# Patient Record
Sex: FEMALE | Race: WHITE | NOT HISPANIC OR LATINO | Employment: OTHER | URBAN - METROPOLITAN AREA
[De-identification: names, ages, dates, MRNs, and addresses within clinical notes are randomized per-mention and may not be internally consistent; named-entity substitution may affect disease eponyms.]

---

## 2017-02-13 ENCOUNTER — ALLSCRIPTS OFFICE VISIT (OUTPATIENT)
Dept: OTHER | Facility: OTHER | Age: 77
End: 2017-02-13

## 2017-02-13 DIAGNOSIS — R79.89 OTHER SPECIFIED ABNORMAL FINDINGS OF BLOOD CHEMISTRY: ICD-10-CM

## 2017-02-13 DIAGNOSIS — R60.9 EDEMA: ICD-10-CM

## 2017-02-13 DIAGNOSIS — I10 ESSENTIAL (PRIMARY) HYPERTENSION: ICD-10-CM

## 2017-05-15 ENCOUNTER — ALLSCRIPTS OFFICE VISIT (OUTPATIENT)
Dept: OTHER | Facility: OTHER | Age: 77
End: 2017-05-15

## 2017-08-14 ENCOUNTER — ALLSCRIPTS OFFICE VISIT (OUTPATIENT)
Dept: OTHER | Facility: OTHER | Age: 77
End: 2017-08-14

## 2017-08-14 DIAGNOSIS — R51 HEADACHE(784.0): ICD-10-CM

## 2017-09-20 DIAGNOSIS — Z90.5 ACQUIRED ABSENCE OF KIDNEY: ICD-10-CM

## 2017-09-25 ENCOUNTER — ALLSCRIPTS OFFICE VISIT (OUTPATIENT)
Dept: OTHER | Facility: OTHER | Age: 77
End: 2017-09-25

## 2017-09-26 ENCOUNTER — GENERIC CONVERSION - ENCOUNTER (OUTPATIENT)
Dept: OTHER | Facility: OTHER | Age: 77
End: 2017-09-26

## 2017-10-03 ENCOUNTER — GENERIC CONVERSION - ENCOUNTER (OUTPATIENT)
Dept: OTHER | Facility: OTHER | Age: 77
End: 2017-10-03

## 2017-11-21 ENCOUNTER — GENERIC CONVERSION - ENCOUNTER (OUTPATIENT)
Dept: OTHER | Facility: OTHER | Age: 77
End: 2017-11-21

## 2017-11-29 ENCOUNTER — GENERIC CONVERSION - ENCOUNTER (OUTPATIENT)
Dept: OTHER | Facility: OTHER | Age: 77
End: 2017-11-29

## 2017-12-20 ENCOUNTER — ALLSCRIPTS OFFICE VISIT (OUTPATIENT)
Dept: OTHER | Facility: OTHER | Age: 77
End: 2017-12-20

## 2017-12-20 DIAGNOSIS — I51.9 HEART DISEASE: ICD-10-CM

## 2017-12-20 DIAGNOSIS — S09.90XA INJURY OF HEAD: ICD-10-CM

## 2017-12-20 DIAGNOSIS — I10 ESSENTIAL (PRIMARY) HYPERTENSION: ICD-10-CM

## 2017-12-20 DIAGNOSIS — R79.89 OTHER SPECIFIED ABNORMAL FINDINGS OF BLOOD CHEMISTRY: ICD-10-CM

## 2017-12-21 NOTE — PROGRESS NOTES
Assessment   1  Closed head injury, initial encounter (959 01) (S09 90XA)   2  Low serum vitamin B12 (790 6) (R79 89)   3  Diastolic dysfunction (538 5) (I51 9)    Plan   Acute lower UTI    · Ciprofloxacin HCl - 250 MG Oral Tablet   · Pyridium 100 MG Oral Tablet  Benign essential hypertension, Diastolic dysfunction    · (1) TSH WITH FT4 REFLEX; Status:Active; Requested for:65Brn7451;   Closed head injury, initial encounter    · * CT HEAD WO CONTRAST; Status:Need Information - Financial Authorization; Requested XAP:[id number];   Diastolic dysfunction    · (1) COMPREHENSIVE METABOLIC PANEL; Status:Active; Requested for:60Vpu6855;    · EKG/ECG- POC; Status:Complete - Retrospective By Protocol Authorization;   Done:    07JOR4090 10:03AM  Low serum vitamin B12    · (1) CBC/PLT/DIFF; Status:Active; Requested for:98Gqw4091;    · (1) VITAMIN B12; Status:Active; Requested for:38Dmw4607;     Discussion/Summary   Surgical Clearance: She is at a LOW risk from a cardiovascular standpoint at this time without any additional cardiac testing  Reevaluation needed, if she should present with symptoms prior to surgery/procedure  Possible side effects of new medications were reviewed with the patient/guardian today  The treatment plan was reviewed with the patient/guardian  The patient/guardian understands and agrees with the treatment plan      Chief Complaint   SURGICAL CLEARANCE  CATARACT SURGERY TO BE DONE 12/27/2017  EKG DONE IN SEPTEMBER      History of Present Illness   Pre-Op Visit (Brief): The patient is being seen for a preoperative visit  The procedure is a(n) catarct  The indication for surgery is cataract  Surgical Risk Assessment:      Prior Anesthesia: She had prior anesthesia,-- no prior adverse reaction to edidural anesthesia,-- no prior adverse reaction to spinal anesthesia-- and-- no prior adverse reaction to general anesthesia  Pertinent Past Medical History: no pertinent past medical history   Exercise Capacity: able to walk four blocks without symptoms-- and-- able to walk two flights of stairs without symptoms  Symptoms: no symptoms  Pertinent Family History: no pertinent family history  Living Situation: home is secure and supportive  HPI: She fell out of bed 2 nights ago  She struck her head  She states she heard a loud crack  She did not have much in the way of symptoms on the 1st night, but now she has a lump on her head and is very painful to touch  She has not lost consciousness  She has no vision changes  She does have pain behind her right ear and she has some nausea  She has no dizziness and no other complaints  Active Problems   1  Acquired solitary kidney (V45 73) (Z90 5)   2  Acute lower UTI (599 0) (N39 0)   3  Atypical chest pain (786 59) (R07 89)   4  Benign essential hypertension (401 1) (I10)   5  Biceps tendinitis of right shoulder (726 12) (M75 21)   6  Chronic diastolic CHF (congestive heart failure) (428 32,428 0) (I50 32)   7  Constipation due to opioid therapy (564 09,E935 2) (K59 03,T40 2X5A)   8  Diastolic dysfunction (171 5) (I51 9)   9  Dyspnea on exertion (786 09) (R06 09)   10  GERD without esophagitis (530 81) (K21 9)   11  Headache, worsening (784 0) (R51)   12  Lateral epicondylitis of right elbow (726 32) (M77 11)   13  Low serum vitamin B12 (790 6) (R79 89)   14  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)   15  Need for pneumococcal vaccination (V03 82) (Z23)   16  Peripheral edema (782 3) (R60 9)   17  Screening for genitourinary condition (V81 6) (Z13 89)   18   Special screening for other neurological conditions (V80 09) (Z13 89)    Past Medical History    · History of Cough (786 2) (R05)   · History of hypercholesterolemia (V12 29) (Z86 39)   · History of hypertension (V12 59) (Z86 79)   · History of IBS (V12 79) (Z87 19)   · History of rheumatic fever (V12 09) (Z86 79)   · History of Leg swelling (729 81) (M79 89)   · History of Skin lesion (709  9) (L98 9)    Surgical History    · History of Hysterectomy (V88 01)   · History of Kidney Surgery   · History of Tonsillectomy   · History of Treatment Of Ankle Fracture    Family History   Mother    · Family history of hypertension (V17 49) (Z82 49)  Father    · Family history of asthma (V17 5) (Z82 5)  Sister    · Family history of malignant neoplasm (V16 9) (Z80 9)    Social History    · Never a smoker    Current Meds    1  BD Luer-Arpan Syringe 26G X 5/8 3 ML Miscellaneous; USE AS DIRECTED; Therapy: 59ZRL6683 to (Last Rx:03Mar2017)  Requested for: 60NJR5606 Ordered   2  Ciprofloxacin HCl - 250 MG Oral Tablet; TAKE 1 TABLET EVERY 12 HOURS DAILY; Therapy: 37QWI9066 to (Evaluate:28Sep2017)  Requested for: 67PCY7708; Last     Rx:25Sep2017 Ordered   3  Cyanocobalamin 1000 MCG/ML Injection Solution; INJECT 1 ML INTRAMUSCULARLY     ONCE A MONTH;     Therapy: 20LGC2655 to (Last MD:49XCX2234)  Requested for: 13VPM0987 Ordered   4  Esomeprazole Magnesium 40 MG Oral Capsule Delayed Release; TAKE 1 CAPSULE     DAILY AS NEEDED; Therapy: 59QEX7254 to (Evaluate:28Jan2017)  Requested for: 30Xfr2930; Last     Rx:03Sqf3786 Ordered   5  Fluticasone Propionate 50 MCG/ACT Nasal Suspension; USE 2 SPRAYS IN EACH     NOSTRIL ONCE DAILY; Therapy: 04MZM2272 to (Last Rx:14Mar2016)  Requested for: 19BNE6049 Ordered   6  Furosemide 20 MG Oral Tablet; TAKE 1 TABLET DAILY AS DIRECTED; Therapy: 10ITN8111 to (Evaluate:08Old1645)  Requested for: 31REL1525; Last     Rx:26Tcs0530 Ordered   7  HydroCHLOROthiazide 25 MG Oral Tablet; take 1 tablet by mouth once daily; Therapy: 30LEK3650 to (Evaluate:41Nee9680)  Requested for: 56HCP4384; Last     Rx:23Fmw9247 Ordered   8  Losartan Potassium 50 MG Oral Tablet; TAKE 1 TABLET DAILY; Therapy: 97FZX8311 to (Evaluate:77Zwx8815)  Requested for: 19UTY4781; Last     Rx:48Cfw0073 Ordered   9   Polyethylene Glycol 3350 Oral Powder; MIX 1 CAPFUL (17GM) IN 8 OUNCES OF     WATER, JUICE, OR TEA AND DRINK DAILY; Therapy: 53Ssy5659 to (Evaluate:17Mar2018)  Requested for: 08Wbv0537; Last     Rx:09Gbo5808 Ordered   10  Potassium Chloride 10 MEQ TBCR; Therapy: (Recorded:14Mar2016) to Recorded   11  Pyridium 100 MG Oral Tablet; TAKE 1 TABLET 3 TIMES DAILY AFTER MEALS AS      NEEDED; Therapy: 69DII3969 to (Last Lyndal Sicard)  Requested for: 08OPJ1327 Ordered   12  Vitamin D3 1000 UNIT Oral Tablet Chewable; Therapy: (Recorded:14Mar2016) to Recorded    Allergies   1  Amoxicillin TABS   2  Statins    Vitals    Recorded: 44Bkz2587 09:15AM   Heart Rate 59   Respiration 15   Systolic 166   Diastolic 64   Height 4 ft 11 in   Weight 174 lb 8 oz   BMI Calculated 35 24   BSA Calculated 1 74   O2 Saturation 96     Physical Exam        Constitutional      General appearance: No acute distress, well appearing and well nourished  Ears, Nose, Mouth, and Throat      Otoscopic examination: Tympanic membranes translucent with normal light reflex  Canals patent without erythema  Hearing: Normal        Lips, teeth, and gums: Normal, good dentition  Neck      Neck: Supple, symmetric, trachea midline, no masses  Thyroid: Normal, no thyromegaly  Pulmonary      Respiratory effort: No increased work of breathing or signs of respiratory distress  Auscultation of lungs: Clear to auscultation  Cardiovascular      Auscultation of heart: Normal rate and rhythm, normal S1 and S2, no murmurs  Carotid pulses: 2+ bilaterally  Abdominal aorta: Normal        Musculoskeletal      Gait and station: Abnormal  -- tender parietal area right  Neurologic      Cranial nerves: Cranial nerves II-XII intact  Cortical function: Normal mental status  Reflexes: 2+ and symmetric  Sensation: No sensory loss  Coordination: Normal finger to nose and heel to shin         Psychiatric      Judgment and insight: Normal        Orientation to person, place, and time: Normal        Recent and remote memory: Intact  Mood and affect: Normal        Results/Data   EKG/ECG- POC 35Kxb8378 10:03AM Rajesh Rico      Test Name Result Flag Reference   EKG/ECG [id number]          End of Encounter Meds   1  HydroCHLOROthiazide 25 MG Oral Tablet; take 1 tablet by mouth once daily; Therapy: 27HEQ3148 to (Evaluate:2018)  Requested for: 22IDP2742; Last     Rx:15Mob7630 Ordered  2  Losartan Potassium 50 MG Oral Tablet; TAKE 1 TABLET DAILY; Therapy: 44FLP0031 to (Evaluate:2018)  Requested for: 65YGO4379; Last     Rx:39Ddf7900 Ordered  3  Polyethylene Glycol 3350 Oral Powder; MIX 1 CAPFUL (17GM) IN 8 OUNCES OF     WATER, JUICE, OR TEA AND DRINK DAILY; Therapy: 86Ebc0366 to (Evaluate:2018)  Requested for: 51Gyv5419; Last     Rx:42Jjh8793 Ordered  4  Furosemide 20 MG Oral Tablet; TAKE 1 TABLET DAILY AS DIRECTED; Therapy: 58OBR0471 to (Evaluate:2018)  Requested for: 64TMB7448; Last     Rx:74Oxx5676 Ordered  5  Esomeprazole Magnesium 40 MG Oral Capsule Delayed Release (NexIUM); TAKE 1     CAPSULE DAILY AS NEEDED; Therapy: 06LPL3758 to (Evaluate:2017)  Requested for: 70Ajh6909; Last     Rx:72Ltg7771 Ordered  6  BD Luer-Arpan Syringe 26G X 5/8 3 ML Miscellaneous; USE AS DIRECTED; Therapy: 64QWQ5338 to (Last Rx:2017)  Requested for: 44BJN6785 Ordered   7  Cyanocobalamin 1000 MCG/ML Injection Solution; INJECT 1 ML INTRAMUSCULARLY     ONCE A MONTH;     Therapy: 63PBI8898 to (Last J86YHM9632)  Requested for: 58HHS6482 Ordered  8  Fluticasone Propionate 50 MCG/ACT Nasal Suspension; USE 2 SPRAYS IN EACH     NOSTRIL ONCE DAILY; Therapy: 56JJG2973 to (Last Rx:2016)  Requested for: 70UFB3706 Ordered  9  Potassium Chloride 10 MEQ TBCR; Therapy: (Recorded:2016) to Recorded   10  Vitamin D3 1000 UNIT Oral Tablet Chewable;       Therapy: (Recorded:2016) to Recorded    Future Appointments      Date/Time Provider Specialty Site 02/05/2018 09:30 AM Birdena Bamberger, MD [address]    Electronically signed by : Mara Medina MD; Dec 20 2017 10:25AM EST                       (Author)

## 2018-01-12 VITALS
HEART RATE: 60 BPM | OXYGEN SATURATION: 97 % | BODY MASS INDEX: 34.12 KG/M2 | HEIGHT: 59 IN | RESPIRATION RATE: 15 BRPM | DIASTOLIC BLOOD PRESSURE: 72 MMHG | SYSTOLIC BLOOD PRESSURE: 130 MMHG | WEIGHT: 169.25 LBS

## 2018-01-13 VITALS
HEART RATE: 62 BPM | BODY MASS INDEX: 34.68 KG/M2 | HEIGHT: 59 IN | SYSTOLIC BLOOD PRESSURE: 140 MMHG | OXYGEN SATURATION: 97 % | WEIGHT: 172 LBS | DIASTOLIC BLOOD PRESSURE: 82 MMHG | RESPIRATION RATE: 15 BRPM

## 2018-01-14 VITALS
HEIGHT: 59 IN | HEART RATE: 68 BPM | RESPIRATION RATE: 14 BRPM | OXYGEN SATURATION: 95 % | SYSTOLIC BLOOD PRESSURE: 124 MMHG | WEIGHT: 172.25 LBS | BODY MASS INDEX: 34.72 KG/M2 | DIASTOLIC BLOOD PRESSURE: 62 MMHG

## 2018-01-15 VITALS
WEIGHT: 172.38 LBS | OXYGEN SATURATION: 98 % | DIASTOLIC BLOOD PRESSURE: 68 MMHG | BODY MASS INDEX: 34.75 KG/M2 | HEIGHT: 59 IN | RESPIRATION RATE: 15 BRPM | HEART RATE: 65 BPM | SYSTOLIC BLOOD PRESSURE: 122 MMHG

## 2018-01-23 VITALS
DIASTOLIC BLOOD PRESSURE: 64 MMHG | BODY MASS INDEX: 35.18 KG/M2 | RESPIRATION RATE: 15 BRPM | HEART RATE: 59 BPM | SYSTOLIC BLOOD PRESSURE: 128 MMHG | WEIGHT: 174.5 LBS | HEIGHT: 59 IN | OXYGEN SATURATION: 96 %

## 2018-02-12 RX ORDER — FUROSEMIDE 20 MG/1
1 TABLET ORAL DAILY
COMMUNITY
Start: 2017-02-13 | End: 2018-03-16 | Stop reason: SDUPTHER

## 2018-02-12 RX ORDER — FLUTICASONE PROPIONATE 50 MCG
2 SPRAY, SUSPENSION (ML) NASAL DAILY
COMMUNITY
Start: 2016-03-14 | End: 2018-08-06

## 2018-02-12 RX ORDER — CALCIUM CARBONATE 300MG(750)
TABLET,CHEWABLE ORAL
COMMUNITY
End: 2018-05-07

## 2018-02-12 RX ORDER — LOSARTAN POTASSIUM 50 MG/1
TABLET ORAL
Refills: 0 | COMMUNITY
Start: 2018-01-03 | End: 2018-05-07

## 2018-02-12 RX ORDER — ESOMEPRAZOLE MAGNESIUM 40 MG/1
1 CAPSULE, DELAYED RELEASE ORAL DAILY PRN
COMMUNITY
Start: 2016-06-13 | End: 2018-05-07

## 2018-02-12 RX ORDER — HYDROCHLOROTHIAZIDE 25 MG/1
1 TABLET ORAL DAILY
COMMUNITY
Start: 2017-12-18 | End: 2018-08-06

## 2018-02-12 RX ORDER — POTASSIUM CHLORIDE 750 MG/1
TABLET, EXTENDED RELEASE ORAL
COMMUNITY
End: 2018-08-06

## 2018-02-12 RX ORDER — CYANOCOBALAMIN 1000 UG/ML
1 INJECTION INTRAMUSCULAR; SUBCUTANEOUS
COMMUNITY
Start: 2017-03-03 | End: 2018-02-13 | Stop reason: SDUPTHER

## 2018-02-13 ENCOUNTER — OFFICE VISIT (OUTPATIENT)
Dept: FAMILY MEDICINE CLINIC | Facility: CLINIC | Age: 78
End: 2018-02-13
Payer: COMMERCIAL

## 2018-02-13 VITALS
DIASTOLIC BLOOD PRESSURE: 78 MMHG | OXYGEN SATURATION: 97 % | SYSTOLIC BLOOD PRESSURE: 132 MMHG | HEART RATE: 69 BPM | RESPIRATION RATE: 14 BRPM

## 2018-02-13 DIAGNOSIS — I10 BENIGN ESSENTIAL HYPERTENSION: ICD-10-CM

## 2018-02-13 DIAGNOSIS — G62.89 OTHER POLYNEUROPATHY: Primary | ICD-10-CM

## 2018-02-13 DIAGNOSIS — E53.8 LOW SERUM VITAMIN B12: ICD-10-CM

## 2018-02-13 DIAGNOSIS — E53.8 VITAMIN B12 DEFICIENCY: Primary | ICD-10-CM

## 2018-02-13 DIAGNOSIS — J32.9 SINUSITIS, UNSPECIFIED CHRONICITY, UNSPECIFIED LOCATION: ICD-10-CM

## 2018-02-13 DIAGNOSIS — E53.8 B12 DEFICIENCY: ICD-10-CM

## 2018-02-13 DIAGNOSIS — M81.0 OSTEOPOROSIS, UNSPECIFIED OSTEOPOROSIS TYPE, UNSPECIFIED PATHOLOGICAL FRACTURE PRESENCE: ICD-10-CM

## 2018-02-13 DIAGNOSIS — I10 ESSENTIAL HYPERTENSION: ICD-10-CM

## 2018-02-13 PROBLEM — R51.9 HEADACHE, WORSENING: Status: ACTIVE | Noted: 2017-08-14

## 2018-02-13 PROBLEM — R60.9 PERIPHERAL EDEMA: Status: ACTIVE | Noted: 2017-02-13

## 2018-02-13 PROBLEM — M51.16 LUMBAR DISC HERNIATION WITH RADICULOPATHY: Status: ACTIVE | Noted: 2017-09-25

## 2018-02-13 PROBLEM — G62.9 PERIPHERAL NEUROPATHY: Status: ACTIVE | Noted: 2018-02-13

## 2018-02-13 PROBLEM — S09.90XA CLOSED HEAD INJURY: Status: ACTIVE | Noted: 2017-12-20

## 2018-02-13 PROBLEM — N39.0 ACUTE LOWER UTI: Status: ACTIVE | Noted: 2017-09-20

## 2018-02-13 PROBLEM — R60.0 PERIPHERAL EDEMA: Status: ACTIVE | Noted: 2017-02-13

## 2018-02-13 PROBLEM — I50.32 CHRONIC DIASTOLIC CHF (CONGESTIVE HEART FAILURE) (HCC): Status: ACTIVE | Noted: 2017-05-15

## 2018-02-13 PROCEDURE — 99214 OFFICE O/P EST MOD 30 MIN: CPT | Performed by: FAMILY MEDICINE

## 2018-02-13 PROCEDURE — 96372 THER/PROPH/DIAG INJ SC/IM: CPT

## 2018-02-13 RX ORDER — POLYETHYLENE GLYCOL 3350 17 G/17G
POWDER, FOR SOLUTION ORAL DAILY
COMMUNITY
Start: 2017-09-18 | End: 2018-05-07

## 2018-02-13 RX ORDER — PHENAZOPYRIDINE HYDROCHLORIDE 100 MG/1
1 TABLET, FILM COATED ORAL EVERY 8 HOURS
COMMUNITY
Start: 2017-09-25 | End: 2018-05-07

## 2018-02-13 RX ORDER — OFLOXACIN 3 MG/ML
SOLUTION/ DROPS OPHTHALMIC
Refills: 0 | COMMUNITY
Start: 2017-12-11 | End: 2018-05-07

## 2018-02-13 RX ORDER — DULOXETIN HYDROCHLORIDE 30 MG/1
30 CAPSULE, DELAYED RELEASE ORAL DAILY
Qty: 30 CAPSULE | Refills: 5 | Status: SHIPPED | OUTPATIENT
Start: 2018-02-13 | End: 2018-05-07

## 2018-02-13 RX ORDER — CYANOCOBALAMIN 1000 UG/ML
1000 INJECTION INTRAMUSCULAR; SUBCUTANEOUS ONCE
Status: CANCELLED | OUTPATIENT
Start: 2018-02-13

## 2018-02-13 RX ORDER — CYANOCOBALAMIN 1000 UG/ML
1000 INJECTION INTRAMUSCULAR; SUBCUTANEOUS WEEKLY
Qty: 10 ML | Refills: 5 | Status: SHIPPED | OUTPATIENT
Start: 2018-02-13 | End: 2018-08-06

## 2018-02-13 RX ORDER — SULFAMETHOXAZOLE AND TRIMETHOPRIM 800; 160 MG/1; MG/1
1 TABLET ORAL EVERY 12 HOURS SCHEDULED
Qty: 20 TABLET | Refills: 0 | Status: SHIPPED | OUTPATIENT
Start: 2018-02-13 | End: 2018-02-23

## 2018-02-13 RX ORDER — KETOROLAC TROMETHAMINE 4 MG/ML
SOLUTION/ DROPS OPHTHALMIC
Refills: 0 | COMMUNITY
Start: 2017-12-11 | End: 2018-05-07

## 2018-02-13 NOTE — PROGRESS NOTES
Assessment/Plan:    No problem-specific Assessment & Plan notes found for this encounter  Diagnoses and all orders for this visit:    Other polyneuropathy  -     DULoxetine (CYMBALTA) 30 mg delayed release capsule; Take 1 capsule (30 mg total) by mouth daily    B12 deficiency  -     SYRINGE-NEEDLE, DISP, 3 ML (B-D 3CC LUER-BRYAN SYR 26GX5/8") 26G X 5/8" 3 ML MISC; by Does not apply route once a week  -     cyanocobalamin 1,000 mcg/mL; Inject 1 mL (1,000 mcg total) under the skin once a week    Sinusitis, unspecified chronicity, unspecified location  -     sulfamethoxazole-trimethoprim (BACTRIM DS) 800-160 mg per tablet; Take 1 tablet by mouth every 12 (twelve) hours for 10 days    Essential hypertension  -     Lipid panel; Future  -     Comprehensive metabolic panel; Future  -     TSH, 3rd generation with T4 reflex; Future    Osteoporosis, unspecified osteoporosis type, unspecified pathological fracture presence  -     DXA bone density spine hip and pelvis; Future    Benign essential hypertension    Low serum vitamin B12    Other orders  -     Discontinue: SYRINGE-NEEDLE, DISP, 3 ML (B-D 3CC LUER-BRYAN SYR 26GX5/8") 26G X 5/8" 3 ML MISC; by Does not apply route  -     Discontinue: cyanocobalamin 1,000 mcg/mL; Inject 1 mL as directed  -     esomeprazole (NexIUM) 40 MG capsule; Take 1 capsule by mouth daily as needed  -     fluticasone (FLONASE) 50 mcg/act nasal spray; 2 sprays into each nostril daily  -     furosemide (LASIX) 20 mg tablet; Take 1 tablet by mouth daily  -     hydrochlorothiazide (HYDRODIURIL) 25 mg tablet; Take 1 tablet by mouth daily  -     losartan (COZAAR) 50 mg tablet;   -     Cholecalciferol (VITAMIN D3) 1000 units CHEW; Chew  -     potassium chloride (K-DUR,KLOR-CON) 10 mEq tablet; Take by mouth  -     ofloxacin (OCUFLOX) 0 3 % ophthalmic solution; START 3 DAYS PREOP instill 1 drop into right eye four times a day     (REFER TO PRESCRIPTION NOTES)    -     ketorolac (ACULAR) 0 4 % SOLN; instill 1 drop into right eye four times a day 3 DAYS PREOP/POSTO     (REFER TO PRESCRIPTION NOTES)  -     polyethylene glycol (GLYCOLAX) powder; Take by mouth daily  -     phenazopyridine (PYRIDIUM) 100 mg tablet; Take 1 tablet by mouth every 8 (eight) hours          Subjective:      Patient ID: Onelia Christine is a 76 y o  female  She has burning pain in the left lower extremity  It runs from the mid shin to the foot  She has no DM but does have B12 deficiency  She is not taking anything for it  She c/o fatigue, especially post exertionally  She is not taking B12 shots, although they were prescribed  She c/o sinus infection  She has sinus pain and congestion  She has PND and cough  She has no F/C    Her blood pressure is well controlled on her current regimen  She has no chest pain or shortness of breath  She has no new headache or vision changes  Leg Pain          The following portions of the patient's history were reviewed and updated as appropriate:   She  has a past medical history of Hypercholesteremia; Hypertension; IBS (irritable bowel syndrome); Rheumatic fever; and Skin lesion  She  does not have any pertinent problems on file  She  has a past surgical history that includes Hysterectomy; Kidney surgery; Tonsillectomy; and Ankle fracture surgery  Her family history includes Asthma in her father; Cancer in her sister; Hypertension in her mother  She  reports that she has never smoked  She does not have any smokeless tobacco history on file  Her alcohol and drug histories are not on file    Current Outpatient Prescriptions   Medication Sig Dispense Refill    esomeprazole (NexIUM) 40 MG capsule Take 1 capsule by mouth daily as needed      hydrochlorothiazide (HYDRODIURIL) 25 mg tablet Take 1 tablet by mouth daily      Cholecalciferol (VITAMIN D3) 1000 units CHEW Chew      cyanocobalamin 1,000 mcg/mL Inject 1 mL (1,000 mcg total) under the skin once a week 10 mL 5    DULoxetine (CYMBALTA) 30 mg delayed release capsule Take 1 capsule (30 mg total) by mouth daily 30 capsule 5    fluticasone (FLONASE) 50 mcg/act nasal spray 2 sprays into each nostril daily      furosemide (LASIX) 20 mg tablet Take 1 tablet by mouth daily      ketorolac (ACULAR) 0 4 % SOLN instill 1 drop into right eye four times a day 3 DAYS PREOP/POSTO     (REFER TO PRESCRIPTION NOTES)  0    losartan (COZAAR) 50 mg tablet   0    ofloxacin (OCUFLOX) 0 3 % ophthalmic solution START 3 DAYS PREOP instill 1 drop into right eye four times a day     (REFER TO PRESCRIPTION NOTES)  0    phenazopyridine (PYRIDIUM) 100 mg tablet Take 1 tablet by mouth every 8 (eight) hours      polyethylene glycol (GLYCOLAX) powder Take by mouth daily      potassium chloride (K-DUR,KLOR-CON) 10 mEq tablet Take by mouth      sulfamethoxazole-trimethoprim (BACTRIM DS) 800-160 mg per tablet Take 1 tablet by mouth every 12 (twelve) hours for 10 days 20 tablet 0    SYRINGE-NEEDLE, DISP, 3 ML (B-D 3CC LUER-BRYAN SYR 26GX5/8") 26G X 5/8" 3 ML MISC by Does not apply route once a week 4 each 5     No current facility-administered medications for this visit  No current outpatient prescriptions on file prior to visit  No current facility-administered medications on file prior to visit  She is allergic to amoxicillin; ciprofloxacin; and statins       Review of Systems   All other systems reviewed and are negative  Objective:    Vitals:    02/13/18 0908   BP: 132/78   Pulse: 69   Resp: 14   SpO2: 97%        Physical Exam   Constitutional: She is oriented to person, place, and time  Neck: Normal range of motion  Neck supple  Cardiovascular: Normal rate, regular rhythm, normal heart sounds and intact distal pulses  Pulmonary/Chest: Effort normal and breath sounds normal    Abdominal: Soft  Bowel sounds are normal    Musculoskeletal: Normal range of motion  Neurological: She is alert and oriented to person, place, and time   She has normal reflexes  Skin: Skin is warm and dry  Psychiatric: She has a normal mood and affect  Her behavior is normal  Judgment and thought content normal    Nursing note and vitals reviewed

## 2018-02-15 RX ORDER — CYANOCOBALAMIN 1000 UG/ML
1000 INJECTION INTRAMUSCULAR; SUBCUTANEOUS
Status: SHIPPED | OUTPATIENT
Start: 2018-02-15

## 2018-02-19 RX ADMIN — CYANOCOBALAMIN 1000 MCG: 1000 INJECTION INTRAMUSCULAR; SUBCUTANEOUS at 10:08

## 2018-02-26 ENCOUNTER — TELEPHONE (OUTPATIENT)
Dept: FAMILY MEDICINE CLINIC | Facility: CLINIC | Age: 78
End: 2018-02-26

## 2018-03-16 DIAGNOSIS — R60.9 EDEMA, UNSPECIFIED TYPE: Primary | ICD-10-CM

## 2018-03-16 RX ORDER — FUROSEMIDE 20 MG/1
20 TABLET ORAL DAILY
Qty: 30 TABLET | Refills: 5 | Status: SHIPPED | OUTPATIENT
Start: 2018-03-16 | End: 2018-05-21 | Stop reason: SDUPTHER

## 2018-05-07 ENCOUNTER — OFFICE VISIT (OUTPATIENT)
Dept: FAMILY MEDICINE CLINIC | Facility: CLINIC | Age: 78
End: 2018-05-07
Payer: COMMERCIAL

## 2018-05-07 VITALS
SYSTOLIC BLOOD PRESSURE: 130 MMHG | DIASTOLIC BLOOD PRESSURE: 78 MMHG | RESPIRATION RATE: 15 BRPM | BODY MASS INDEX: 36.73 KG/M2 | HEIGHT: 59 IN | HEART RATE: 71 BPM | WEIGHT: 182.2 LBS | OXYGEN SATURATION: 96 % | TEMPERATURE: 98.6 F

## 2018-05-07 DIAGNOSIS — E53.8 B12 DEFICIENCY: ICD-10-CM

## 2018-05-07 DIAGNOSIS — Z13.89 SCREENING FOR NEUROLOGICAL CONDITION: ICD-10-CM

## 2018-05-07 DIAGNOSIS — Z13.89 SCREENING FOR GENITOURINARY CONDITION: ICD-10-CM

## 2018-05-07 DIAGNOSIS — M79.10 MYALGIA: ICD-10-CM

## 2018-05-07 DIAGNOSIS — I50.32 CHRONIC DIASTOLIC CHF (CONGESTIVE HEART FAILURE) (HCC): ICD-10-CM

## 2018-05-07 DIAGNOSIS — R07.89 ATYPICAL CHEST PAIN: ICD-10-CM

## 2018-05-07 DIAGNOSIS — R06.00 DYSPNEA ON EXERTION: ICD-10-CM

## 2018-05-07 DIAGNOSIS — R10.11 RUQ PAIN: ICD-10-CM

## 2018-05-07 DIAGNOSIS — I10 ESSENTIAL HYPERTENSION: Primary | ICD-10-CM

## 2018-05-07 DIAGNOSIS — N39.0 ACUTE LOWER UTI: ICD-10-CM

## 2018-05-07 DIAGNOSIS — I51.89 DIASTOLIC DYSFUNCTION: ICD-10-CM

## 2018-05-07 PROCEDURE — 3725F SCREEN DEPRESSION PERFORMED: CPT | Performed by: FAMILY MEDICINE

## 2018-05-07 PROCEDURE — 1101F PT FALLS ASSESS-DOCD LE1/YR: CPT | Performed by: FAMILY MEDICINE

## 2018-05-07 PROCEDURE — 99214 OFFICE O/P EST MOD 30 MIN: CPT | Performed by: FAMILY MEDICINE

## 2018-05-07 PROCEDURE — 93000 ELECTROCARDIOGRAM COMPLETE: CPT | Performed by: FAMILY MEDICINE

## 2018-05-07 NOTE — PROGRESS NOTES
Assessment/Plan:    No problem-specific Assessment & Plan notes found for this encounter  Diagnoses and all orders for this visit:    Essential hypertension  -     Lipid panel; Future  -     Comprehensive metabolic panel; Future  -     TSH, 3rd generation; Future  -     CBC and differential; Future    Chronic diastolic CHF (congestive heart failure) (HCC)  -     Comprehensive metabolic panel; Future  -     TSH, 3rd generation; Future  -     CBC and differential; Future  -     NT-BNP PRO; Future  -     NM myocardial perfusion spect (rx stress and/or rest); Future    Acute lower UTI  -     UA (URINE) with reflex to Microscopic  -     Comprehensive metabolic panel; Future  -     TSH, 3rd generation; Future  -     CBC and differential; Future    B12 deficiency  -     Comprehensive metabolic panel; Future  -     TSH, 3rd generation; Future  -     CBC and differential; Future    Diastolic dysfunction  -     Comprehensive metabolic panel; Future  -     TSH, 3rd generation; Future  -     CBC and differential; Future  -     NT-BNP PRO; Future    Dyspnea on exertion  -     Comprehensive metabolic panel; Future  -     TSH, 3rd generation; Future  -     CBC and differential; Future    Myalgia  -     Sedimentation rate, automated; Future  -     Comprehensive metabolic panel; Future  -     TSH, 3rd generation; Future  -     CBC and differential; Future    RUQ pain  -     US right upper quadrant; Future  -     TSH, 3rd generation; Future  -     CBC and differential; Future    Atypical chest pain  -     NM myocardial perfusion spect (rx stress and/or rest); Future    Other orders  -     lansoprazole (PREVACID SOLUTAB) 15 mg disintegrating tablet; Take 15 mg by mouth daily  -     Hm Mammography          Subjective:      Patient ID: Nohemi Knight is a 76 y o  female  She woke today with nausea  She denies vomiting or diarrhea  She has no abdominal pain  She has no F/C    She is concerned about her kidneys (solitary kidney)  She had some intermittent chest pain radiating down the left arm  She has not had stress testing  She has no nausea with this chest pain, however  The chest pain occurred at night (in bad)  The pain lasted about a minute or so and resolved spontaneously  She denies palpitations or diaphoresis  She has right-sided low back pain that runs up into the thoracic area  She has no urinary c/o  She has various c/o pain  She has pain in both shoulders and her right biceps  She has vague abdominal pain  She has diastolic dysfunction  She is taking 20 mg of lasix  She is not taking losartan, but the reason why is not clear  The following portions of the patient's history were reviewed and updated as appropriate:   She  has a past medical history of Hypercholesteremia; Hypertension; IBS (irritable bowel syndrome); Rheumatic fever; and Skin lesion  She   Patient Active Problem List    Diagnosis Date Noted    Peripheral neuropathy 02/13/2018    B12 deficiency 02/13/2018    Sinusitis 02/13/2018    Osteoporosis 02/13/2018    Closed head injury 12/20/2017    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    Acute lower UTI 09/20/2017    Headache, worsening 08/14/2017    Chronic diastolic CHF (congestive heart failure) ([facility] [address] ) 05/15/2017    Low serum vitamin B12 02/13/2017    Peripheral edema 02/13/2017    Atypical chest pain 08/01/2016    Biceps tendinitis of right shoulder 75/74/2756    Diastolic dysfunction 32/73/7929    Lateral epicondylitis of right elbow 04/11/2016    Essential hypertension 03/14/2016    Dyspnea on exertion 03/14/2016    GERD without esophagitis 03/14/2016     She  has a past surgical history that includes Hysterectomy; Kidney surgery; Tonsillectomy; and Ankle fracture surgery  Her family history includes Asthma in her father; Cancer in her sister; Hypertension in her mother  She  reports that she has never smoked   She has never used smokeless tobacco  She reports that she does not drink alcohol or use drugs    Current Outpatient Prescriptions   Medication Sig Dispense Refill    cyanocobalamin 1,000 mcg/mL Inject 1 mL (1,000 mcg total) under the skin once a week 10 mL 5    fluticasone (FLONASE) 50 mcg/act nasal spray 2 sprays into each nostril daily      furosemide (LASIX) 20 mg tablet Take 1 tablet (20 mg total) by mouth daily 30 tablet 5    hydrochlorothiazide (HYDRODIURIL) 25 mg tablet Take 1 tablet by mouth daily      lansoprazole (PREVACID SOLUTAB) 15 mg disintegrating tablet Take 15 mg by mouth daily      potassium chloride (K-DUR,KLOR-CON) 10 mEq tablet Take by mouth       Current Facility-Administered Medications   Medication Dose Route Frequency Provider Last Rate Last Dose    cyanocobalamin injection 1,000 mcg  1,000 mcg Intramuscular Q30 Days Ever MD Bernard   1,000 mcg at 02/19/18 1008     Current Outpatient Prescriptions on File Prior to Visit   Medication Sig    cyanocobalamin 1,000 mcg/mL Inject 1 mL (1,000 mcg total) under the skin once a week    fluticasone (FLONASE) 50 mcg/act nasal spray 2 sprays into each nostril daily    furosemide (LASIX) 20 mg tablet Take 1 tablet (20 mg total) by mouth daily    hydrochlorothiazide (HYDRODIURIL) 25 mg tablet Take 1 tablet by mouth daily    [DISCONTINUED] polyethylene glycol (GLYCOLAX) powder Take by mouth daily    [DISCONTINUED] SYRINGE-NEEDLE, DISP, 3 ML (B-D 3CC LUER-BRYAN SYR 26GX5/8") 26G X 5/8" 3 ML MISC by Does not apply route once a week    potassium chloride (K-DUR,KLOR-CON) 10 mEq tablet Take by mouth    [DISCONTINUED] Cholecalciferol (VITAMIN D3) 1000 units CHEW Chew    [DISCONTINUED] DULoxetine (CYMBALTA) 30 mg delayed release capsule Take 1 capsule (30 mg total) by mouth daily    [DISCONTINUED] esomeprazole (NexIUM) 40 MG capsule Take 1 capsule by mouth daily as needed    [DISCONTINUED] ketorolac (ACULAR) 0 4 % SOLN instill 1 drop into right eye four times a day 3 DAYS PREOP/POSTO     (REFER TO PRESCRIPTION NOTES)   [DISCONTINUED] losartan (COZAAR) 50 mg tablet     [DISCONTINUED] ofloxacin (OCUFLOX) 0 3 % ophthalmic solution START 3 DAYS PREOP instill 1 drop into right eye four times a day     (REFER TO PRESCRIPTION NOTES)   [DISCONTINUED] phenazopyridine (PYRIDIUM) 100 mg tablet Take 1 tablet by mouth every 8 (eight) hours     Current Facility-Administered Medications on File Prior to Visit   Medication    cyanocobalamin injection 1,000 mcg     She is allergic to amoxicillin; ciprofloxacin; and statins       Review of Systems   Respiratory: Positive for shortness of breath  Cardiovascular: Positive for chest pain and leg swelling  Gastrointestinal: Positive for abdominal pain and nausea  Genitourinary: Positive for dysuria  Musculoskeletal: Positive for arthralgias and myalgias  Objective:      /78 (BP Location: Right arm, Patient Position: Sitting, Cuff Size: Large)   Pulse 71   Temp 98 6 °F (37 °C)   Resp 15   Ht 4' 11" (1 499 m)   Wt 82 6 kg (182 lb 3 2 oz)   SpO2 96%   BMI 36 80 kg/m²          Physical Exam   Constitutional: She is oriented to person, place, and time  She appears well-developed  Neck: Normal range of motion  Neck supple  Cardiovascular: Normal rate, regular rhythm, normal heart sounds and intact distal pulses  Pulmonary/Chest: Effort normal and breath sounds normal    Abdominal: Soft  Bowel sounds are normal    Musculoskeletal: Normal range of motion  Neurological: She is alert and oriented to person, place, and time  She has normal reflexes  Skin: Skin is warm and dry  Psychiatric: She has a normal mood and affect  Her behavior is normal  Judgment normal    Nursing note and vitals reviewed

## 2018-05-07 NOTE — PATIENT INSTRUCTIONS
To evaluate your chest pain, I would like to do a stress test       I would like to ultrasound your gallbladder to see if that is making you sick  I have some lab work to look at diabetes and a form of arthritis for your joint and muscle pain  We will check your thyroid out as well  Increase your Lasix (Furosamide) to 40 mg (2 tablets at a time)

## 2018-05-08 ENCOUNTER — TELEPHONE (OUTPATIENT)
Dept: FAMILY MEDICINE CLINIC | Facility: CLINIC | Age: 78
End: 2018-05-08

## 2018-05-21 DIAGNOSIS — R60.9 EDEMA, UNSPECIFIED TYPE: ICD-10-CM

## 2018-05-21 RX ORDER — FUROSEMIDE 20 MG/1
40 TABLET ORAL DAILY
Qty: 30 TABLET | Refills: 5 | Status: SHIPPED | OUTPATIENT
Start: 2018-05-21 | End: 2018-08-06

## 2018-05-30 ENCOUNTER — TELEPHONE (OUTPATIENT)
Dept: FAMILY MEDICINE CLINIC | Facility: CLINIC | Age: 78
End: 2018-05-30

## 2018-05-30 DIAGNOSIS — R52 PAIN: Primary | ICD-10-CM

## 2018-05-31 RX ORDER — TRAMADOL HYDROCHLORIDE 50 MG/1
50 TABLET ORAL EVERY 6 HOURS PRN
Qty: 30 TABLET | Refills: 0 | Status: SHIPPED | OUTPATIENT
Start: 2018-05-31 | End: 2018-08-06

## 2018-06-27 DIAGNOSIS — R60.9 EDEMA, UNSPECIFIED TYPE: ICD-10-CM

## 2018-07-05 ENCOUNTER — TELEPHONE (OUTPATIENT)
Dept: FAMILY MEDICINE CLINIC | Facility: CLINIC | Age: 78
End: 2018-07-05

## 2018-08-01 RX ORDER — NITROFURANTOIN 25; 75 MG/1; MG/1
CAPSULE ORAL
Refills: 0 | COMMUNITY
Start: 2018-07-09 | End: 2018-08-06

## 2018-08-01 RX ORDER — METHYLPREDNISOLONE 4 MG/1
TABLET ORAL
Refills: 0 | COMMUNITY
Start: 2018-07-09 | End: 2018-08-06

## 2018-08-06 ENCOUNTER — OFFICE VISIT (OUTPATIENT)
Dept: FAMILY MEDICINE CLINIC | Facility: CLINIC | Age: 78
End: 2018-08-06
Payer: COMMERCIAL

## 2018-08-06 VITALS
WEIGHT: 168.2 LBS | SYSTOLIC BLOOD PRESSURE: 118 MMHG | HEIGHT: 59 IN | HEART RATE: 74 BPM | BODY MASS INDEX: 33.91 KG/M2 | TEMPERATURE: 98.8 F | DIASTOLIC BLOOD PRESSURE: 62 MMHG | OXYGEN SATURATION: 98 % | RESPIRATION RATE: 15 BRPM

## 2018-08-06 DIAGNOSIS — I10 ESSENTIAL HYPERTENSION: Primary | ICD-10-CM

## 2018-08-06 DIAGNOSIS — I50.32 CHRONIC DIASTOLIC CHF (CONGESTIVE HEART FAILURE) (HCC): ICD-10-CM

## 2018-08-06 PROCEDURE — 99213 OFFICE O/P EST LOW 20 MIN: CPT | Performed by: FAMILY MEDICINE

## 2018-08-06 PROCEDURE — 3008F BODY MASS INDEX DOCD: CPT | Performed by: FAMILY MEDICINE

## 2018-08-06 RX ORDER — CITALOPRAM 20 MG/1
20 TABLET ORAL DAILY
COMMUNITY
End: 2018-08-16 | Stop reason: SDUPTHER

## 2018-08-07 ENCOUNTER — TELEPHONE (OUTPATIENT)
Dept: FAMILY MEDICINE CLINIC | Facility: CLINIC | Age: 78
End: 2018-08-07

## 2018-08-08 ENCOUNTER — TELEPHONE (OUTPATIENT)
Dept: FAMILY MEDICINE CLINIC | Facility: CLINIC | Age: 78
End: 2018-08-08

## 2018-08-08 NOTE — TELEPHONE ENCOUNTER
Patient said she can not swallow the potassium pills, is asking for liquid potassium to be called into the pharmacy

## 2018-08-10 ENCOUNTER — TELEPHONE (OUTPATIENT)
Dept: FAMILY MEDICINE CLINIC | Facility: CLINIC | Age: 78
End: 2018-08-10

## 2018-08-10 DIAGNOSIS — I50.32 CHRONIC DIASTOLIC CHF (CONGESTIVE HEART FAILURE) (HCC): Primary | ICD-10-CM

## 2018-08-16 DIAGNOSIS — F32.A DEPRESSION, UNSPECIFIED DEPRESSION TYPE: Primary | ICD-10-CM

## 2018-08-16 RX ORDER — CITALOPRAM 20 MG/1
20 TABLET ORAL DAILY
Qty: 90 TABLET | Refills: 3 | Status: SHIPPED | OUTPATIENT
Start: 2018-08-16 | End: 2018-08-17 | Stop reason: SDUPTHER

## 2018-08-17 ENCOUNTER — TELEPHONE (OUTPATIENT)
Dept: FAMILY MEDICINE CLINIC | Facility: CLINIC | Age: 78
End: 2018-08-17

## 2018-08-17 DIAGNOSIS — F32.A DEPRESSION, UNSPECIFIED DEPRESSION TYPE: ICD-10-CM

## 2018-08-17 RX ORDER — CITALOPRAM 20 MG/1
20 TABLET ORAL DAILY
Qty: 90 TABLET | Refills: 0 | Status: SHIPPED | OUTPATIENT
Start: 2018-08-17 | End: 2018-12-24 | Stop reason: SDUPTHER

## 2018-09-04 ENCOUNTER — OFFICE VISIT (OUTPATIENT)
Dept: FAMILY MEDICINE CLINIC | Facility: CLINIC | Age: 78
End: 2018-09-04
Payer: COMMERCIAL

## 2018-09-04 VITALS
DIASTOLIC BLOOD PRESSURE: 78 MMHG | HEIGHT: 59 IN | BODY MASS INDEX: 34.51 KG/M2 | WEIGHT: 171.2 LBS | HEART RATE: 68 BPM | OXYGEN SATURATION: 95 % | SYSTOLIC BLOOD PRESSURE: 126 MMHG | RESPIRATION RATE: 16 BRPM

## 2018-09-04 DIAGNOSIS — Z23 NEED FOR INFLUENZA VACCINATION: Primary | ICD-10-CM

## 2018-09-04 DIAGNOSIS — I51.89 DIASTOLIC DYSFUNCTION: ICD-10-CM

## 2018-09-04 DIAGNOSIS — I50.32 CHRONIC DIASTOLIC CHF (CONGESTIVE HEART FAILURE) (HCC): ICD-10-CM

## 2018-09-04 DIAGNOSIS — K22.9 ESOPHAGUS DISORDER: ICD-10-CM

## 2018-09-04 DIAGNOSIS — I10 ESSENTIAL HYPERTENSION: ICD-10-CM

## 2018-09-04 PROCEDURE — 99214 OFFICE O/P EST MOD 30 MIN: CPT | Performed by: FAMILY MEDICINE

## 2018-09-04 RX ORDER — POTASSIUM CHLORIDE 1.5 G/1.77G
20 POWDER, FOR SOLUTION ORAL 2 TIMES DAILY
Qty: 100 PACKET | Refills: 5 | Status: SHIPPED | OUTPATIENT
Start: 2018-09-04 | End: 2018-10-09 | Stop reason: SDUPTHER

## 2018-09-04 RX ORDER — POTASSIUM CHLORIDE 20 MEQ/1
20 TABLET, EXTENDED RELEASE ORAL DAILY
COMMUNITY
End: 2018-09-04

## 2018-09-04 RX ORDER — FUROSEMIDE 40 MG/1
40 TABLET ORAL DAILY
COMMUNITY
End: 2018-10-09 | Stop reason: SDUPTHER

## 2018-09-04 RX ORDER — LANSOPRAZOLE 15 MG/1
15 CAPSULE, DELAYED RELEASE ORAL DAILY
COMMUNITY
End: 2021-12-23 | Stop reason: ALTCHOICE

## 2018-09-04 NOTE — PROGRESS NOTES
Assessment/Plan:    No problem-specific Assessment & Plan notes found for this encounter  Diagnoses and all orders for this visit:    Need for influenza vaccination  -     influenza vaccine, [id number], high-dose, PF 0 5 mL, for patients 65 yr+ (FLUZONE HIGH-DOSE)    Chronic diastolic CHF (congestive heart failure) ([facility] [address] )  -     Echo complete with contrast if indicated; Future  -     potassium chloride (KLOR-CON) 20 mEq packet; Take 20 mEq by mouth 2 (two) times a day  -     Ambulatory Referral to [address] [place] [address] [facility]; Future    Essential hypertension  -     potassium chloride (KLOR-CON) 20 mEq packet; Take 20 mEq by mouth 2 (two) times a day    Diastolic dysfunction  -     Echo complete with contrast if indicated; Future  -     potassium chloride (KLOR-CON) 20 mEq packet; Take 20 mEq by mouth 2 (two) times a day  -     Comprehensive metabolic panel; Future  -     Ambulatory Referral to Home Health; Future    Esophagus disorder  -     Ambulatory referral to Gastroenterology; Future    Other orders  -     lansoprazole (PREVACID) 15 mg capsule; Take 15 mg by mouth daily  -     Discontinue: potassium chloride (K-DUR,KLOR-CON) 20 mEq tablet; Take 20 mEq by mouth daily  -     furosemide (LASIX) 20 mg tablet; Take 20 mg by mouth daily          Subjective:      Patient ID: Mattie Babinski is a [de-identified] y o  female  She has diastolic dysfunction  She has increased swelling of both lower extremities for several weeks  She has some orthopnea and HINES  She is up 3 pounds since her last visit  She had a TTE ordered, but it was not performed  She has no cough or wheezing  She had one episode of non-exertional CP  It was left-sided and involved the left arm as well  She had no other associated symptoms  It lasted about 5 minutes and resolved on its own  She states her anxiety is well controlled on Celexa  She has no side effects          The following portions of the patient's history were reviewed and updated as appropriate:   She  has a past medical history of Hypercholesteremia; Hypertension; IBS (irritable bowel syndrome); Rheumatic fever; and Skin lesion  She   Patient Active Problem List    Diagnosis Date Noted    Screening for genitourinary condition 05/07/2018    Screening for neurological condition 05/07/2018    Peripheral neuropathy 02/13/2018    B12 deficiency 02/13/2018    Sinusitis 02/13/2018    Osteoporosis 02/13/2018    Closed head injury 12/20/2017    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    Acute lower UTI 09/20/2017    Headache, worsening 08/14/2017    Chronic diastolic CHF (congestive heart failure) ([facility] [address] ) 05/15/2017    Low serum vitamin B12 02/13/2017    Peripheral edema 02/13/2017    Atypical chest pain 08/01/2016    Biceps tendinitis of right shoulder 63/69/4603    Diastolic dysfunction 07/77/7247    Lateral epicondylitis of right elbow 04/11/2016    Essential hypertension 03/14/2016    Dyspnea on exertion 03/14/2016    GERD without esophagitis 03/14/2016     She  has a past surgical history that includes Hysterectomy; Kidney surgery; Tonsillectomy; and Ankle fracture surgery  Her family history includes Asthma in her father; Cancer in her sister; Hypertension in her mother  She  reports that she has never smoked  She has never used smokeless tobacco  She reports that she does not drink alcohol or use drugs    Current Outpatient Prescriptions   Medication Sig Dispense Refill    citalopram (CeleXA) 20 mg tablet Take 1 tablet (20 mg total) by mouth daily 90 tablet 0    furosemide (LASIX) 20 mg tablet Take 20 mg by mouth daily      lansoprazole (PREVACID) 15 mg capsule Take 15 mg by mouth daily      potassium chloride (KLOR-CON) 20 mEq packet Take 20 mEq by mouth 2 (two) times a day 100 packet 5     Current Facility-Administered Medications   Medication Dose Route Frequency Provider Last Rate Last Dose    cyanocobalamin injection 1,000 mcg  1,000 mcg Intramuscular Q30 Days Baljeet Sherman MD   1,000 mcg at 02/19/18 1008     Current Outpatient Prescriptions on File Prior to Visit   Medication Sig    citalopram (CeleXA) 20 mg tablet Take 1 tablet (20 mg total) by mouth daily     Current Facility-Administered Medications on File Prior to Visit   Medication    cyanocobalamin injection 1,000 mcg     She is allergic to amoxicillin; ciprofloxacin; erythromycin; and statins       Review of Systems   Respiratory: Positive for shortness of breath  Cardiovascular: Positive for leg swelling  All other systems reviewed and are negative  Objective:      /78 (BP Location: Right arm, Patient Position: Sitting, Cuff Size: Large)   Pulse 68   Resp 16   Ht 4' 11" (1 499 m)   Wt 77 7 kg (171 lb 3 2 oz)   SpO2 95%   BMI 34 58 kg/m²          Physical Exam   Constitutional: She is oriented to person, place, and time  She appears well-developed and well-nourished  Neck: Normal range of motion  Neck supple  Cardiovascular: Normal rate, regular rhythm, normal heart sounds and intact distal pulses  Pulmonary/Chest: Effort normal and breath sounds normal    Abdominal: Soft  Bowel sounds are normal    Musculoskeletal: Normal range of motion  Neurological: She is alert and oriented to person, place, and time  She has normal reflexes  Skin: Skin is warm and dry  Psychiatric: She has a normal mood and affect  Her behavior is normal  Judgment and thought content normal    Nursing note and vitals reviewed

## 2018-10-09 ENCOUNTER — OFFICE VISIT (OUTPATIENT)
Dept: FAMILY MEDICINE CLINIC | Facility: CLINIC | Age: 78
End: 2018-10-09
Payer: COMMERCIAL

## 2018-10-09 VITALS
SYSTOLIC BLOOD PRESSURE: 108 MMHG | RESPIRATION RATE: 16 BRPM | DIASTOLIC BLOOD PRESSURE: 68 MMHG | HEIGHT: 59 IN | OXYGEN SATURATION: 96 % | WEIGHT: 172 LBS | BODY MASS INDEX: 34.68 KG/M2 | HEART RATE: 77 BPM

## 2018-10-09 DIAGNOSIS — M25.512 CHRONIC LEFT SHOULDER PAIN: ICD-10-CM

## 2018-10-09 DIAGNOSIS — I51.89 DIASTOLIC DYSFUNCTION: ICD-10-CM

## 2018-10-09 DIAGNOSIS — I10 ESSENTIAL HYPERTENSION: ICD-10-CM

## 2018-10-09 DIAGNOSIS — I50.32 CHRONIC DIASTOLIC CHF (CONGESTIVE HEART FAILURE) (HCC): ICD-10-CM

## 2018-10-09 DIAGNOSIS — Z23 ENCOUNTER FOR IMMUNIZATION: Primary | ICD-10-CM

## 2018-10-09 DIAGNOSIS — G89.29 CHRONIC LEFT SHOULDER PAIN: ICD-10-CM

## 2018-10-09 DIAGNOSIS — M54.32 SCIATICA OF LEFT SIDE: ICD-10-CM

## 2018-10-09 PROCEDURE — 90670 PCV13 VACCINE IM: CPT

## 2018-10-09 PROCEDURE — G0008 ADMIN INFLUENZA VIRUS VAC: HCPCS

## 2018-10-09 PROCEDURE — 4040F PNEUMOC VAC/ADMIN/RCVD: CPT

## 2018-10-09 PROCEDURE — 1160F RVW MEDS BY RX/DR IN RCRD: CPT | Performed by: FAMILY MEDICINE

## 2018-10-09 PROCEDURE — 1160F RVW MEDS BY RX/DR IN RCRD: CPT

## 2018-10-09 PROCEDURE — 90662 IIV NO PRSV INCREASED AG IM: CPT

## 2018-10-09 PROCEDURE — G0009 ADMIN PNEUMOCOCCAL VACCINE: HCPCS

## 2018-10-09 PROCEDURE — 99214 OFFICE O/P EST MOD 30 MIN: CPT | Performed by: FAMILY MEDICINE

## 2018-10-09 RX ORDER — FUROSEMIDE 40 MG/1
40 TABLET ORAL DAILY
Qty: 60 TABLET | Refills: 5 | Status: SHIPPED | OUTPATIENT
Start: 2018-10-09 | End: 2019-10-22 | Stop reason: SDUPTHER

## 2018-10-09 RX ORDER — POTASSIUM CHLORIDE 1.5 G/1.77G
20 POWDER, FOR SOLUTION ORAL 2 TIMES DAILY
Qty: 60 TABLET | Refills: 5 | Status: SHIPPED | OUTPATIENT
Start: 2018-10-09 | End: 2018-11-19

## 2018-10-09 NOTE — PROGRESS NOTES
Assessment/Plan:    No problem-specific Assessment & Plan notes found for this encounter  Diagnoses and all orders for this visit:    Encounter for immunization  -     influenza vaccine, [id number], high-dose, PF 0 5 mL, for patients 65 yr+ (FLUZONE HIGH-DOSE)    Chronic diastolic CHF (congestive heart failure) (HCC)  -     furosemide (LASIX) 40 mg tablet; Take 1 tablet (40 mg total) by mouth daily  -     potassium chloride (KLOR-CON) 20 mEq packet; Take 20 mEq by mouth 2 (two) times a day  -     Elastic Bandages & Supports (MEDICAL COMPRESSION STOCKINGS) MISC; by Does not apply route daily    Essential hypertension  -     potassium chloride (KLOR-CON) 20 mEq packet; Take 20 mEq by mouth 2 (two) times a day    Diastolic dysfunction  -     potassium chloride (KLOR-CON) 20 mEq packet; Take 20 mEq by mouth 2 (two) times a day    Sciatica of left side  -     MRI lumbar spine w wo contrast; Future    Chronic left shoulder pain  -     Ambulatory referral to Physical Therapy; Future          Subjective:      Patient ID: Cintia Fuchs is a [de-identified] y o  female  She has HINES  She can walk about 1/2 a block before she gets winded  She has orthopnea and a pain in the center of her back at night  These symptoms are not exertional   She has no CP  She has no diaphoresis, palpitations, or nausea  She c/o sciatica  She has pain in the low back that radiates down the left leg  She has left shoulder pain and decreased ROM  She has difficulty reaching behind her and with abduction of the left arm  Her BP is at goal on her current regimen  The following portions of the patient's history were reviewed and updated as appropriate:   She  has a past medical history of Hypercholesteremia; Hypertension; IBS (irritable bowel syndrome); Rheumatic fever; and Skin lesion    She   Patient Active Problem List    Diagnosis Date Noted    Chronic left shoulder pain 10/09/2018    Screening for genitourinary condition 05/07/2018  Screening for neurological condition 05/07/2018    Peripheral neuropathy 02/13/2018    B12 deficiency 02/13/2018    Sinusitis 02/13/2018    Osteoporosis 02/13/2018    Closed head injury 12/20/2017    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    Acute lower UTI 09/20/2017    Headache, worsening 08/14/2017    Chronic diastolic CHF (congestive heart failure) ([facility] [address] ) 05/15/2017    Low serum vitamin B12 02/13/2017    Peripheral edema 02/13/2017    Atypical chest pain 08/01/2016    Biceps tendinitis of right shoulder 40/79/1415    Diastolic dysfunction 18/68/4288    Lateral epicondylitis of right elbow 04/11/2016    Essential hypertension 03/14/2016    Dyspnea on exertion 03/14/2016    GERD without esophagitis 03/14/2016     She  has a past surgical history that includes Hysterectomy; Kidney surgery; Tonsillectomy; and Ankle fracture surgery  Her family history includes Asthma in her father; Cancer in her sister; Hypertension in her mother  She  reports that she has never smoked  She has never used smokeless tobacco  She reports that she does not drink alcohol or use drugs    Current Outpatient Prescriptions   Medication Sig Dispense Refill    citalopram (CeleXA) 20 mg tablet Take 1 tablet (20 mg total) by mouth daily 90 tablet 0    furosemide (LASIX) 40 mg tablet Take 1 tablet (40 mg total) by mouth daily 60 tablet 5    lansoprazole (PREVACID) 15 mg capsule Take 15 mg by mouth daily      potassium chloride (KLOR-CON) 20 mEq packet Take 20 mEq by mouth 2 (two) times a day 60 tablet 5    Elastic Bandages & Supports (MEDICAL COMPRESSION STOCKINGS) MISC by Does not apply route daily 2 each 0     Current Facility-Administered Medications   Medication Dose Route Frequency Provider Last Rate Last Dose    cyanocobalamin injection 1,000 mcg  1,000 mcg Intramuscular Q30 Days Bonita Bonner MD   1,000 mcg at 02/19/18 1008     Current Outpatient Prescriptions on File Prior to Visit   Medication Sig    citalopram (CeleXA) 20 mg tablet Take 1 tablet (20 mg total) by mouth daily    lansoprazole (PREVACID) 15 mg capsule Take 15 mg by mouth daily    [DISCONTINUED] furosemide (LASIX) 40 mg tablet Take 40 mg by mouth daily      [DISCONTINUED] potassium chloride (KLOR-CON) 20 mEq packet Take 20 mEq by mouth 2 (two) times a day     Current Facility-Administered Medications on File Prior to Visit   Medication    cyanocobalamin injection 1,000 mcg     She is allergic to amoxicillin; ciprofloxacin; erythromycin; and statins       Review of Systems   All other systems reviewed and are negative  Objective:      /68 (BP Location: Right arm, Patient Position: Sitting, Cuff Size: Large)   Pulse 77   Resp 16   Ht 4' 11" (1 499 m)   Wt 78 kg (172 lb)   SpO2 96%   BMI 34 74 kg/m²          Physical Exam   Constitutional: She is oriented to person, place, and time  She appears well-developed and well-nourished  Neck: Normal range of motion  Neck supple  Cardiovascular: Normal rate, regular rhythm, normal heart sounds and intact distal pulses  Pulmonary/Chest: Effort normal and breath sounds normal    Abdominal: Soft  Bowel sounds are normal    Musculoskeletal: Normal range of motion  Neurological: She is alert and oriented to person, place, and time  She has normal reflexes  Skin: Skin is warm and dry  Psychiatric: She has a normal mood and affect  Her behavior is normal  Judgment and thought content normal    Nursing note and vitals reviewed

## 2018-11-19 ENCOUNTER — TELEPHONE (OUTPATIENT)
Dept: FAMILY MEDICINE CLINIC | Facility: CLINIC | Age: 78
End: 2018-11-19

## 2018-11-19 DIAGNOSIS — I50.32 CHRONIC DIASTOLIC CHF (CONGESTIVE HEART FAILURE) (HCC): ICD-10-CM

## 2018-11-19 DIAGNOSIS — I10 ESSENTIAL HYPERTENSION: ICD-10-CM

## 2018-11-19 DIAGNOSIS — I51.89 DIASTOLIC DYSFUNCTION: ICD-10-CM

## 2018-11-19 RX ORDER — POTASSIUM CHLORIDE 20 MEQ/1
20 TABLET, EXTENDED RELEASE ORAL 2 TIMES DAILY
Qty: 60 TABLET | Refills: 5 | Status: SHIPPED | OUTPATIENT
Start: 2018-11-19 | End: 2019-06-14 | Stop reason: SDUPTHER

## 2018-12-24 DIAGNOSIS — F32.A DEPRESSION, UNSPECIFIED DEPRESSION TYPE: ICD-10-CM

## 2018-12-24 RX ORDER — CITALOPRAM 20 MG/1
20 TABLET ORAL DAILY
Qty: 90 TABLET | Refills: 3 | Status: SHIPPED | OUTPATIENT
Start: 2018-12-24 | End: 2019-09-03 | Stop reason: ALTCHOICE

## 2019-01-15 ENCOUNTER — OFFICE VISIT (OUTPATIENT)
Dept: FAMILY MEDICINE CLINIC | Facility: CLINIC | Age: 79
End: 2019-01-15
Payer: COMMERCIAL

## 2019-01-15 VITALS
HEIGHT: 59 IN | SYSTOLIC BLOOD PRESSURE: 130 MMHG | HEART RATE: 50 BPM | OXYGEN SATURATION: 95 % | DIASTOLIC BLOOD PRESSURE: 70 MMHG | RESPIRATION RATE: 15 BRPM | BODY MASS INDEX: 35.32 KG/M2 | WEIGHT: 175.2 LBS

## 2019-01-15 DIAGNOSIS — I50.32 CHRONIC DIASTOLIC CHF (CONGESTIVE HEART FAILURE) (HCC): Primary | ICD-10-CM

## 2019-01-15 DIAGNOSIS — N39.0 ACUTE LOWER UTI: ICD-10-CM

## 2019-01-15 DIAGNOSIS — R07.89 ATYPICAL CHEST PAIN: ICD-10-CM

## 2019-01-15 DIAGNOSIS — N30.00 ACUTE CYSTITIS WITHOUT HEMATURIA: ICD-10-CM

## 2019-01-15 PROCEDURE — 99214 OFFICE O/P EST MOD 30 MIN: CPT | Performed by: FAMILY MEDICINE

## 2019-01-15 PROCEDURE — 1160F RVW MEDS BY RX/DR IN RCRD: CPT | Performed by: FAMILY MEDICINE

## 2019-01-15 PROCEDURE — 93000 ELECTROCARDIOGRAM COMPLETE: CPT | Performed by: FAMILY MEDICINE

## 2019-01-15 RX ORDER — SULFAMETHOXAZOLE AND TRIMETHOPRIM 800; 160 MG/1; MG/1
1 TABLET ORAL EVERY 12 HOURS SCHEDULED
Qty: 14 TABLET | Refills: 0 | Status: SHIPPED | OUTPATIENT
Start: 2019-01-15 | End: 2019-01-22

## 2019-01-15 NOTE — PROGRESS NOTES
Assessment/Plan:    No problem-specific Assessment & Plan notes found for this encounter  Diagnoses and all orders for this visit:    Chronic diastolic CHF (congestive heart failure) ([facility])    Acute lower UTI  -     sulfamethoxazole-trimethoprim (BACTRIM DS) 800-160 mg per tablet; Take 1 tablet by mouth every 12 (twelve) hours for 7 days    Acute cystitis without hematuria    Atypical chest pain  -     NM myocardial perfusion spect (rx stress and/or rest); Future          Subjective:      Patient ID: Elona Aschoff is a [de-identified] y o  female  She has burning with urination  She has urinary frequency as well  She is on a diuretic due to a history of diastolic dysfunction and peripheral edema  She is having some visual hallucinations  She has no auditory hallucinations  She sees someone sitting next to her and she saw something run under the table  She has some SOB  When this happens, she gets a sharp pain in the middle of her back and down her left arm  The following portions of the patient's history were reviewed and updated as appropriate:   She  has a past medical history of Hypercholesteremia; Hypertension; IBS (irritable bowel syndrome); Rheumatic fever; and Skin lesion    She   Patient Active Problem List    Diagnosis Date Noted    Acute cystitis without hematuria 01/15/2019    Chronic left shoulder pain 10/09/2018    Screening for genitourinary condition 05/07/2018    Screening for neurological condition 05/07/2018    Peripheral neuropathy 02/13/2018    B12 deficiency 02/13/2018    Sinusitis 02/13/2018    Osteoporosis 02/13/2018    Closed head injury 12/20/2017    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    Acute lower UTI 09/20/2017    Headache, worsening 08/14/2017    [facility] 05/15/2017    Low serum vitamin B12 02/13/2017    Peripheral edema 02/13/2017    Atypical chest pain 08/01/2016    Biceps tendinitis of right shoulder 58/08/3192    Diastolic dysfunction 75/08/0231    Lateral epicondylitis of right elbow 04/11/2016    Essential hypertension 03/14/2016    Dyspnea on exertion 03/14/2016    GERD without esophagitis 03/14/2016     She  has a past surgical history that includes Hysterectomy; Kidney surgery; Tonsillectomy; and Ankle fracture surgery  Her family history includes Asthma in her father; Cancer in her sister; Hypertension in her mother  She  reports that she has never smoked  She has never used smokeless tobacco  She reports that she does not drink alcohol or use drugs    Current Outpatient Prescriptions   Medication Sig Dispense Refill    citalopram (CeleXA) 20 mg tablet Take 1 tablet (20 mg total) by mouth daily 90 tablet 3    Elastic Bandages & Supports (MEDICAL COMPRESSION STOCKINGS) MISC by Does not apply route daily 2 each 0    furosemide (LASIX) 40 mg tablet Take 1 tablet (40 mg total) by mouth daily 60 tablet 5    lansoprazole (PREVACID) 15 mg capsule Take 15 mg by mouth daily      potassium chloride (K-DUR,KLOR-CON) 20 mEq tablet Take 1 tablet (20 mEq total) by mouth 2 (two) times a day 60 tablet 5    sulfamethoxazole-trimethoprim (BACTRIM DS) 800-160 mg per tablet Take 1 tablet by mouth every 12 (twelve) hours for 7 days 14 tablet 0     Current Facility-Administered Medications   Medication Dose Route Frequency Provider Last Rate Last Dose    cyanocobalamin injection 1,000 mcg  1,000 mcg Intramuscular Q30 Days Aurelia Garcia MD   1,000 mcg at 02/19/18 1008     Current Outpatient Prescriptions on File Prior to Visit   Medication Sig    citalopram (CeleXA) 20 mg tablet Take 1 tablet (20 mg total) by mouth daily    Elastic Bandages & Supports (MEDICAL COMPRESSION STOCKINGS) MISC by Does not apply route daily    furosemide (LASIX) 40 mg tablet Take 1 tablet (40 mg total) by mouth daily    lansoprazole (PREVACID) 15 mg capsule Take 15 mg by mouth daily    potassium chloride (LAURA-SARA,KLOR-CON) 20 mEq tablet Take 1 tablet (20 mEq total) by mouth 2 (two) times a day     Current Facility-Administered Medications on File Prior to Visit   Medication    cyanocobalamin injection 1,000 mcg     She is allergic to amoxicillin; ciprofloxacin; erythromycin; and statins       Review of Systems   All other systems reviewed and are negative  Objective:      /70 (BP Location: Right arm, Patient Position: Sitting, Cuff Size: Large)   Pulse (!) 50   Resp 15   Ht 4' 11" (1 499 m)   Wt 79 5 kg (175 lb 3 2 oz)   SpO2 95%   BMI 35 39 kg/m²          Physical Exam   Constitutional: She is oriented to person, place, and time  She appears well-developed and well-nourished  Neck: Normal range of motion  Neck supple  Cardiovascular: Normal rate, regular rhythm, normal heart sounds and intact distal pulses  Pulmonary/Chest: Effort normal and breath sounds normal    Abdominal: Soft  Bowel sounds are normal    Musculoskeletal: Normal range of motion  Neurological: She is alert and oriented to person, place, and time  She has normal reflexes  Skin: Skin is warm and dry  Psychiatric: She has a normal mood and affect  Her behavior is normal  Judgment and thought content normal    Nursing note and vitals reviewed

## 2019-01-18 ENCOUNTER — TELEPHONE (OUTPATIENT)
Dept: FAMILY MEDICINE CLINIC | Facility: CLINIC | Age: 79
End: 2019-01-18

## 2019-01-21 ENCOUNTER — TELEPHONE (OUTPATIENT)
Dept: FAMILY MEDICINE CLINIC | Facility: CLINIC | Age: 79
End: 2019-01-21

## 2019-01-24 ENCOUNTER — TELEPHONE (OUTPATIENT)
Dept: FAMILY MEDICINE CLINIC | Facility: CLINIC | Age: 79
End: 2019-01-24

## 2019-04-02 ENCOUNTER — TELEPHONE (OUTPATIENT)
Dept: FAMILY MEDICINE CLINIC | Facility: CLINIC | Age: 79
End: 2019-04-02

## 2019-04-02 DIAGNOSIS — R05.9 COUGH: Primary | ICD-10-CM

## 2019-04-02 RX ORDER — BENZONATATE 100 MG/1
100 CAPSULE ORAL 3 TIMES DAILY PRN
Qty: 20 CAPSULE | Refills: 0 | Status: SHIPPED | OUTPATIENT
Start: 2019-04-02 | End: 2019-04-30

## 2019-04-30 ENCOUNTER — OFFICE VISIT (OUTPATIENT)
Dept: FAMILY MEDICINE CLINIC | Facility: CLINIC | Age: 79
End: 2019-04-30
Payer: COMMERCIAL

## 2019-04-30 VITALS
SYSTOLIC BLOOD PRESSURE: 124 MMHG | DIASTOLIC BLOOD PRESSURE: 66 MMHG | WEIGHT: 180 LBS | BODY MASS INDEX: 36.29 KG/M2 | OXYGEN SATURATION: 96 % | HEART RATE: 91 BPM | HEIGHT: 59 IN | RESPIRATION RATE: 15 BRPM

## 2019-04-30 DIAGNOSIS — G44.52 NEW DAILY PERSISTENT HEADACHE: ICD-10-CM

## 2019-04-30 DIAGNOSIS — R42 VERTIGO: ICD-10-CM

## 2019-04-30 DIAGNOSIS — R23.2 HOT FLASHES: ICD-10-CM

## 2019-04-30 DIAGNOSIS — I50.32 CHRONIC DIASTOLIC CHF (CONGESTIVE HEART FAILURE) (HCC): ICD-10-CM

## 2019-04-30 DIAGNOSIS — R94.39 ABNORMAL STRESS TEST: Primary | ICD-10-CM

## 2019-04-30 LAB
ALBUMIN SERPL BCP-MCNC: 3.2 G/DL (ref 3.5–5)
ALP SERPL-CCNC: 80 U/L (ref 46–116)
ALT SERPL W P-5'-P-CCNC: 13 U/L (ref 12–78)
ANION GAP SERPL CALCULATED.3IONS-SCNC: 4 MMOL/L (ref 4–13)
AST SERPL W P-5'-P-CCNC: 11 U/L (ref 5–45)
BASOPHILS # BLD AUTO: 0.06 THOUSANDS/ΜL (ref 0–0.1)
BASOPHILS NFR BLD AUTO: 1 % (ref 0–1)
BILIRUB SERPL-MCNC: 0.36 MG/DL (ref 0.2–1)
BUN SERPL-MCNC: 25 MG/DL (ref 5–25)
CALCIUM SERPL-MCNC: 8.7 MG/DL (ref 8.3–10.1)
CHLORIDE SERPL-SCNC: 109 MMOL/L (ref 100–108)
CO2 SERPL-SCNC: 28 MMOL/L (ref 21–32)
CREAT SERPL-MCNC: 0.94 MG/DL (ref 0.6–1.3)
EOSINOPHIL # BLD AUTO: 0.13 THOUSAND/ΜL (ref 0–0.61)
EOSINOPHIL NFR BLD AUTO: 1 % (ref 0–6)
ERYTHROCYTE [DISTWIDTH] IN BLOOD BY AUTOMATED COUNT: 13.9 % (ref 11.6–15.1)
ERYTHROCYTE [SEDIMENTATION RATE] IN BLOOD: 21 MM/HOUR (ref 0–20)
GFR SERPL CREATININE-BSD FRML MDRD: 57 ML/MIN/1.73SQ M
GLUCOSE SERPL-MCNC: 81 MG/DL (ref 65–140)
HCT VFR BLD AUTO: 41.1 % (ref 34.8–46.1)
HGB BLD-MCNC: 13 G/DL (ref 11.5–15.4)
IMM GRANULOCYTES # BLD AUTO: 0.07 THOUSAND/UL (ref 0–0.2)
IMM GRANULOCYTES NFR BLD AUTO: 1 % (ref 0–2)
LYMPHOCYTES # BLD AUTO: 1.56 THOUSANDS/ΜL (ref 0.6–4.47)
LYMPHOCYTES NFR BLD AUTO: 17 % (ref 14–44)
MCH RBC QN AUTO: 30.7 PG (ref 26.8–34.3)
MCHC RBC AUTO-ENTMCNC: 31.6 G/DL (ref 31.4–37.4)
MCV RBC AUTO: 97 FL (ref 82–98)
MONOCYTES # BLD AUTO: 0.74 THOUSAND/ΜL (ref 0.17–1.22)
MONOCYTES NFR BLD AUTO: 8 % (ref 4–12)
NEUTROPHILS # BLD AUTO: 6.69 THOUSANDS/ΜL (ref 1.85–7.62)
NEUTS SEG NFR BLD AUTO: 72 % (ref 43–75)
NRBC BLD AUTO-RTO: 0 /100 WBCS
PLATELET # BLD AUTO: 298 THOUSANDS/UL (ref 149–390)
PMV BLD AUTO: 12.4 FL (ref 8.9–12.7)
POTASSIUM SERPL-SCNC: 4.7 MMOL/L (ref 3.5–5.3)
PROT SERPL-MCNC: 6.5 G/DL (ref 6.4–8.2)
RBC # BLD AUTO: 4.23 MILLION/UL (ref 3.81–5.12)
SODIUM SERPL-SCNC: 141 MMOL/L (ref 136–145)
TSH SERPL DL<=0.05 MIU/L-ACNC: 1.79 UIU/ML (ref 0.36–3.74)
WBC # BLD AUTO: 9.25 THOUSAND/UL (ref 4.31–10.16)

## 2019-04-30 PROCEDURE — 99214 OFFICE O/P EST MOD 30 MIN: CPT | Performed by: FAMILY MEDICINE

## 2019-04-30 PROCEDURE — 36415 COLL VENOUS BLD VENIPUNCTURE: CPT | Performed by: FAMILY MEDICINE

## 2019-04-30 PROCEDURE — 85652 RBC SED RATE AUTOMATED: CPT | Performed by: FAMILY MEDICINE

## 2019-04-30 PROCEDURE — 1125F AMNT PAIN NOTED PAIN PRSNT: CPT | Performed by: FAMILY MEDICINE

## 2019-04-30 PROCEDURE — 1160F RVW MEDS BY RX/DR IN RCRD: CPT | Performed by: FAMILY MEDICINE

## 2019-04-30 PROCEDURE — 84443 ASSAY THYROID STIM HORMONE: CPT | Performed by: FAMILY MEDICINE

## 2019-04-30 PROCEDURE — 80053 COMPREHEN METABOLIC PANEL: CPT | Performed by: FAMILY MEDICINE

## 2019-04-30 PROCEDURE — 1170F FXNL STATUS ASSESSED: CPT | Performed by: FAMILY MEDICINE

## 2019-04-30 PROCEDURE — 85025 COMPLETE CBC W/AUTO DIFF WBC: CPT | Performed by: FAMILY MEDICINE

## 2019-04-30 RX ORDER — FUROSEMIDE 40 MG/1
40 TABLET ORAL DAILY
Qty: 60 TABLET | Refills: 5 | Status: CANCELLED | OUTPATIENT
Start: 2019-04-30

## 2019-04-30 RX ORDER — FUROSEMIDE 40 MG/1
60 TABLET ORAL DAILY
Qty: 90 TABLET | Refills: 3 | Status: SHIPPED | OUTPATIENT
Start: 2019-04-30 | End: 2020-01-07 | Stop reason: ALTCHOICE

## 2019-06-14 DIAGNOSIS — I10 ESSENTIAL HYPERTENSION: ICD-10-CM

## 2019-06-14 RX ORDER — POTASSIUM CHLORIDE 20 MEQ/1
TABLET, EXTENDED RELEASE ORAL
Qty: 60 TABLET | Refills: 5 | Status: SHIPPED | OUTPATIENT
Start: 2019-06-14 | End: 2020-01-27

## 2019-08-30 ENCOUNTER — TELEPHONE (OUTPATIENT)
Dept: FAMILY MEDICINE CLINIC | Facility: CLINIC | Age: 79
End: 2019-08-30

## 2019-09-03 ENCOUNTER — OFFICE VISIT (OUTPATIENT)
Dept: FAMILY MEDICINE CLINIC | Facility: CLINIC | Age: 79
End: 2019-09-03
Payer: COMMERCIAL

## 2019-09-03 VITALS
HEART RATE: 50 BPM | WEIGHT: 177.8 LBS | TEMPERATURE: 97.1 F | DIASTOLIC BLOOD PRESSURE: 82 MMHG | OXYGEN SATURATION: 98 % | SYSTOLIC BLOOD PRESSURE: 118 MMHG | BODY MASS INDEX: 35.84 KG/M2 | HEIGHT: 59 IN

## 2019-09-03 DIAGNOSIS — N30.00 ACUTE CYSTITIS WITHOUT HEMATURIA: ICD-10-CM

## 2019-09-03 DIAGNOSIS — R30.0 BURNING WITH URINATION: ICD-10-CM

## 2019-09-03 DIAGNOSIS — E55.9 VITAMIN D DEFICIENCY: ICD-10-CM

## 2019-09-03 DIAGNOSIS — N18.30 CKD (CHRONIC KIDNEY DISEASE), STAGE III (HCC): Primary | ICD-10-CM

## 2019-09-03 DIAGNOSIS — I50.32 CHRONIC DIASTOLIC CHF (CONGESTIVE HEART FAILURE) (HCC): ICD-10-CM

## 2019-09-03 DIAGNOSIS — Z90.5 SINGLE KIDNEY: ICD-10-CM

## 2019-09-03 PROBLEM — F41.9 ANXIETY: Status: ACTIVE | Noted: 2019-08-20

## 2019-09-03 PROBLEM — J32.9 SINUSITIS: Status: RESOLVED | Noted: 2018-02-13 | Resolved: 2019-09-03

## 2019-09-03 PROBLEM — R00.1 JUNCTIONAL BRADYCARDIA: Status: ACTIVE | Noted: 2019-07-17

## 2019-09-03 PROBLEM — R26.2 AMBULATORY DYSFUNCTION: Status: ACTIVE | Noted: 2019-07-26

## 2019-09-03 PROBLEM — I48.91 A-FIB (HCC): Status: ACTIVE | Noted: 2019-07-23

## 2019-09-03 LAB
BILIRUB UR QL STRIP: NEGATIVE
CLARITY UR: CLEAR
COLOR UR: YELLOW
GLUCOSE UR STRIP-MCNC: NEGATIVE MG/DL
HGB UR QL STRIP.AUTO: NEGATIVE
KETONES UR STRIP-MCNC: NEGATIVE MG/DL
LEUKOCYTE ESTERASE UR QL STRIP: NEGATIVE
NITRITE UR QL STRIP: NEGATIVE
PH UR STRIP.AUTO: 6 [PH]
PROT UR STRIP-MCNC: NEGATIVE MG/DL
SP GR UR STRIP.AUTO: 1.02 (ref 1–1.03)
UROBILINOGEN UR QL STRIP.AUTO: 1 E.U./DL

## 2019-09-03 PROCEDURE — 99214 OFFICE O/P EST MOD 30 MIN: CPT | Performed by: NURSE PRACTITIONER

## 2019-09-03 PROCEDURE — 81003 URINALYSIS AUTO W/O SCOPE: CPT | Performed by: NURSE PRACTITIONER

## 2019-09-03 RX ORDER — NITROFURANTOIN 25; 75 MG/1; MG/1
100 CAPSULE ORAL 2 TIMES DAILY
Qty: 10 CAPSULE | Refills: 0 | Status: SHIPPED | OUTPATIENT
Start: 2019-09-03 | End: 2019-09-08

## 2019-09-03 RX ORDER — ASPIRIN 81 MG/1
81 TABLET, CHEWABLE ORAL DAILY
COMMUNITY
Start: 2019-08-05 | End: 2019-10-22 | Stop reason: SDUPTHER

## 2019-09-03 RX ORDER — FLUTICASONE PROPIONATE 50 MCG
1 SPRAY, SUSPENSION (ML) NASAL DAILY
COMMUNITY
Start: 2019-08-05 | End: 2020-08-04

## 2019-09-03 NOTE — PROGRESS NOTES
Assessment/Plan:     Diagnoses and all orders for this visit:    CKD (chronic kidney disease), stage III ([place] [address] )  -     Ambulatory referral to Nephrology; Future    Single kidney  -     Ambulatory referral to Nephrology; Future    Chronic diastolic CHF (congestive heart failure) ([place] [address] )  -     Ambulatory referral to Nephrology; Future    Burning with urination  -     UA w Reflex to Microscopic w Reflex to Culture - Clinic Collect    Acute cystitis without hematuria  -     nitrofurantoin (MACROBID) 100 mg capsule; Take 1 capsule (100 mg total) by mouth 2 (two) times a day for 5 days    Vitamin D deficiency  -     Cholecalciferol 1000 units tablet; Take 1 tablet (1,000 Units total) by mouth daily    Other orders  -     fluticasone (FLONASE) 50 mcg/act nasal spray; 1 spray into each nostril daily  -     aspirin 81 mg chewable tablet; Chew 81 mg daily        Subjective:      Patient ID: Jeevan Sorensen is a 78 y o  female  Patient presents to [address] [place] [address] as follow up  Allergies, medical history and current medications reviewed with patient  Patient was admitted to [facility] in July 2019 for LUPE and UTI and underwent rehabilitation  Patient reports that during hospitalization, her Celexa was D/C'ed; patient states she is unsure why it was discontinued, but states she does not wish to restart the medication at this time  Patient is followed by Cardiology and is currently wearing 14-day Holter Monitor, with about 7 days remaining  Patient states she did not take Lasix this morning due to today's appointment, but states she will take medication when she gets home  Today, patient C/O nausea, ongoing for about 2 days  Patient also reports symptoms fever, intermittent burning with urination and pain with urination  Patient has a single kidney  Patient Care Team:  Claudia Frausto MD as PCP - General  Griselda Spore, MD (Cardiology)    Review of Systems   Constitutional: Positive for fever  HENT: Positive for congestion  Gastrointestinal: Positive for nausea  Genitourinary: Positive for dysuria  All other systems reviewed and are negative  Objective:    /82 (BP Location: Left arm, Patient Position: Sitting, Cuff Size: Large)   Pulse (!) 50   Temp (!) 97 1 °F (36 2 °C) (Temporal)   Ht 4' 11" (1 499 m)   Wt 80 6 kg (177 lb 12 8 oz)   SpO2 98%   BMI 35 91 kg/m²      Physical Exam   Constitutional: She is oriented to person, place, and time  She appears well-developed and well-nourished  No distress  HENT:   Head: Normocephalic and atraumatic  Right Ear: Tympanic membrane, external ear and ear canal normal    Left Ear: Tympanic membrane, external ear and ear canal normal    Nose: Mucosal edema present  Mouth/Throat: Uvula is midline, oropharynx is clear and moist and mucous membranes are normal    Nasal turbinates red, moist and without exudate  Eyes: Conjunctivae and lids are normal    Neck: Normal range of motion  No tracheal deviation present  Cardiovascular: Normal rate, regular rhythm, S1 normal and S2 normal  Exam reveals distant heart sounds  Pulmonary/Chest: Effort normal  No respiratory distress  She has rales  Abdominal: Normal appearance  She exhibits no distension  There is no guarding  Musculoskeletal: Normal range of motion  Neurological: She is alert and oriented to person, place, and time  Skin: Skin is warm, dry and intact  Psychiatric: She has a normal mood and affect  Her speech is normal    Nursing note and vitals reviewed

## 2019-09-03 NOTE — PATIENT INSTRUCTIONS
Low-Sodium Diet   WHAT YOU NEED TO KNOW:   What is a low-sodium diet? A low-sodium diet limits foods that are high in sodium (salt)  You will need to follow a low-sodium diet if you have high blood pressure, kidney disease, or heart failure  You may also need to follow this diet if you have a condition that is causing your body to retain (hold) extra fluid  You may need to limit the amount of sodium you eat to 1,500 mg  Ask your healthcare provider how much sodium you can have each day  How can I use food labels to choose foods that are low in sodium? Read food labels to find the amount of sodium they contain  The amount of sodium is listed in milligrams (mg)  The % Daily Value (DV) column tells you how much of your daily needs are met by 1 serving of the food for each nutrient listed  Choose foods that have less than 5% of the DV of sodium  These foods are considered low in sodium  Foods that have 20% or more of the DV of sodium are considered high in sodium  Some food labels may also list any of the following terms that tell you about the sodium content in the food:  · Sodium-free:  Less than 5 mg in each serving    · Very low sodium:  35 mg of sodium or less in each serving    · Low sodium:  140 mg of sodium or less in each serving    · Reduced sodium: At least 25% less sodium in each serving than the regular type    · Light in sodium:  50% less sodium in each serving    · Unsalted or no added salt:  No extra salt is added during processing (the food may still contain sodium)  Which foods should I avoid? Salty foods are high in sodium   You should avoid the following:  · Processed foods:      ¨ Mixes for cornbread, biscuits, cake, and pudding     ¨ Instant foods, such as potatoes, cereals, noodles, and rice     ¨ Packaged foods, such as bread stuffing, rice and pasta mixes, snack dip mixes, and macaroni and cheese     ¨ Canned foods, such as canned vegetables, soups, broths, sauces, and vegetable or tomato juice    ¨ Snack foods, such as salted chips, popcorn, pretzels, pork rinds, salted crackers, and salted nuts    ¨ Frozen foods, such as dinners, entrees, vegetables with sauces, and breaded meats    ¨ Sauerkraut, pickled vegetables, and other foods prepared in brine    · Meats and cheeses:      ¨ Smoked or cured meat, such as corned beef, woodruff, ham, hot dogs, and sausage    ¨ Canned meats or spreads, such as potted meats, sardines, anchovies, and imitation seafood    ¨ Deli or lunch meats, such as bologna, ham, turkey, and roast beef    ¨ Processed cheese, such as American cheese and cheese spreads    · Condiments, sauces, and seasonings:      ¨ Salt (¼ teaspoon of salt contains 575 mg of sodium)    ¨ Seasonings made with salt, such as garlic salt, celery salt, onion salt, and seasoned salt    ¨ Regular soy sauce, barbecue sauce, teriyaki sauce, steak sauce, Worcestershire sauce, and most flavored vinegars    ¨ Canned gravy and mixes     ¨ Regular condiments, such as mustard, ketchup, and salad dressings    ¨ Pickles and olives    ¨ Meat tenderizers and monosodium glutamate (MSG)  Which foods can I include? Read the food label to find the amount of sodium in each serving  · Bread and cereal:  Try to choose breads with less than 80 mg of sodium per serving  ¨ Bread, roll, yimi, tortilla, or unsalted crackers  ¨ Ready-to-eat cereals with less than 5% DV of sodium (examples include shredded wheat and puffed rice)    ¨ Pasta    · Vegetables and fruits:      ¨ Unsalted fresh, frozen, or canned vegetables    ¨ Fresh, frozen, or canned fruits    ¨ Fruit juice    · Dairy:  One serving has about 150 mg of sodium  ¨ Milk, all types    ¨ Yogurt    ¨ Hard cheese, such as cheddar, Swiss, [employer], or mozzarella    · Meat and other protein foods:  Some raw meats may have added sodium       ¨ Plain meats, fish, and poultry     ¨ Egg    · Other foods:      ¨ Homemade pudding    ¨ Unsalted nuts, popcorn, or pretzels    ¨ Unsalted butter or margarine  What are some ways that I can decrease sodium? · Add spices and herbs to foods instead of salt during cooking  Use salt-free seasonings to add flavor to foods  Examples include onion powder, garlic powder, basil, pollock powder, paprika, and parsley  Try lemon or lime juice or vinegar to give foods a tart flavor  Use hot peppers, pepper, or cayenne pepper to add a spicy flavor to foods  · Do not keep a salt shaker at your kitchen table  This may help keep you from adding salt to food at the table  It may take time to get used to enjoying the natural flavor of food instead of adding salt  Talk to your healthcare provider before you use salt substitutes  Some salt substitutes have a high amount of potassium and need to be avoided if you have kidney disease  · Choose low-sodium foods at restaurants  Meals from restaurants are often high in sodium  Some restaurants have nutrition information on the menu that tells you the amount of sodium in their foods  If possible, ask for your food to be prepared with less, or no salt  · Shop for unsalted or low-sodium foods and snacks at the grocery store  Examples include unsalted or low-sodium broths, soups, and canned vegetables  Choose fresh or frozen vegetables instead  Choose unsalted nuts or seeds or fresh fruits or vegetables as snacks  Read food labels and choose salt-free, very low-sodium, or low-sodium foods  CARE AGREEMENT:   You have the right to help plan your care  Discuss treatment options with your caregivers to decide what care you want to receive  You always have the right to refuse treatment  The above information is an  only  It is not intended as medical advice for individual conditions or treatments  Talk to your doctor, nurse or pharmacist before following any medical regimen to see if it is safe and effective for you    © 2017 [facility] Information is for End User's use only and may not be sold, redistributed or otherwise used for commercial purposes  All illustrations and images included in CareNotes® are the copyrighted property of [employer] M [employer]  or [employer] Elkins

## 2019-09-05 ENCOUNTER — TELEPHONE (OUTPATIENT)
Dept: FAMILY MEDICINE CLINIC | Facility: CLINIC | Age: 79
End: 2019-09-05

## 2019-09-05 DIAGNOSIS — R11.0 NAUSEA: Primary | ICD-10-CM

## 2019-09-05 RX ORDER — ONDANSETRON 4 MG/1
4 TABLET, FILM COATED ORAL EVERY 8 HOURS PRN
Qty: 20 TABLET | Refills: 0 | Status: SHIPPED | OUTPATIENT
Start: 2019-09-05 | End: 2020-09-01 | Stop reason: ALTCHOICE

## 2019-09-05 NOTE — TELEPHONE ENCOUNTER
States that antibiotic that she was prescribed is causing her to feel nauseous after taking, asking if there is something else she could try

## 2019-09-06 ENCOUNTER — TELEPHONE (OUTPATIENT)
Dept: FAMILY MEDICINE CLINIC | Facility: CLINIC | Age: 79
End: 2019-09-06

## 2019-09-06 DIAGNOSIS — L03.90 CELLULITIS, UNSPECIFIED CELLULITIS SITE: Primary | ICD-10-CM

## 2019-09-06 RX ORDER — SULFAMETHOXAZOLE AND TRIMETHOPRIM 800; 160 MG/1; MG/1
1 TABLET ORAL EVERY 12 HOURS SCHEDULED
Qty: 14 TABLET | Refills: 0 | Status: SHIPPED | OUTPATIENT
Start: 2019-09-06 | End: 2019-09-13

## 2019-09-10 ENCOUNTER — TELEPHONE (OUTPATIENT)
Dept: FAMILY MEDICINE CLINIC | Facility: CLINIC | Age: 79
End: 2019-09-10

## 2019-10-22 ENCOUNTER — OFFICE VISIT (OUTPATIENT)
Dept: FAMILY MEDICINE CLINIC | Facility: CLINIC | Age: 79
End: 2019-10-22
Payer: COMMERCIAL

## 2019-10-22 VITALS
HEIGHT: 59 IN | HEART RATE: 68 BPM | BODY MASS INDEX: 35.96 KG/M2 | OXYGEN SATURATION: 97 % | RESPIRATION RATE: 18 BRPM | WEIGHT: 178.4 LBS | SYSTOLIC BLOOD PRESSURE: 110 MMHG | DIASTOLIC BLOOD PRESSURE: 80 MMHG | TEMPERATURE: 97.7 F

## 2019-10-22 DIAGNOSIS — R22.43 LOCALIZED SWELLING OF BOTH LOWER LEGS: ICD-10-CM

## 2019-10-22 DIAGNOSIS — H53.8 BLURRY VISION: ICD-10-CM

## 2019-10-22 DIAGNOSIS — R06.02 SHORTNESS OF BREATH: ICD-10-CM

## 2019-10-22 DIAGNOSIS — H43.392 VITREOUS FLOATERS OF LEFT EYE: ICD-10-CM

## 2019-10-22 DIAGNOSIS — J30.2 SEASONAL ALLERGIC RHINITIS, UNSPECIFIED TRIGGER: ICD-10-CM

## 2019-10-22 DIAGNOSIS — M79.89 SWELLING OF BOTH LOWER EXTREMITIES: ICD-10-CM

## 2019-10-22 DIAGNOSIS — Z00.00 MEDICARE ANNUAL WELLNESS VISIT, SUBSEQUENT: Primary | ICD-10-CM

## 2019-10-22 DIAGNOSIS — Z83.518 FAMILY HISTORY OF CATARACTS: ICD-10-CM

## 2019-10-22 DIAGNOSIS — Z23 NEED FOR IMMUNIZATION AGAINST INFLUENZA: Primary | ICD-10-CM

## 2019-10-22 DIAGNOSIS — I50.32 CHRONIC DIASTOLIC CHF (CONGESTIVE HEART FAILURE) (HCC): ICD-10-CM

## 2019-10-22 DIAGNOSIS — K64.0 GRADE I HEMORRHOIDS: ICD-10-CM

## 2019-10-22 DIAGNOSIS — E78.2 MIXED HYPERLIPIDEMIA: ICD-10-CM

## 2019-10-22 DIAGNOSIS — R42 DIZZINESS: ICD-10-CM

## 2019-10-22 DIAGNOSIS — R09.89 RESPIRATORY CRACKLES OF BOTH LUNGS: ICD-10-CM

## 2019-10-22 PROCEDURE — 99215 OFFICE O/P EST HI 40 MIN: CPT | Performed by: NURSE PRACTITIONER

## 2019-10-22 PROCEDURE — G0008 ADMIN INFLUENZA VIRUS VAC: HCPCS

## 2019-10-22 PROCEDURE — G0439 PPPS, SUBSEQ VISIT: HCPCS | Performed by: NURSE PRACTITIONER

## 2019-10-22 PROCEDURE — 1125F AMNT PAIN NOTED PAIN PRSNT: CPT | Performed by: NURSE PRACTITIONER

## 2019-10-22 PROCEDURE — 90662 IIV NO PRSV INCREASED AG IM: CPT

## 2019-10-22 PROCEDURE — 1170F FXNL STATUS ASSESSED: CPT | Performed by: NURSE PRACTITIONER

## 2019-10-22 RX ORDER — ASPIRIN 81 MG/1
81 TABLET, CHEWABLE ORAL DAILY
Qty: 30 TABLET | Refills: 11 | Status: SHIPPED | OUTPATIENT
Start: 2019-10-22 | End: 2020-09-01 | Stop reason: ALTCHOICE

## 2019-10-22 NOTE — PATIENT INSTRUCTIONS
Wellness Visit for Adults   WHAT YOU NEED TO KNOW:   What is a wellness visit? A wellness visit is when you see your healthcare provider to get screened for health problems  You can also get advice on how to stay healthy  Write down your questions so you remember to ask them  Ask your healthcare provider how often you should have a wellness visit  What happens at a wellness visit? Your healthcare provider will ask about your health, and your family history of health problems  This includes high blood pressure, heart disease, and cancer  He or she will ask if you have symptoms that concern you, if you smoke, and about your mood  You may also be asked about your intake of medicines, supplements, food, and alcohol  Any of the following may be done:  · Your weight  will be checked  Your height may also be checked so your body mass index (BMI) can be calculated  Your BMI shows if you are at a healthy weight  · Your blood pressure  and heart rate will be checked  Your temperature may also be checked  · Blood and urine tests  may be done  Blood tests may be done to check your cholesterol levels  Abnormal cholesterol levels increase your risk for heart disease and stroke  You may also need a blood or urine test to check for diabetes if you are at increased risk  Urine tests may be done to look for signs of an infection or kidney disease  · A physical exam  includes checking your heartbeat and lungs with a stethoscope  Your healthcare provider may also check your skin to look for sun damage  · Screening tests  may be recommended  A screening test is done to check for diseases that may not cause symptoms  The screening tests you may need depend on your age, gender, family history, and lifestyle habits  For example, colorectal screening may be recommended if you are 48years old or older  What screening tests do I need if I am a woman? · A Pap smear  is used to screen for cervical cancer   Pap smears are usually done every 3 to 5 years depending on your age  You may need them more often if you have had abnormal Pap smear test results in the past  Ask your healthcare provider how often you should have a Pap smear  · A mammogram  is an x-ray of your breasts to screen for breast cancer  Experts recommend mammograms every 2 years starting at age 48 years  You may need a mammogram at age 52 years or younger if you have an increased risk for breast cancer  Talk to your healthcare provider about when you should start having mammograms and how often you need them  What vaccines might I need? · Get an influenza vaccine  every year  The influenza vaccine protects you from the flu  Several types of viruses cause the flu  The viruses change over time, so new vaccines are made each year  · Get a tetanus-diphtheria (Td) booster vaccine  every 10 years  This vaccine protects you against tetanus and diphtheria  Tetanus is a severe infection that may cause painful muscle spasms and lockjaw  Diphtheria is a severe bacterial infection that causes a thick covering in the back of your mouth and throat  · Get a human papillomavirus (HPV) vaccine  if you are female and aged 23 to 32 or male 23 to 24 and never received it  This vaccine protects you from HPV infection  HPV is the most common infection spread by sexual contact  HPV may also cause vaginal, penile, and anal cancers  · Get a pneumococcal vaccine  if you are aged 72 years or older  The pneumococcal vaccine is an injection given to protect you from pneumococcal disease  Pneumococcal disease is an infection caused by pneumococcal bacteria  The infection may cause pneumonia, meningitis, or an ear infection  · Get a shingles vaccine  if you are aged 61 or older, even if you have had shingles before  The shingles vaccine is an injection to protect you from the varicella-zoster virus  This is the same virus that causes chickenpox   Shingles is a painful rash that develops in people who had chickenpox or have been exposed to the virus  How can I eat healthy? My Plate is a model for planning healthy meals  It shows the types and amounts of foods that should go on your plate  Fruits and vegetables make up about half of your plate, and grains and protein make up the other half  A serving of dairy is included on the side of your plate  The amount of calories and serving sizes you need depends on your age, gender, weight, and height  Examples of healthy foods are listed below:  · Eat a variety of vegetables  such as dark green, red, and orange vegetables  You can also include canned vegetables low in sodium (salt) and frozen vegetables without added butter or sauces  · Eat a variety of fresh fruits , canned fruit in 100% juice, frozen fruit, and dried fruit  · Include whole grains  At least half of the grains you eat should be whole grains  Examples include whole-wheat bread, wheat pasta, brown rice, and whole-grain cereals such as oatmeal     · Eat a variety of protein foods such as seafood (fish and shellfish), lean meat, and poultry without skin (turkey and chicken)  Examples of lean meats include pork leg, shoulder, or tenderloin, and beef round, sirloin, tenderloin, and extra lean ground beef  Other protein foods include eggs and egg substitutes, beans, peas, soy products, nuts, and seeds  · Choose low-fat dairy products such as skim or 1% milk or low-fat yogurt, cheese, and cottage cheese  · Limit unhealthy fats  such as butter, hard margarine, and shortening  How much exercise do I need? Exercise at least 30 minutes per day on most days of the week  Some examples of exercise include walking, biking, dancing, and swimming  You can also fit in more physical activity by taking the stairs instead of the elevator or parking farther away from stores  Include muscle strengthening activities 2 days each week  Regular exercise provides many health benefits  It helps you manage your weight, and decreases your risk for type 2 diabetes, heart disease, stroke, and high blood pressure  Exercise can also help improve your mood  Ask your healthcare provider about the best exercise plan for you  What are some general health and safety guidelines I should follow? · Do not smoke  Nicotine and other chemicals in cigarettes and cigars can cause lung damage  Ask your healthcare provider for information if you currently smoke and need help to quit  E-cigarettes or smokeless tobacco still contain nicotine  Talk to your healthcare provider before you use these products  · Limit alcohol  A drink of alcohol is 12 ounces of beer, 5 ounces of wine, or 1½ ounces of liquor  · Lose weight, if needed  Being overweight increases your risk of certain health conditions  These include heart disease, high blood pressure, type 2 diabetes, and certain types of cancer  · Protect your skin  Do not sunbathe or use tanning beds  Use sunscreen with a SPF 15 or higher  Apply sunscreen at least 15 minutes before you go outside  Reapply sunscreen every 2 hours  Wear protective clothing, hats, and sunglasses when you are outside  · Drive safely  Always wear your seatbelt  Make sure everyone in your car wears a seatbelt  A seatbelt can save your life if you are in an accident  Do not use your cell phone when you are driving  This could distract you and cause an accident  Pull over if you need to make a call or send a text message  · Practice safe sex  Use latex condoms if are sexually active and have more than one partner  Your healthcare provider may recommend screening tests for sexually transmitted infections (STIs)  · Wear helmets, lifejackets, and protective gear  Always wear a helmet when you ride a bike or motorcycle, go skiing, or play sports that could cause a head injury  Wear protective equipment when you play sports   Wear a lifejacket when you are on a boat or doing water sports  CARE AGREEMENT:   You have the right to help plan your care  Learn about your health condition and how it may be treated  Discuss treatment options with your caregivers to decide what care you want to receive  You always have the right to refuse treatment  The above information is an  only  It is not intended as medical advice for individual conditions or treatments  Talk to your doctor, nurse or pharmacist before following any medical regimen to see if it is safe and effective for you  © 2017 [address] Information is for End User's use only and may not be sold, redistributed or otherwise used for commercial purposes  All illustrations and images included in CareNotes® are the copyrighted property of [employer] M [employer]  or [employer] Elkins

## 2019-10-22 NOTE — PROGRESS NOTES
Assessment/Plan:     Diagnoses and all orders for this visit:    Medicare annual wellness visit, subsequent    Blurry vision  -     Ambulatory referral to Ophthalmology; Future    Vitreous floaters of left eye  -     Ambulatory referral to Ophthalmology; Future    Family history of cataracts  -     Ambulatory referral to Ophthalmology; Future    Shortness of breath  -     XR chest pa & lateral; Future  -     NT-BNP PRO; Future  -     Comprehensive metabolic panel; Future  -     CBC and differential; Future    Respiratory crackles of both lungs  -     XR chest pa & lateral; Future  -     NT-BNP PRO; Future    Localized swelling of both lower legs  -     NT-BNP PRO; Future  -     Comprehensive metabolic panel; Future  -     CBC and differential; Future    Chronic diastolic CHF (congestive heart failure) (HCC)  -     aspirin 81 mg chewable tablet; Chew 1 tablet (81 mg total) daily    Grade I hemorrhoids  Comments:  Trial Desitin and F/U with GI  Orders:  -     zinc oxide (DESITIN) 40 % PSTE; Apply topically daily    Seasonal allergic rhinitis, unspecified trigger  Comments:  Continue with Flonase, as prescribed    Dizziness  Comments:  Check labs    Swelling of both lower extremities  Comments:  Check labs and F/U with Cardiology    Mixed hyperlipidemia  -     Lipid panel; Future        Subjective:      Patient ID: Luiza Coelho is a 78 y o  female  Patient presents to [address] [place] [address] for routine checkup with multiple complaints  Allergies, medical history and current medications reviewed with patient; patient reports taking all medications as prescribed without issues  Patient C/O right-sided leg pain with bilateral leg/ankle swelling, ongoing for several months  Patient states she uses OTC Aleve, which does provide temporary relief; patient states she has never used any muscle relaxers   Patient states she has used Compression Stockings in the past, but states "it just moved the swelling up her leg " Patient states swelling improves with elevated of lower extremities  Patient states she has her initial appointment with Nephrology in December 2019 and follows with Cardiology Dr Moises Zuñiga, who she last saw about 3 months ago  Patient also C/O blurred vision that is worst in the right eye, ongoing for about 3 weeks ago  Patient has a history of cataract removal, which she had done through [facility]  Patient states her last eye exam was "quite a while ago "     Patient also C/O sinus congestion, ongoing for "a long time " Patient reports using Flonase, which is somewhat effective  Patient also C/O dizziness when getting in/out of bed, ongoing for about 2 weeks  Patient denies any dizziness other than when getting in/out of bed  Patient describes it as a "spinning feeling "     Patient also C/O rectal pressure, ongoing for about 1 week  Patient states the pressure is throbbing  Patient continues to follow with Gastroenterology, with her next appointment in November 2019  Patient also C/O intermittent chest pain with shortness of breath, ongoing for a few months  Patient states she becomes "exhausted" with any type of exertion  Patient had Holter Monitor completed in July 2019, which revealed symptomatic arrhythmia  Patient Care Team:  Gisela El MD as PCP - General  Jacoby Yu MD (Cardiology)  Jaron Sampson MD (Nephrology)  Nghia Schmidt MD (Gastroenterology)    Review of Systems   HENT: Positive for congestion  Eyes: Positive for visual disturbance  Respiratory: Positive for shortness of breath  Cardiovascular: Positive for chest pain and leg swelling  Gastrointestinal: Positive for rectal pain  Musculoskeletal: Positive for arthralgias  Neurological: Positive for dizziness       Objective:    /80 (BP Location: Right arm, Patient Position: Sitting, Cuff Size: Large)   Pulse 68   Temp 97 7 °F (36 5 °C) (Temporal)   Resp 18   Ht 4' 11" (1 499 m)   Wt 80 9 kg (178 lb 6 4 oz)   SpO2 97%   BMI 36 03 kg/m²      Physical Exam   Constitutional: She is oriented to person, place, and time  She appears well-developed and well-nourished  No distress  HENT:   Head: Normocephalic and atraumatic  Right Ear: Tympanic membrane, external ear and ear canal normal    Left Ear: Tympanic membrane, external ear and ear canal normal    Nose: Nose normal  No mucosal edema  Mouth/Throat: Uvula is midline, oropharynx is clear and moist and mucous membranes are normal    Nasal turbinates pink, moist and without exudate  Eyes: Conjunctivae and lids are normal    Neck: Normal range of motion  No tracheal deviation present  Cardiovascular: Normal rate, S1 normal, S2 normal and normal heart sounds  An irregular rhythm present  No murmur heard  Pulmonary/Chest: Effort normal  No respiratory distress  She has no decreased breath sounds  She has no wheezes  She has no rhonchi  She has rales in the right lower field and the left lower field  Abdominal: Normal appearance  She exhibits no distension  There is no guarding  Musculoskeletal: Normal range of motion  Right ankle: She exhibits swelling  Left ankle: She exhibits swelling  Right foot: There is swelling  Left foot: There is swelling  Neurological: She is alert and oriented to person, place, and time  Skin: Skin is warm, dry and intact  Psychiatric: She has a normal mood and affect  Her speech is normal    Nursing note and vitals reviewed

## 2019-10-22 NOTE — PROGRESS NOTES
Assessment and Plan:     Problem List Items Addressed This Visit     None      Visit Diagnoses     Medicare annual wellness visit, subsequent    -  Primary    Mixed hyperlipidemia        Relevant Orders    Lipid panel           Preventive health issues were discussed with patient, and age appropriate screening tests were ordered as noted in patient's After Visit Summary  Personalized health advice and appropriate referrals for health education or preventive services given if needed, as noted in patient's After Visit Summary       History of Present Illness:     Patient presents for Medicare Annual Wellness visit    Patient Care Team:  Brandon Petty MD as PCP - Chad Stenr MD (Cardiology)     Problem List:     Patient Active Problem List   Diagnosis    Atypical chest pain    Essential hypertension    Biceps tendinitis of right shoulder    Chronic diastolic CHF (congestive heart failure) [facility])    Closed head injury    Diastolic dysfunction    Dyspnea on exertion    GERD without esophagitis    Headache, worsening    Lateral epicondylitis of right elbow    Low serum vitamin B12    Lumbar disc herniation with radiculopathy    Peripheral edema    Peripheral neuropathy    B12 deficiency    Osteoporosis    Chronic left shoulder pain    BMI 36 0-36 9,adult    A-fib [facility] [address] [facility]    Ambulatory dysfunction    Anxiety    Junctional bradycardia    Stage 3 chronic kidney disease ([facility] [address] )    Single kidney    Vitamin D deficiency      Past Medical and Surgical History:     Past Medical History:   Diagnosis Date    Hypercholesteremia     Hypertension     IBS (irritable bowel syndrome)     Rheumatic fever     Skin lesion      Past Surgical History:   Procedure Laterality Date    ANKLE FRACTURE SURGERY      LAST ASSESSED [id number]    HYSTERECTOMY      KIDNEY SURGERY      TONSILLECTOMY        Family History:     Family History   Problem Relation Age of Onset    Hypertension Mother     Asthma Father     Cancer Sister       Social History:     Social History     Socioeconomic History    Marital status:       Spouse name: None    Number of children: None    Years of education: None    Highest education level: None   Occupational History    None   Social Needs    Financial resource strain: None    Food insecurity:     Worry: None     Inability: None    Transportation needs:     Medical: None     Non-medical: None   Tobacco Use    Smoking status: Never Smoker    Smokeless tobacco: Never Used   Substance and Sexual Activity    Alcohol use: No    Drug use: No    Sexual activity: None   Lifestyle    Physical activity:     Days per week: None     Minutes per session: None    Stress: None   Relationships    Social connections:     Talks on phone: None     Gets together: None     Attends [employer] service: None     Active member of club or organization: None     Attends meetings of clubs or organizations: None     Relationship status: None    Intimate partner violence:     Fear of current or ex partner: None     Emotionally abused: None     Physically abused: None     Forced sexual activity: None   Other Topics Concern    None   Social History Narrative    None       Medications and Allergies:     Current Outpatient Medications   Medication Sig Dispense Refill    aspirin 81 mg chewable tablet Chew 81 mg daily      Cholecalciferol 1000 units tablet Take 1 tablet (1,000 Units total) by mouth daily 30 tablet 5    Elastic Bandages & Supports ([address]) MISC by Does not apply route daily 2 each 0    fluticasone (FLONASE) 50 mcg/act nasal spray 1 spray into each nostril daily      furosemide (LASIX) 40 mg tablet Take 1 tablet (40 mg total) by mouth daily 60 tablet 5    furosemide (LASIX) 40 mg tablet Take 1 5 tablets (60 mg total) by mouth daily 90 tablet 3    lansoprazole (PREVACID) 15 mg capsule Take 15 mg by mouth daily      ondansetron (ZOFRAN) 4 mg tablet Take 1 tablet (4 mg total) by mouth every 8 (eight) hours as needed for nausea or vomiting 20 tablet 0    potassium chloride (K-DUR,KLOR-CON) 20 mEq tablet take 1 tablet by mouth twice a day 60 tablet 5     Current Facility-Administered Medications   Medication Dose Route Frequency Provider Last Rate Last Dose    cyanocobalamin injection 1,000 mcg  1,000 mcg Intramuscular Q30 Days Mireya Guy MD   1,000 mcg at 02/19/18 1008     Allergies   Allergen Reactions    Amoxicillin     Ciprofloxacin     Erythromycin     Statins       Immunizations:     Immunization History   Administered Date(s) Administered    Influenza, high dose seasonal 0 5 mL 10/09/2018    Pneumococcal Conjugate 13-Valent 10/09/2018    Pneumococcal Polysaccharide PPV23 11/14/2016      Health Maintenance:         Topic Date Due    CRC Screening: Colonoscopy  05/28/2022         Topic Date Due    HEPATITIS B VACCINES (1 of 3 - Risk 3-dose series) 06/19/1959    DTaP,Tdap,and Td Vaccines (1 - Tdap) 06/19/1959    INFLUENZA VACCINE  07/01/2019      Medicare Health Risk Assessment:     /80 (BP Location: Right arm, Patient Position: Sitting, Cuff Size: Large)   Pulse 68   Temp 97 7 °F (36 5 °C) (Temporal)   Resp 18   Ht 4' 11" (1 499 m)   Wt 80 9 kg (178 lb 6 4 oz)   SpO2 97%   BMI 36 03 kg/m²      April Tejada is here for her Subsequent Wellness visit  Last Medicare Wellness visit information reviewed, patient interviewed, no change since last AWV  Health Risk Assessment:   Patient rates overall health as fair  Patient feels that their physical health rating is slightly worse  Eyesight was rated as slightly worse  Hearing was rated as slightly worse  Patient feels that their emotional and mental health rating is slightly better  Pain experienced in the last 7 days has been a lot  Patient's pain rating has been 9/10  Patient states that she has experienced no weight loss or gain in last 6 months       Depression Screening:   PHQ-2 Score: 0      Fall Risk Screening: In the past year, patient has experienced: no history of falling in past year      Urinary Incontinence Screening:   Patient has leaked urine accidently in the last six months  Home Safety:  Patient has trouble with stairs inside or outside of their home  Patient has working smoke alarms and has working carbon monoxide detector  Home safety hazards include: none  Nutrition:   Current diet is Regular, No Added Salt and Frequent junk food  Medications:   Patient is not currently taking any over-the-counter supplements  Patient is able to manage medications  Activities of Daily Living (ADLs)/Instrumental Activities of Daily Living (IADLs):   Walk and transfer into and out of bed and chair?: Yes  Dress and groom yourself?: Yes    Bathe or shower yourself?: Yes    Feed yourself?  Yes  Do your laundry/housekeeping?: No  Manage your money, pay your bills and track your expenses?: Yes  Make your own meals?: Yes    Do your own shopping?: No    Previous Hospitalizations:   Any hospitalizations or ED visits within the last 12 months?: Yes    How many hospitalizations have you had in the last year?: 1-2    Advance Care Planning:   Living will: No    Durable POA for healthcare: No    Advanced directive: No    Five wishes given: Yes      PREVENTIVE SCREENINGS      Cardiovascular Screening:    General: Screening Current    Due for: Lipid Panel      Diabetes Screening:     General: Screening Current      Colorectal Cancer Screening:     General: Screening Current      Breast Cancer Screening:     General: Screening Current      Cervical Cancer Screening:    General: Screening Not Indicated      Osteoporosis Screening:    General: History Osteoporosis and Screening Current      Abdominal Aortic Aneurysm (AAA) Screening:        General: Screening Not Indicated      Lung Cancer Screening:     General: Screening Not Indicated      Hepatitis C Screening:    General: Screening Not Indicated      [facility], CRNP

## 2019-11-05 ENCOUNTER — TELEPHONE (OUTPATIENT)
Dept: FAMILY MEDICINE CLINIC | Facility: CLINIC | Age: 79
End: 2019-11-05

## 2019-11-05 DIAGNOSIS — N18.30 CKD (CHRONIC KIDNEY DISEASE) STAGE 3, GFR 30-59 ML/MIN (HCC): ICD-10-CM

## 2019-11-05 DIAGNOSIS — E78.1 HYPERTRIGLYCERIDEMIA: Primary | ICD-10-CM

## 2019-11-05 RX ORDER — OMEGA-3-ACID ETHYL ESTERS 1 G/1
1 CAPSULE, LIQUID FILLED ORAL 2 TIMES DAILY
Qty: 60 CAPSULE | Refills: 5 | Status: SHIPPED | OUTPATIENT
Start: 2019-11-05 | End: 2020-01-07 | Stop reason: SDUPTHER

## 2019-11-05 NOTE — TELEPHONE ENCOUNTER
GFR decreased @ 40 7 mL/min  BUN slightly increased @ 27 mg/dL  Creatinine slightly increased @ 1 2 mg/dL  Plan: This appears to be slightly below the patient's baseline  Recheck in 3 months  Triglycerides high @ 310 mg/dL  Total cholesteral elevated @ 247 mg/dL  LDL elevated @ 138 mg/dL  Plan:  Patient has known statin intolerance  We will start Lovaza and OTC fiber supplement, and recheck labs in 6 months

## 2019-11-19 ENCOUNTER — OFFICE VISIT (OUTPATIENT)
Dept: FAMILY MEDICINE CLINIC | Facility: CLINIC | Age: 79
End: 2019-11-19
Payer: COMMERCIAL

## 2019-11-19 VITALS
HEIGHT: 59 IN | RESPIRATION RATE: 18 BRPM | HEART RATE: 56 BPM | WEIGHT: 177.2 LBS | TEMPERATURE: 98.2 F | DIASTOLIC BLOOD PRESSURE: 68 MMHG | SYSTOLIC BLOOD PRESSURE: 112 MMHG | BODY MASS INDEX: 35.72 KG/M2 | OXYGEN SATURATION: 94 %

## 2019-11-19 DIAGNOSIS — G89.29 CHRONIC PAIN OF BOTH SHOULDERS: ICD-10-CM

## 2019-11-19 DIAGNOSIS — M25.511 CHRONIC PAIN OF BOTH SHOULDERS: ICD-10-CM

## 2019-11-19 DIAGNOSIS — I95.1 ORTHOSTATIC HYPOTENSION: ICD-10-CM

## 2019-11-19 DIAGNOSIS — N18.30 STAGE 3 CHRONIC KIDNEY DISEASE (HCC): ICD-10-CM

## 2019-11-19 DIAGNOSIS — E78.1 HYPERTRIGLYCERIDEMIA: ICD-10-CM

## 2019-11-19 DIAGNOSIS — Z09 FOLLOW-UP EXAMINATION: Primary | ICD-10-CM

## 2019-11-19 DIAGNOSIS — R25.2 NOCTURNAL MUSCLE CRAMPS: ICD-10-CM

## 2019-11-19 DIAGNOSIS — M25.512 CHRONIC PAIN OF BOTH SHOULDERS: ICD-10-CM

## 2019-11-19 PROCEDURE — 1160F RVW MEDS BY RX/DR IN RCRD: CPT | Performed by: NURSE PRACTITIONER

## 2019-11-19 PROCEDURE — 99214 OFFICE O/P EST MOD 30 MIN: CPT | Performed by: NURSE PRACTITIONER

## 2019-11-19 PROCEDURE — 3725F SCREEN DEPRESSION PERFORMED: CPT | Performed by: NURSE PRACTITIONER

## 2019-11-19 NOTE — PROGRESS NOTES
Assessment/Plan:     Diagnoses and all orders for this visit:    Follow-up examination    Orthostatic hypotension  Comments:  Discussed changing positions slowly and encouraged patient to discuss symptoms with Cardiologist    Nocturnal muscle cramps  Comments:  Patient refuses Magnesium supplement at this time    Hypertriglyceridemia  Comments:  Continue current    Stage 3 chronic kidney disease ([facility] [address] )  Comments: Followed by Nephrology    Chronic pain of both shoulders  Comments: Instructed patient to notify office when ready for referral        Subjective:      Patient ID: Vijay Granados is a 78 y o  female  Patient presents to [address] [place] [address] as follow up  Allergies, medical history and current medications reviewed with patient; patient reports taking all medications as prescribed without issues  Patient continues to C/O bilateral ankle swelling and dizziness when laying down or getting up  Patient had initial appointment with Nephrology Dr Elda Farah on 11/5/19; patient had renal US completed yesterday  Patient was instructed to follow up with Cardiology; patient is scheduled with Cardiology Dr Suki Means on 12/10/19  Patient reports she would prefer to be seen by Ophthalmology Dr Reese Villalta of [facility] for routine eye exam, as she had previously followed with him in the past for cataracts  Patient states that since starting Lovaza, she has noticed increased burping and leg cramping; patient states cramping primarily occurs in the feet  Patient states cramping is worst at bedtime, and states "it wakes her up "     Patient also C/O continued bilateral shoulder pain  Patient reports she had done Physical Therapy, which seemed to make her symptoms worse  Patient refuses Orthopedic referral at this time       Patient Care Team:  Joan Cedeño MD as PCP - General  Prashant Turner MD (Cardiology)  Esthela Hager MD (Nephrology)  Anjel Briseno MD (Gastroenterology)  Raeann Vaughan CRNP (Obstetrics and Gynecology)    Review of Systems   Eyes: Positive for visual disturbance  Cardiovascular: Positive for leg swelling  Musculoskeletal: Positive for arthralgias  Neurological: Positive for dizziness  Objective:    /68 (BP Location: Left arm, Patient Position: Sitting, Cuff Size: Large)   Pulse 56   Temp 98 2 °F (36 8 °C) (Temporal)   Resp 18   Ht 4' 11" (1 499 m)   Wt 80 4 kg (177 lb 3 2 oz)   SpO2 94%   BMI 35 79 kg/m²      Physical Exam   Constitutional: She is oriented to person, place, and time  She appears well-developed and well-nourished  No distress  HENT:   Head: Normocephalic and atraumatic  Eyes: Conjunctivae and lids are normal    Neck: Normal range of motion  No tracheal deviation present  Cardiovascular: Normal rate, S1 normal, S2 normal and normal heart sounds  An irregular rhythm present  No murmur heard  Pulmonary/Chest: Effort normal and breath sounds normal  No respiratory distress  Abdominal: Normal appearance  She exhibits no distension  There is no guarding  Musculoskeletal: Normal range of motion  Neurological: She is alert and oriented to person, place, and time  Skin: Skin is warm, dry and intact  Psychiatric: She has a normal mood and affect  Her speech is normal    Nursing note and vitals reviewed

## 2020-01-07 ENCOUNTER — OFFICE VISIT (OUTPATIENT)
Dept: FAMILY MEDICINE CLINIC | Facility: CLINIC | Age: 80
End: 2020-01-07
Payer: COMMERCIAL

## 2020-01-07 VITALS
HEART RATE: 35 BPM | SYSTOLIC BLOOD PRESSURE: 104 MMHG | DIASTOLIC BLOOD PRESSURE: 60 MMHG | WEIGHT: 175.6 LBS | BODY MASS INDEX: 35.4 KG/M2 | TEMPERATURE: 97.3 F | HEIGHT: 59 IN | OXYGEN SATURATION: 97 %

## 2020-01-07 DIAGNOSIS — E78.1 HYPERTRIGLYCERIDEMIA: ICD-10-CM

## 2020-01-07 DIAGNOSIS — T88.7XXA SIDE EFFECT OF MEDICATION: ICD-10-CM

## 2020-01-07 DIAGNOSIS — I50.32 CHRONIC DIASTOLIC CHF (CONGESTIVE HEART FAILURE) (HCC): ICD-10-CM

## 2020-01-07 DIAGNOSIS — L03.116 CELLULITIS OF LEFT LOWER EXTREMITY: Primary | ICD-10-CM

## 2020-01-07 DIAGNOSIS — R14.0 FLATULENCE/GAS PAIN/BELCHING: ICD-10-CM

## 2020-01-07 PROBLEM — Z90.5 H/O LEFT NEPHRECTOMY: Status: ACTIVE | Noted: 2019-12-10

## 2020-01-07 PROBLEM — I25.10 CAD (CORONARY ARTERY DISEASE): Status: ACTIVE | Noted: 2019-12-17

## 2020-01-07 PROCEDURE — 1160F RVW MEDS BY RX/DR IN RCRD: CPT | Performed by: NURSE PRACTITIONER

## 2020-01-07 PROCEDURE — 3725F SCREEN DEPRESSION PERFORMED: CPT | Performed by: NURSE PRACTITIONER

## 2020-01-07 PROCEDURE — 99214 OFFICE O/P EST MOD 30 MIN: CPT | Performed by: NURSE PRACTITIONER

## 2020-01-07 RX ORDER — OMEGA-3-ACID ETHYL ESTERS 1 G/1
1 CAPSULE, LIQUID FILLED ORAL 2 TIMES DAILY
Qty: 60 CAPSULE | Refills: 5 | Status: SHIPPED | OUTPATIENT
Start: 2020-01-07 | End: 2020-08-17

## 2020-01-07 RX ORDER — EZETIMIBE 10 MG/1
10 TABLET ORAL DAILY
COMMUNITY
End: 2022-03-11 | Stop reason: SDUPTHER

## 2020-01-07 RX ORDER — SIMETHICONE 125 MG
125 CAPSULE ORAL
Qty: 90 EACH | Refills: 0 | Status: SHIPPED | OUTPATIENT
Start: 2020-01-07 | End: 2020-02-10

## 2020-01-07 RX ORDER — DOXYCYCLINE HYCLATE 100 MG/1
100 CAPSULE ORAL EVERY 12 HOURS SCHEDULED
Qty: 14 CAPSULE | Refills: 0 | Status: SHIPPED | OUTPATIENT
Start: 2020-01-07 | End: 2020-01-14

## 2020-01-07 RX ORDER — FUROSEMIDE 40 MG/1
TABLET ORAL
COMMUNITY
End: 2021-12-23 | Stop reason: DRUGHIGH

## 2020-01-07 RX ORDER — METOPROLOL SUCCINATE 25 MG/1
12.5 TABLET, EXTENDED RELEASE ORAL DAILY
COMMUNITY
End: 2021-12-23 | Stop reason: ALTCHOICE

## 2020-01-07 RX ORDER — NITROGLYCERIN 0.4 MG/1
0.4 TABLET SUBLINGUAL
COMMUNITY
End: 2022-06-20

## 2020-01-07 RX ORDER — FUROSEMIDE 40 MG/1
TABLET ORAL
COMMUNITY
End: 2020-01-07 | Stop reason: ALTCHOICE

## 2020-01-07 NOTE — PATIENT INSTRUCTIONS
Cellulitis, Ambulatory Care   GENERAL INFORMATION:   Cellulitis  is a skin infection caused by bacteria  Common symptoms include the following:   · Fever    · A red, warm, swollen area on your skin    · Pain when the area is touched    · Bumps or blisters (abscess) that may drain pus    · Bumpy, raised skin that feels like an orange peel  Seek immediate care for the following symptoms:   · An increase in pain, redness, warmth, and size    · Red streaks coming from the infected area    · A thin, gray-brown discharge coming from your infected skin area    · A crackling under your skin when you touch it    · Purple dots or bumps on your skin, or bleeding under your skin    · New swelling and pain in your legs    · Sudden trouble breathing or chest pain  Treatment for cellulitis  may include medicines to treat the bacterial infection or decrease pain  The infection may need to be cleaned out  Damaged, dead, or infected tissue may need to be cut away to help your wound heal   Manage your symptoms:   · Elevate your wound above the level of your heart  as often as you can  This will help decrease swelling and pain  Prop your wound on pillows or blankets to keep it elevated comfortably  · Clean your wound as directed  You may need to wash the wound with soap and water  Look for signs of infection  · Wear pressure stockings as directed  The stockings are tight and put pressure on your legs  This improves blood flow and decreases swelling  Prevent cellulitis:   · Wash your hands often  Use soap and water  Wash your hands after you use the bathroom, change a child's diapers, or sneeze  Wash your hands before you prepare or eat food  Use lotion to prevent dry, cracked skin  · Do not share personal items, such as towels, clothing, and razors  · Clean exercise equipment  with germ-killing  before and after you use it    Follow up with your healthcare provider as directed:  Write down your questions so you remember to ask them during your visits  CARE AGREEMENT:   You have the right to help plan your care  Learn about your health condition and how it may be treated  Discuss treatment options with your caregivers to decide what care you want to receive  You always have the right to refuse treatment  The above information is an  only  It is not intended as medical advice for individual conditions or treatments  Talk to your doctor, nurse or pharmacist before following any medical regimen to see if it is safe and effective for you  © 2014 [address] is for End User's use only and may not be sold, redistributed or otherwise used for commercial purposes  All illustrations and images included in CareNotes® are the copyrighted property of [employer]  or [employer]  Wellness Visit for Adults   WHAT YOU NEED TO KNOW:   What is a wellness visit? A wellness visit is when you see your healthcare provider to get screened for health problems  You can also get advice on how to stay healthy  Write down your questions so you remember to ask them  Ask your healthcare provider how often you should have a wellness visit  What happens at a wellness visit? Your healthcare provider will ask about your health, and your family history of health problems  This includes high blood pressure, heart disease, and cancer  He or she will ask if you have symptoms that concern you, if you smoke, and about your mood  You may also be asked about your intake of medicines, supplements, food, and alcohol  Any of the following may be done:  · Your weight  will be checked  Your height may also be checked so your body mass index (BMI) can be calculated  Your BMI shows if you are at a healthy weight  · Your blood pressure  and heart rate will be checked  Your temperature may also be checked  · Blood and urine tests  may be done   Blood tests may be done to check your cholesterol levels  Abnormal cholesterol levels increase your risk for heart disease and stroke  You may also need a blood or urine test to check for diabetes if you are at increased risk  Urine tests may be done to look for signs of an infection or kidney disease  · A physical exam  includes checking your heartbeat and lungs with a stethoscope  Your healthcare provider may also check your skin to look for sun damage  · Screening tests  may be recommended  A screening test is done to check for diseases that may not cause symptoms  The screening tests you may need depend on your age, gender, family history, and lifestyle habits  For example, colorectal screening may be recommended if you are 48years old or older  What screening tests do I need if I am a woman? · A Pap smear  is used to screen for cervical cancer  Pap smears are usually done every 3 to 5 years depending on your age  You may need them more often if you have had abnormal Pap smear test results in the past  Ask your healthcare provider how often you should have a Pap smear  · A mammogram  is an x-ray of your breasts to screen for breast cancer  Experts recommend mammograms every 2 years starting at age 48 years  You may need a mammogram at age 52 years or younger if you have an increased risk for breast cancer  Talk to your healthcare provider about when you should start having mammograms and how often you need them  What vaccines might I need? · Get an influenza vaccine  every year  The influenza vaccine protects you from the flu  Several types of viruses cause the flu  The viruses change over time, so new vaccines are made each year  · Get a tetanus-diphtheria (Td) booster vaccine  every 10 years  This vaccine protects you against tetanus and diphtheria  Tetanus is a severe infection that may cause painful muscle spasms and lockjaw   Diphtheria is a severe bacterial infection that causes a thick covering in the back of your mouth and throat  · Get a human papillomavirus (HPV) vaccine  if you are female and aged 23 to 32 or male 23 to 24 and never received it  This vaccine protects you from HPV infection  HPV is the most common infection spread by sexual contact  HPV may also cause vaginal, penile, and anal cancers  · Get a pneumococcal vaccine  if you are aged 72 years or older  The pneumococcal vaccine is an injection given to protect you from pneumococcal disease  Pneumococcal disease is an infection caused by pneumococcal bacteria  The infection may cause pneumonia, meningitis, or an ear infection  · Get a shingles vaccine  if you are aged 61 or older, even if you have had shingles before  The shingles vaccine is an injection to protect you from the varicella-zoster virus  This is the same virus that causes chickenpox  Shingles is a painful rash that develops in people who had chickenpox or have been exposed to the virus  How can I eat healthy? My Plate is a model for planning healthy meals  It shows the types and amounts of foods that should go on your plate  Fruits and vegetables make up about half of your plate, and grains and protein make up the other half  A serving of dairy is included on the side of your plate  The amount of calories and serving sizes you need depends on your age, gender, weight, and height  Examples of healthy foods are listed below:  · Eat a variety of vegetables  such as dark green, red, and orange vegetables  You can also include canned vegetables low in sodium (salt) and frozen vegetables without added butter or sauces  · Eat a variety of fresh fruits , canned fruit in 100% juice, frozen fruit, and dried fruit  · Include whole grains  At least half of the grains you eat should be whole grains   Examples include whole-wheat bread, wheat pasta, brown rice, and whole-grain cereals such as oatmeal     · Eat a variety of protein foods such as seafood (fish and shellfish), lean meat, and poultry without skin (turkey and chicken)  Examples of lean meats include pork leg, shoulder, or tenderloin, and beef round, sirloin, tenderloin, and extra lean ground beef  Other protein foods include eggs and egg substitutes, beans, peas, soy products, nuts, and seeds  · Choose low-fat dairy products such as skim or 1% milk or low-fat yogurt, cheese, and cottage cheese  · Limit unhealthy fats  such as butter, hard margarine, and shortening  How much exercise do I need? Exercise at least 30 minutes per day on most days of the week  Some examples of exercise include walking, biking, dancing, and swimming  You can also fit in more physical activity by taking the stairs instead of the elevator or parking farther away from stores  Include muscle strengthening activities 2 days each week  Regular exercise provides many health benefits  It helps you manage your weight, and decreases your risk for type 2 diabetes, heart disease, stroke, and high blood pressure  Exercise can also help improve your mood  Ask your healthcare provider about the best exercise plan for you  What are some general health and safety guidelines I should follow? · Do not smoke  Nicotine and other chemicals in cigarettes and cigars can cause lung damage  Ask your healthcare provider for information if you currently smoke and need help to quit  E-cigarettes or smokeless tobacco still contain nicotine  Talk to your healthcare provider before you use these products  · Limit alcohol  A drink of alcohol is 12 ounces of beer, 5 ounces of wine, or 1½ ounces of liquor  · Lose weight, if needed  Being overweight increases your risk of certain health conditions  These include heart disease, high blood pressure, type 2 diabetes, and certain types of cancer  · Protect your skin  Do not sunbathe or use tanning beds  Use sunscreen with a SPF 15 or higher  Apply sunscreen at least 15 minutes before you go outside   Reapply sunscreen every 2 hours  Wear protective clothing, hats, and sunglasses when you are outside  · Drive safely  Always wear your seatbelt  Make sure everyone in your car wears a seatbelt  A seatbelt can save your life if you are in an accident  Do not use your cell phone when you are driving  This could distract you and cause an accident  Pull over if you need to make a call or send a text message  · Practice safe sex  Use latex condoms if are sexually active and have more than one partner  Your healthcare provider may recommend screening tests for sexually transmitted infections (STIs)  · Wear helmets, lifejackets, and protective gear  Always wear a helmet when you ride a bike or motorcycle, go skiing, or play sports that could cause a head injury  Wear protective equipment when you play sports  Wear a lifejacket when you are on a boat or doing water sports  CARE AGREEMENT:   You have the right to help plan your care  Learn about your health condition and how it may be treated  Discuss treatment options with your caregivers to decide what care you want to receive  You always have the right to refuse treatment  The above information is an  only  It is not intended as medical advice for individual conditions or treatments  Talk to your doctor, nurse or pharmacist before following any medical regimen to see if it is safe and effective for you  © 2017 [address] Information is for End User's use only and may not be sold, redistributed or otherwise used for commercial purposes  All illustrations and images included in CareNotes® are the copyrighted property of [employer]  or Wolfgang Elkins

## 2020-01-07 NOTE — PROGRESS NOTES
Assessment/Plan:     Diagnoses and all orders for this visit:    Cellulitis of left lower extremity  -     doxycycline hyclate (VIBRAMYCIN) 100 mg capsule; Take 1 capsule (100 mg total) by mouth every 12 (twelve) hours for 7 days    Flatulence/gas pain/belching  Comments:  Trial Simethicone  Orders:  -     simethicone (MYLICON,GAS-X) 022 MG CAPS; Take 1 capsule (125 mg total) by mouth 3 (three) times daily after meals    Side effect of medication  Comments:  Stop taking Zofran    Hypertriglyceridemia  -     omega-3-acid ethyl esters (LOVAZA) 1 g capsule; Take 1 capsule (1 g total) by mouth 2 (two) times a day    Chronic diastolic CHF (congestive heart failure) ([facility] [address] )  Comments: Followed by Cardiology    Other orders  -     Discontinue: furosemide (LASIX) 40 mg tablet; Take by mouth Take 2 tablets (80 mg) every AM and 1 tablet (40 mg) every PM  -     furosemide (LASIX) 40 mg tablet; Take by mouth Take by mouth Take 2 tablets (80 mg) every AM and 1 tablet (40 mg) every PM  -     nitroglycerin (NITROSTAT) 0 4 mg SL tablet; Place 0 4 mg under the tongue every 5 (five) minutes as needed  -     metoprolol succinate (TOPROL-XL) 25 mg 24 hr tablet; Take 12 5 mg by mouth daily  -     ezetimibe (ZETIA) 10 mg tablet; Take 10 mg by mouth daily        Subjective:      Patient ID: Christa Davidson is a 78 y o  female  Patient presents to [address] [place] [address] with complaints of cellulitis  Allergies, medical history and current medications reviewed with patient; patient reports taking all medications as prescribed without issues  Patient had seen Cardiology Dr Agnieszka Lozano on 1/2/20, who had recommended follow up with PCP regarding some redness of the left lower extremity  Patient states the area was initially tender to the touch, which has since somewhat improved  Patient also C/O belching that is associated with bile reflux, as well as nausea and "burning in her stomach," ongoing for about 2 days   Patient states she had taken Zofran, which seemed to worsen symptoms and "made her feel funny like she didn't know what to do with herself " Patient C/O dizziness, "like her head feels heavy," that started after taking Zofran this morning  Patient is taking Prevacid as prescribed by Gastroenterology Dr Brianna Ruiz  Patient Care Team:  Cj Apple MD as PCP - General Randall Wesley MD (Cardiology)  Maximilian Macario MD (Nephrology)  Nohemi Rm MD (Gastroenterology)  Ebb Ormond, CRNP (Obstetrics and Gynecology)    Review of Systems   Cardiovascular: Positive for leg swelling  Skin: Positive for rash  Neurological: Positive for dizziness  All other systems reviewed and are negative  Objective:    /60 (BP Location: Right arm, Patient Position: Sitting, Cuff Size: Large)   Pulse (!) 35   Temp (!) 97 3 °F (36 3 °C) (Temporal)   Ht 4' 11" (1 499 m)   Wt 79 7 kg (175 lb 9 6 oz)   SpO2 97%   BMI 35 47 kg/m²      Physical Exam   Constitutional: She is oriented to person, place, and time  She appears well-developed and well-nourished  No distress  HENT:   Head: Normocephalic and atraumatic  Eyes: Conjunctivae and lids are normal    Neck: Normal range of motion  No tracheal deviation present  Cardiovascular: Normal rate, regular rhythm, S1 normal and S2 normal  Exam reveals distant heart sounds  Pulmonary/Chest: Effort normal and breath sounds normal  No respiratory distress  Abdominal: Normal appearance and bowel sounds are normal  She exhibits no distension  There is no guarding  Musculoskeletal: Normal range of motion  Left ankle: She exhibits swelling (+1 non-pitting)  Neurological: She is alert and oriented to person, place, and time  Skin: Skin is warm, dry and intact  There is erythema (left lower extremity; warm to touch)  Psychiatric: She has a normal mood and affect  Her speech is normal    Nursing note and vitals reviewed  BMI Counseling: Body mass index is 35 47 kg/m²   The BMI is above normal  Nutrition recommendations include encouraging healthy choices of fruits and vegetables, consuming healthier snacks, reducing intake of saturated and trans fat and reducing intake of cholesterol  Exercise recommendations include exercising 3-5 times per week  The above recommendations were included in patient instructions

## 2020-01-16 DIAGNOSIS — L03.116 CELLULITIS OF LEFT LOWER EXTREMITY: ICD-10-CM

## 2020-01-17 RX ORDER — DOXYCYCLINE HYCLATE 100 MG/1
CAPSULE ORAL
Qty: 14 CAPSULE | Refills: 0 | OUTPATIENT
Start: 2020-01-17

## 2020-01-27 DIAGNOSIS — I10 ESSENTIAL HYPERTENSION: ICD-10-CM

## 2020-01-27 RX ORDER — POTASSIUM CHLORIDE 20 MEQ/1
TABLET, EXTENDED RELEASE ORAL
Qty: 60 TABLET | Refills: 0 | Status: SHIPPED | OUTPATIENT
Start: 2020-01-27 | End: 2020-03-02

## 2020-02-10 DIAGNOSIS — R14.0 FLATULENCE/GAS PAIN/BELCHING: ICD-10-CM

## 2020-02-10 RX ORDER — SIMETHICONE 125 MG
CAPSULE ORAL
Qty: 90 CAPSULE | Refills: 2 | Status: SHIPPED | OUTPATIENT
Start: 2020-02-10 | End: 2020-02-20 | Stop reason: ALTCHOICE

## 2020-02-20 ENCOUNTER — OFFICE VISIT (OUTPATIENT)
Dept: FAMILY MEDICINE CLINIC | Facility: CLINIC | Age: 80
End: 2020-02-20
Payer: COMMERCIAL

## 2020-02-20 VITALS
SYSTOLIC BLOOD PRESSURE: 120 MMHG | WEIGHT: 173.4 LBS | OXYGEN SATURATION: 95 % | TEMPERATURE: 98.6 F | BODY MASS INDEX: 34.96 KG/M2 | DIASTOLIC BLOOD PRESSURE: 80 MMHG | HEART RATE: 98 BPM | HEIGHT: 59 IN

## 2020-02-20 DIAGNOSIS — L60.3 BRITTLE NAILS: ICD-10-CM

## 2020-02-20 DIAGNOSIS — G89.29 CHRONIC BILATERAL LOW BACK PAIN WITH RIGHT-SIDED SCIATICA: ICD-10-CM

## 2020-02-20 DIAGNOSIS — M54.41 CHRONIC BILATERAL LOW BACK PAIN WITH RIGHT-SIDED SCIATICA: ICD-10-CM

## 2020-02-20 DIAGNOSIS — K03.89: ICD-10-CM

## 2020-02-20 DIAGNOSIS — R25.2 MUSCLE CRAMPING: ICD-10-CM

## 2020-02-20 DIAGNOSIS — R53.83 OTHER FATIGUE: ICD-10-CM

## 2020-02-20 DIAGNOSIS — L65.9 HAIR THINNING: ICD-10-CM

## 2020-02-20 DIAGNOSIS — L03.116 CELLULITIS OF LEFT LOWER EXTREMITY: ICD-10-CM

## 2020-02-20 DIAGNOSIS — E55.9 VITAMIN D DEFICIENCY: ICD-10-CM

## 2020-02-20 DIAGNOSIS — N18.30 STAGE 3 CHRONIC KIDNEY DISEASE (HCC): Primary | ICD-10-CM

## 2020-02-20 PROCEDURE — 3079F DIAST BP 80-89 MM HG: CPT | Performed by: NURSE PRACTITIONER

## 2020-02-20 PROCEDURE — 1036F TOBACCO NON-USER: CPT | Performed by: NURSE PRACTITIONER

## 2020-02-20 PROCEDURE — 99215 OFFICE O/P EST HI 40 MIN: CPT | Performed by: NURSE PRACTITIONER

## 2020-02-20 PROCEDURE — 1160F RVW MEDS BY RX/DR IN RCRD: CPT | Performed by: NURSE PRACTITIONER

## 2020-02-20 PROCEDURE — 3008F BODY MASS INDEX DOCD: CPT | Performed by: NURSE PRACTITIONER

## 2020-02-20 PROCEDURE — 3074F SYST BP LT 130 MM HG: CPT | Performed by: NURSE PRACTITIONER

## 2020-02-20 PROCEDURE — 4040F PNEUMOC VAC/ADMIN/RCVD: CPT | Performed by: NURSE PRACTITIONER

## 2020-02-20 RX ORDER — DOXYCYCLINE HYCLATE 100 MG
100 TABLET ORAL 2 TIMES DAILY
Qty: 20 TABLET | Refills: 0 | Status: SHIPPED | OUTPATIENT
Start: 2020-02-20 | End: 2020-03-01

## 2020-02-20 NOTE — PROGRESS NOTES
Assessment/Plan:     Diagnoses and all orders for this visit:    Stage 3 chronic kidney disease ([facility] [address] )  Comments:  Repeat CMP in 3 months  Orders:  -     Comprehensive metabolic panel; Future    Cellulitis of left lower extremity  -     doxycycline hyclate (VIBRA-TABS) 100 mg tablet; Take 1 tablet (100 mg total) by mouth 2 (two) times a day for 10 days    Brittle nails  -     Iron Panel (Includes Ferritin, Iron Sat%, Iron, and TIBC); Future  -     TSH, 3rd generation; Future  -     T3, free; Future  -     T4, free; Future    Hair thinning  -     Iron Panel (Includes Ferritin, Iron Sat%, Iron, and TIBC); Future  -     TSH, 3rd generation; Future  -     T3, free; Future  -     T4, free; Future    Other fatigue  -     Iron Panel (Includes Ferritin, Iron Sat%, Iron, and TIBC); Future  -     TSH, 3rd generation; Future  -     T3, free; Future  -     T4, free; Future    Chronic bilateral low back pain with right-sided sciatica  Comments:  Trial topical cream  Orders:  -     Lidocaine-Menthol 4-1 % CREA; Apply topically as needed (pain)    Brittle teeth  -     Ambulatory referral to Dentistry; Future    Vitamin D deficiency  -     Vitamin D 1,25 dihydroxy; Future    Muscle cramping  -     Magnesium; Future        Subjective:      Patient ID: Kathleen Patel is a 78 y o  female  Patient presents to [address] [place] [address] as follow up with multiple complaints  Allergies, medical history and current medications reviewed with patient; patient reports taking all medications as prescribed without issues  Patient c/o peeling and brittle toe and finger nails, with thinning hair  Patient also c/o ongoing fatigue       Patient also c/o brittle teeth; patient states she "chews on the inside of her mouth and her teeth break " Patient reports she has not seen a dentist in "almost 50 years " Patient is currently prescribed Vitamin D 1,000 units daily, which the patient "thinks she's still taking "     Patient also c/o redness, swelling and "shiny skin" to the left lower extremity  Patient reports she has been taking Lasix as prescribed by Cardiology Dr Elsa Pereira, who she last saw on 1/2/20  Patient was previously prescribed course of Doxycycline, which she reports was effective  Patient also c/o tremors to the bilateral hands; patient reports she occasionally has difficulty holding items and frequently drops things as a result  Patient reports episodes several times per day, and states shaking is limited to her hands  Patient reports she occasionally wakes up in the morning with her hands "clenched" as well  Patient also c/o low back pain with sciatica that impairs her ability to walk  Patient reports pain is exacerbated by foul weather  Patient reports using OTC Tylenol, which is not very effective  Patient reports using OTC Icy Hot patches, which are effective but she has difficulty with the patches keeping the patches adhered  Patient Care Team:  Linette Jain MD as PCP - General  Virginia Sanchez MD (Cardiology)  Aide Saldana MD (Nephrology)  Eliane Chong MD (Gastroenterology)  BETH Hernandez (Obstetrics and Gynecology)    Review of Systems   Constitutional: Positive for fatigue  HENT: Positive for dental problem  Cardiovascular: Positive for leg swelling  Musculoskeletal: Positive for back pain  Skin: Positive for color change and rash  Neurological: Positive for tremors  All other systems reviewed and are negative  Objective:    /80 (BP Location: Left arm, Patient Position: Sitting, Cuff Size: Large)   Pulse 98   Temp 98 6 °F (37 °C) (Temporal)   Ht 4' 11" (1 499 m)   Wt 78 7 kg (173 lb 6 4 oz)   SpO2 95%   BMI 35 02 kg/m²      Physical Exam   Constitutional: She is oriented to person, place, and time  She appears well-developed and well-nourished  No distress  HENT:   Head: Normocephalic and atraumatic     Right Ear: Tympanic membrane, external ear and ear canal normal    Left Ear: Tympanic membrane, external ear and ear canal normal    Nose: Nose normal  No mucosal edema  Mouth/Throat: Uvula is midline, oropharynx is clear and moist and mucous membranes are normal    Nasal turbinates pink, moist and without exudate  Eyes: Conjunctivae and lids are normal    Neck: Normal range of motion  No tracheal deviation present  Cardiovascular: Normal rate, regular rhythm, S1 normal and S2 normal  Exam reveals distant heart sounds  Pulmonary/Chest: Effort normal  No respiratory distress  She has decreased breath sounds  She has no wheezes  She has no rhonchi  She has no rales  Abdominal: Soft  Bowel sounds are normal  She exhibits no distension and no mass  There is no hepatosplenomegaly  There is no tenderness  There is no guarding  Musculoskeletal: Normal range of motion  Lumbar back: She exhibits spasm  Neurological: She is alert and oriented to person, place, and time  Skin: Skin is warm, dry and intact  Psychiatric: She has a normal mood and affect  Her speech is normal    Nursing note and vitals reviewed

## 2020-03-02 ENCOUNTER — TELEPHONE (OUTPATIENT)
Dept: FAMILY MEDICINE CLINIC | Facility: CLINIC | Age: 80
End: 2020-03-02

## 2020-03-02 DIAGNOSIS — K03.89: Primary | ICD-10-CM

## 2020-03-02 DIAGNOSIS — I10 ESSENTIAL HYPERTENSION: ICD-10-CM

## 2020-03-02 DIAGNOSIS — G62.89 OTHER POLYNEUROPATHY: ICD-10-CM

## 2020-03-02 DIAGNOSIS — M51.16 LUMBAR DISC HERNIATION WITH RADICULOPATHY: ICD-10-CM

## 2020-03-02 DIAGNOSIS — G89.29 CHRONIC BILATERAL LOW BACK PAIN WITH RIGHT-SIDED SCIATICA: ICD-10-CM

## 2020-03-02 DIAGNOSIS — M54.41 CHRONIC BILATERAL LOW BACK PAIN WITH RIGHT-SIDED SCIATICA: ICD-10-CM

## 2020-03-02 RX ORDER — POTASSIUM CHLORIDE 20 MEQ/1
TABLET, EXTENDED RELEASE ORAL
Qty: 60 TABLET | Refills: 0 | Status: SHIPPED | OUTPATIENT
Start: 2020-03-02 | End: 2020-04-17

## 2020-03-18 DIAGNOSIS — M51.16 LUMBAR DISC HERNIATION WITH RADICULOPATHY: Primary | ICD-10-CM

## 2020-03-18 RX ORDER — GABAPENTIN 100 MG/1
100 CAPSULE ORAL 3 TIMES DAILY
Qty: 90 CAPSULE | Refills: 3 | Status: SHIPPED | OUTPATIENT
Start: 2020-03-18 | End: 2020-09-01 | Stop reason: ALTCHOICE

## 2020-03-18 RX ORDER — TIZANIDINE HYDROCHLORIDE 4 MG/1
4 CAPSULE, GELATIN COATED ORAL 3 TIMES DAILY
Qty: 90 CAPSULE | Refills: 5 | Status: SHIPPED | OUTPATIENT
Start: 2020-03-18 | End: 2020-09-01 | Stop reason: ALTCHOICE

## 2020-03-18 NOTE — PROGRESS NOTES
1   Do not fill Lyrica  It is expensive and can worsen heart failure  I sent in gabapentin instead  2   Robaxin is not covered by your insurance  I sent in Zanaflex instead

## 2020-03-19 DIAGNOSIS — E55.9 VITAMIN D DEFICIENCY: ICD-10-CM

## 2020-03-19 DIAGNOSIS — R14.0 FLATULENCE/GAS PAIN/BELCHING: ICD-10-CM

## 2020-03-19 RX ORDER — SIMETHICONE 125 MG
CAPSULE ORAL
Qty: 180 CAPSULE | Refills: 0 | Status: SHIPPED | OUTPATIENT
Start: 2020-03-19 | End: 2020-09-01 | Stop reason: ALTCHOICE

## 2020-03-19 RX ORDER — MELATONIN
Qty: 90 TABLET | Refills: 1 | Status: SHIPPED | OUTPATIENT
Start: 2020-03-19 | End: 2021-07-19 | Stop reason: SDUPTHER

## 2020-03-21 ENCOUNTER — NURSE TRIAGE (OUTPATIENT)
Dept: OTHER | Facility: OTHER | Age: 80
End: 2020-03-21

## 2020-03-21 NOTE — TELEPHONE ENCOUNTER
Regarding: Anxiety  ----- Message from Satya Blandon sent at 3/21/2020 11:23 AM EDT -----  "I'm experiencing anxiety and would like my anti-anxiety medication "

## 2020-03-21 NOTE — TELEPHONE ENCOUNTER
Patient is requesting a refill of medication for her anxiety, she could not remember the name of the medication she used to take  She was advised to call the office on Monday for an appointment

## 2020-03-21 NOTE — TELEPHONE ENCOUNTER
Reason for Disposition   [1] Symptoms of anxiety or panic AND [2] has not been evaluated for this by physician    Answer Assessment - Initial Assessment Questions  1  CONCERN: "What happened that made you call today?"      Needs a refill of anti-anxiety medication  2  ANXIETY SYMPTOM SCREENING: "Can you describe how you have been feeling?"  (e g , tense, restless, panicky, anxious, keyed up, trouble sleeping, trouble concentrating)      Only symptoms is that she doesn't enjoy eating  Only eating fruit  3  ONSET: "How long have you been feeling this way?"      Began yesterday  4  RECURRENT: "Have you felt this way before?"  If yes: "What happened that time?" "What helped these feelings go away in the past?"       She has experienced this before  5  RISK OF HARM - SUICIDAL IDEATION:  "Do you ever have thoughts of hurting or killing yourself?"  (e g , yes, no, no but preoccupation with thoughts about death)    - INTENT:  "Do you have thoughts of hurting or killing yourself right NOW?" (e g , yes, no, N/A)    - PLAN: "Do you have a specific plan for how you would do this?" (e g , gun, knife, overdose, no plan, N/A)      Denied  6  RISK OF HARM - HOMICIDAL IDEATION:  "Do you ever have thoughts of hurting or killing someone else?"  (e g , yes, no, no but preoccupation with thoughts about death)    - INTENT:  "Do you have thoughts of hurting or killing someone right NOW?" (e g , yes, no, N/A)    - PLAN: "Do you have a specific plan for how you would do this?" (e g , gun, knife, no plan, N/A)       Denied  7  FUNCTIONAL IMPAIRMENT: "How have things been going for you overall in your life? Have you had any more difficulties than usual doing your normal daily activities?"  (e g , better, same, worse; self-care, school, work, interactions)      Not able to eat  Lower back pain     8  SUPPORT: "Who is with you now?" "Who do you live with?" "Do you have family or friends nearby who you can talk to?"       Denies any support with her  9  THERAPIST: "Do you have a counselor or therapist? Name?"      Denied  10  STRESSORS: "Has there been any new stress or recent changes in your life?"        Was hospitalized last week for back surgery  Cousin  recently  11  CAFFEINE ABUSE: "Do you drink caffeinated beverages, and how much each day?" (e g , coffee, tea, viry)        One cup of coffee in the morning     13  OTHER SYMPTOMS: "Do you have any other physical symptoms right now?" (e g , chest pain, palpitations, difficulty breathing, fever)        Denies chest pain, palpitations, difficulty breathing, fever    Protocols used: ANXIETY AND PANIC ATTACK-ADULT-

## 2020-03-23 NOTE — TELEPHONE ENCOUNTER
Please see message below from [facility]    Patient is requesting a refill of medication for her anxiety, she could not remember the name of the medication she used to take  She was advised to call the office on Monday for an appointment

## 2020-03-27 ENCOUNTER — TELEMEDICINE (OUTPATIENT)
Dept: FAMILY MEDICINE CLINIC | Facility: CLINIC | Age: 80
End: 2020-03-27
Payer: COMMERCIAL

## 2020-03-27 DIAGNOSIS — Z09 ENCOUNTER FOR EXAMINATION FOLLOWING TREATMENT AT HOSPITAL: ICD-10-CM

## 2020-03-27 DIAGNOSIS — R53.83 OTHER FATIGUE: Primary | ICD-10-CM

## 2020-03-27 DIAGNOSIS — I50.32 CHRONIC DIASTOLIC CHF (CONGESTIVE HEART FAILURE) (HCC): ICD-10-CM

## 2020-03-27 DIAGNOSIS — N18.30 STAGE 3 CHRONIC KIDNEY DISEASE (HCC): ICD-10-CM

## 2020-03-27 DIAGNOSIS — D72.828 OTHER ELEVATED WHITE BLOOD CELL (WBC) COUNT: ICD-10-CM

## 2020-03-27 PROCEDURE — 99443 PR PHYS/QHP TELEPHONE EVALUATION 21-30 MIN: CPT | Performed by: NURSE PRACTITIONER

## 2020-03-27 NOTE — PROGRESS NOTES
Virtual Regular Visit    Problem List Items Addressed This Visit        Cardiovascular and Mediastinum    Chronic diastolic CHF (congestive heart failure) (HCC)    Relevant Orders    XR chest pa & lateral    NT-BNP PRO       Genitourinary    Stage 3 chronic kidney disease (HCC)    Relevant Orders    Comprehensive metabolic panel      Other Visit Diagnoses     Other fatigue    -  Primary    Check labs and CXR; call office if symptoms worsen    Relevant Orders    XR chest pa & lateral    CBC and differential    Comprehensive metabolic panel    NT-BNP PRO    Other elevated white blood cell (WBC) count        Relevant Orders    CBC and differential    Encounter for examination following treatment at hospital        Instructed patient to D/C Lyrica          Reason for visit is fatigue  Patient states she woke up this morning, ate breakfast and suddently "felt exhausted " Patient states she took Prevacid, ASA, Metoprolol, Zetia, Potassium and Lasix this morning  Patient states when she takes deep breaths "it sounds like her stomach is growling with pain in the pit of the stomach " Patient denies any dark/tarry stools, cough, fever, nausea or vomiting  Patient reports difficulty taking a deep breath; patient reports improvement of leg swelling  Patient was also recently admitted to [facility] for back pain, where she was discharged on Robaxin, Sinemet and Lyrica  Patient states she is concerned regarding multiple new prescriptions due to known CHF  Encounter provider BETH Hammer    Provider located at [address] [place] [address]    Recent Visits  Date Type Provider Dept   03/27/20 Telemedicine BETH Alan  [facility]   Showing recent visits within past 7 days and meeting all other requirements     Future Appointments  No visits were found meeting these conditions     Showing future appointments within next 150 days and meeting all other requirements      After connecting through telephone, the patient was identified by name and date of birth  Kizzy Kahn was informed that this is a telemedicine visit and that the visit is being conducted through telephone which may not be secure and therefore, might not be HIPAA-compliant  My office door was closed  No one else was in the room  She acknowledged consent and understanding of privacy and security of the video platform  The patient has agreed to participate and understands they can discontinue the visit at any time  Subjective  Kizzy Kahn is a 78 y o  female      Past Medical History:   Diagnosis Date    Hypercholesteremia     Hypertension     IBS (irritable bowel syndrome)     Rheumatic fever     Skin lesion      Past Surgical History:   Procedure Laterality Date    ANKLE FRACTURE SURGERY      LAST ASSESSED [id number]    HYSTERECTOMY      KIDNEY SURGERY      TONSILLECTOMY       Current Outpatient Medications   Medication Sig Dispense Refill    carbidopa-levodopa (SINEMET)  mg per tablet Take 1 tablet by mouth 3 (three) times a day      methocarbamol (ROBAXIN) 500 mg tablet Take 500 mg by mouth 4 (four) times a day      aspirin 81 mg chewable tablet Chew 1 tablet (81 mg total) daily 30 tablet 11    cholecalciferol (VITAMIN D3) 1,000 units tablet take 1 tablet by mouth once daily 90 tablet 1    Elastic Bandages & Supports (MEDICAL COMPRESSION STOCKINGS) MISC by Does not apply route daily 2 each 0    ezetimibe (ZETIA) 10 mg tablet Take 10 mg by mouth daily      fluticasone (FLONASE) 50 mcg/act nasal spray 1 spray into each nostril daily      furosemide (LASIX) 40 mg tablet Take by mouth Take by mouth Take 2 tablets (80 mg) every AM and 1 tablet (40 mg) every PM      gabapentin (NEURONTIN) 100 mg capsule Take 1 capsule (100 mg total) by mouth 3 (three) times a day 90 capsule 3    lansoprazole (PREVACID) 15 mg capsule Take 15 mg by mouth daily      Lidocaine-Menthol 4-1 % CREA Apply topically as needed (pain) 60 g 5    metoprolol succinate (TOPROL-XL) 25 mg 24 hr tablet Take 12 5 mg by mouth daily      nitroglycerin (NITROSTAT) 0 4 mg SL tablet Place 0 4 mg under the tongue every 5 (five) minutes as needed      omega-3-acid ethyl esters (LOVAZA) 1 g capsule Take 1 capsule (1 g total) by mouth 2 (two) times a day 60 capsule 5    ondansetron (ZOFRAN) 4 mg tablet Take 1 tablet (4 mg total) by mouth every 8 (eight) hours as needed for nausea or vomiting 20 tablet 0    potassium chloride (K-DUR,KLOR-CON) 20 mEq tablet take 1 tablet by mouth twice a day 60 tablet 0    simethicone (MYLICON,GAS-X) 378 MG CAPS TAKE 1 CAPSULE BY MOUTH 3 TIMES A DAY AFTER MEALS 180 capsule 0    TiZANidine (ZANAFLEX) 4 MG capsule Take 1 capsule (4 mg total) by mouth 3 (three) times a day 90 capsule 5    zinc oxide (DESITIN) 40 % PSTE Apply topically daily 57 g 1     Current Facility-Administered Medications   Medication Dose Route Frequency Provider Last Rate Last Dose    cyanocobalamin injection 1,000 mcg  1,000 mcg Intramuscular Q30 Days Amauri Ely MD   1,000 mcg at 02/19/18 1008     Allergies   Allergen Reactions    Amoxicillin Hives    Ciprofloxacin Hives    Erythromycin Hives    Statins      Other reaction(s): Other (see comments)     Review of Systems   Constitutional: Positive for fatigue  Negative for fever  Respiratory: Positive for chest tightness  Negative for cough  Gastrointestinal: Negative for blood in stool, nausea and vomiting  All other systems reviewed and are negative  I spent 30 minutes with the patient during this visit

## 2020-03-30 PROBLEM — M54.10 BACK PAIN WITH RIGHT-SIDED RADICULOPATHY: Status: ACTIVE | Noted: 2020-03-11

## 2020-03-30 RX ORDER — METHOCARBAMOL 500 MG/1
500 TABLET, FILM COATED ORAL 4 TIMES DAILY
COMMUNITY
End: 2020-09-01 | Stop reason: ALTCHOICE

## 2020-04-07 ENCOUNTER — TELEPHONE (OUTPATIENT)
Dept: FAMILY MEDICINE CLINIC | Facility: CLINIC | Age: 80
End: 2020-04-07

## 2020-04-07 DIAGNOSIS — I50.32 CHRONIC DIASTOLIC CHF (CONGESTIVE HEART FAILURE) (HCC): Primary | ICD-10-CM

## 2020-04-07 DIAGNOSIS — N18.30 CKD (CHRONIC KIDNEY DISEASE) STAGE 3, GFR 30-59 ML/MIN (HCC): ICD-10-CM

## 2020-04-09 ENCOUNTER — TELEMEDICINE (OUTPATIENT)
Dept: FAMILY MEDICINE CLINIC | Facility: CLINIC | Age: 80
End: 2020-04-09
Payer: COMMERCIAL

## 2020-04-09 DIAGNOSIS — I50.32 CHRONIC DIASTOLIC CHF (CONGESTIVE HEART FAILURE) (HCC): ICD-10-CM

## 2020-04-09 DIAGNOSIS — I10 ESSENTIAL HYPERTENSION: Primary | ICD-10-CM

## 2020-04-09 DIAGNOSIS — R00.1 JUNCTIONAL BRADYCARDIA: ICD-10-CM

## 2020-04-09 DIAGNOSIS — I48.91 ATRIAL FIBRILLATION, UNSPECIFIED TYPE (HCC): ICD-10-CM

## 2020-04-09 PROCEDURE — G2012 BRIEF CHECK IN BY MD/QHP: HCPCS | Performed by: FAMILY MEDICINE

## 2020-04-10 ENCOUNTER — TELEPHONE (OUTPATIENT)
Dept: FAMILY MEDICINE CLINIC | Facility: CLINIC | Age: 80
End: 2020-04-10

## 2020-04-12 ENCOUNTER — TELEPHONE (OUTPATIENT)
Dept: OTHER | Facility: OTHER | Age: 80
End: 2020-04-12

## 2020-04-14 ENCOUNTER — TELEPHONE (OUTPATIENT)
Dept: FAMILY MEDICINE CLINIC | Facility: CLINIC | Age: 80
End: 2020-04-14

## 2020-04-14 DIAGNOSIS — N30.00 ACUTE CYSTITIS WITHOUT HEMATURIA: Primary | ICD-10-CM

## 2020-04-14 RX ORDER — SULFAMETHOXAZOLE AND TRIMETHOPRIM 800; 160 MG/1; MG/1
1 TABLET ORAL EVERY 12 HOURS SCHEDULED
Qty: 14 TABLET | Refills: 0 | Status: SHIPPED | OUTPATIENT
Start: 2020-04-14 | End: 2020-04-21

## 2020-04-16 ENCOUNTER — TELEPHONE (OUTPATIENT)
Dept: FAMILY MEDICINE CLINIC | Facility: CLINIC | Age: 80
End: 2020-04-16

## 2020-04-16 DIAGNOSIS — I10 ESSENTIAL HYPERTENSION: ICD-10-CM

## 2020-04-17 RX ORDER — POTASSIUM CHLORIDE 20 MEQ/1
TABLET, EXTENDED RELEASE ORAL
Qty: 60 TABLET | Refills: 5 | Status: SHIPPED | OUTPATIENT
Start: 2020-04-17 | End: 2020-10-26

## 2020-04-20 ENCOUNTER — TELEPHONE (OUTPATIENT)
Dept: FAMILY MEDICINE CLINIC | Facility: CLINIC | Age: 80
End: 2020-04-20

## 2020-05-26 ENCOUNTER — OFFICE VISIT (OUTPATIENT)
Dept: FAMILY MEDICINE CLINIC | Facility: CLINIC | Age: 80
End: 2020-05-26
Payer: COMMERCIAL

## 2020-05-26 DIAGNOSIS — G89.29 CHRONIC PAIN OF LEFT ANKLE: ICD-10-CM

## 2020-05-26 DIAGNOSIS — M25.572 CHRONIC PAIN OF LEFT ANKLE: ICD-10-CM

## 2020-05-26 DIAGNOSIS — M54.41 CHRONIC BILATERAL LOW BACK PAIN WITH RIGHT-SIDED SCIATICA: ICD-10-CM

## 2020-05-26 DIAGNOSIS — R05.9 COUGH: ICD-10-CM

## 2020-05-26 DIAGNOSIS — N18.30 STAGE 3 CHRONIC KIDNEY DISEASE (HCC): ICD-10-CM

## 2020-05-26 DIAGNOSIS — I50.32 CHRONIC DIASTOLIC CHF (CONGESTIVE HEART FAILURE) (HCC): Primary | ICD-10-CM

## 2020-05-26 DIAGNOSIS — G89.29 CHRONIC BILATERAL LOW BACK PAIN WITH RIGHT-SIDED SCIATICA: ICD-10-CM

## 2020-05-26 PROBLEM — Z86.79 H/O: RHEUMATIC FEVER: Status: ACTIVE | Noted: 2018-07-12

## 2020-05-26 PROBLEM — M54.30 SCIATICA: Status: ACTIVE | Noted: 2018-07-12

## 2020-05-26 PROCEDURE — 99442 PR PHYS/QHP TELEPHONE EVALUATION 11-20 MIN: CPT | Performed by: NURSE PRACTITIONER

## 2020-05-26 RX ORDER — BENZONATATE 100 MG/1
100 CAPSULE ORAL 3 TIMES DAILY PRN
Qty: 20 CAPSULE | Refills: 0 | Status: SHIPPED | OUTPATIENT
Start: 2020-05-26 | End: 2021-07-19

## 2020-06-01 ENCOUNTER — TELEPHONE (OUTPATIENT)
Dept: FAMILY MEDICINE CLINIC | Facility: CLINIC | Age: 80
End: 2020-06-01

## 2020-06-01 DIAGNOSIS — I50.32 CHRONIC DIASTOLIC CHF (CONGESTIVE HEART FAILURE) (HCC): Primary | ICD-10-CM

## 2020-06-01 RX ORDER — METOLAZONE 2.5 MG/1
TABLET ORAL
Qty: 3 TABLET | Refills: 5 | Status: SHIPPED | OUTPATIENT
Start: 2020-06-01 | End: 2020-09-01 | Stop reason: ALTCHOICE

## 2020-07-10 ENCOUNTER — TELEPHONE (OUTPATIENT)
Dept: FAMILY MEDICINE CLINIC | Facility: CLINIC | Age: 80
End: 2020-07-10

## 2020-07-10 DIAGNOSIS — R21 RASH: Primary | ICD-10-CM

## 2020-07-10 RX ORDER — NYSTATIN 100000 [USP'U]/G
POWDER TOPICAL 4 TIMES DAILY
Qty: 60 G | Refills: 0 | Status: SHIPPED | OUTPATIENT
Start: 2020-07-10 | End: 2020-09-01 | Stop reason: ALTCHOICE

## 2020-08-17 DIAGNOSIS — E78.1 HYPERTRIGLYCERIDEMIA: ICD-10-CM

## 2020-08-17 RX ORDER — OMEGA-3-ACID ETHYL ESTERS 1 G/1
CAPSULE, LIQUID FILLED ORAL
Qty: 60 CAPSULE | Refills: 5 | Status: SHIPPED | OUTPATIENT
Start: 2020-08-17 | End: 2021-12-23 | Stop reason: ALTCHOICE

## 2020-09-01 ENCOUNTER — OFFICE VISIT (OUTPATIENT)
Dept: FAMILY MEDICINE CLINIC | Facility: CLINIC | Age: 80
End: 2020-09-01
Payer: COMMERCIAL

## 2020-09-01 VITALS
OXYGEN SATURATION: 95 % | DIASTOLIC BLOOD PRESSURE: 60 MMHG | BODY MASS INDEX: 35.48 KG/M2 | HEIGHT: 59 IN | SYSTOLIC BLOOD PRESSURE: 100 MMHG | TEMPERATURE: 98.9 F | WEIGHT: 176 LBS | HEART RATE: 51 BPM

## 2020-09-01 DIAGNOSIS — R47.89 WORD FINDING DIFFICULTY: ICD-10-CM

## 2020-09-01 DIAGNOSIS — E55.9 VITAMIN D DEFICIENCY: ICD-10-CM

## 2020-09-01 DIAGNOSIS — M54.6 ACUTE BILATERAL THORACIC BACK PAIN: ICD-10-CM

## 2020-09-01 DIAGNOSIS — H93.11 TINNITUS, RIGHT: ICD-10-CM

## 2020-09-01 DIAGNOSIS — R25.1 OCCASIONAL TREMORS: ICD-10-CM

## 2020-09-01 DIAGNOSIS — R07.81 RIB PAIN: ICD-10-CM

## 2020-09-01 DIAGNOSIS — M54.2 NECK PAIN: Primary | ICD-10-CM

## 2020-09-01 DIAGNOSIS — F32.A CHRONIC DEPRESSION: ICD-10-CM

## 2020-09-01 DIAGNOSIS — Z13.29 SCREENING FOR THYROID DISORDER: ICD-10-CM

## 2020-09-01 DIAGNOSIS — R68.89 FORGETFULNESS: ICD-10-CM

## 2020-09-01 DIAGNOSIS — H91.91 DECREASED HEARING OF RIGHT EAR: ICD-10-CM

## 2020-09-01 DIAGNOSIS — Z78.0 POSTMENOPAUSAL: ICD-10-CM

## 2020-09-01 DIAGNOSIS — R25.2 MUSCLE CRAMPING: ICD-10-CM

## 2020-09-01 DIAGNOSIS — E53.8 B12 DEFICIENCY: ICD-10-CM

## 2020-09-01 PROCEDURE — 1160F RVW MEDS BY RX/DR IN RCRD: CPT | Performed by: NURSE PRACTITIONER

## 2020-09-01 PROCEDURE — 3078F DIAST BP <80 MM HG: CPT | Performed by: NURSE PRACTITIONER

## 2020-09-01 PROCEDURE — 99215 OFFICE O/P EST HI 40 MIN: CPT | Performed by: NURSE PRACTITIONER

## 2020-09-01 PROCEDURE — 1036F TOBACCO NON-USER: CPT | Performed by: NURSE PRACTITIONER

## 2020-09-01 RX ORDER — POLYETHYLENE GLYCOL 3350 17 G/17G
17 POWDER ORAL DAILY
COMMUNITY
Start: 2020-04-21 | End: 2022-04-12 | Stop reason: ALTCHOICE

## 2020-09-01 RX ORDER — CITALOPRAM 20 MG/1
20 TABLET ORAL DAILY
Qty: 30 TABLET | Refills: 5 | Status: SHIPPED | OUTPATIENT
Start: 2020-09-01 | End: 2021-07-19

## 2020-09-01 RX ORDER — ACETAMINOPHEN,DIPHENHYDRAMINE HCL 500; 25 MG/1; MG/1
1 TABLET, FILM COATED ORAL
COMMUNITY
Start: 2020-04-07 | End: 2020-09-01 | Stop reason: ALTCHOICE

## 2020-09-01 RX ORDER — PREGABALIN 25 MG/1
25 CAPSULE ORAL
COMMUNITY
Start: 2020-03-17 | End: 2020-09-01 | Stop reason: ALTCHOICE

## 2020-09-01 RX ORDER — APIXABAN 5 MG/1
5 TABLET, FILM COATED ORAL 2 TIMES DAILY
COMMUNITY
Start: 2020-07-02 | End: 2021-09-28 | Stop reason: SDUPTHER

## 2020-09-01 RX ORDER — ACETAMINOPHEN 500 MG
500 TABLET ORAL EVERY 6 HOURS PRN
COMMUNITY
Start: 2020-05-12

## 2020-09-01 RX ORDER — LOPERAMIDE HYDROCHLORIDE 2 MG/1
2 TABLET ORAL
COMMUNITY
Start: 2020-05-13 | End: 2021-12-23 | Stop reason: ALTCHOICE

## 2020-09-01 NOTE — PROGRESS NOTES
Assessment/Plan:     Diagnoses and all orders for this visit:    Neck pain  -     XR spine cervical complete 4 or 5 vw non injury; Future    Acute bilateral thoracic back pain  -     XR spine thoracic 3 vw; Future    Rib pain  -     XR ribs bilateral 3 vw; Future    Decreased hearing of right ear  -     Ambulatory Referral to Otolaryngology; Future    Tinnitus, right  -     Ambulatory Referral to Otolaryngology; Future    Occasional tremors  -     Ambulatory referral to Neurology; Future    Word finding difficulty  -     Ambulatory referral to Neurology; Future    Forgetfulness  -     Ambulatory referral to Neurology; Future    Chronic depression  Comments:  Restart Celexa  Orders:  -     citalopram (CeleXA) 20 mg tablet; Take 1 tablet (20 mg total) by mouth daily    Muscle cramping  -     Comprehensive metabolic panel; Future  -     Magnesium; Future    B12 deficiency  -     Vitamin B12; Future    Vitamin D deficiency  -     Vitamin D 1,25 dihydroxy; Future    Postmenopausal  -     Vitamin D 1,25 dihydroxy; Future    Screening for thyroid disorder  -     TSH, 3rd generation; Future    Other orders  -     Eliquis 5 MG; Take 5 mg by mouth 2 (two) times a day  -     acetaminophen (TYLENOL) 500 mg tablet; Take 500 mg by mouth  -     Discontinue: diphenhydrAMINE-acetaminophen (TYLENOL PM)  MG TABS; Take 1 tablet by mouth  -     loperamide (IMODIUM A-D) 2 MG tablet; Take 2 mg by mouth  -     polyethylene glycol (GLYCOLAX) 17 GM/SCOOP; Take 17 g by mouth  -     Discontinue: pregabalin (LYRICA) 25 mg capsule; Take 25 mg by mouth        Subjective:      Patient ID: Matt Gale is a 78 y o  female  Patient [facility] to [facility] with multiple complaints  Allergies, medical history and current medications reviewed with patient; patient reports taking all medications as prescribed without issues  Patient c/o neck pain, ongoing for about 1 month   Patient states she was at a wedding about 1 month ago, but denies any increased activity  Patient states she notices a "clicking" when she turns her head, and reports stiffness in the morning  Patient states symptoms are worse to the right neck  Patient follows with Pain Management Dr Shantanu Sanabria, who she last saw 8/18/20, for lumbar steroid injections; patient states next appointment is scheduled for 10/13/20  Patient also c/o pain to the mid back/lower chest area, ongoing for several days  Patient states pain is not affected by deep breathing  Patient states she does notice improvement of pain after removing her bra  Patient states she has not discussed the above symptoms with Dr Shantanu Sanabria  Patient also reports ear congestion, ongoing for about 1 month  Patient states she seems to notice improvement with repositioning her head  Patient reports associated ear pain and hearing loss  Patient also reports increased forgetfulness and difficulty speaking, worsening over the last year  Patient states she sometimes stutters and "can't get her words out " Patient also reports occasional hand tremors  Patient continues to experience leg cramping, that seems to be worst at nighttime  Patient reports taking Potassium as prescribed  Patient also reports "crying all the time," ongoing for several months  Patient reports today would have been her husbands birthday, and she continues to struggle with his loss  Patient was on Celexa several years ago, which she tolerated well, but states it was discontinued during prior hospitalization  Patient states she is not currently interested in counseling  Patient Care Team:  Charlette Garcia MD as PCP - General  Manolo Talbot MD (Cardiology)  Julianna Crespo MD (Nephrology)  Qi Arthur MD (Gastroenterology)  Shantell Vogt (Obstetrics and Gynecology)    Review of Systems   HENT: Positive for hearing loss  Musculoskeletal: Positive for back pain, neck pain and neck stiffness  Neurological: Positive for tremors  Psychiatric/Behavioral: Positive for decreased concentration and dysphoric mood  All other systems reviewed and are negative  Objective:    /60   Pulse (!) 51   Temp 98 9 °F (37 2 °C)   Ht 4' 11" (1 499 m)   Wt 79 8 kg (176 lb)   SpO2 95%   BMI 35 55 kg/m²      Physical Exam  Vitals signs and nursing note reviewed  Constitutional:       General: She is not in acute distress  Appearance: Normal appearance  She is well-developed  HENT:      Head: Normocephalic and atraumatic  Right Ear: Ear canal and external ear normal  No drainage  No middle ear effusion  Tympanic membrane is injected  Left Ear: Ear canal and external ear normal  No drainage  No middle ear effusion  Tympanic membrane is injected  Eyes:      General: Lids are normal       Conjunctiva/sclera: Conjunctivae normal    Neck:      Musculoskeletal: Normal range of motion  Trachea: No tracheal deviation  Cardiovascular:      Rate and Rhythm: Normal rate  Rhythm regularly irregular  Heart sounds: S1 normal and S2 normal  Murmur present  Pulmonary:      Effort: Pulmonary effort is normal  No respiratory distress  Breath sounds: Normal breath sounds  Abdominal:      General: There is no distension  Tenderness: There is no guarding  Musculoskeletal: Normal range of motion  Cervical back: She exhibits tenderness (upon palpation)  Thoracic back: She exhibits tenderness (upon palpation)  Skin:     General: Skin is warm and dry  Neurological:      Mental Status: She is alert and oriented to person, place, and time     Psychiatric:         Speech: Speech normal

## 2020-09-17 ENCOUNTER — TELEPHONE (OUTPATIENT)
Dept: FAMILY MEDICINE CLINIC | Facility: CLINIC | Age: 80
End: 2020-09-17

## 2020-09-17 NOTE — TELEPHONE ENCOUNTER
Her xrays showed degenerative changes, and I would recommend following up with Pain Management for further evaluation, since she already follows with Dr Valene Primrose

## 2020-10-07 ENCOUNTER — TELEMEDICINE (OUTPATIENT)
Dept: FAMILY MEDICINE CLINIC | Facility: CLINIC | Age: 80
End: 2020-10-07
Payer: COMMERCIAL

## 2020-10-07 DIAGNOSIS — Z20.828 EXPOSURE TO SARS-ASSOCIATED CORONAVIRUS: ICD-10-CM

## 2020-10-07 DIAGNOSIS — R11.0 NAUSEA: ICD-10-CM

## 2020-10-07 DIAGNOSIS — Z20.820 EXPOSURE TO VARICELLA ZOSTER VIRUS (VZV): Primary | ICD-10-CM

## 2020-10-07 PROCEDURE — 99443 PR PHYS/QHP TELEPHONE EVALUATION 21-30 MIN: CPT | Performed by: NURSE PRACTITIONER

## 2020-10-08 ENCOUNTER — TELEPHONE (OUTPATIENT)
Dept: FAMILY MEDICINE CLINIC | Facility: CLINIC | Age: 80
End: 2020-10-08

## 2020-10-08 DIAGNOSIS — R11.0 NAUSEA: Primary | ICD-10-CM

## 2020-10-08 RX ORDER — ONDANSETRON 4 MG/1
4 TABLET, FILM COATED ORAL EVERY 8 HOURS PRN
Qty: 20 TABLET | Refills: 0 | Status: SHIPPED | OUTPATIENT
Start: 2020-10-08 | End: 2021-10-21 | Stop reason: ALTCHOICE

## 2020-10-09 ENCOUNTER — TELEMEDICINE (OUTPATIENT)
Dept: FAMILY MEDICINE CLINIC | Facility: CLINIC | Age: 80
End: 2020-10-09
Payer: COMMERCIAL

## 2020-10-09 DIAGNOSIS — N18.32 STAGE 3B CHRONIC KIDNEY DISEASE (HCC): ICD-10-CM

## 2020-10-09 DIAGNOSIS — Z09 HOSPITAL DISCHARGE FOLLOW-UP: Primary | ICD-10-CM

## 2020-10-09 DIAGNOSIS — R11.0 NAUSEA: ICD-10-CM

## 2020-10-09 PROCEDURE — 99442 PR PHYS/QHP TELEPHONE EVALUATION 11-20 MIN: CPT | Performed by: NURSE PRACTITIONER

## 2020-10-12 LAB — EXT SARS-COV-2: NOT DETECTED

## 2020-10-23 ENCOUNTER — TELEMEDICINE (OUTPATIENT)
Dept: FAMILY MEDICINE CLINIC | Facility: CLINIC | Age: 80
End: 2020-10-23
Payer: COMMERCIAL

## 2020-10-23 DIAGNOSIS — N28.1 RENAL CYST: ICD-10-CM

## 2020-10-23 DIAGNOSIS — Z71.89 CARE PLAN DISCUSSED WITH PATIENT: Primary | ICD-10-CM

## 2020-10-23 DIAGNOSIS — R41.3 IMPAIRED MEMORY: ICD-10-CM

## 2020-10-23 DIAGNOSIS — N18.32 STAGE 3B CHRONIC KIDNEY DISEASE (HCC): ICD-10-CM

## 2020-10-23 DIAGNOSIS — Z79.899 ENCOUNTER FOR MEDICATION REVIEW: ICD-10-CM

## 2020-10-23 DIAGNOSIS — G44.89 OTHER HEADACHE SYNDROME: ICD-10-CM

## 2020-10-23 DIAGNOSIS — Z90.5 SINGLE KIDNEY: ICD-10-CM

## 2020-10-23 PROCEDURE — 99442 PR PHYS/QHP TELEPHONE EVALUATION 11-20 MIN: CPT | Performed by: NURSE PRACTITIONER

## 2020-10-26 DIAGNOSIS — I10 ESSENTIAL HYPERTENSION: ICD-10-CM

## 2020-10-26 RX ORDER — POTASSIUM CHLORIDE 20 MEQ/1
TABLET, EXTENDED RELEASE ORAL
Qty: 60 TABLET | Refills: 5 | Status: SHIPPED | OUTPATIENT
Start: 2020-10-26 | End: 2021-04-26

## 2020-11-25 ENCOUNTER — TELEMEDICINE (OUTPATIENT)
Dept: FAMILY MEDICINE CLINIC | Facility: CLINIC | Age: 80
End: 2020-11-25
Payer: COMMERCIAL

## 2020-11-25 DIAGNOSIS — Z71.89 MEDICATION CARE PLAN DISCUSSED WITH PATIENT: Primary | ICD-10-CM

## 2020-11-25 DIAGNOSIS — I50.32 CHRONIC DIASTOLIC CHF (CONGESTIVE HEART FAILURE) (HCC): ICD-10-CM

## 2020-11-25 DIAGNOSIS — N18.32 STAGE 3B CHRONIC KIDNEY DISEASE (HCC): ICD-10-CM

## 2020-11-25 PROCEDURE — 99442 PR PHYS/QHP TELEPHONE EVALUATION 11-20 MIN: CPT | Performed by: NURSE PRACTITIONER

## 2020-12-01 ENCOUNTER — OFFICE VISIT (OUTPATIENT)
Dept: FAMILY MEDICINE CLINIC | Facility: CLINIC | Age: 80
End: 2020-12-01
Payer: COMMERCIAL

## 2020-12-01 VITALS
HEART RATE: 64 BPM | OXYGEN SATURATION: 95 % | DIASTOLIC BLOOD PRESSURE: 60 MMHG | BODY MASS INDEX: 35.88 KG/M2 | SYSTOLIC BLOOD PRESSURE: 110 MMHG | WEIGHT: 178 LBS | HEIGHT: 59 IN | TEMPERATURE: 98.3 F

## 2020-12-01 DIAGNOSIS — A41.9 SEPSIS, DUE TO UNSPECIFIED ORGANISM, UNSPECIFIED WHETHER ACUTE ORGAN DYSFUNCTION PRESENT (HCC): ICD-10-CM

## 2020-12-01 DIAGNOSIS — E66.01 OBESITY, MORBID (HCC): ICD-10-CM

## 2020-12-01 DIAGNOSIS — K59.00 CONSTIPATION, UNSPECIFIED CONSTIPATION TYPE: ICD-10-CM

## 2020-12-01 DIAGNOSIS — N17.9 AKI (ACUTE KIDNEY INJURY) (HCC): ICD-10-CM

## 2020-12-01 DIAGNOSIS — M46.87 OTHER SPECIFIED INFLAMMATORY SPONDYLOPATHIES, LUMBOSACRAL REGION (HCC): ICD-10-CM

## 2020-12-01 DIAGNOSIS — R10.11 RUQ PAIN: Primary | ICD-10-CM

## 2020-12-01 DIAGNOSIS — I50.32 CHRONIC DIASTOLIC CHF (CONGESTIVE HEART FAILURE) (HCC): ICD-10-CM

## 2020-12-01 DIAGNOSIS — G20 PARKINSON'S DISEASE (HCC): ICD-10-CM

## 2020-12-01 DIAGNOSIS — R42 VERTIGO: ICD-10-CM

## 2020-12-01 DIAGNOSIS — I48.91 ATRIAL FIBRILLATION, UNSPECIFIED TYPE (HCC): ICD-10-CM

## 2020-12-01 PROBLEM — G20.A1 PARKINSON'S DISEASE: Status: ACTIVE | Noted: 2020-12-01

## 2020-12-01 PROCEDURE — 3074F SYST BP LT 130 MM HG: CPT | Performed by: FAMILY MEDICINE

## 2020-12-01 PROCEDURE — 3725F SCREEN DEPRESSION PERFORMED: CPT | Performed by: FAMILY MEDICINE

## 2020-12-01 PROCEDURE — 3288F FALL RISK ASSESSMENT DOCD: CPT | Performed by: FAMILY MEDICINE

## 2020-12-01 PROCEDURE — 99214 OFFICE O/P EST MOD 30 MIN: CPT | Performed by: FAMILY MEDICINE

## 2020-12-01 PROCEDURE — 3078F DIAST BP <80 MM HG: CPT | Performed by: FAMILY MEDICINE

## 2020-12-01 PROCEDURE — 1101F PT FALLS ASSESS-DOCD LE1/YR: CPT | Performed by: FAMILY MEDICINE

## 2020-12-01 PROCEDURE — 1036F TOBACCO NON-USER: CPT | Performed by: FAMILY MEDICINE

## 2020-12-01 RX ORDER — MEMANTINE HYDROCHLORIDE 5 MG/1
5 TABLET ORAL 2 TIMES DAILY
COMMUNITY
Start: 2020-11-17 | End: 2020-12-01

## 2020-12-01 RX ORDER — TRIAMCINOLONE ACETONIDE 40 MG/ML
30 INJECTION, SUSPENSION INTRA-ARTICULAR; INTRAMUSCULAR
COMMUNITY
Start: 2020-11-30 | End: 2020-12-01

## 2020-12-01 RX ORDER — BUPIVACAINE HYDROCHLORIDE 5 MG/ML
15 INJECTION, SOLUTION EPIDURAL; INTRACAUDAL
COMMUNITY
Start: 2020-11-30 | End: 2020-12-01

## 2020-12-07 ENCOUNTER — TELEPHONE (OUTPATIENT)
Dept: FAMILY MEDICINE CLINIC | Facility: CLINIC | Age: 80
End: 2020-12-07

## 2020-12-10 ENCOUNTER — TELEPHONE (OUTPATIENT)
Dept: FAMILY MEDICINE CLINIC | Facility: CLINIC | Age: 80
End: 2020-12-10

## 2020-12-14 DIAGNOSIS — R10.11 RUQ PAIN: Primary | ICD-10-CM

## 2020-12-21 ENCOUNTER — TELEPHONE (OUTPATIENT)
Dept: FAMILY MEDICINE CLINIC | Facility: CLINIC | Age: 80
End: 2020-12-21

## 2020-12-21 DIAGNOSIS — R10.11 RUQ PAIN: Primary | ICD-10-CM

## 2021-03-09 ENCOUNTER — OFFICE VISIT (OUTPATIENT)
Dept: FAMILY MEDICINE CLINIC | Facility: CLINIC | Age: 81
End: 2021-03-09
Payer: COMMERCIAL

## 2021-03-09 VITALS
HEART RATE: 63 BPM | BODY MASS INDEX: 36.29 KG/M2 | DIASTOLIC BLOOD PRESSURE: 58 MMHG | OXYGEN SATURATION: 95 % | WEIGHT: 180 LBS | SYSTOLIC BLOOD PRESSURE: 110 MMHG | HEIGHT: 59 IN | TEMPERATURE: 98.9 F

## 2021-03-09 DIAGNOSIS — I48.91 ATRIAL FIBRILLATION, UNSPECIFIED TYPE (HCC): ICD-10-CM

## 2021-03-09 DIAGNOSIS — G20 PARKINSON'S DISEASE (HCC): ICD-10-CM

## 2021-03-09 DIAGNOSIS — I50.32 CHRONIC DIASTOLIC CHF (CONGESTIVE HEART FAILURE) (HCC): Primary | ICD-10-CM

## 2021-03-09 DIAGNOSIS — R00.1 JUNCTIONAL BRADYCARDIA: ICD-10-CM

## 2021-03-09 DIAGNOSIS — E66.01 OBESITY, MORBID (HCC): ICD-10-CM

## 2021-03-09 DIAGNOSIS — N18.31 STAGE 3A CHRONIC KIDNEY DISEASE (HCC): ICD-10-CM

## 2021-03-09 DIAGNOSIS — M46.87 OTHER SPECIFIED INFLAMMATORY SPONDYLOPATHIES, LUMBOSACRAL REGION (HCC): ICD-10-CM

## 2021-03-09 PROCEDURE — G0439 PPPS, SUBSEQ VISIT: HCPCS | Performed by: FAMILY MEDICINE

## 2021-03-09 PROCEDURE — 99214 OFFICE O/P EST MOD 30 MIN: CPT | Performed by: FAMILY MEDICINE

## 2021-03-09 PROCEDURE — 3288F FALL RISK ASSESSMENT DOCD: CPT | Performed by: FAMILY MEDICINE

## 2021-03-09 PROCEDURE — 1036F TOBACCO NON-USER: CPT | Performed by: FAMILY MEDICINE

## 2021-03-09 PROCEDURE — 1170F FXNL STATUS ASSESSED: CPT | Performed by: FAMILY MEDICINE

## 2021-03-09 PROCEDURE — 1125F AMNT PAIN NOTED PAIN PRSNT: CPT | Performed by: FAMILY MEDICINE

## 2021-03-09 RX ORDER — ISOSORBIDE MONONITRATE 30 MG/1
30 TABLET, EXTENDED RELEASE ORAL DAILY
COMMUNITY
Start: 2021-02-02 | End: 2022-04-29

## 2021-03-09 RX ORDER — MEMANTINE HYDROCHLORIDE 5 MG/1
5 TABLET ORAL 2 TIMES DAILY
COMMUNITY
End: 2021-04-26 | Stop reason: SDUPTHER

## 2021-03-09 RX ORDER — GABAPENTIN 100 MG/1
CAPSULE ORAL
Qty: 90 CAPSULE | Refills: 3 | Status: SHIPPED | OUTPATIENT
Start: 2021-03-09 | End: 2021-07-19 | Stop reason: SDUPTHER

## 2021-03-09 NOTE — PROGRESS NOTES
Assessment and Plan:     Problem List Items Addressed This Visit     None           Preventive health issues were discussed with patient, and age appropriate screening tests were ordered as noted in patient's After Visit Summary  Personalized health advice and appropriate referrals for health education or preventive services given if needed, as noted in patient's After Visit Summary  History of Present Illness:     Patient presents for Welcome to Medicare visit       Patient Care Team:  Galileo Florence MD as PCP - Adrian Monson MD (Cardiology)  Nathan Carrasquillo MD (Nephrology)  Jason Reynolds MD (Gastroenterology)  Dorota Mahmood (Obstetrics and Gynecology)  Tara Churchill MD (Pain Medicine)     Review of Systems:     Review of Systems   Problem List:     Patient Active Problem List   Diagnosis    Atypical chest pain    Essential hypertension    Biceps tendinitis of right shoulder    Chronic diastolic CHF (congestive heart failure) ([facility])    Closed head injury    Diastolic dysfunction    Dyspnea on exertion    GERD without esophagitis    Headache, worsening    Lateral epicondylitis of right elbow    Low serum vitamin B12    Lumbar disc herniation with radiculopathy    Peripheral edema    Peripheral neuropathy    B12 deficiency    Osteoporosis    Chronic left shoulder pain    BMI 36 0-36 9,adult    A-fib ([address] )    Ambulatory dysfunction    Anxiety    Junctional bradycardia    Stage 3 chronic kidney disease    Single kidney    Vitamin D deficiency    CAD (coronary artery disease)    H/O left nephrectomy    Back pain with right-sided radiculopathy    H/O: rheumatic fever    Osteoarthritis    Peripheral venous insufficiency    Sciatica    Renal cyst    Parkinson's disease ([address]    LUPE (acute kidney injury) ([address]    Other specified inflammatory spondylopathies, lumbosacral region ([address]    Sepsis ([address]    Obesity, morbid ([address] )    Constipation      Past Medical and Surgical History:     Past Medical History:   Diagnosis Date    Hypercholesteremia     Hypertension     IBS (irritable bowel syndrome)     Rheumatic fever     Skin lesion      Past Surgical History:   Procedure Laterality Date    ANKLE FRACTURE SURGERY      LAST ASSESSED [id number]    HYSTERECTOMY      KIDNEY SURGERY      TONSILLECTOMY        Family History:     Family History   Problem Relation Age of Onset    Hypertension Mother     Asthma Father     Cancer Sister       Social History:        Social History     Socioeconomic History    Marital status:       Spouse name: Not on file    Number of children: Not on file    Years of education: Not on file    Highest education level: Not on file   Occupational History    Not on file   Social Needs    Financial resource strain: Not on file    Food insecurity     Worry: Not on file     Inability: Not on file    Transportation needs     Medical: Not on file     Non-medical: Not on file   Tobacco Use    Smoking status: Never Smoker    Smokeless tobacco: Never Used   Substance and Sexual Activity    Alcohol use: No    Drug use: No    Sexual activity: Not on file   Lifestyle    Physical activity     Days per week: Not on file     Minutes per session: Not on file    Stress: Not on file   Relationships    Social connections     Talks on phone: Not on file     Gets together: Not on file     Attends Hinduism service: Not on file     Active member of club or organization: Not on file     Attends meetings of clubs or organizations: Not on file     Relationship status: Not on file    Intimate partner violence     Fear of current or ex partner: Not on file     Emotionally abused: Not on file     Physically abused: Not on file     Forced sexual activity: Not on file   Other Topics Concern    Not on file   Social History Narrative    Not on file      Medications and Allergies:     Current Outpatient Medications   Medication Sig Dispense Refill    isosorbide mononitrate (IMDUR) 30 mg 24 hr tablet Take 30 mg by mouth      acetaminophen (TYLENOL) 500 mg tablet Take 500 mg by mouth      benzonatate (TESSALON PERLES) 100 mg capsule Take 1 capsule (100 mg total) by mouth 3 (three) times a day as needed for cough 20 capsule 0    cholecalciferol (VITAMIN D3) 1,000 units tablet take 1 tablet by mouth once daily 90 tablet 1    citalopram (CeleXA) 20 mg tablet Take 1 tablet (20 mg total) by mouth daily 30 tablet 5    Eliquis 5 MG Take 5 mg by mouth 2 (two) times a day      ezetimibe (ZETIA) 10 mg tablet Take 10 mg by mouth daily      furosemide (LASIX) 40 mg tablet Take by mouth Take by mouth Take 2 tablets (80 mg) every AM and 1 tablet (40 mg) every PM      lansoprazole (PREVACID) 15 mg capsule Take 15 mg by mouth daily      loperamide (IMODIUM A-D) 2 MG tablet Take 2 mg by mouth      memantine (NAMENDA) 5 mg tablet Take 5 mg by mouth 2 (two) times a day      metoprolol succinate (TOPROL-XL) 25 mg 24 hr tablet Take 12 5 mg by mouth daily      nitroglycerin (NITROSTAT) 0 4 mg SL tablet Place 0 4 mg under the tongue every 5 (five) minutes as needed      omega-3-acid ethyl esters (LOVAZA) 1 g capsule TAKE 1 CAPSULE BY MOUTH TWICE A DAY 60 capsule 5    ondansetron (ZOFRAN) 4 mg tablet Take 1 tablet (4 mg total) by mouth every 8 (eight) hours as needed for nausea or vomiting 20 tablet 0    polyethylene glycol (GLYCOLAX) 17 GM/SCOOP Take 17 g by mouth      potassium chloride (K-DUR,KLOR-CON) 20 mEq tablet take 1 tablet by mouth twice a day 60 tablet 5     Current Facility-Administered Medications   Medication Dose Route Frequency Provider Last Rate Last Admin    cyanocobalamin injection 1,000 mcg  1,000 mcg Intramuscular Q30 Days Leroy Raya MD   1,000 mcg at 02/19/18 1008     Allergies   Allergen Reactions    Amoxicillin Hives    Ciprofloxacin Hives    Erythromycin Hives    Statins      Other reaction(s):  Other (see comments)    Amoxicillin-Pot Clavulanate      Other reaction(s): pt does not remember    Clindamycin      Other reaction(s): Other (See Comments), pt does not remember  PT DOES NOT RECALL REACTION    Other reaction(s): Other (See Comments)  PT DOES NOT RECALL REACTION      Other reaction(s): Other (See Comments), pt does not remember  PT DOES NOT RECALL REACTION      Immunizations:     Immunization History   Administered Date(s) Administered    Influenza, high dose seasonal 0 7 mL 10/09/2018, 10/22/2019    Pneumococcal Conjugate 13-Valent 10/09/2018    Pneumococcal Polysaccharide PPV23 11/14/2016      Health Maintenance:         Topic Date Due    Colonoscopy Surveillance  05/28/2022         Topic Date Due    COVID-19 Vaccine (1 of 2) 06/19/1956    DTaP,Tdap,and Td Vaccines (1 - Tdap) 06/19/1959    Influenza Vaccine (1) 09/01/2020      Medicare Screening Tests and Risk Assessments:     Chet Mcgrath is here for her Subsequent Wellness visit  Health Risk Assessment:   Patient rates overall health as fair  Patient feels that their physical health rating is slightly worse  Eyesight was rated as slightly worse  Hearing was rated as slightly worse  Patient feels that their emotional and mental health rating is slightly worse  Pain experienced in the last 7 days has been a lot  Patient's pain rating has been 9/10  Patient states that she has experienced no weight loss or gain in last 6 months  Fall Risk Screening: In the past year, patient has experienced: no history of falling in past year      Urinary Incontinence Screening:   Patient has leaked urine accidently in the last six months  Home Safety:  Patient has trouble with stairs inside or outside of their home  Patient has working smoke alarms and has working carbon monoxide detector  Home safety hazards include: none  Nutrition:   Current diet is Regular, No Added Salt and Frequent junk food  Medications:   Patient is not currently taking any over-the-counter supplements  Patient is able to manage medications  Activities of Daily Living (ADLs)/Instrumental Activities of Daily Living (IADLs):   Walk and transfer into and out of bed and chair?: Yes  Dress and groom yourself?: Yes    Bathe or shower yourself?: Yes    Feed yourself? Yes  Do your laundry/housekeeping?: No  Manage your money, pay your bills and track your expenses?: Yes  Make your own meals?: Yes    Do your own shopping?: No    Advance Care Planning:   Living will: No    Five wishes given: Yes      Cognitive Screening:   Provider or family/friend/caregiver concerned regarding cognition?: Yes    PREVENTIVE SCREENINGS      Cardiovascular Screening:    General: Screening Not Indicated and History Lipid Disorder      Diabetes Screening:     General: Screening Current      Cervical Cancer Screening:    General: Screening Not Indicated      Osteoporosis Screening:    General: Screening Not Indicated and History Osteoporosis      Lung Cancer Screening:     General: Screening Not Indicated    Screening, Brief Intervention, and Referral to Treatment (SBIRT)    Screening  Typical number of drinks in a day: 0    No exam data present     Physical Exam:     There were no vitals taken for this visit      Physical Exam     Luna Mora MD

## 2021-03-09 NOTE — PATIENT INSTRUCTIONS
Medicare Preventive Visit Patient Instructions  Thank you for completing your Welcome to Medicare Visit or Medicare Annual Wellness Visit today  Your next wellness visit will be due in one year (3/10/2022)  The screening/preventive services that you may require over the next 5-10 years are detailed below  Some tests may not apply to you based off risk factors and/or age  Screening tests ordered at today's visit but not completed yet may show as past due  Also, please note that scanned in results may not display below  Preventive Screenings:  Service Recommendations Previous Testing/Comments   Colorectal Cancer Screening  * Colonoscopy    * Fecal Occult Blood Test (FOBT)/Fecal Immunochemical Test (FIT)  * Fecal DNA/Cologuard Test  * Flexible Sigmoidoscopy Age: 54-65 years old   Colonoscopy: every 10 years (may be performed more frequently if at higher risk)  OR  FOBT/FIT: every 1 year  OR  Cologuard: every 3 years  OR  Sigmoidoscopy: every 5 years  Screening may be recommended earlier than age 48 if at higher risk for colorectal cancer  Also, an individualized decision between you and your healthcare provider will decide whether screening between the ages of 74-80 would be appropriate  Colonoscopy: 05/28/2019  FOBT/FIT: Not on file  Cologuard: Not on file  Sigmoidoscopy: Not on file          Breast Cancer Screening Age: 36 years old  Frequency: every 1-2 years  Not required if history of left and right mastectomy Mammogram: 04/03/2018        Cervical Cancer Screening Between the ages of 21-29, pap smear recommended once every 3 years  Between the ages of 33-67, can perform pap smear with HPV co-testing every 5 years     Recommendations may differ for women with a history of total hysterectomy, cervical cancer, or abnormal pap smears in past  Pap Smear: Not on file    Screening Not Indicated   Hepatitis C Screening Once for adults born between [facility]  More frequently in patients at high risk for Hepatitis C Hep C Antibody: Not on file        Diabetes Screening 1-2 times per year if you're at risk for diabetes or have pre-diabetes Fasting glucose: No results in last 5 years   A1C: 7 1 %    Screening Current   Cholesterol Screening Once every 5 years if you don't have a lipid disorder  May order more often based on risk factors  Lipid panel: 01/06/2020    Screening Not Indicated  History Lipid Disorder     Other Preventive Screenings Covered by Medicare:  1  Abdominal Aortic Aneurysm (AAA) Screening: covered once if your at risk  You're considered to be at risk if you have a family history of AAA  2  Lung Cancer Screening: covers low dose CT scan once per year if you meet all of the following conditions: (1) Age 50-69; (2) No signs or symptoms of lung cancer; (3) Current smoker or have quit smoking within the last 15 years; (4) You have a tobacco smoking history of at least 30 pack years (packs per day multiplied by number of years you smoked); (5) You get a written order from a healthcare provider  3  Glaucoma Screening: covered annually if you're considered high risk: (1) You have diabetes OR (2) Family history of glaucoma OR (3)  aged 48 and older OR (3)  American aged 72 and older  3  Osteoporosis Screening: covered every 2 years if you meet one of the following conditions: (1) You're estrogen deficient and at risk for osteoporosis based off medical history and other findings; (2) Have a vertebral abnormality; (3) On glucocorticoid therapy for more than 3 months; (4) Have primary hyperparathyroidism; (5) On osteoporosis medications and need to assess response to drug therapy  · Last bone density test (DXA Scan): 04/27/2018  5  HIV Screening: covered annually if you're between the age of 12-76  Also covered annually if you are younger than 13 and older than 72 with risk factors for HIV infection   For pregnant patients, it is covered up to 3 times per pregnancy  Immunizations:  Immunization Recommendations   Influenza Vaccine Annual influenza vaccination during flu season is recommended for all persons aged >= 6 months who do not have contraindications   Pneumococcal Vaccine (Prevnar and Pneumovax)  * Prevnar = PCV13  * Pneumovax = PPSV23   Adults 25-60 years old: 1-3 doses may be recommended based on certain risk factors  Adults 72 years old: Prevnar (PCV13) vaccine recommended followed by Pneumovax (PPSV23) vaccine  If already received PPSV23 since turning 65, then PCV13 recommended at least one year after PPSV23 dose  Hepatitis B Vaccine 3 dose series if at intermediate or high risk (ex: diabetes, end stage renal disease, liver disease)   Tetanus (Td) Vaccine - COST NOT COVERED BY MEDICARE PART B Following completion of primary series, a booster dose should be given every 10 years to maintain immunity against tetanus  Td may also be given as tetanus wound prophylaxis  Tdap Vaccine - COST NOT COVERED BY MEDICARE PART B Recommended at least once for all adults  For pregnant patients, recommended with each pregnancy  Shingles Vaccine (Shingrix) - COST NOT COVERED BY MEDICARE PART B  2 shot series recommended in those aged 48 and above     Health Maintenance Due:      Topic Date Due    Colonoscopy Surveillance  05/28/2022     Immunizations Due:      Topic Date Due    COVID-19 Vaccine (1 of 2) 06/19/1956    DTaP,Tdap,and Td Vaccines (1 - Tdap) 06/19/1959    Influenza Vaccine (1) 09/01/2020     Advance Directives   What are advance directives? Advance directives are legal documents that state your wishes and plans for medical care  These plans are made ahead of time in case you lose your ability to make decisions for yourself  Advance directives can apply to any medical decision, such as the treatments you want, and if you want to donate organs  What are the types of advance directives?   There are many types of advance directives, and each state has rules about how to use them  You may choose a combination of any of the following:  · Living will: This is a written record of the treatment you want  You can also choose which treatments you do not want, which to limit, and which to stop at a certain time  This includes surgery, medicine, IV fluid, and tube feedings  · Durable power of  for healthcare [facility]): This is a written record that states who you want to make healthcare choices for you when you are unable to make them for yourself  This person, called a proxy, is usually a family member or a friend  You may choose more than 1 proxy  · Do not resuscitate (DNR) order:  A DNR order is used in case your heart stops beating or you stop breathing  It is a request not to have certain forms of treatment, such as CPR  A DNR order may be included in other types of advance directives  · Medical directive: This covers the care that you want if you are in a coma, near death, or unable to make decisions for yourself  You can list the treatments you want for each condition  Treatment may include pain medicine, surgery, blood transfusions, dialysis, IV or tube feedings, and a ventilator (breathing machine)  · Values history: This document has questions about your views, beliefs, and how you feel and think about life  This information can help others choose the care that you would choose  Why are advance directives important? An advance directive helps you control your care  Although spoken wishes may be used, it is better to have your wishes written down  Spoken wishes can be misunderstood, or not followed  Treatments may be given even if you do not want them  An advance directive may make it easier for your family to make difficult choices about your care  Urinary Incontinence   Urinary incontinence (UI)  is when you lose control of your bladder  UI develops because your bladder cannot store or empty urine properly   The 3 most common types of UI are stress incontinence, urge incontinence, or both  Medicines:   · May be given to help strengthen your bladder control  Report any side effects of medication to your healthcare provider  Do pelvic muscle exercises often:  Your pelvic muscles help you stop urinating  Squeeze these muscles tight for 5 seconds, then relax for 5 seconds  Gradually work up to squeezing for 10 seconds  Do 3 sets of 15 repetitions a day, or as directed  This will help strengthen your pelvic muscles and improve bladder control  Train your bladder:  Go to the bathroom at set times, such as every 2 hours, even if you do not feel the urge to go  You can also try to hold your urine when you feel the urge to go  For example, hold your urine for 5 minutes when you feel the urge to go  As that becomes easier, hold your urine for 10 minutes  Self-care:   · Keep a UI record  Write down how often you leak urine and how much you leak  Make a note of what you were doing when you leaked urine  · Drink liquids as directed  You may need to limit the amount of liquid you drink to help control your urine leakage  Do not drink any liquid right before you go to bed  Limit or do not have drinks that contain caffeine or alcohol  · Prevent constipation  Eat a variety of high-fiber foods  Good examples are high-fiber cereals, beans, vegetables, and whole-grain breads  Walking is the best way to trigger your intestines to have a bowel movement  · Exercise regularly and maintain a healthy weight  Weight loss and exercise will decrease pressure on your bladder and help you control your leakage  · Use a catheter as directed  to help empty your bladder  A catheter is a tiny, plastic tube that is put into your bladder to drain your urine  · Go to behavior therapy as directed  Behavior therapy may be used to help you learn to control your urge to urinate      Weight Management   Why it is important to manage your weight:  Being overweight increases your risk of health conditions such as heart disease, high blood pressure, type 2 diabetes, and certain types of cancer  It can also increase your risk for osteoarthritis, sleep apnea, and other respiratory problems  Aim for a slow, steady weight loss  Even a small amount of weight loss can lower your risk of health problems  How to lose weight safely:  A safe and healthy way to lose weight is to eat fewer calories and get regular exercise  You can lose up about 1 pound a week by decreasing the number of calories you eat by 500 calories each day  Healthy meal plan for weight management:  A healthy meal plan includes a variety of foods, contains fewer calories, and helps you stay healthy  A healthy meal plan includes the following:  · Eat whole-grain foods more often  A healthy meal plan should contain fiber  Fiber is the part of grains, fruits, and vegetables that is not broken down by your body  Whole-grain foods are healthy and provide extra fiber in your diet  Some examples of whole-grain foods are whole-wheat breads and pastas, oatmeal, brown rice, and bulgur  · Eat a variety of vegetables every day  Include dark, leafy greens such as spinach, kale, remi greens, and mustard greens  Eat yellow and orange vegetables such as carrots, sweet potatoes, and winter squash  · Eat a variety of fruits every day  Choose fresh or canned fruit (canned in its own juice or light syrup) instead of juice  Fruit juice has very little or no fiber  · Eat low-fat dairy foods  Drink fat-free (skim) milk or 1% milk  Eat fat-free yogurt and low-fat cottage cheese  Try low-fat cheeses such as mozzarella and other reduced-fat cheeses  · Choose meat and other protein foods that are low in fat  Choose beans or other legumes such as split peas or lentils  Choose fish, skinless poultry (chicken or turkey), or lean cuts of red meat (beef or pork)  Before you cook meat or poultry, cut off any visible fat  · Use less fat and oil  Try baking foods instead of frying them  Add less fat, such as margarine, sour cream, regular salad dressing and mayonnaise to foods  Eat fewer high-fat foods  Some examples of high-fat foods include french fries, doughnuts, ice cream, and cakes  · Eat fewer sweets  Limit foods and drinks that are high in sugar  This includes candy, cookies, regular soda, and sweetened drinks  Exercise:  Exercise at least 30 minutes per day on most days of the week  Some examples of exercise include walking, biking, dancing, and swimming  You can also fit in more physical activity by taking the stairs instead of the elevator or parking farther away from stores  Ask your healthcare provider about the best exercise plan for you  © Copyright [employer] 2018 Information is for End User's use only and may not be sold, redistributed or otherwise used for commercial purposes   All illustrations and images included in CareNotes® are the copyrighted property of [employer]  or [address] [place], [place] [address]pe

## 2021-03-09 NOTE — PROGRESS NOTES
Assessment/Plan:    No problem-specific Assessment & Plan notes found for this encounter  Diagnoses and all orders for this visit:    Chronic diastolic CHF (congestive heart failure) (HCC)  -     gabapentin (NEURONTIN) 100 mg capsule; Take 1 cap nightly for 1 week, then increase to twice daily for 1 week, then increase to three times daily  Atrial fibrillation, unspecified type (HCC)  -     gabapentin (NEURONTIN) 100 mg capsule; Take 1 cap nightly for 1 week, then increase to twice daily for 1 week, then increase to three times daily  Junctional bradycardia  -     gabapentin (NEURONTIN) 100 mg capsule; Take 1 cap nightly for 1 week, then increase to twice daily for 1 week, then increase to three times daily  Other specified inflammatory spondylopathies, lumbosacral region ([facility] [address] )  -     gabapentin (NEURONTIN) 100 mg capsule; Take 1 cap nightly for 1 week, then increase to twice daily for 1 week, then increase to three times daily  Parkinson's disease ([facility] [address] )  -     gabapentin (NEURONTIN) 100 mg capsule; Take 1 cap nightly for 1 week, then increase to twice daily for 1 week, then increase to three times daily  Obesity, morbid (HCC)  -     gabapentin (NEURONTIN) 100 mg capsule; Take 1 cap nightly for 1 week, then increase to twice daily for 1 week, then increase to three times daily  Stage 3a chronic kidney disease  -     gabapentin (NEURONTIN) 100 mg capsule; Take 1 cap nightly for 1 week, then increase to twice daily for 1 week, then increase to three times daily  Other orders  -     memantine (NAMENDA) 5 mg tablet; Take 5 mg by mouth 2 (two) times a day  -     isosorbide mononitrate (IMDUR) 30 mg 24 hr tablet; Take 30 mg by mouth          Subjective:      Patient ID: Unique Light is a 78 y o  female  Her BP is well controlled on her current regimen  She has no CP or SOB  She has a stress test scheduled for this Thursday  Her lipids are at goal on her current regimen    She has normal LFTs and no myalgias or muscle weakness  She has right-sided radicular pain  She has no numbness or weakness  She is seeing pain management  She has PAF and is taking Eliquis  She has no bleeding episodes  She has no palpitations, syncope, or near syncope  Diarrhea         The following portions of the patient's history were reviewed and updated as appropriate:   She  has a past medical history of Hypercholesteremia, Hypertension, IBS (irritable bowel syndrome), Rheumatic fever, and Skin lesion    She   Patient Active Problem List    Diagnosis Date Noted    [facility] 12/01/2020    LUPE (acute kidney injury) ([facility] [address] ) 12/01/2020    Other specified inflammatory spondylopathies, lumbosacral region ([facility] ) 12/01/2020    Sepsis ([facility] [address] ) 12/01/2020    Obesity, morbid ([facility] ) 12/01/2020    Constipation 12/01/2020    Renal cyst 10/23/2020    Back pain with right-sided radiculopathy 03/11/2020    CAD (coronary artery disease) 12/17/2019    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    Single kidney 09/03/2019    Vitamin D deficiency 09/03/2019    Anxiety 08/20/2019    Ambulatory dysfunction 07/26/2019    [facility]fib [facility] 07/23/2019    Stage 3a chronic kidney disease 07/23/2019    Junctional bradycardia 07/17/2019    BMI 36 0-36 9,adult 04/30/2019    Chronic left shoulder pain 10/09/2018    H/O: rheumatic fever 07/12/2018    Sciatica 07/12/2018    Peripheral neuropathy 02/13/2018    B12 deficiency 02/13/2018    Osteoporosis 02/13/2018    Closed head injury 12/20/2017    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    Headache, worsening 08/14/2017    Chronic diastolic CHF (congestive heart failure) ([facility] [address] ) 05/15/2017    Low serum vitamin B12 02/13/2017    Peripheral edema 02/13/2017    Atypical chest pain 08/01/2016    Biceps tendinitis of right shoulder 11/47/9645    Diastolic dysfunction 72/36/0127    Lateral epicondylitis of right elbow 04/11/2016    Essential hypertension 03/14/2016    Dyspnea on exertion 03/14/2016    GERD without esophagitis 03/14/2016    Osteoarthritis 09/17/2012    Peripheral venous insufficiency 09/17/2012     She  has a past surgical history that includes Hysterectomy; Kidney surgery; Tonsillectomy; and Ankle fracture surgery  Her family history includes Asthma in her father; Cancer in her sister; Hypertension in her mother  She  reports that she has never smoked  She has never used smokeless tobacco  She reports that she does not drink alcohol or use drugs  Current Outpatient Medications   Medication Sig Dispense Refill    isosorbide mononitrate (IMDUR) 30 mg 24 hr tablet Take 30 mg by mouth      acetaminophen (TYLENOL) 500 mg tablet Take 500 mg by mouth      benzonatate (TESSALON PERLES) 100 mg capsule Take 1 capsule (100 mg total) by mouth 3 (three) times a day as needed for cough 20 capsule 0    cholecalciferol (VITAMIN D3) 1,000 units tablet take 1 tablet by mouth once daily 90 tablet 1    citalopram (CeleXA) 20 mg tablet Take 1 tablet (20 mg total) by mouth daily 30 tablet 5    Eliquis 5 MG Take 5 mg by mouth 2 (two) times a day      ezetimibe (ZETIA) 10 mg tablet Take 10 mg by mouth daily      furosemide (LASIX) 40 mg tablet Take by mouth Take by mouth Take 2 tablets (80 mg) every AM and 1 tablet (40 mg) every PM      gabapentin (NEURONTIN) 100 mg capsule Take 1 cap nightly for 1 week, then increase to twice daily for 1 week, then increase to three times daily   90 capsule 3    lansoprazole (PREVACID) 15 mg capsule Take 15 mg by mouth daily      loperamide (IMODIUM A-D) 2 MG tablet Take 2 mg by mouth      memantine (NAMENDA) 5 mg tablet Take 5 mg by mouth 2 (two) times a day      metoprolol succinate (TOPROL-XL) 25 mg 24 hr tablet Take 12 5 mg by mouth daily      nitroglycerin (NITROSTAT) 0 4 mg SL tablet Place 0 4 mg under the tongue every 5 (five) minutes as needed      omega-3-acid ethyl esters (LOVAZA) 1 g capsule TAKE 1 CAPSULE BY MOUTH TWICE A DAY 60 capsule 5    ondansetron (ZOFRAN) 4 mg tablet Take 1 tablet (4 mg total) by mouth every 8 (eight) hours as needed for nausea or vomiting 20 tablet 0    polyethylene glycol (GLYCOLAX) 17 GM/SCOOP Take 17 g by mouth      potassium chloride (K-DUR,KLOR-CON) 20 mEq tablet take 1 tablet by mouth twice a day 60 tablet 5     Current Facility-Administered Medications   Medication Dose Route Frequency Provider Last Rate Last Admin    cyanocobalamin injection 1,000 mcg  1,000 mcg Intramuscular Q30 Days Reddy Araujo MD   1,000 mcg at 02/19/18 1008     Current Outpatient Medications on File Prior to Visit   Medication Sig    isosorbide mononitrate (IMDUR) 30 mg 24 hr tablet Take 30 mg by mouth    acetaminophen (TYLENOL) 500 mg tablet Take 500 mg by mouth    benzonatate (TESSALON PERLES) 100 mg capsule Take 1 capsule (100 mg total) by mouth 3 (three) times a day as needed for cough    cholecalciferol (VITAMIN D3) 1,000 units tablet take 1 tablet by mouth once daily    citalopram (CeleXA) 20 mg tablet Take 1 tablet (20 mg total) by mouth daily    Eliquis 5 MG Take 5 mg by mouth 2 (two) times a day    ezetimibe (ZETIA) 10 mg tablet Take 10 mg by mouth daily    furosemide (LASIX) 40 mg tablet Take by mouth Take by mouth Take 2 tablets (80 mg) every AM and 1 tablet (40 mg) every PM    lansoprazole (PREVACID) 15 mg capsule Take 15 mg by mouth daily    loperamide (IMODIUM A-D) 2 MG tablet Take 2 mg by mouth    memantine (NAMENDA) 5 mg tablet Take 5 mg by mouth 2 (two) times a day    metoprolol succinate (TOPROL-XL) 25 mg 24 hr tablet Take 12 5 mg by mouth daily    nitroglycerin (NITROSTAT) 0 4 mg SL tablet Place 0 4 mg under the tongue every 5 (five) minutes as needed    omega-3-acid ethyl esters (LOVAZA) 1 g capsule TAKE 1 CAPSULE BY MOUTH TWICE A DAY    ondansetron (ZOFRAN) 4 mg tablet Take 1 tablet (4 mg total) by mouth every 8 (eight) hours as needed for nausea or vomiting    polyethylene glycol (GLYCOLAX) 17 GM/SCOOP Take 17 g by mouth    potassium chloride (K-DUR,KLOR-CON) 20 mEq tablet take 1 tablet by mouth twice a day     Current Facility-Administered Medications on File Prior to Visit   Medication    cyanocobalamin injection 1,000 mcg     She is allergic to amoxicillin; ciprofloxacin; erythromycin; statins; amoxicillin-pot clavulanate; and clindamycin       Review of Systems   Gastrointestinal: Positive for diarrhea  All other systems reviewed and are negative  Objective:      /58   Pulse 63   Temp 98 9 °F (37 2 °C)   Ht 4' 11" (1 499 m)   Wt 81 6 kg (180 lb)   SpO2 95%   BMI 36 36 kg/m²          Physical Exam  Vitals signs and nursing note reviewed  Constitutional:       Appearance: Normal appearance  She is obese  Neck:      Musculoskeletal: Normal range of motion and neck supple  Cardiovascular:      Rate and Rhythm: Normal rate and regular rhythm  Pulses: Normal pulses  Heart sounds: Normal heart sounds  Pulmonary:      Effort: Pulmonary effort is normal       Breath sounds: Normal breath sounds  Abdominal:      General: Abdomen is flat  Bowel sounds are normal       Palpations: Abdomen is soft  Musculoskeletal: Normal range of motion  Skin:     General: Skin is warm and dry  Capillary Refill: Capillary refill takes less than 2 seconds  Neurological:      General: No focal deficit present  Mental Status: She is alert and oriented to person, place, and time  Mental status is at baseline  Psychiatric:         Mood and Affect: Mood normal          Behavior: Behavior normal          Thought Content:  Thought content normal          Judgment: Judgment normal

## 2021-03-29 ENCOUNTER — TELEPHONE (OUTPATIENT)
Dept: FAMILY MEDICINE CLINIC | Facility: CLINIC | Age: 81
End: 2021-03-29

## 2021-04-11 ENCOUNTER — NURSE TRIAGE (OUTPATIENT)
Dept: OTHER | Facility: OTHER | Age: 81
End: 2021-04-11

## 2021-04-11 NOTE — TELEPHONE ENCOUNTER
Regardin79 Y/O UTI NAUSEA FINISHED ANTIBIOTIC ONE KIDNEY  ----- Message from Carlos Eduardo Salazar sent at 2021  1:10 PM EDT -----  "I have a urinary infection I took my antibiotics they finish yesterday  I feel sick to my stomach nauseous and I only have one kidney  "

## 2021-04-11 NOTE — TELEPHONE ENCOUNTER
Patient unable to facilitate a virtual visit  She will call office in the morning if not feeling better  Reason for Disposition   Unexplained nausea    Answer Assessment - Initial Assessment Questions  1  NAUSEA SEVERITY: "How bad is the nausea?" (e g , mild, moderate, severe; dehydration, weight loss)  Moderate  Did eat cereal this morning  2  ONSET: "When did the nausea begin?"      Couple days ago but worse today  3  VOMITING: "Any vomiting?" If so, ask: "How many times today?"      None    4  RECURRENT SYMPTOM: "Have you had nausea before?" If so, ask: "When was the last time?" "What happened that time?"      No     5  CAUSE: "What do you think is causing the nausea?"      Unknown  Has completed her course of kelfex for a uti  Still having some burning  Not able to check temp because does not have thermometer with her  One episode of soft stool      Protocols used: NAUSEA-ADULT-AH

## 2021-04-26 DIAGNOSIS — I10 ESSENTIAL HYPERTENSION: ICD-10-CM

## 2021-04-26 DIAGNOSIS — F03.90 DEMENTIA WITHOUT BEHAVIORAL DISTURBANCE, UNSPECIFIED DEMENTIA TYPE (HCC): Primary | ICD-10-CM

## 2021-04-26 RX ORDER — MEMANTINE HYDROCHLORIDE 5 MG/1
5 TABLET ORAL 2 TIMES DAILY
Qty: 60 TABLET | Refills: 3 | Status: SHIPPED | OUTPATIENT
Start: 2021-04-26 | End: 2021-09-08

## 2021-04-26 RX ORDER — POTASSIUM CHLORIDE 20 MEQ/1
TABLET, EXTENDED RELEASE ORAL
Qty: 60 TABLET | Refills: 5 | Status: SHIPPED | OUTPATIENT
Start: 2021-04-26 | End: 2021-12-23 | Stop reason: ALTCHOICE

## 2021-06-04 RX ORDER — DOXYCYCLINE HYCLATE 100 MG/1
100 CAPSULE ORAL EVERY 12 HOURS
COMMUNITY
Start: 2021-04-19 | End: 2021-12-23 | Stop reason: ALTCHOICE

## 2021-06-04 RX ORDER — CEPHALEXIN 500 MG/1
CAPSULE ORAL
COMMUNITY
Start: 2021-04-01 | End: 2021-07-19

## 2021-06-04 RX ORDER — FLUTICASONE PROPIONATE 50 MCG
2 SPRAY, SUSPENSION (ML) NASAL DAILY PRN
COMMUNITY
End: 2022-06-20

## 2021-06-04 RX ORDER — PREGABALIN 25 MG/1
25 CAPSULE ORAL 2 TIMES DAILY
COMMUNITY
End: 2021-12-23 | Stop reason: ALTCHOICE

## 2021-06-04 RX ORDER — NYSTATIN 100000 [USP'U]/G
POWDER TOPICAL 2 TIMES DAILY
COMMUNITY
End: 2021-12-23 | Stop reason: ALTCHOICE

## 2021-06-04 RX ORDER — ASPIRIN 81 MG/1
81 TABLET, CHEWABLE ORAL DAILY
COMMUNITY
End: 2021-12-23 | Stop reason: ALTCHOICE

## 2021-06-08 ENCOUNTER — OFFICE VISIT (OUTPATIENT)
Dept: FAMILY MEDICINE CLINIC | Facility: CLINIC | Age: 81
End: 2021-06-08
Payer: COMMERCIAL

## 2021-06-08 VITALS
SYSTOLIC BLOOD PRESSURE: 98 MMHG | HEIGHT: 59 IN | DIASTOLIC BLOOD PRESSURE: 54 MMHG | BODY MASS INDEX: 36.08 KG/M2 | WEIGHT: 179 LBS | OXYGEN SATURATION: 98 % | HEART RATE: 58 BPM

## 2021-06-08 DIAGNOSIS — I25.10 CORONARY ARTERY DISEASE INVOLVING NATIVE HEART, UNSPECIFIED VESSEL OR LESION TYPE, UNSPECIFIED WHETHER ANGINA PRESENT: Primary | ICD-10-CM

## 2021-06-08 PROCEDURE — 99214 OFFICE O/P EST MOD 30 MIN: CPT | Performed by: FAMILY MEDICINE

## 2021-06-08 PROCEDURE — 3725F SCREEN DEPRESSION PERFORMED: CPT | Performed by: FAMILY MEDICINE

## 2021-06-08 PROCEDURE — 1160F RVW MEDS BY RX/DR IN RCRD: CPT | Performed by: FAMILY MEDICINE

## 2021-06-08 PROCEDURE — 1036F TOBACCO NON-USER: CPT | Performed by: FAMILY MEDICINE

## 2021-06-08 NOTE — PROGRESS NOTES
Assessment/Plan:    No problem-specific Assessment & Plan notes found for this encounter  Diagnoses and all orders for this visit:    Coronary artery disease involving native heart, unspecified vessel or lesion type, unspecified whether angina present    Other orders  -     pregabalin (LYRICA) 25 mg capsule; Take 25 mg by mouth 2 (two) times a day  -     nystatin (MYCOSTATIN) powder; Apply topically 2 (two) times a day  -     fluticasone (FLONASE) 50 mcg/act nasal spray; 2 sprays into each nostril daily  -     doxycycline hyclate (VIBRAMYCIN) 100 mg capsule; Take 100 mg by mouth every 12 (twelve) hours  -     cephalexin (KEFLEX) 500 mg capsule; take 1 capsule by mouth three times a day for 10 days  -     aspirin 81 mg chewable tablet; Chew 81 mg daily          Subjective:      Patient ID: Misti Martinez is a 78 y o  female  She has a known cardiac history  She has jaw pain, left shoulder pain, substernal chest pain and intermittent SOB  She is having symptoms in the office  I am concerned about a cardiac origin for her symptoms  I offered to call 911, but she wanted to have her son take her to the ER  The following portions of the patient's history were reviewed and updated as appropriate:   She  has a past medical history of Hypercholesteremia, Hypertension, IBS (irritable bowel syndrome), Rheumatic fever, and Skin lesion    She   Patient Active Problem List    Diagnosis Date Noted    Parkinson's disease ([facility] ) 12/01/2020    [facility]) [facility] 12/01/2020    Other specified inflammatory spondylopathies, lumbosacral region ([facility] [address] ) 12/01/2020    Sepsis ([facility] [address] ) 12/01/2020    Obesity, morbid ([facility] ) 12/01/2020    Constipation 12/01/2020    Renal cyst 10/23/2020    Back pain with right-sided radiculopathy 03/11/2020    CAD (coronary artery disease) 12/17/2019    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    Single kidney 09/03/2019    Vitamin D deficiency 09/03/2019    Anxiety 08/20/2019    Ambulatory dysfunction 07/26/2019    A-fib ([facility] [address] ) 07/23/2019    Stage 3a chronic kidney disease ([facility] [address] ) 07/23/2019    Junctional bradycardia 07/17/2019    BMI 36 0-36 9,adult 04/30/2019    Chronic left shoulder pain 10/09/2018    H/O: rheumatic fever 07/12/2018    Sciatica 07/12/2018    Peripheral neuropathy 02/13/2018    B12 deficiency 02/13/2018    Osteoporosis 02/13/2018    Closed head injury 12/20/2017    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    Headache, worsening 08/14/2017    Chronic diastolic CHF (congestive heart failure) ([facility] [address] ) 05/15/2017    Low serum vitamin B12 02/13/2017    Peripheral edema 02/13/2017    Other chest pain 08/01/2016    Biceps tendinitis of right shoulder 15/80/0217    Diastolic dysfunction 74/66/5092    Lateral epicondylitis of right elbow 04/11/2016    Essential hypertension 03/14/2016    Dyspnea on exertion 03/14/2016    GERD without esophagitis 03/14/2016    Osteoarthritis 09/17/2012    Peripheral venous insufficiency 09/17/2012     She  has a past surgical history that includes Hysterectomy; Kidney surgery; Tonsillectomy; and Ankle fracture surgery  Her family history includes Asthma in her father; Cancer in her sister; Hypertension in her mother  She  reports that she has never smoked  She has never used smokeless tobacco  She reports that she does not drink alcohol or use drugs    Current Outpatient Medications   Medication Sig Dispense Refill    acetaminophen (TYLENOL) 500 mg tablet Take 500 mg by mouth      aspirin 81 mg chewable tablet Chew 81 mg daily      cholecalciferol (VITAMIN D3) 1,000 units tablet take 1 tablet by mouth once daily 90 tablet 1    citalopram (CeleXA) 20 mg tablet Take 1 tablet (20 mg total) by mouth daily 30 tablet 5    Eliquis 5 MG Take 5 mg by mouth 2 (two) times a day      ezetimibe (ZETIA) 10 mg tablet Take 10 mg by mouth daily      fluticasone (FLONASE) 50 mcg/act nasal spray 2 sprays into each nostril daily  furosemide (LASIX) 40 mg tablet Take by mouth Take by mouth Take 2 tablets (80 mg) every AM and 1 tablet (40 mg) every PM      gabapentin (NEURONTIN) 100 mg capsule Take 1 cap nightly for 1 week, then increase to twice daily for 1 week, then increase to three times daily   90 capsule 3    isosorbide mononitrate (IMDUR) 30 mg 24 hr tablet Take 30 mg by mouth      lansoprazole (PREVACID) 15 mg capsule Take 15 mg by mouth daily      loperamide (IMODIUM A-D) 2 MG tablet Take 2 mg by mouth      memantine (NAMENDA) 5 mg tablet Take 1 tablet (5 mg total) by mouth 2 (two) times a day 60 tablet 3    metoprolol succinate (TOPROL-XL) 25 mg 24 hr tablet Take 12 5 mg by mouth daily      nitroglycerin (NITROSTAT) 0 4 mg SL tablet Place 0 4 mg under the tongue every 5 (five) minutes as needed      nystatin (MYCOSTATIN) powder Apply topically 2 (two) times a day      omega-3-acid ethyl esters (LOVAZA) 1 g capsule TAKE 1 CAPSULE BY MOUTH TWICE A DAY 60 capsule 5    ondansetron (ZOFRAN) 4 mg tablet Take 1 tablet (4 mg total) by mouth every 8 (eight) hours as needed for nausea or vomiting 20 tablet 0    polyethylene glycol (GLYCOLAX) 17 GM/SCOOP Take 17 g by mouth      potassium chloride (K-DUR,KLOR-CON) 20 mEq tablet take 1 tablet by mouth twice a day 60 tablet 5    pregabalin (LYRICA) 25 mg capsule Take 25 mg by mouth 2 (two) times a day      benzonatate (TESSALON PERLES) 100 mg capsule Take 1 capsule (100 mg total) by mouth 3 (three) times a day as needed for cough (Patient not taking: Reported on 6/8/2021) 20 capsule 0    cephalexin (KEFLEX) 500 mg capsule take 1 capsule by mouth three times a day for 10 days      doxycycline hyclate (VIBRAMYCIN) 100 mg capsule Take 100 mg by mouth every 12 (twelve) hours       Current Facility-Administered Medications   Medication Dose Route Frequency Provider Last Rate Last Admin    cyanocobalamin injection 1,000 mcg  1,000 mcg Intramuscular Q30 Days Lucien Braun MD 1,000 mcg at 02/19/18 1008     Current Outpatient Medications on File Prior to Visit   Medication Sig    acetaminophen (TYLENOL) 500 mg tablet Take 500 mg by mouth    aspirin 81 mg chewable tablet Chew 81 mg daily    cholecalciferol (VITAMIN D3) 1,000 units tablet take 1 tablet by mouth once daily    citalopram (CeleXA) 20 mg tablet Take 1 tablet (20 mg total) by mouth daily    Eliquis 5 MG Take 5 mg by mouth 2 (two) times a day    ezetimibe (ZETIA) 10 mg tablet Take 10 mg by mouth daily    fluticasone (FLONASE) 50 mcg/act nasal spray 2 sprays into each nostril daily    furosemide (LASIX) 40 mg tablet Take by mouth Take by mouth Take 2 tablets (80 mg) every AM and 1 tablet (40 mg) every PM    gabapentin (NEURONTIN) 100 mg capsule Take 1 cap nightly for 1 week, then increase to twice daily for 1 week, then increase to three times daily      isosorbide mononitrate (IMDUR) 30 mg 24 hr tablet Take 30 mg by mouth    lansoprazole (PREVACID) 15 mg capsule Take 15 mg by mouth daily    loperamide (IMODIUM A-D) 2 MG tablet Take 2 mg by mouth    memantine (NAMENDA) 5 mg tablet Take 1 tablet (5 mg total) by mouth 2 (two) times a day    metoprolol succinate (TOPROL-XL) 25 mg 24 hr tablet Take 12 5 mg by mouth daily    nitroglycerin (NITROSTAT) 0 4 mg SL tablet Place 0 4 mg under the tongue every 5 (five) minutes as needed    nystatin (MYCOSTATIN) powder Apply topically 2 (two) times a day    omega-3-acid ethyl esters (LOVAZA) 1 g capsule TAKE 1 CAPSULE BY MOUTH TWICE A DAY    ondansetron (ZOFRAN) 4 mg tablet Take 1 tablet (4 mg total) by mouth every 8 (eight) hours as needed for nausea or vomiting    polyethylene glycol (GLYCOLAX) 17 GM/SCOOP Take 17 g by mouth    potassium chloride (K-DUR,KLOR-CON) 20 mEq tablet take 1 tablet by mouth twice a day    pregabalin (LYRICA) 25 mg capsule Take 25 mg by mouth 2 (two) times a day    benzonatate (TESSALON PERLES) 100 mg capsule Take 1 capsule (100 mg total) by mouth 3 (three) times a day as needed for cough (Patient not taking: Reported on 6/8/2021)    cephalexin (KEFLEX) 500 mg capsule take 1 capsule by mouth three times a day for 10 days    doxycycline hyclate (VIBRAMYCIN) 100 mg capsule Take 100 mg by mouth every 12 (twelve) hours     Current Facility-Administered Medications on File Prior to Visit   Medication    cyanocobalamin injection 1,000 mcg     She is allergic to amoxicillin; ciprofloxacin; erythromycin; statins; amoxicillin-pot clavulanate; and clindamycin       Review of Systems   Respiratory: Positive for shortness of breath  Cardiovascular: Positive for chest pain  All other systems reviewed and are negative  Objective:      BP 98/54   Pulse 58   Ht 4' 11" (1 499 m)   Wt 81 2 kg (179 lb)   SpO2 98%   BMI 36 15 kg/m²          Physical Exam  Vitals signs and nursing note reviewed  Constitutional:       Appearance: She is well-developed  She is obese  HENT:      Head: Normocephalic and atraumatic  Cardiovascular:      Heart sounds: Normal heart sounds  Pulmonary:      Effort: Pulmonary effort is normal       Breath sounds: Normal breath sounds  Abdominal:      General: Bowel sounds are normal       Palpations: Abdomen is soft  Neurological:      Mental Status: She is alert

## 2021-06-08 NOTE — PROGRESS NOTES
BMI Counseling: Body mass index is 36 15 kg/m²  The BMI is above normal  Nutrition recommendations include reducing portion sizes, decreasing overall calorie intake, 3-5 servings of fruits/vegetables daily, reducing fast food intake, consuming healthier snacks, decreasing soda and/or juice intake, moderation in carbohydrate intake, increasing intake of lean protein, reducing intake of saturated fat and trans fat and reducing intake of cholesterol  Exercise recommendations include moderate aerobic physical activity for 150 minutes/week, vigorous aerobic physical activity for 75 minutes/week, exercising 3-5 times per week, joining a gym and strength training exercises

## 2021-07-19 ENCOUNTER — OFFICE VISIT (OUTPATIENT)
Dept: FAMILY MEDICINE CLINIC | Facility: CLINIC | Age: 81
End: 2021-07-19
Payer: COMMERCIAL

## 2021-07-19 VITALS
DIASTOLIC BLOOD PRESSURE: 60 MMHG | WEIGHT: 168 LBS | TEMPERATURE: 98.8 F | HEIGHT: 59 IN | OXYGEN SATURATION: 94 % | SYSTOLIC BLOOD PRESSURE: 110 MMHG | BODY MASS INDEX: 33.87 KG/M2 | HEART RATE: 64 BPM

## 2021-07-19 DIAGNOSIS — Z90.5 SINGLE KIDNEY: Chronic | ICD-10-CM

## 2021-07-19 DIAGNOSIS — E55.9 VITAMIN D DEFICIENCY: ICD-10-CM

## 2021-07-19 DIAGNOSIS — N18.31 STAGE 3A CHRONIC KIDNEY DISEASE (HCC): ICD-10-CM

## 2021-07-19 DIAGNOSIS — E66.01 OBESITY, MORBID (HCC): ICD-10-CM

## 2021-07-19 DIAGNOSIS — G20 PARKINSON'S DISEASE (HCC): ICD-10-CM

## 2021-07-19 DIAGNOSIS — I10 ESSENTIAL HYPERTENSION: Primary | ICD-10-CM

## 2021-07-19 DIAGNOSIS — I50.32 CHRONIC DIASTOLIC CHF (CONGESTIVE HEART FAILURE) (HCC): ICD-10-CM

## 2021-07-19 DIAGNOSIS — I48.91 ATRIAL FIBRILLATION, UNSPECIFIED TYPE (HCC): ICD-10-CM

## 2021-07-19 DIAGNOSIS — R00.1 JUNCTIONAL BRADYCARDIA: ICD-10-CM

## 2021-07-19 DIAGNOSIS — M46.87 OTHER SPECIFIED INFLAMMATORY SPONDYLOPATHIES, LUMBOSACRAL REGION (HCC): ICD-10-CM

## 2021-07-19 DIAGNOSIS — I25.10 CORONARY ARTERY DISEASE INVOLVING NATIVE HEART, UNSPECIFIED VESSEL OR LESION TYPE, UNSPECIFIED WHETHER ANGINA PRESENT: ICD-10-CM

## 2021-07-19 PROBLEM — A41.9 SEPSIS (HCC): Status: RESOLVED | Noted: 2020-12-01 | Resolved: 2021-07-19

## 2021-07-19 PROCEDURE — 99496 TRANSJ CARE MGMT HIGH F2F 7D: CPT | Performed by: FAMILY MEDICINE

## 2021-07-19 RX ORDER — GABAPENTIN 100 MG/1
CAPSULE ORAL
Qty: 90 CAPSULE | Refills: 3 | Status: SHIPPED | OUTPATIENT
Start: 2021-07-19 | End: 2021-09-13 | Stop reason: SDUPTHER

## 2021-07-19 RX ORDER — B-COMPLEX WITH VITAMIN C
1 TABLET ORAL
COMMUNITY
End: 2021-07-19 | Stop reason: SDUPTHER

## 2021-07-19 RX ORDER — MELATONIN
1000 DAILY
Qty: 90 TABLET | Refills: 1 | Status: SHIPPED | OUTPATIENT
Start: 2021-07-19 | End: 2022-02-02 | Stop reason: SDUPTHER

## 2021-07-19 RX ORDER — CLOPIDOGREL BISULFATE 75 MG/1
75 TABLET ORAL DAILY
COMMUNITY
Start: 2021-06-25 | End: 2022-03-11 | Stop reason: SDUPTHER

## 2021-07-19 RX ORDER — CHLORAL HYDRATE 500 MG
1 CAPSULE ORAL 2 TIMES DAILY
COMMUNITY
End: 2021-12-23 | Stop reason: ALTCHOICE

## 2021-07-19 RX ORDER — B-COMPLEX WITH VITAMIN C
1 TABLET ORAL 2 TIMES DAILY WITH MEALS
Qty: 60 TABLET | Refills: 5 | Status: SHIPPED | OUTPATIENT
Start: 2021-07-19 | End: 2022-02-02 | Stop reason: SDUPTHER

## 2021-07-19 RX ORDER — LOSARTAN POTASSIUM 25 MG/1
25 TABLET ORAL DAILY
COMMUNITY
Start: 2021-06-26 | End: 2022-04-12 | Stop reason: ALTCHOICE

## 2021-07-19 NOTE — ASSESSMENT & PLAN NOTE
Wt Readings from Last 3 Encounters:   07/19/21 76 2 kg (168 lb)   06/08/21 81 2 kg (179 lb)   03/09/21 81 6 kg (180 lb)       Stable  Continue current

## 2021-07-19 NOTE — PROGRESS NOTES
Assessment/Plan:     Chronic diastolic CHF (congestive heart failure) ([facility])  Wt Readings from Last 3 Encounters:   07/19/21 76 2 kg (168 lb)   06/08/21 81 2 kg (179 lb)   03/09/21 81 6 kg (180 lb)       Stable  Continue current  A-fib (HCC)  Stable  Continue current  CAD (coronary artery disease)  Stable  Continue current  Stage 3a chronic kidney disease [facility])  Lab Results   Component Value Date    EGFR 57 04/30/2019    EGFR 43 6 05/09/2016    CREATININE 0 94 04/30/2019    CREATININE 1 20 05/09/2016   Stable  Continue current  Diagnoses and all orders for this visit:    Essential hypertension    Chronic diastolic CHF (congestive heart failure) ([facility])    Atrial fibrillation, unspecified type ([facility] )    Coronary artery disease involving native heart, unspecified vessel or lesion type, unspecified whether angina present    Stage 3a chronic kidney disease ([facility] )    Single kidney    Other orders  -     clopidogrel (PLAVIX) 75 mg tablet; Take 75 mg by mouth daily  -     calcium carbonate-vitamin D (OSCAL-D) 500 mg-200 units per tablet; Take 1 tablet by mouth  -     losartan (COZAAR) 25 mg tablet; Take 25 mg by mouth daily  -     Omega-3 Fatty Acids (fish oil) 1,000 mg; Take 1 g by mouth 2 (two) times a day         Subjective:     Patient ID: Lacy Price is a 78 y o  female  She had an NSTEMI on June 17th  She had cardiac catheterization and is s/p stent to the left circumflex  She is on ASA and Plavix  She is also on Elquis for A-fib  She is on a beta-blocker and Zetia  She is allergic to statin medications  It is unclear if she has a cardiology f/u  Her blood pressure is well controlled in the office today  She has a history of solitary kidney  She is on an angiotensin receptor blocker  There is questionable diagnosis of type 2 diabetes mellitus  Her blood sugars have always been at goal here in the office  She was diuresed for development of a left pleural effusion    Her weight is stable and she appears euvolemic in the office today  Review of Systems   All other systems reviewed and are negative  Objective:     Physical Exam  Vitals and nursing note reviewed  Constitutional:       Appearance: Normal appearance  She is obese  HENT:      Head: Normocephalic and atraumatic  Cardiovascular:      Rate and Rhythm: Normal rate and regular rhythm  Pulses: Normal pulses  Heart sounds: Normal heart sounds  Pulmonary:      Effort: Pulmonary effort is normal       Breath sounds: Normal breath sounds  Abdominal:      General: Abdomen is flat  Bowel sounds are normal       Palpations: Abdomen is soft  Musculoskeletal:         General: Normal range of motion  Cervical back: Normal range of motion and neck supple  Skin:     General: Skin is warm and dry  Capillary Refill: Capillary refill takes less than 2 seconds  Neurological:      General: No focal deficit present  Mental Status: She is alert and oriented to person, place, and time  Mental status is at baseline  Psychiatric:         Mood and Affect: Mood normal          Behavior: Behavior normal          Thought Content: Thought content normal          Judgment: Judgment normal            Vitals:    07/19/21 1206   BP: 110/60   BP Location: Left arm   Patient Position: Sitting   Cuff Size: Large   Pulse: 64   Temp: 98 8 °F (37 1 °C)   SpO2: 94%   Weight: 76 2 kg (168 lb)   Height: 4' 11" (1 499 m)       Transitional Care Management Review:  Montserrat Galvan is a 78 y o  female here for TCM follow up       During the TCM phone call patient stated:    TCM Call (since 6/18/2021)     None      TCM Call (since 6/18/2021)     None          Aaron Roy MD

## 2021-07-19 NOTE — ASSESSMENT & PLAN NOTE
Lab Results   Component Value Date    EGFR 57 04/30/2019    EGFR 43 6 05/09/2016    CREATININE 0 94 04/30/2019    CREATININE 1 20 05/09/2016   Stable  Continue current

## 2021-07-20 NOTE — CONSULTS
Chief Complaint  SURGICAL CLEARANCE  CATARACT SURGERY TO BE DONE 12/27/2017  EKG DONE IN SEPTEMBER      History of Present Illness  Pre-Op Visit (Brief): The patient is being seen for a preoperative visit  The procedure is a(n) catarct  The indication for surgery is cataract  Surgical Risk Assessment:   Prior Anesthesia: She had prior anesthesia, no prior adverse reaction to edidural anesthesia, no prior adverse reaction to spinal anesthesia and no prior adverse reaction to general anesthesia  Pertinent Past Medical History: no pertinent past medical history  Exercise Capacity: able to walk four blocks without symptoms and able to walk two flights of stairs without symptoms  Symptoms: no symptoms  Pertinent Family History: no pertinent family history  Living Situation: home is secure and supportive  HPI: She fell out of bed 2 nights ago  She struck her head  She states she heard a loud crack  She did not have much in the way of symptoms on the 1st night, but now she has a lump on her head and is very painful to touch  She has not lost consciousness  She has no vision changes  She does have pain behind her right ear and she has some nausea  She has no dizziness and no other complaints  Active Problems    1  Acquired solitary kidney (V45 73) (Z90 5)   2  Acute lower UTI (599 0) (N39 0)   3  Atypical chest pain (786 59) (R07 89)   4  Benign essential hypertension (401 1) (I10)   5  Biceps tendinitis of right shoulder (726 12) (M75 21)   6  Chronic diastolic CHF (congestive heart failure) (428 32,428 0) (I50 32)   7  Constipation due to opioid therapy (564 09,E935 2) (K59 03,T40 2X5A)   8  Diastolic dysfunction (350 1) (I51 9)   9  Dyspnea on exertion (786 09) (R06 09)   10  GERD without esophagitis (530 81) (K21 9)   11  Headache, worsening (784 0) (R51)   12  Lateral epicondylitis of right elbow (726 32) (M77 11)   13  Low serum vitamin B12 (790 6) (R79 89)   14   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)   15  Need for pneumococcal vaccination (V03 82) (Z23)   16  Peripheral edema (782 3) (R60 9)   17  Screening for genitourinary condition (V81 6) (Z13 89)   18  Special screening for other neurological conditions (V80 09) (Z13 89)    Past Medical History    · History of Cough (786 2) (R05)   · History of hypercholesterolemia (V12 29) (Z86 39)   · History of hypertension (V12 59) (Z86 79)   · History of IBS (V12 79) (Z87 19)   · History of rheumatic fever (V12 09) (Z86 79)   · History of Leg swelling (729 81) (M79 89)   · History of Skin lesion (709 9) (L98 9)    Surgical History    · History of Hysterectomy (V88 01)   · History of Kidney Surgery   · History of Tonsillectomy   · History of Treatment Of Ankle Fracture    Family History    · Family history of hypertension (V17 49) (Z82 49)    · Family history of asthma (V17 5) (Z82 5)    · Family history of malignant neoplasm (V16 9) (Z80 9)    Social History    · Never a smoker    Current Meds   1  BD Luer-Arpan Syringe 26G X 5/8" 3 ML Miscellaneous; USE AS DIRECTED; Therapy: 22CKJ8498 to (Last Rx:03Mar2017)  Requested for: 37ZBH9579 Ordered   2  Ciprofloxacin HCl - 250 MG Oral Tablet; TAKE 1 TABLET EVERY 12 HOURS DAILY; Therapy: 55ZYG7310 to (Evaluate:28Sep2017)  Requested for: 12MOK2640; Last   Rx:25Sep2017 Ordered   3  Cyanocobalamin 1000 MCG/ML Injection Solution; INJECT 1 ML INTRAMUSCULARLY   ONCE A MONTH;   Therapy: 75IPQ3663 to (Last ND:83FJT6201)  Requested for: 69VOJ7558 Ordered   4  Esomeprazole Magnesium 40 MG Oral Capsule Delayed Release; TAKE 1 CAPSULE   DAILY AS NEEDED; Therapy: 44OZY0224 to (Evaluate:28Jan2017)  Requested for: 18Chi7233; Last   Rx:22Bmw1371 Ordered   5  Fluticasone Propionate 50 MCG/ACT Nasal Suspension; USE 2 SPRAYS IN EACH   NOSTRIL ONCE DAILY; Therapy: 42DOB9083 to (Last Rx:14Mar2016)  Requested for: 68FCL7244 Ordered   6  Furosemide 20 MG Oral Tablet; TAKE 1 TABLET DAILY AS DIRECTED;    Therapy: 73ACT9280 to DISPLAY PLAN FREE TEXT (Evaluate:43Psq0906)  Requested for: 07UCF2096; Last   Rx:74Fnt4236 Ordered   7  HydroCHLOROthiazide 25 MG Oral Tablet; take 1 tablet by mouth once daily; Therapy: 75VCI4657 to (Evaluate:39Kkj4737)  Requested for: 29OGT7324; Last   Rx:74Shg2228 Ordered   8  Losartan Potassium 50 MG Oral Tablet; TAKE 1 TABLET DAILY; Therapy: 38PTF4370 to (Evaluate:38Aam5641)  Requested for: 20RMU5631; Last   Rx:11Oqv8908 Ordered   9  Polyethylene Glycol 3350 Oral Powder; MIX 1 CAPFUL (17GM) IN 8 OUNCES OF WATER,   JUICE, OR TEA AND DRINK DAILY; Therapy: 94Vnx0604 to (Evaluate:17Mar2018)  Requested for: 48Rny8769; Last   Rx:27Lyt0108 Ordered   10  Potassium Chloride 10 MEQ TBCR; Therapy: (Recorded:14Mar2016) to Recorded   11  Pyridium 100 MG Oral Tablet; TAKE 1 TABLET 3 TIMES DAILY AFTER MEALS AS    NEEDED; Therapy: 31LLT4793 to (Last [place])  Requested for: 93CBT3738 Ordered   12  Vitamin D3 1000 UNIT Oral Tablet Chewable; Therapy: (Recorded:14Mar2016) to Recorded    Allergies    1  Amoxicillin TABS   2  Statins    Vitals  Signs    Heart Rate: 59  Respiration: 15  Systolic: 537  Diastolic: 64  Height: 4 ft 11 in  Weight: 174 lb 8 oz  BMI Calculated: 35 24  BSA Calculated: 1 74  O2 Saturation: 96    Physical Exam    Constitutional   General appearance: No acute distress, well appearing and well nourished  Ears, Nose, Mouth, and Throat   Otoscopic examination: Tympanic membranes translucent with normal light reflex  Canals patent without erythema  Hearing: Normal     Lips, teeth, and gums: Normal, good dentition  Neck   Neck: Supple, symmetric, trachea midline, no masses  Thyroid: Normal, no thyromegaly  Pulmonary   Respiratory effort: No increased work of breathing or signs of respiratory distress  Auscultation of lungs: Clear to auscultation  Cardiovascular   Auscultation of heart: Normal rate and rhythm, normal S1 and S2, no murmurs  Carotid pulses: 2+ bilaterally      Abdominal aorta: DISPLAY PLAN FREE TEXT Normal     Musculoskeletal   Gait and station: Abnormal   tender parietal area right  Neurologic   Cranial nerves: Cranial nerves II-XII intact  Cortical function: Normal mental status  Reflexes: 2+ and symmetric  Sensation: No sensory loss  Coordination: Normal finger to nose and heel to shin  Psychiatric   Judgment and insight: Normal     Orientation to person, place, and time: Normal     Recent and remote memory: Intact  Mood and affect: Normal        Results/Data  EKG/ECG- POC 70Jvt2512 10:03AM Ramón Deem     Test Name Result Flag Reference   EKG/ECG [id number]         Assessment    1  Closed head injury, initial encounter (959 01) (S09 90XA)   2  Low serum vitamin B12 (790 6) (R79 89)   3  Diastolic dysfunction (566 3) (I51 9)    Plan  Acute lower UTI    · Ciprofloxacin HCl - 250 MG Oral Tablet   · Pyridium 100 MG Oral Tablet  Benign essential hypertension, Diastolic dysfunction    · (1) TSH WITH FT4 REFLEX; Status:Active; Requested for:72Gkw1840;   Closed head injury, initial encounter    · * CT HEAD WO CONTRAST; Status:Need Information - Financial Authorization; Requested NXS:[id number];   Diastolic dysfunction    · (1) COMPREHENSIVE METABOLIC PANEL; Status:Active; Requested for:59Vdy0303;    · EKG/ECG- POC; Status:Complete - Retrospective By Protocol Authorization;   Done:  81RVO1104 10:03AM  Low serum vitamin B12    · (1) CBC/PLT/DIFF; Status:Active; Requested for:94Pku5213;    · (1) VITAMIN B12; Status:Active; Requested for:97Uwg8724;     Discussion/Summary  Surgical Clearance: She is at a LOW risk from a cardiovascular standpoint at this time without any additional cardiac testing  Reevaluation needed, if she should present with symptoms prior to surgery/procedure  Possible side effects of new medications were reviewed with the patient/guardian today  The treatment plan was reviewed with the patient/guardian   The patient/guardian understands and agrees with the treatment plan DISPLAY PLAN FREE TEXT DISPLAY PLAN FREE TEXT DISPLAY PLAN FREE TEXT DISPLAY PLAN FREE TEXT

## 2021-07-22 ENCOUNTER — PATIENT OUTREACH (OUTPATIENT)
Dept: FAMILY MEDICINE CLINIC | Facility: CLINIC | Age: 81
End: 2021-07-22

## 2021-07-22 NOTE — PROGRESS NOTES
OP SW received referral from Dr Irene Crooks requesting to assist patient in completing Patient Assistance Application for Eliquis  OP SW called patient  Patient reports she lives in a high rise apartment with her son  Patient's son provides assistance  Patient also has the waiver program through [facility] 3x a week (M- 3hrs; W-1hr;Fri-1hr)  Patient uses a walker  Patient has [employer]  Patient's son assists with errands  Patient's monthly income is $1999 from pension and $90 from [facility]  Patient states her Eliquis is $40 and she wants to complete the Patient Assistance Application to cover costs  OP SW started Eliquis PAP and faxed to the [facility] for Dr Irene Crooks to complete the medical portion  OP SW to then mail the application to patient for her to sign and send pension/ssi statements  OP SW to follow

## 2021-07-30 ENCOUNTER — PATIENT OUTREACH (OUTPATIENT)
Dept: FAMILY MEDICINE CLINIC | Facility: CLINIC | Age: 81
End: 2021-07-30

## 2021-08-03 ENCOUNTER — PATIENT OUTREACH (OUTPATIENT)
Dept: FAMILY MEDICINE CLINIC | Facility: CLINIC | Age: 81
End: 2021-08-03

## 2021-08-03 ENCOUNTER — TELEPHONE (OUTPATIENT)
Dept: FAMILY MEDICINE CLINIC | Facility: CLINIC | Age: 81
End: 2021-08-03

## 2021-08-03 NOTE — TELEPHONE ENCOUNTER
Pt feels since shes taking 2 gabapentin instead of 1 her mental status isnt as good, she cant get words out, and also feels her hands are shaking more & shes dropping things, she took 2 today, but wants to know if she can drop back to the 1 daily

## 2021-08-03 NOTE — PROGRESS NOTES
OP SW mailed out Eliquis PAP and instructions for patient to complete her portion  OP SW highlighted for patient what needs to be completed  OP SW to follow

## 2021-08-16 ENCOUNTER — PATIENT OUTREACH (OUTPATIENT)
Dept: FAMILY MEDICINE CLINIC | Facility: CLINIC | Age: 81
End: 2021-08-16

## 2021-08-16 NOTE — PROGRESS NOTES
OP SW called patient to follow up on Eliquis PAP  Patient is collecting her pension and SSI statements  Patient is also calling the pharmacy to gather her out of pocket medication costs  OP SW to follow

## 2021-08-30 ENCOUNTER — PATIENT OUTREACH (OUTPATIENT)
Dept: FAMILY MEDICINE CLINIC | Facility: CLINIC | Age: 81
End: 2021-08-30

## 2021-08-30 NOTE — PROGRESS NOTES
OP SW called patient to follow up on Eliquis PAP       Patient continues to collect her pension and SSI statements  Patient is also calling the pharmacy to gather her out of pocket medication costs  Patient states she will contact OP SW for further assistance      OP RAHAT to follow

## 2021-09-08 ENCOUNTER — PATIENT OUTREACH (OUTPATIENT)
Dept: FAMILY MEDICINE CLINIC | Facility: CLINIC | Age: 81
End: 2021-09-08

## 2021-09-08 DIAGNOSIS — F03.90 DEMENTIA WITHOUT BEHAVIORAL DISTURBANCE, UNSPECIFIED DEMENTIA TYPE (HCC): ICD-10-CM

## 2021-09-08 RX ORDER — MEMANTINE HYDROCHLORIDE 5 MG/1
TABLET ORAL
Qty: 60 TABLET | Refills: 3 | Status: SHIPPED | OUTPATIENT
Start: 2021-09-08 | End: 2021-09-29

## 2021-09-08 NOTE — PROGRESS NOTES
OP SW received voicemail that patient gathered her pension and out of pocket pharmacy expenses  Patient asked what are the next steps  OP SW called back patient and patient requested to call her back  OP SW to follow

## 2021-09-08 NOTE — PROGRESS NOTES
OP RAHAT spoke with patient  Patient reports she has completed her portion of the application and will need to make copies of the pension and out of pocket medication cost      Patient to stop in office on 9/28 for copies to be made and documents to be mailed out  KAYLAH GIANG to follow

## 2021-09-13 ENCOUNTER — TELEPHONE (OUTPATIENT)
Dept: FAMILY MEDICINE CLINIC | Facility: CLINIC | Age: 81
End: 2021-09-13

## 2021-09-13 DIAGNOSIS — I50.32 CHRONIC DIASTOLIC CHF (CONGESTIVE HEART FAILURE) (HCC): ICD-10-CM

## 2021-09-13 DIAGNOSIS — I48.91 ATRIAL FIBRILLATION, UNSPECIFIED TYPE (HCC): ICD-10-CM

## 2021-09-13 DIAGNOSIS — G20 PARKINSON'S DISEASE (HCC): ICD-10-CM

## 2021-09-13 DIAGNOSIS — N18.31 STAGE 3A CHRONIC KIDNEY DISEASE (HCC): ICD-10-CM

## 2021-09-13 DIAGNOSIS — E66.01 OBESITY, MORBID (HCC): ICD-10-CM

## 2021-09-13 DIAGNOSIS — M46.87 OTHER SPECIFIED INFLAMMATORY SPONDYLOPATHIES, LUMBOSACRAL REGION (HCC): ICD-10-CM

## 2021-09-13 DIAGNOSIS — R00.1 JUNCTIONAL BRADYCARDIA: ICD-10-CM

## 2021-09-13 RX ORDER — GABAPENTIN 100 MG/1
100 CAPSULE ORAL DAILY
Qty: 30 CAPSULE | Refills: 3 | Status: SHIPPED | OUTPATIENT
Start: 2021-09-13 | End: 2022-04-12

## 2021-09-13 NOTE — TELEPHONE ENCOUNTER
Pt is on only 1 gabapentin daily, but she is still getting a script from pharmacy for 3 & they are charging her more ,she wants a script sent thru for just the 1 daily (said it was changed because she couldn't tolerate it)  She is ALSO concerned because she was given a script for elliquis & plavix, she doesn't want to take the plavix, shes only taking the elliquis @ this time, but wants to  Know why she has a script for both

## 2021-09-27 ENCOUNTER — PATIENT OUTREACH (OUTPATIENT)
Dept: FAMILY MEDICINE CLINIC | Facility: CLINIC | Age: 81
End: 2021-09-27

## 2021-09-27 NOTE — PROGRESS NOTES
OP SW called patient to confirm she will drop off her Eliquis PAP application tomorrow  Phone was busy and unable to leave message  OP RAHAT to follow

## 2021-09-28 ENCOUNTER — PATIENT OUTREACH (OUTPATIENT)
Dept: FAMILY MEDICINE CLINIC | Facility: CLINIC | Age: 81
End: 2021-09-28

## 2021-09-28 DIAGNOSIS — I48.91 ATRIAL FIBRILLATION, UNSPECIFIED TYPE (HCC): Primary | ICD-10-CM

## 2021-09-28 RX ORDER — APIXABAN 5 MG/1
5 TABLET, FILM COATED ORAL 2 TIMES DAILY
Qty: 180 TABLET | Refills: 3 | Status: SHIPPED | OUTPATIENT
Start: 2021-09-28 | End: 2021-12-23 | Stop reason: DRUGHIGH

## 2021-10-12 ENCOUNTER — PATIENT OUTREACH (OUTPATIENT)
Dept: FAMILY MEDICINE CLINIC | Facility: CLINIC | Age: 81
End: 2021-10-12

## 2021-10-25 ENCOUNTER — OFFICE VISIT (OUTPATIENT)
Dept: FAMILY MEDICINE CLINIC | Facility: CLINIC | Age: 81
End: 2021-10-25
Payer: COMMERCIAL

## 2021-10-25 VITALS
OXYGEN SATURATION: 94 % | TEMPERATURE: 98.4 F | BODY MASS INDEX: 33.67 KG/M2 | HEIGHT: 59 IN | DIASTOLIC BLOOD PRESSURE: 58 MMHG | HEART RATE: 68 BPM | WEIGHT: 167 LBS | SYSTOLIC BLOOD PRESSURE: 110 MMHG

## 2021-10-25 DIAGNOSIS — I48.91 ATRIAL FIBRILLATION, UNSPECIFIED TYPE (HCC): ICD-10-CM

## 2021-10-25 DIAGNOSIS — I10 ESSENTIAL HYPERTENSION: ICD-10-CM

## 2021-10-25 DIAGNOSIS — Z23 NEEDS FLU SHOT: Primary | ICD-10-CM

## 2021-10-25 DIAGNOSIS — I50.32 CHRONIC DIASTOLIC CHF (CONGESTIVE HEART FAILURE) (HCC): ICD-10-CM

## 2021-10-25 DIAGNOSIS — I21.4 NSTEMI (NON-ST ELEVATED MYOCARDIAL INFARCTION) (HCC): ICD-10-CM

## 2021-10-25 DIAGNOSIS — N18.31 STAGE 3A CHRONIC KIDNEY DISEASE (HCC): ICD-10-CM

## 2021-10-25 PROBLEM — R73.03 PREDIABETES: Status: ACTIVE | Noted: 2021-07-09

## 2021-10-25 PROBLEM — R15.9 FECAL INCONTINENCE: Status: ACTIVE | Noted: 2021-03-23

## 2021-10-25 PROBLEM — R91.8 GROUND GLASS OPACITY PRESENT ON IMAGING OF LUNG: Status: ACTIVE | Noted: 2021-07-09

## 2021-10-25 PROBLEM — R94.39 ABNORMAL STRESS TEST: Status: ACTIVE | Noted: 2019-08-20

## 2021-10-25 PROCEDURE — 1036F TOBACCO NON-USER: CPT | Performed by: FAMILY MEDICINE

## 2021-10-25 PROCEDURE — 99214 OFFICE O/P EST MOD 30 MIN: CPT | Performed by: FAMILY MEDICINE

## 2021-10-25 PROCEDURE — 1160F RVW MEDS BY RX/DR IN RCRD: CPT | Performed by: FAMILY MEDICINE

## 2021-10-25 PROCEDURE — G0008 ADMIN INFLUENZA VIRUS VAC: HCPCS | Performed by: FAMILY MEDICINE

## 2021-10-25 PROCEDURE — 90662 IIV NO PRSV INCREASED AG IM: CPT | Performed by: FAMILY MEDICINE

## 2021-10-25 RX ORDER — CLOTRIMAZOLE AND BETAMETHASONE DIPROPIONATE 10; .64 MG/G; MG/G
CREAM TOPICAL
COMMUNITY
Start: 2021-09-27 | End: 2022-04-12 | Stop reason: ALTCHOICE

## 2021-10-25 RX ORDER — TRAMADOL HYDROCHLORIDE 50 MG/1
50 TABLET ORAL EVERY 6 HOURS PRN
Qty: 90 TABLET | Refills: 0 | Status: SHIPPED | OUTPATIENT
Start: 2021-10-25 | End: 2021-12-23 | Stop reason: ALTCHOICE

## 2021-10-26 ENCOUNTER — TELEPHONE (OUTPATIENT)
Dept: FAMILY MEDICINE CLINIC | Facility: CLINIC | Age: 81
End: 2021-10-26

## 2021-10-27 ENCOUNTER — APPOINTMENT (OUTPATIENT)
Dept: LAB | Facility: CLINIC | Age: 81
End: 2021-10-27
Payer: COMMERCIAL

## 2021-10-27 ENCOUNTER — APPOINTMENT (OUTPATIENT)
Dept: RADIOLOGY | Facility: CLINIC | Age: 81
End: 2021-10-27
Payer: COMMERCIAL

## 2021-10-27 DIAGNOSIS — N18.31 STAGE 3A CHRONIC KIDNEY DISEASE (HCC): ICD-10-CM

## 2021-10-27 DIAGNOSIS — G20 PARKINSON'S DISEASE (HCC): ICD-10-CM

## 2021-10-27 DIAGNOSIS — I48.91 ATRIAL FIBRILLATION, UNSPECIFIED TYPE (HCC): ICD-10-CM

## 2021-10-27 DIAGNOSIS — I21.4 NSTEMI (NON-ST ELEVATED MYOCARDIAL INFARCTION) (HCC): ICD-10-CM

## 2021-10-27 DIAGNOSIS — R10.11 RUQ PAIN: ICD-10-CM

## 2021-10-27 DIAGNOSIS — I10 ESSENTIAL HYPERTENSION: ICD-10-CM

## 2021-10-27 DIAGNOSIS — E66.01 OBESITY, MORBID (HCC): ICD-10-CM

## 2021-10-27 DIAGNOSIS — M46.87 OTHER SPECIFIED INFLAMMATORY SPONDYLOPATHIES, LUMBOSACRAL REGION (HCC): ICD-10-CM

## 2021-10-27 DIAGNOSIS — A41.9 SEPSIS, DUE TO UNSPECIFIED ORGANISM, UNSPECIFIED WHETHER ACUTE ORGAN DYSFUNCTION PRESENT (HCC): ICD-10-CM

## 2021-10-27 DIAGNOSIS — N17.9 AKI (ACUTE KIDNEY INJURY) (HCC): ICD-10-CM

## 2021-10-27 DIAGNOSIS — Z23 NEEDS FLU SHOT: ICD-10-CM

## 2021-10-27 DIAGNOSIS — I50.32 CHRONIC DIASTOLIC CHF (CONGESTIVE HEART FAILURE) (HCC): ICD-10-CM

## 2021-10-27 PROCEDURE — 36415 COLL VENOUS BLD VENIPUNCTURE: CPT

## 2021-10-27 PROCEDURE — 82550 ASSAY OF CK (CPK): CPT

## 2021-10-27 PROCEDURE — 83880 ASSAY OF NATRIURETIC PEPTIDE: CPT

## 2021-10-27 PROCEDURE — 86140 C-REACTIVE PROTEIN: CPT

## 2021-10-27 PROCEDURE — 85025 COMPLETE CBC W/AUTO DIFF WBC: CPT

## 2021-10-27 PROCEDURE — 80061 LIPID PANEL: CPT

## 2021-10-27 PROCEDURE — 71046 X-RAY EXAM CHEST 2 VIEWS: CPT

## 2021-10-27 PROCEDURE — 84443 ASSAY THYROID STIM HORMONE: CPT

## 2021-10-27 PROCEDURE — 80053 COMPREHEN METABOLIC PANEL: CPT

## 2021-10-27 PROCEDURE — 85652 RBC SED RATE AUTOMATED: CPT

## 2021-10-28 LAB
ALBUMIN SERPL BCP-MCNC: 3.2 G/DL (ref 3.5–5)
ALP SERPL-CCNC: 96 U/L (ref 46–116)
ALT SERPL W P-5'-P-CCNC: 19 U/L (ref 12–78)
ANION GAP SERPL CALCULATED.3IONS-SCNC: 5 MMOL/L (ref 4–13)
AST SERPL W P-5'-P-CCNC: 14 U/L (ref 5–45)
BASOPHILS # BLD AUTO: 0.04 THOUSANDS/ΜL (ref 0–0.1)
BASOPHILS NFR BLD AUTO: 1 % (ref 0–1)
BILIRUB SERPL-MCNC: 0.87 MG/DL (ref 0.2–1)
BUN SERPL-MCNC: 21 MG/DL (ref 5–25)
CALCIUM ALBUM COR SERPL-MCNC: 9.9 MG/DL (ref 8.3–10.1)
CALCIUM SERPL-MCNC: 9.3 MG/DL (ref 8.3–10.1)
CHLORIDE SERPL-SCNC: 103 MMOL/L (ref 100–108)
CHOLEST SERPL-MCNC: 197 MG/DL (ref 50–200)
CK SERPL-CCNC: 52 U/L (ref 26–192)
CO2 SERPL-SCNC: 27 MMOL/L (ref 21–32)
CREAT SERPL-MCNC: 1.1 MG/DL (ref 0.6–1.3)
CRP SERPL QL: 41.3 MG/L
EOSINOPHIL # BLD AUTO: 0.12 THOUSAND/ΜL (ref 0–0.61)
EOSINOPHIL NFR BLD AUTO: 2 % (ref 0–6)
ERYTHROCYTE [DISTWIDTH] IN BLOOD BY AUTOMATED COUNT: 15.9 % (ref 11.6–15.1)
ERYTHROCYTE [SEDIMENTATION RATE] IN BLOOD: 74 MM/HOUR (ref 0–29)
GFR SERPL CREATININE-BSD FRML MDRD: 47 ML/MIN/1.73SQ M
GLUCOSE P FAST SERPL-MCNC: 89 MG/DL (ref 65–99)
HCT VFR BLD AUTO: 38.2 % (ref 34.8–46.1)
HDLC SERPL-MCNC: 48 MG/DL
HGB BLD-MCNC: 11.7 G/DL (ref 11.5–15.4)
IMM GRANULOCYTES # BLD AUTO: 0.03 THOUSAND/UL (ref 0–0.2)
IMM GRANULOCYTES NFR BLD AUTO: 0 % (ref 0–2)
LDLC SERPL CALC-MCNC: 108 MG/DL (ref 0–100)
LYMPHOCYTES # BLD AUTO: 1.94 THOUSANDS/ΜL (ref 0.6–4.47)
LYMPHOCYTES NFR BLD AUTO: 25 % (ref 14–44)
MCH RBC QN AUTO: 26.1 PG (ref 26.8–34.3)
MCHC RBC AUTO-ENTMCNC: 30.6 G/DL (ref 31.4–37.4)
MCV RBC AUTO: 85 FL (ref 82–98)
MONOCYTES # BLD AUTO: 0.62 THOUSAND/ΜL (ref 0.17–1.22)
MONOCYTES NFR BLD AUTO: 8 % (ref 4–12)
NEUTROPHILS # BLD AUTO: 5.15 THOUSANDS/ΜL (ref 1.85–7.62)
NEUTS SEG NFR BLD AUTO: 64 % (ref 43–75)
NONHDLC SERPL-MCNC: 149 MG/DL
NRBC BLD AUTO-RTO: 0 /100 WBCS
NT-PROBNP SERPL-MCNC: 507 PG/ML
PLATELET # BLD AUTO: 324 THOUSANDS/UL (ref 149–390)
PMV BLD AUTO: 12.9 FL (ref 8.9–12.7)
POTASSIUM SERPL-SCNC: 3.7 MMOL/L (ref 3.5–5.3)
PROT SERPL-MCNC: 7.3 G/DL (ref 6.4–8.2)
RBC # BLD AUTO: 4.49 MILLION/UL (ref 3.81–5.12)
SODIUM SERPL-SCNC: 135 MMOL/L (ref 136–145)
TRIGL SERPL-MCNC: 205 MG/DL
TSH SERPL DL<=0.05 MIU/L-ACNC: 1.1 UIU/ML (ref 0.36–3.74)
WBC # BLD AUTO: 7.9 THOUSAND/UL (ref 4.31–10.16)

## 2021-12-03 ENCOUNTER — TRANSITIONAL CARE MANAGEMENT (OUTPATIENT)
Dept: FAMILY MEDICINE CLINIC | Facility: CLINIC | Age: 81
End: 2021-12-03

## 2021-12-17 DIAGNOSIS — I50.32 CHRONIC DIASTOLIC CHF (CONGESTIVE HEART FAILURE) (HCC): ICD-10-CM

## 2021-12-17 DIAGNOSIS — N18.31 STAGE 3A CHRONIC KIDNEY DISEASE (HCC): Primary | ICD-10-CM

## 2021-12-20 ENCOUNTER — APPOINTMENT (OUTPATIENT)
Dept: LAB | Facility: CLINIC | Age: 81
End: 2021-12-20
Payer: COMMERCIAL

## 2021-12-20 ENCOUNTER — OFFICE VISIT (OUTPATIENT)
Dept: URGENT CARE | Facility: CLINIC | Age: 81
End: 2021-12-20
Payer: COMMERCIAL

## 2021-12-20 VITALS
SYSTOLIC BLOOD PRESSURE: 122 MMHG | BODY MASS INDEX: 35.41 KG/M2 | RESPIRATION RATE: 20 BRPM | DIASTOLIC BLOOD PRESSURE: 78 MMHG | HEART RATE: 72 BPM | WEIGHT: 153 LBS | HEIGHT: 55 IN | TEMPERATURE: 99 F | OXYGEN SATURATION: 94 %

## 2021-12-20 DIAGNOSIS — H60.501 ACUTE OTITIS EXTERNA OF RIGHT EAR, UNSPECIFIED TYPE: Primary | ICD-10-CM

## 2021-12-20 DIAGNOSIS — N18.31 STAGE 3A CHRONIC KIDNEY DISEASE (HCC): ICD-10-CM

## 2021-12-20 DIAGNOSIS — I50.32 CHRONIC DIASTOLIC CHF (CONGESTIVE HEART FAILURE) (HCC): ICD-10-CM

## 2021-12-20 LAB
ANION GAP SERPL CALCULATED.3IONS-SCNC: 5 MMOL/L (ref 4–13)
BASOPHILS # BLD AUTO: 0.05 THOUSANDS/ΜL (ref 0–0.1)
BASOPHILS NFR BLD AUTO: 1 % (ref 0–1)
BUN SERPL-MCNC: 17 MG/DL (ref 5–25)
CALCIUM SERPL-MCNC: 9.9 MG/DL (ref 8.3–10.1)
CHLORIDE SERPL-SCNC: 109 MMOL/L (ref 100–108)
CO2 SERPL-SCNC: 25 MMOL/L (ref 21–32)
CREAT SERPL-MCNC: 1.07 MG/DL (ref 0.6–1.3)
EOSINOPHIL # BLD AUTO: 0.19 THOUSAND/ΜL (ref 0–0.61)
EOSINOPHIL NFR BLD AUTO: 2 % (ref 0–6)
ERYTHROCYTE [DISTWIDTH] IN BLOOD BY AUTOMATED COUNT: 18.4 % (ref 11.6–15.1)
GFR SERPL CREATININE-BSD FRML MDRD: 48 ML/MIN/1.73SQ M
GLUCOSE P FAST SERPL-MCNC: 101 MG/DL (ref 65–99)
HCT VFR BLD AUTO: 35.2 % (ref 34.8–46.1)
HGB BLD-MCNC: 10.5 G/DL (ref 11.5–15.4)
IMM GRANULOCYTES # BLD AUTO: 0.06 THOUSAND/UL (ref 0–0.2)
IMM GRANULOCYTES NFR BLD AUTO: 1 % (ref 0–2)
LYMPHOCYTES # BLD AUTO: 1.62 THOUSANDS/ΜL (ref 0.6–4.47)
LYMPHOCYTES NFR BLD AUTO: 21 % (ref 14–44)
MCH RBC QN AUTO: 25.8 PG (ref 26.8–34.3)
MCHC RBC AUTO-ENTMCNC: 29.8 G/DL (ref 31.4–37.4)
MCV RBC AUTO: 87 FL (ref 82–98)
MONOCYTES # BLD AUTO: 0.68 THOUSAND/ΜL (ref 0.17–1.22)
MONOCYTES NFR BLD AUTO: 9 % (ref 4–12)
NEUTROPHILS # BLD AUTO: 5.23 THOUSANDS/ΜL (ref 1.85–7.62)
NEUTS SEG NFR BLD AUTO: 66 % (ref 43–75)
NRBC BLD AUTO-RTO: 0 /100 WBCS
PLATELET # BLD AUTO: 351 THOUSANDS/UL (ref 149–390)
PMV BLD AUTO: 11.9 FL (ref 8.9–12.7)
POTASSIUM SERPL-SCNC: 4.1 MMOL/L (ref 3.5–5.3)
RBC # BLD AUTO: 4.07 MILLION/UL (ref 3.81–5.12)
SODIUM SERPL-SCNC: 139 MMOL/L (ref 136–145)
WBC # BLD AUTO: 7.83 THOUSAND/UL (ref 4.31–10.16)

## 2021-12-20 PROCEDURE — 99202 OFFICE O/P NEW SF 15 MIN: CPT | Performed by: PHYSICIAN ASSISTANT

## 2021-12-20 PROCEDURE — 85025 COMPLETE CBC W/AUTO DIFF WBC: CPT

## 2021-12-20 PROCEDURE — 80048 BASIC METABOLIC PNL TOTAL CA: CPT

## 2021-12-20 PROCEDURE — 36415 COLL VENOUS BLD VENIPUNCTURE: CPT

## 2021-12-20 PROCEDURE — S9083 URGENT CARE CENTER GLOBAL: HCPCS | Performed by: PHYSICIAN ASSISTANT

## 2021-12-20 RX ORDER — SULFACETAMIDE SODIUM 100 MG/ML
SOLUTION/ DROPS OPHTHALMIC
Qty: 15 ML | Refills: 0 | Status: SHIPPED | OUTPATIENT
Start: 2021-12-20 | End: 2021-12-23 | Stop reason: ALTCHOICE

## 2021-12-21 RX ORDER — CEFPODOXIME PROXETIL 200 MG/1
TABLET, FILM COATED ORAL
COMMUNITY
Start: 2021-11-12 | End: 2021-12-23 | Stop reason: ALTCHOICE

## 2021-12-21 RX ORDER — POTASSIUM CHLORIDE 750 MG/1
10 CAPSULE, EXTENDED RELEASE ORAL DAILY
COMMUNITY
Start: 2021-12-10 | End: 2022-06-20

## 2021-12-21 RX ORDER — DOXYCYCLINE 100 MG/1
TABLET ORAL
COMMUNITY
Start: 2021-11-12 | End: 2021-12-23 | Stop reason: ALTCHOICE

## 2021-12-21 RX ORDER — LANSOPRAZOLE 30 MG/1
30 CAPSULE, DELAYED RELEASE ORAL DAILY
COMMUNITY
Start: 2021-12-10 | End: 2022-02-02 | Stop reason: SDUPTHER

## 2021-12-23 ENCOUNTER — OFFICE VISIT (OUTPATIENT)
Dept: FAMILY MEDICINE CLINIC | Facility: CLINIC | Age: 81
End: 2021-12-23
Payer: COMMERCIAL

## 2021-12-23 VITALS
HEART RATE: 60 BPM | DIASTOLIC BLOOD PRESSURE: 80 MMHG | TEMPERATURE: 98.9 F | WEIGHT: 154 LBS | OXYGEN SATURATION: 99 % | BODY MASS INDEX: 35.64 KG/M2 | RESPIRATION RATE: 18 BRPM | HEIGHT: 55 IN | SYSTOLIC BLOOD PRESSURE: 122 MMHG

## 2021-12-23 DIAGNOSIS — F41.9 ANXIETY: Primary | ICD-10-CM

## 2021-12-23 DIAGNOSIS — I25.10 CORONARY ARTERY DISEASE INVOLVING NATIVE HEART, UNSPECIFIED VESSEL OR LESION TYPE, UNSPECIFIED WHETHER ANGINA PRESENT: ICD-10-CM

## 2021-12-23 DIAGNOSIS — Z79.899 POLYPHARMACY: ICD-10-CM

## 2021-12-23 DIAGNOSIS — R00.1 JUNCTIONAL BRADYCARDIA: ICD-10-CM

## 2021-12-23 DIAGNOSIS — N18.31 STAGE 3A CHRONIC KIDNEY DISEASE (HCC): ICD-10-CM

## 2021-12-23 DIAGNOSIS — I48.0 PAROXYSMAL A-FIB (HCC): ICD-10-CM

## 2021-12-23 DIAGNOSIS — K64.4 EXTERNAL HEMORRHOIDS: ICD-10-CM

## 2021-12-23 DIAGNOSIS — F03.90 DEMENTIA WITHOUT BEHAVIORAL DISTURBANCE, UNSPECIFIED DEMENTIA TYPE (HCC): ICD-10-CM

## 2021-12-23 PROCEDURE — 99215 OFFICE O/P EST HI 40 MIN: CPT | Performed by: FAMILY MEDICINE

## 2021-12-23 PROCEDURE — 1160F RVW MEDS BY RX/DR IN RCRD: CPT | Performed by: FAMILY MEDICINE

## 2021-12-23 PROCEDURE — 3725F SCREEN DEPRESSION PERFORMED: CPT | Performed by: FAMILY MEDICINE

## 2021-12-23 PROCEDURE — 1036F TOBACCO NON-USER: CPT | Performed by: FAMILY MEDICINE

## 2021-12-23 RX ORDER — SERTRALINE HYDROCHLORIDE 25 MG/1
25 TABLET, FILM COATED ORAL DAILY
Qty: 30 TABLET | Refills: 1 | Status: SHIPPED | OUTPATIENT
Start: 2021-12-23 | End: 2022-04-12 | Stop reason: ALTCHOICE

## 2021-12-23 RX ORDER — MEMANTINE HYDROCHLORIDE 5 MG/1
5 TABLET ORAL 2 TIMES DAILY
Qty: 60 TABLET | Refills: 3
Start: 2021-12-23 | End: 2022-02-21

## 2021-12-23 RX ORDER — FUROSEMIDE 20 MG/1
20 TABLET ORAL 2 TIMES DAILY
Qty: 60 TABLET | Refills: 0 | Status: ON HOLD
Start: 2021-12-23 | End: 2022-06-20 | Stop reason: SDUPTHER

## 2021-12-23 RX ORDER — MULTIVITAMIN WITH FOLIC ACID 400 MCG
1 TABLET ORAL DAILY
COMMUNITY
Start: 2021-12-10 | End: 2022-04-12 | Stop reason: ALTCHOICE

## 2021-12-23 RX ORDER — HYDROCORTISONE 25 MG/G
CREAM TOPICAL
Qty: 28 G | Refills: 1 | Status: SHIPPED | OUTPATIENT
Start: 2021-12-23 | End: 2022-06-20

## 2021-12-23 RX ORDER — FUROSEMIDE 20 MG/1
20 TABLET ORAL DAILY
COMMUNITY
Start: 2021-12-10 | End: 2021-12-23 | Stop reason: SDUPTHER

## 2021-12-23 RX ORDER — APIXABAN 2.5 MG/1
2.5 TABLET, FILM COATED ORAL 2 TIMES DAILY
COMMUNITY
Start: 2021-12-10 | End: 2022-03-28 | Stop reason: SDUPTHER

## 2021-12-28 RX ORDER — OMEGA-3-ACID ETHYL ESTERS 1 G/1
1 CAPSULE, LIQUID FILLED ORAL DAILY
COMMUNITY
End: 2022-06-20

## 2021-12-28 RX ORDER — ASPIRIN 81 MG/1
81 TABLET ORAL DAILY
COMMUNITY
End: 2021-12-28 | Stop reason: ALTCHOICE

## 2022-02-02 DIAGNOSIS — I25.10 CORONARY ARTERY DISEASE INVOLVING NATIVE HEART, UNSPECIFIED VESSEL OR LESION TYPE, UNSPECIFIED WHETHER ANGINA PRESENT: ICD-10-CM

## 2022-02-02 DIAGNOSIS — I10 ESSENTIAL HYPERTENSION: ICD-10-CM

## 2022-02-02 DIAGNOSIS — G20 PARKINSON'S DISEASE (HCC): ICD-10-CM

## 2022-02-02 DIAGNOSIS — Z90.5 SINGLE KIDNEY: Chronic | ICD-10-CM

## 2022-02-02 DIAGNOSIS — I48.91 ATRIAL FIBRILLATION, UNSPECIFIED TYPE (HCC): ICD-10-CM

## 2022-02-02 DIAGNOSIS — R00.1 JUNCTIONAL BRADYCARDIA: ICD-10-CM

## 2022-02-02 DIAGNOSIS — I50.32 CHRONIC DIASTOLIC CHF (CONGESTIVE HEART FAILURE) (HCC): ICD-10-CM

## 2022-02-02 DIAGNOSIS — N18.31 STAGE 3A CHRONIC KIDNEY DISEASE (HCC): ICD-10-CM

## 2022-02-02 DIAGNOSIS — E66.01 OBESITY, MORBID (HCC): ICD-10-CM

## 2022-02-02 DIAGNOSIS — M46.87 OTHER SPECIFIED INFLAMMATORY SPONDYLOPATHIES, LUMBOSACRAL REGION (HCC): ICD-10-CM

## 2022-02-02 DIAGNOSIS — E55.9 VITAMIN D DEFICIENCY: ICD-10-CM

## 2022-02-02 RX ORDER — ASCORBIC ACID 500 MG
500 TABLET ORAL DAILY
Qty: 90 TABLET | Refills: 3 | Status: SHIPPED | OUTPATIENT
Start: 2022-02-02 | End: 2022-06-20

## 2022-02-02 RX ORDER — LANSOPRAZOLE 30 MG/1
30 CAPSULE, DELAYED RELEASE ORAL DAILY
Qty: 90 CAPSULE | Refills: 3 | Status: SHIPPED | OUTPATIENT
Start: 2022-02-02 | End: 2022-05-23 | Stop reason: SDUPTHER

## 2022-02-02 RX ORDER — ZINC SULFATE 50(220)MG
220 CAPSULE ORAL DAILY
Qty: 90 CAPSULE | Refills: 3 | Status: SHIPPED | OUTPATIENT
Start: 2022-02-02 | End: 2022-06-20

## 2022-02-02 RX ORDER — B-COMPLEX WITH VITAMIN C
1 TABLET ORAL 2 TIMES DAILY WITH MEALS
Qty: 60 TABLET | Refills: 5 | Status: SHIPPED | OUTPATIENT
Start: 2022-02-02 | End: 2022-04-12 | Stop reason: ALTCHOICE

## 2022-02-02 RX ORDER — MELATONIN
1000 DAILY
Qty: 90 TABLET | Refills: 1 | Status: SHIPPED | OUTPATIENT
Start: 2022-02-02

## 2022-02-21 ENCOUNTER — OFFICE VISIT (OUTPATIENT)
Dept: FAMILY MEDICINE CLINIC | Facility: CLINIC | Age: 82
End: 2022-02-21
Payer: COMMERCIAL

## 2022-02-21 VITALS
HEIGHT: 55 IN | WEIGHT: 158 LBS | HEART RATE: 70 BPM | BODY MASS INDEX: 36.57 KG/M2 | OXYGEN SATURATION: 98 % | SYSTOLIC BLOOD PRESSURE: 120 MMHG | DIASTOLIC BLOOD PRESSURE: 76 MMHG | TEMPERATURE: 98.4 F

## 2022-02-21 DIAGNOSIS — F03.90 DEMENTIA WITHOUT BEHAVIORAL DISTURBANCE, UNSPECIFIED DEMENTIA TYPE (HCC): ICD-10-CM

## 2022-02-21 DIAGNOSIS — E66.01 OBESITY, MORBID (HCC): ICD-10-CM

## 2022-02-21 DIAGNOSIS — M46.87 OTHER SPECIFIED INFLAMMATORY SPONDYLOPATHIES, LUMBOSACRAL REGION (HCC): ICD-10-CM

## 2022-02-21 DIAGNOSIS — I50.32 CHRONIC DIASTOLIC CHF (CONGESTIVE HEART FAILURE) (HCC): ICD-10-CM

## 2022-02-21 DIAGNOSIS — I48.0 PAROXYSMAL A-FIB (HCC): ICD-10-CM

## 2022-02-21 DIAGNOSIS — Z90.5 SINGLE KIDNEY: ICD-10-CM

## 2022-02-21 DIAGNOSIS — I10 ESSENTIAL HYPERTENSION: Primary | ICD-10-CM

## 2022-02-21 DIAGNOSIS — G20 PARKINSON'S DISEASE (HCC): ICD-10-CM

## 2022-02-21 DIAGNOSIS — I21.4 NSTEMI (NON-ST ELEVATED MYOCARDIAL INFARCTION) (HCC): ICD-10-CM

## 2022-02-21 DIAGNOSIS — Z23 NEED FOR SHINGLES VACCINE: ICD-10-CM

## 2022-02-21 DIAGNOSIS — E55.9 VITAMIN D DEFICIENCY: ICD-10-CM

## 2022-02-21 DIAGNOSIS — I48.91 ATRIAL FIBRILLATION, UNSPECIFIED TYPE (HCC): ICD-10-CM

## 2022-02-21 DIAGNOSIS — R29.898 WEAKNESS OF BOTH UPPER EXTREMITIES: ICD-10-CM

## 2022-02-21 DIAGNOSIS — I25.10 CORONARY ARTERY DISEASE INVOLVING NATIVE HEART, UNSPECIFIED VESSEL OR LESION TYPE, UNSPECIFIED WHETHER ANGINA PRESENT: ICD-10-CM

## 2022-02-21 DIAGNOSIS — N18.31 STAGE 3A CHRONIC KIDNEY DISEASE (HCC): ICD-10-CM

## 2022-02-21 PROCEDURE — 1160F RVW MEDS BY RX/DR IN RCRD: CPT | Performed by: FAMILY MEDICINE

## 2022-02-21 PROCEDURE — 99214 OFFICE O/P EST MOD 30 MIN: CPT | Performed by: FAMILY MEDICINE

## 2022-02-21 PROCEDURE — 3078F DIAST BP <80 MM HG: CPT | Performed by: FAMILY MEDICINE

## 2022-02-21 PROCEDURE — 1036F TOBACCO NON-USER: CPT | Performed by: FAMILY MEDICINE

## 2022-02-21 PROCEDURE — 3074F SYST BP LT 130 MM HG: CPT | Performed by: FAMILY MEDICINE

## 2022-02-21 RX ORDER — METOPROLOL SUCCINATE 25 MG/1
TABLET, EXTENDED RELEASE ORAL
COMMUNITY
Start: 2022-01-04 | End: 2022-04-12 | Stop reason: ALTCHOICE

## 2022-02-21 RX ORDER — ZOSTER VACCINE RECOMBINANT, ADJUVANTED 50 MCG/0.5
0.5 KIT INTRAMUSCULAR ONCE
Qty: 1 EACH | Refills: 1 | Status: SHIPPED | OUTPATIENT
Start: 2022-02-21 | End: 2022-02-21

## 2022-02-21 NOTE — PROGRESS NOTES
Assessment/Plan:    No problem-specific Assessment & Plan notes found for this encounter  Diagnoses and all orders for this visit:    Essential hypertension  -     CBC and differential; Future  -     Comprehensive metabolic panel; Future  -     TSH, 3rd generation; Future  -     Sedimentation rate, automated; Future  -     Ambulatory Referral to Podiatry; Future  -     NT-BNP PRO; Future    Chronic diastolic CHF (congestive heart failure) (HCC)  -     CBC and differential; Future  -     Comprehensive metabolic panel; Future  -     TSH, 3rd generation; Future  -     Sedimentation rate, automated; Future  -     Ambulatory Referral to Podiatry; Future  -     NT-BNP PRO; Future    Atrial fibrillation, unspecified type (HCC)  -     CBC and differential; Future  -     Comprehensive metabolic panel; Future  -     TSH, 3rd generation; Future  -     Sedimentation rate, automated; Future  -     Ambulatory Referral to Podiatry; Future  -     NT-BNP PRO; Future    Coronary artery disease involving native heart, unspecified vessel or lesion type, unspecified whether angina present  -     CBC and differential; Future  -     Comprehensive metabolic panel; Future  -     TSH, 3rd generation; Future  -     Sedimentation rate, automated; Future  -     Ambulatory Referral to Podiatry; Future  -     NT-BNP PRO; Future    Stage 3a chronic kidney disease (HCC)  -     CBC and differential; Future  -     Comprehensive metabolic panel; Future  -     TSH, 3rd generation; Future  -     Sedimentation rate, automated; Future  -     Ambulatory Referral to Podiatry; Future  -     NT-BNP PRO; Future    Single kidney  -     CBC and differential; Future  -     Comprehensive metabolic panel; Future  -     TSH, 3rd generation; Future  -     Sedimentation rate, automated; Future  -     Ambulatory Referral to Podiatry; Future  -     NT-BNP PRO;  Future    Obesity, morbid (HCC)  -     CBC and differential; Future  -     Comprehensive metabolic panel; Future  -     TSH, 3rd generation; Future  -     Sedimentation rate, automated; Future  -     Ambulatory Referral to Podiatry; Future  -     NT-BNP PRO; Future    Vitamin D deficiency  -     CBC and differential; Future  -     Comprehensive metabolic panel; Future  -     TSH, 3rd generation; Future  -     Sedimentation rate, automated; Future  -     Ambulatory Referral to Podiatry; Future  -     NT-BNP PRO; Future    Parkinson's disease ([place] [address] )  -     CBC and differential; Future  -     Comprehensive metabolic panel; Future  -     TSH, 3rd generation; Future  -     Sedimentation rate, automated; Future  -     Ambulatory Referral to Podiatry; Future  -     NT-BNP PRO; Future    Dementia without behavioral disturbance, unspecified dementia type (HCC)  -     CBC and differential; Future  -     Comprehensive metabolic panel; Future  -     TSH, 3rd generation; Future  -     Sedimentation rate, automated; Future  -     Ambulatory Referral to Podiatry; Future  -     NT-BNP PRO; Future    Paroxysmal A-fib (HCC)  -     CBC and differential; Future  -     Comprehensive metabolic panel; Future  -     TSH, 3rd generation; Future  -     Sedimentation rate, automated; Future  -     Ambulatory Referral to Podiatry; Future  -     NT-BNP PRO; Future    NSTEMI (non-ST elevated myocardial infarction) ([place] [address] )  -     CBC and differential; Future  -     Comprehensive metabolic panel; Future  -     TSH, 3rd generation; Future  -     Sedimentation rate, automated; Future  -     Ambulatory Referral to Podiatry; Future  -     NT-BNP PRO; Future    Other specified inflammatory spondylopathies, lumbosacral region ([place] [address] )  -     NT-BNP PRO; Future    Weakness of both upper extremities  -     Ambulatory Referral to Physical Therapy; Future  -     NT-BNP PRO; Future    Need for shingles vaccine  -     Zoster Vac Recomb Adjuvanted (Shingrix) 50 MCG/0 5ML SUSR;  Inject 0 5 mL into a muscle once for 1 dose Repeat dose in 2 to 6 months    Other orders  - metoprolol succinate (TOPROL-XL) 25 mg 24 hr tablet; Take 0 5 tablets (12 5 mg total) by mouth daily  1/2 tab daily          Subjective:      Patient ID: Yazmin Mithcell is a 78 y o  female  She has chest pain  It radiates to the middle of the back  She gets it several times a week  The pain is similar to when she had a heart attack  She can get it at rest   She gets SOB and has palpitations  She has throbbing headache in the temple  It lasts a few seconds then goes away  It happens once a day  She has weak  strength  "My hands shake and I drop things "    She has pain in both shoulders  She feels weak in the upper extremities  The following portions of the patient's history were reviewed and updated as appropriate:   She  has a past medical history of Hypercholesteremia, Hypertension, IBS (irritable bowel syndrome), Rheumatic fever, and Skin lesion    She   Patient Active Problem List    Diagnosis Date Noted    Ground glass opacity present on imaging of lung 07/09/2021    Prediabetes 07/09/2021    [facility]non[facility]ST [facility] 06/17/2021    Fecal incontinence 03/23/2021    Parkinson's disease ([facility] ) 12/01/2020    [facility] 12/01/2020    Other specified inflammatory spondylopathies, lumbosacral region ([facility] ) 12/01/2020    Constipation 12/01/2020    Renal cyst 10/23/2020    Back pain with right-sided radiculopathy 03/11/2020    CAD (coronary artery disease) 12/17/2019    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    Single kidney 09/03/2019    Vitamin D deficiency 09/03/2019    Anxiety 08/20/2019    Abnormal stress test 08/20/2019    Ambulatory dysfunction 07/26/2019    A-fib [facility] 07/23/2019    Stage 3a chronic kidney disease ([facility] ) 07/23/2019    Junctional bradycardia 07/17/2019    BMI 36 0-36 9,adult 04/30/2019    Chronic left shoulder pain 10/09/2018    H/O: rheumatic fever 07/12/2018    Sciatica 07/12/2018    Peripheral neuropathy 02/13/2018    B12 deficiency 02/13/2018    Osteoporosis 02/13/2018    Closed head injury 12/20/2017    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    Headache, worsening 08/14/2017    Chronic diastolic CHF (congestive heart failure) ([facility]) 05/15/2017    Low serum vitamin B12 02/13/2017    Peripheral edema 02/13/2017    Other chest pain 08/01/2016    Biceps tendinitis of right shoulder 45/92/0570    Diastolic dysfunction 12/53/9737    Lateral epicondylitis of right elbow 04/11/2016    Essential hypertension 03/14/2016    Dyspnea on exertion 03/14/2016    GERD without esophagitis 03/14/2016    Osteoarthritis 09/17/2012    Peripheral venous insufficiency 09/17/2012    Prolapse of vaginal walls 12/30/2003     She  has a past surgical history that includes Hysterectomy; Kidney surgery; Tonsillectomy; and Ankle fracture surgery  Her family history includes Asthma in her father; Cancer in her sister; Hypertension in her mother  She  reports that she has never smoked  She has never used smokeless tobacco  She reports that she does not drink alcohol and does not use drugs    Current Outpatient Medications   Medication Sig Dispense Refill    acetaminophen (TYLENOL) 500 mg tablet Take 500 mg by mouth every 6 (six) hours as needed        ascorbic acid (VITAMIN C) 500 MG tablet Take 1 tablet (500 mg total) by mouth daily 90 tablet 3    calcium carbonate-vitamin D (OSCAL-D) 500 mg-200 units per tablet Take 1 tablet by mouth 2 (two) times a day with meals 60 tablet 5    cholecalciferol (VITAMIN D3) 1,000 units tablet Take 1 tablet (1,000 Units total) by mouth daily 90 tablet 1    clopidogrel (PLAVIX) 75 mg tablet Take 75 mg by mouth daily      clotrimazole-betamethasone (LOTRISONE) 1-0 05 % cream APPLY TOPICALLY TO AFFECTED AREAS twice a day for 5 days then if needed      Eliquis 2 5 MG Take 2 5 mg by mouth 2 (two) times a day      ezetimibe (ZETIA) 10 mg tablet Take 10 mg by mouth daily      fluticasone (FLONASE) 50 mcg/act nasal spray 2 sprays into each nostril daily      furosemide (LASIX) 20 mg tablet Take 1 tablet (20 mg total) by mouth 2 (two) times a day (Patient taking differently: Take 20 mg by mouth daily  ) 60 tablet 0    gabapentin (NEURONTIN) 100 mg capsule Take 1 capsule (100 mg total) by mouth daily Take 1 cap nightly for 1 week, then increase to twice daily for 1 week, then increase to three times daily  30 capsule 3    hydrocortisone (ANUSOL-HC) 2 5 % rectal cream Apply topically daily at bedtime 28 g 1    isosorbide mononitrate (IMDUR) 30 mg 24 hr tablet Take 30 mg by mouth daily        lansoprazole (PREVACID) 30 mg capsule Take 1 capsule (30 mg total) by mouth daily 90 capsule 3    losartan (COZAAR) 25 mg tablet Take 25 mg by mouth daily      metoprolol succinate (TOPROL-XL) 25 mg 24 hr tablet Take 0 5 tablets (12 5 mg total) by mouth daily   1/2 tab daily      Multiple Vitamin (Daily-Flo Multivitamin) TABS Take 1 tablet by mouth daily      nitroglycerin (NITROSTAT) 0 4 mg SL tablet Place 0 4 mg under the tongue every 5 (five) minutes as needed      omega-3-acid ethyl esters (LOVAZA) 1 g capsule Take 2 g by mouth 2 (two) times a day      polyethylene glycol (GLYCOLAX) 17 GM/SCOOP Take 17 g by mouth daily        potassium chloride (MICRO-K) 10 MEQ CR capsule Take 10 mEq by mouth daily        senna (SENOKOT) 8 6 MG tablet Take 1 tablet by mouth daily        sertraline (Zoloft) 25 mg tablet Take 1 tablet (25 mg total) by mouth daily 30 tablet 1    zinc sulfate (ZINCATE) 220 mg capsule Take 1 capsule (220 mg total) by mouth daily 90 capsule 3    Zoster Vac Recomb Adjuvanted (Shingrix) 50 MCG/0 5ML SUSR Inject 0 5 mL into a muscle once for 1 dose Repeat dose in 2 to 6 months 1 each 1     Current Facility-Administered Medications   Medication Dose Route Frequency Provider Last Rate Last Admin    cyanocobalamin injection 1,000 mcg  1,000 mcg Intramuscular Q30 Days Serg Le Charlee Costa MD   1,000 mcg at 02/19/18 1008     Current Outpatient Medications on File Prior to Visit   Medication Sig    acetaminophen (TYLENOL) 500 mg tablet Take 500 mg by mouth every 6 (six) hours as needed      ascorbic acid (VITAMIN C) 500 MG tablet Take 1 tablet (500 mg total) by mouth daily    calcium carbonate-vitamin D (OSCAL-D) 500 mg-200 units per tablet Take 1 tablet by mouth 2 (two) times a day with meals    cholecalciferol (VITAMIN D3) 1,000 units tablet Take 1 tablet (1,000 Units total) by mouth daily    clopidogrel (PLAVIX) 75 mg tablet Take 75 mg by mouth daily    clotrimazole-betamethasone (LOTRISONE) 1-0 05 % cream APPLY TOPICALLY TO AFFECTED AREAS twice a day for 5 days then if needed    Eliquis 2 5 MG Take 2 5 mg by mouth 2 (two) times a day    ezetimibe (ZETIA) 10 mg tablet Take 10 mg by mouth daily    fluticasone (FLONASE) 50 mcg/act nasal spray 2 sprays into each nostril daily    furosemide (LASIX) 20 mg tablet Take 1 tablet (20 mg total) by mouth 2 (two) times a day (Patient taking differently: Take 20 mg by mouth daily  )    gabapentin (NEURONTIN) 100 mg capsule Take 1 capsule (100 mg total) by mouth daily Take 1 cap nightly for 1 week, then increase to twice daily for 1 week, then increase to three times daily   hydrocortisone (ANUSOL-HC) 2 5 % rectal cream Apply topically daily at bedtime    isosorbide mononitrate (IMDUR) 30 mg 24 hr tablet Take 30 mg by mouth daily      lansoprazole (PREVACID) 30 mg capsule Take 1 capsule (30 mg total) by mouth daily    losartan (COZAAR) 25 mg tablet Take 25 mg by mouth daily    metoprolol succinate (TOPROL-XL) 25 mg 24 hr tablet Take 0 5 tablets (12 5 mg total) by mouth daily   1/2 tab daily    Multiple Vitamin (Daily-Flo Multivitamin) TABS Take 1 tablet by mouth daily    nitroglycerin (NITROSTAT) 0 4 mg SL tablet Place 0 4 mg under the tongue every 5 (five) minutes as needed    omega-3-acid ethyl esters (LOVAZA) 1 g capsule Take 2 g by mouth 2 (two) times a day    polyethylene glycol (GLYCOLAX) 17 GM/SCOOP Take 17 g by mouth daily      potassium chloride (MICRO-K) 10 MEQ CR capsule Take 10 mEq by mouth daily      senna (SENOKOT) 8 6 MG tablet Take 1 tablet by mouth daily      sertraline (Zoloft) 25 mg tablet Take 1 tablet (25 mg total) by mouth daily    zinc sulfate (ZINCATE) 220 mg capsule Take 1 capsule (220 mg total) by mouth daily    [DISCONTINUED] memantine (NAMENDA) 5 mg tablet Take 1 tablet (5 mg total) by mouth 2 (two) times a day     Current Facility-Administered Medications on File Prior to Visit   Medication    cyanocobalamin injection 1,000 mcg     She is allergic to amoxicillin, ciprofloxacin, erythromycin, statins, amoxicillin-pot clavulanate, and clindamycin       Review of Systems   Respiratory: Positive for shortness of breath  Cardiovascular: Positive for chest pain and palpitations  Negative for leg swelling  All other systems reviewed and are negative  Objective:      /76   Pulse 70   Temp 98 4 °F (36 9 °C)   Ht 4' 7" (1 397 m)   Wt 71 7 kg (158 lb)   SpO2 98%   BMI 36 72 kg/m²          Physical Exam  Vitals and nursing note reviewed  Constitutional:       Appearance: Normal appearance  She is obese  HENT:      Head: Normocephalic and atraumatic  Cardiovascular:      Rate and Rhythm: Normal rate and regular rhythm  Pulses: Normal pulses  Heart sounds: Normal heart sounds  Pulmonary:      Effort: Pulmonary effort is normal       Breath sounds: Normal breath sounds  Abdominal:      General: Abdomen is flat  Bowel sounds are normal       Palpations: Abdomen is soft  Musculoskeletal:         General: Normal range of motion  Cervical back: Normal range of motion and neck supple  Skin:     General: Skin is warm and dry  Capillary Refill: Capillary refill takes less than 2 seconds  Neurological:      General: No focal deficit present        Mental Status: She is alert and oriented to person, place, and time  Mental status is at baseline  Psychiatric:         Mood and Affect: Mood normal          Behavior: Behavior normal          Thought Content:  Thought content normal          Judgment: Judgment normal

## 2022-02-28 ENCOUNTER — APPOINTMENT (OUTPATIENT)
Dept: LAB | Facility: CLINIC | Age: 82
End: 2022-02-28
Payer: COMMERCIAL

## 2022-02-28 DIAGNOSIS — E66.01 OBESITY, MORBID (HCC): ICD-10-CM

## 2022-02-28 DIAGNOSIS — M46.87 OTHER SPECIFIED INFLAMMATORY SPONDYLOPATHIES, LUMBOSACRAL REGION (HCC): ICD-10-CM

## 2022-02-28 DIAGNOSIS — I21.4 NSTEMI (NON-ST ELEVATED MYOCARDIAL INFARCTION) (HCC): ICD-10-CM

## 2022-02-28 DIAGNOSIS — G20 PARKINSON'S DISEASE (HCC): ICD-10-CM

## 2022-02-28 DIAGNOSIS — E55.9 VITAMIN D DEFICIENCY: ICD-10-CM

## 2022-02-28 DIAGNOSIS — Z90.5 SINGLE KIDNEY: ICD-10-CM

## 2022-02-28 DIAGNOSIS — N18.31 STAGE 3A CHRONIC KIDNEY DISEASE (HCC): ICD-10-CM

## 2022-02-28 DIAGNOSIS — I48.0 PAROXYSMAL A-FIB (HCC): ICD-10-CM

## 2022-02-28 DIAGNOSIS — F03.90 DEMENTIA WITHOUT BEHAVIORAL DISTURBANCE, UNSPECIFIED DEMENTIA TYPE (HCC): ICD-10-CM

## 2022-02-28 DIAGNOSIS — R29.898 WEAKNESS OF BOTH UPPER EXTREMITIES: ICD-10-CM

## 2022-02-28 DIAGNOSIS — I25.10 CORONARY ARTERY DISEASE INVOLVING NATIVE HEART, UNSPECIFIED VESSEL OR LESION TYPE, UNSPECIFIED WHETHER ANGINA PRESENT: ICD-10-CM

## 2022-02-28 DIAGNOSIS — I50.32 CHRONIC DIASTOLIC CHF (CONGESTIVE HEART FAILURE) (HCC): ICD-10-CM

## 2022-02-28 DIAGNOSIS — I10 ESSENTIAL HYPERTENSION: ICD-10-CM

## 2022-02-28 DIAGNOSIS — I48.91 ATRIAL FIBRILLATION, UNSPECIFIED TYPE (HCC): ICD-10-CM

## 2022-02-28 LAB
ALBUMIN SERPL BCP-MCNC: 3.5 G/DL (ref 3.5–5)
ALP SERPL-CCNC: 95 U/L (ref 46–116)
ALT SERPL W P-5'-P-CCNC: 14 U/L (ref 12–78)
ANION GAP SERPL CALCULATED.3IONS-SCNC: 12 MMOL/L (ref 4–13)
AST SERPL W P-5'-P-CCNC: 17 U/L (ref 5–45)
BASOPHILS # BLD AUTO: 0.04 THOUSANDS/ΜL (ref 0–0.1)
BASOPHILS NFR BLD AUTO: 1 % (ref 0–1)
BILIRUB SERPL-MCNC: 0.44 MG/DL (ref 0.2–1)
BUN SERPL-MCNC: 23 MG/DL (ref 5–25)
CALCIUM SERPL-MCNC: 9.5 MG/DL (ref 8.3–10.1)
CHLORIDE SERPL-SCNC: 107 MMOL/L (ref 100–108)
CO2 SERPL-SCNC: 23 MMOL/L (ref 21–32)
CREAT SERPL-MCNC: 1.18 MG/DL (ref 0.6–1.3)
EOSINOPHIL # BLD AUTO: 0.08 THOUSAND/ΜL (ref 0–0.61)
EOSINOPHIL NFR BLD AUTO: 1 % (ref 0–6)
ERYTHROCYTE [DISTWIDTH] IN BLOOD BY AUTOMATED COUNT: 16.2 % (ref 11.6–15.1)
ERYTHROCYTE [SEDIMENTATION RATE] IN BLOOD: 47 MM/HOUR (ref 0–29)
GFR SERPL CREATININE-BSD FRML MDRD: 42 ML/MIN/1.73SQ M
GLUCOSE P FAST SERPL-MCNC: 104 MG/DL (ref 65–99)
HCT VFR BLD AUTO: 37.6 % (ref 34.8–46.1)
HGB BLD-MCNC: 11.4 G/DL (ref 11.5–15.4)
IMM GRANULOCYTES # BLD AUTO: 0.02 THOUSAND/UL (ref 0–0.2)
IMM GRANULOCYTES NFR BLD AUTO: 0 % (ref 0–2)
LYMPHOCYTES # BLD AUTO: 1.46 THOUSANDS/ΜL (ref 0.6–4.47)
LYMPHOCYTES NFR BLD AUTO: 20 % (ref 14–44)
MCH RBC QN AUTO: 24.9 PG (ref 26.8–34.3)
MCHC RBC AUTO-ENTMCNC: 30.3 G/DL (ref 31.4–37.4)
MCV RBC AUTO: 82 FL (ref 82–98)
MONOCYTES # BLD AUTO: 0.57 THOUSAND/ΜL (ref 0.17–1.22)
MONOCYTES NFR BLD AUTO: 8 % (ref 4–12)
NEUTROPHILS # BLD AUTO: 5.2 THOUSANDS/ΜL (ref 1.85–7.62)
NEUTS SEG NFR BLD AUTO: 70 % (ref 43–75)
NRBC BLD AUTO-RTO: 0 /100 WBCS
NT-PROBNP SERPL-MCNC: 405 PG/ML
PLATELET # BLD AUTO: 332 THOUSANDS/UL (ref 149–390)
PMV BLD AUTO: 12.7 FL (ref 8.9–12.7)
POTASSIUM SERPL-SCNC: 3.9 MMOL/L (ref 3.5–5.3)
PROT SERPL-MCNC: 7.5 G/DL (ref 6.4–8.2)
RBC # BLD AUTO: 4.58 MILLION/UL (ref 3.81–5.12)
SODIUM SERPL-SCNC: 142 MMOL/L (ref 136–145)
TSH SERPL DL<=0.05 MIU/L-ACNC: 1.59 UIU/ML (ref 0.36–3.74)
WBC # BLD AUTO: 7.37 THOUSAND/UL (ref 4.31–10.16)

## 2022-02-28 PROCEDURE — 84443 ASSAY THYROID STIM HORMONE: CPT

## 2022-02-28 PROCEDURE — 80053 COMPREHEN METABOLIC PANEL: CPT

## 2022-02-28 PROCEDURE — 85652 RBC SED RATE AUTOMATED: CPT

## 2022-02-28 PROCEDURE — 85025 COMPLETE CBC W/AUTO DIFF WBC: CPT

## 2022-02-28 PROCEDURE — 83880 ASSAY OF NATRIURETIC PEPTIDE: CPT

## 2022-02-28 PROCEDURE — 36415 COLL VENOUS BLD VENIPUNCTURE: CPT

## 2022-03-08 ENCOUNTER — OFFICE VISIT (OUTPATIENT)
Dept: FAMILY MEDICINE CLINIC | Facility: CLINIC | Age: 82
End: 2022-03-08
Payer: COMMERCIAL

## 2022-03-08 VITALS
SYSTOLIC BLOOD PRESSURE: 122 MMHG | OXYGEN SATURATION: 98 % | WEIGHT: 158 LBS | TEMPERATURE: 98 F | HEIGHT: 55 IN | DIASTOLIC BLOOD PRESSURE: 76 MMHG | BODY MASS INDEX: 36.57 KG/M2 | HEART RATE: 64 BPM

## 2022-03-08 DIAGNOSIS — D64.9 ANEMIA, UNSPECIFIED TYPE: ICD-10-CM

## 2022-03-08 DIAGNOSIS — M51.16 LUMBAR DISC HERNIATION WITH RADICULOPATHY: Primary | ICD-10-CM

## 2022-03-08 DIAGNOSIS — I50.32 CHRONIC DIASTOLIC CHF (CONGESTIVE HEART FAILURE) (HCC): ICD-10-CM

## 2022-03-08 DIAGNOSIS — G62.89 OTHER POLYNEUROPATHY: ICD-10-CM

## 2022-03-08 DIAGNOSIS — N18.31 STAGE 3A CHRONIC KIDNEY DISEASE (HCC): ICD-10-CM

## 2022-03-08 DIAGNOSIS — I10 ESSENTIAL HYPERTENSION: ICD-10-CM

## 2022-03-08 PROCEDURE — 1036F TOBACCO NON-USER: CPT | Performed by: FAMILY MEDICINE

## 2022-03-08 PROCEDURE — 3078F DIAST BP <80 MM HG: CPT | Performed by: FAMILY MEDICINE

## 2022-03-08 PROCEDURE — 3074F SYST BP LT 130 MM HG: CPT | Performed by: FAMILY MEDICINE

## 2022-03-08 PROCEDURE — 99214 OFFICE O/P EST MOD 30 MIN: CPT | Performed by: FAMILY MEDICINE

## 2022-03-08 PROCEDURE — 1160F RVW MEDS BY RX/DR IN RCRD: CPT | Performed by: FAMILY MEDICINE

## 2022-03-08 NOTE — PROGRESS NOTES
Assessment/Plan:    Chronic diastolic CHF (congestive heart failure) (HCC)  Wt Readings from Last 3 Encounters:   03/08/22 71 7 kg (158 lb)   02/21/22 71 7 kg (158 lb)   12/23/21 69 9 kg (154 lb)       BNP, CMP, and CBC WNL  No peripheral edema, SOB, chest pain  Continue current regimen and monitor with labs and PE  Essential hypertension  BP today 122/76  No chest pain, SOB, N/V, peripheral edema, changes in vision  Continue to monitor BP at visits  Stage 3a chronic kidney disease [facility])  Lab Results   Component Value Date    EGFR 42 02/28/2022    EGFR 48 12/20/2021    EGFR 47 10/27/2021    CREATININE 1 18 02/28/2022    CREATININE 1 07 12/20/2021    CREATININE 1 10 10/27/2021     Stable GFR, Cr  Continue current medications and trend labs  Anemia  CBC H+H borderline low likely due to chronic anemia  Patient reports not new fatigue, SOB  Continue Vit B12 and monitor with CBC  Other specified inflammatory spondylopathies, lumbosacral region [facility])  Patient reports thoracolumbar back pain radiating down left leg  Patient currently takes tylenol with moderate relief  Patient has seen pain management and has received shots with minimal improvement  PM recommended surgery which she declined due to age  Patient agreeable to continue tylenol regimen with the addition of gabapentin today  Continue to monitor pain and mobility at visits  Subjective:      Patient ID: Annice Boas is a 78 y o  female who presents for f/u of blood work and back pain  Patient takes tylenol for the pain with moderate improvement  Patient hasn't taken tylenol today  Patient is localized to the lower back that radiates down left leg  Pain is sharp and 9/10  Patient previously saw pain management [facility] and got shots in the back with no improvement  Pain management suggested surgery for improvement of symptoms which she has decline referencing her age  Patient reports mild incontinence of urine and BM   F/u labs were discussed with patient  Review of Systems   Constitutional: Negative for chills, fatigue and fever  HENT: Negative for congestion, rhinorrhea, sore throat and tinnitus  Respiratory: Negative for cough and shortness of breath  Cardiovascular: Negative for chest pain  Gastrointestinal: Negative for constipation, diarrhea, nausea and vomiting  Endocrine: Negative for cold intolerance, heat intolerance, polydipsia, polyphagia and polyuria  Genitourinary: Positive for dysuria (mild incontinence)  Musculoskeletal: Positive for back pain, joint swelling (left ankle) and myalgias (right shoulder)  Negative for neck pain and neck stiffness  Neurological: Negative for dizziness, tremors, syncope, weakness, light-headedness, numbness and headaches  Psychiatric/Behavioral: Negative for agitation, behavioral problems, confusion and decreased concentration  Objective:      /76   Pulse 64   Temp 98 °F (36 7 °C)   Ht 4' 7" (1 397 m)   Wt 71 7 kg (158 lb)   SpO2 98%   BMI 36 72 kg/m²          Physical Exam  Constitutional:       Appearance: Normal appearance  She is obese  HENT:      Head: Normocephalic and atraumatic  Right Ear: External ear normal       Left Ear: External ear normal       Nose: Nose normal       Mouth/Throat:      Mouth: Mucous membranes are moist    Eyes:      Extraocular Movements: Extraocular movements intact  Conjunctiva/sclera: Conjunctivae normal       Pupils: Pupils are equal, round, and reactive to light  Cardiovascular:      Rate and Rhythm: Normal rate and regular rhythm  Pulses: Normal pulses  Heart sounds: Normal heart sounds  Pulmonary:      Effort: Pulmonary effort is normal       Breath sounds: Normal breath sounds  Abdominal:      General: Abdomen is flat  Bowel sounds are normal       Palpations: Abdomen is soft  Musculoskeletal:         General: Swelling (Left ankle edema erythema) present        Cervical back: Normal range of motion and neck supple  Comments: Midline thoracic tenderness present      Skin:     General: Skin is warm and dry  Capillary Refill: Capillary refill takes less than 2 seconds  Neurological:      General: No focal deficit present  Mental Status: She is alert and oriented to person, place, and time  Mental status is at baseline  Psychiatric:         Mood and Affect: Mood normal          Behavior: Behavior normal          Thought Content:  Thought content normal          Judgment: Judgment normal

## 2022-03-08 NOTE — PROGRESS NOTES
Assessment/Plan:    1  Stage 3a chronic kidney disease (HCC)  - stable  Cont to monitor nephrotoxic agents  2  Other polyneuropathy  - unsure if she is on gabapentin  The patient is not clear on what meds she takes  She is to check with her aide that assists her with this  If not, we should consider initiating, even low dose as this may help her  She will check with her aid and let us know  This is chronic in nature  No h/o DM, she is pre-diabetic, however  She is with significant Lumbar DDD  - mild elev in her Sed Rate  This is likely multifactorial   Do not feel like further testing is warranted  Her main pain generator is her back  She does not c/o myalgias today  Lab Results   Component Value Date    HGBA1C 6 2 (H) 06/17/2021     3  Lumbar disc herniation with radiculopathy  - she is seen by Dr Thee Berrios who provided pain medication  She has also seen PM physician  Will not adjust regimen  Will continue  4  Essential hypertension  - stable, cont regimen  Lifestyle modification discussed as well  5  Anemia, unspecified type  - chronic in nature, stable  She is utd on her CRC screening  6  Chronic diastolic CHF (congestive heart failure) (HCC)  - stable  Does not appear fluid overloaded  RTC in May for scheduled f/u  Patient/Caretaker verbalized understanding and were in agreement with today's assessment and plan  Time was taken to address any questions patient/caretaker had  Indication/Risks/Benefits of medication(s) as prescribed were discussed with the patient/caretaker  The patient verbalized understanding and agreement and elects to take medications as prescribed  Time was taken to answer any questions the patient/caretaker may have had          Chief Complaint   Patient presents with    Follow-up     follow up on labwork    Back Pain    Medication Problem     reviewed meds with pt, pt did not bring list of meds, pt states she is taking all meds reviewed but had some confusion at times with some  has a nurse who does her medications at home for her  Subjective:      Patient ID: Renny Elizondo is a 78 y o  female  She is here today for lab review and f/u  She is complicated in that she has chronic pain, mainly stemming from her Lumbar DDD  Her pain does radiate down into her LEs  She is using a rollator today and tells me she needs to have this as her back hurts and her legs fatigue  She has seen PM and spine doctor and is a candidate for surgery given severity, she declines  She takes tylenol for pain and this helps her a bit but nothing takes her pain away  She has 9/10 pain today  She is with HF - BNP stable  She is taking her medications and is not reporting SEs  The following portions of the patient's history were reviewed and updated as appropriate: allergies, current medications, past family history, past medical history, past social history, past surgical history and problem list     Review of Systems   Constitutional: Negative for chills, fatigue and fever  HENT: Negative for congestion, rhinorrhea, sore throat and tinnitus  Respiratory: Negative for cough and shortness of breath  Cardiovascular: Negative for chest pain  Gastrointestinal: Negative for constipation, diarrhea, nausea and vomiting  Endocrine: Negative for cold intolerance, heat intolerance, polydipsia, polyphagia and polyuria  Genitourinary: Positive for dysuria (mild incontinence)  Musculoskeletal: Positive for back pain, joint swelling (left ankle) and myalgias (right shoulder)  Negative for neck pain and neck stiffness  Neurological: Negative for dizziness, tremors, syncope, weakness, light-headedness, numbness and headaches  Psychiatric/Behavioral: Negative for agitation, behavioral problems, confusion and decreased concentration       Objective:    /76   Pulse 64   Temp 98 °F (36 7 °C)   Ht 4' 7" (1 397 m)   Wt 71 7 kg (158 lb)   SpO2 98%   BMI 36 72 kg/m²        Physical Exam  Vitals and nursing note reviewed  Constitutional:       General: She is not in acute distress  Appearance: She is obese  She is not ill-appearing  HENT:      Head: Normocephalic and atraumatic  Eyes:      Conjunctiva/sclera: Conjunctivae normal    Cardiovascular:      Rate and Rhythm: Normal rate and regular rhythm  Pulmonary:      Effort: Pulmonary effort is normal       Breath sounds: Normal breath sounds  No wheezing, rhonchi or rales  Musculoskeletal:      Cervical back: Neck supple  Comments: General: Swelling (Left ankle edema erythema) present  Cervical back: Normal range of motion  Midline thoracic tenderness present  There is ttp in the midline of her L spine near the L5-S1 junction  Chronic tissue texture changes to the paraspinal musculature  Dec ROM in all cardinal directions  Walks flexed forward with her rollator due to degree of her L-spine disease        Lymphadenopathy:      Cervical: No cervical adenopathy  Skin:     General: Skin is warm and dry  Neurological:      General: No focal deficit present  Mental Status: She is alert and oriented to person, place, and time  Psychiatric:         Mood and Affect: Mood normal          Behavior: Behavior normal          Thought Content:  Thought content normal          Judgment: Judgment normal

## 2022-03-08 NOTE — ASSESSMENT & PLAN NOTE
CBC H+H borderline low likely due to chronic anemia  Patient reports not new fatigue, SOB  Continue Vit B12 and monitor with CBC

## 2022-03-08 NOTE — ASSESSMENT & PLAN NOTE
Lab Results   Component Value Date    EGFR 42 02/28/2022    EGFR 48 12/20/2021    EGFR 47 10/27/2021    CREATININE 1 18 02/28/2022    CREATININE 1 07 12/20/2021    CREATININE 1 10 10/27/2021     Stable GFR, Cr  Continue current medications and trend labs

## 2022-03-08 NOTE — PATIENT INSTRUCTIONS
PLEASE CHECK IF PATIENT IS TAKING GABAPENTIN OR NEURONTIN -- THIS IS A MEDICATION FOR HER PAIN    IF NOT, PLEASE CALL THE CLINIC SO WE CAN RESTART THIS

## 2022-03-08 NOTE — ASSESSMENT & PLAN NOTE
Wt Readings from Last 3 Encounters:   03/08/22 71 7 kg (158 lb)   02/21/22 71 7 kg (158 lb)   12/23/21 69 9 kg (154 lb)       BNP, CMP, and CBC WNL  No peripheral edema, SOB, chest pain   Continue current regimen and monitor with labs and PE

## 2022-03-08 NOTE — ASSESSMENT & PLAN NOTE
Patient reports thoracolumbar back pain radiating down left leg  Patient currently takes tylenol with moderate relief  Patient has seen pain management and has received shots with minimal improvement  PM recommended surgery which she declined due to age  Patient agreeable to continue tylenol regimen with the addition of gabapentin today  Continue to monitor pain and mobility at visits  Patient's aid to f/u to confirm correct pain medications are being provided to patient

## 2022-03-08 NOTE — ASSESSMENT & PLAN NOTE
BP today 122/76  No chest pain, SOB, N/V, peripheral edema, changes in vision  Continue to monitor BP at visits

## 2022-03-11 DIAGNOSIS — I50.32 CHRONIC DIASTOLIC CHF (CONGESTIVE HEART FAILURE) (HCC): Primary | ICD-10-CM

## 2022-03-11 RX ORDER — CLOPIDOGREL BISULFATE 75 MG/1
75 TABLET ORAL DAILY
Qty: 90 TABLET | Refills: 1 | Status: SHIPPED | OUTPATIENT
Start: 2022-03-11

## 2022-03-11 RX ORDER — EZETIMIBE 10 MG/1
10 TABLET ORAL DAILY
Qty: 90 TABLET | Refills: 1 | Status: SHIPPED | OUTPATIENT
Start: 2022-03-11

## 2022-03-28 DIAGNOSIS — I48.91 ATRIAL FIBRILLATION, UNSPECIFIED TYPE (HCC): Primary | ICD-10-CM

## 2022-03-28 RX ORDER — APIXABAN 2.5 MG/1
2.5 TABLET, FILM COATED ORAL 2 TIMES DAILY
Qty: 60 TABLET | Refills: 1 | Status: SHIPPED | OUTPATIENT
Start: 2022-03-28 | End: 2022-08-08 | Stop reason: SDUPTHER

## 2022-04-12 ENCOUNTER — OFFICE VISIT (OUTPATIENT)
Dept: FAMILY MEDICINE CLINIC | Facility: CLINIC | Age: 82
End: 2022-04-12
Payer: COMMERCIAL

## 2022-04-12 VITALS
SYSTOLIC BLOOD PRESSURE: 118 MMHG | HEIGHT: 55 IN | BODY MASS INDEX: 36.8 KG/M2 | TEMPERATURE: 98.2 F | HEART RATE: 60 BPM | OXYGEN SATURATION: 98 % | WEIGHT: 159 LBS | RESPIRATION RATE: 18 BRPM | DIASTOLIC BLOOD PRESSURE: 60 MMHG

## 2022-04-12 DIAGNOSIS — R00.1 JUNCTIONAL BRADYCARDIA: ICD-10-CM

## 2022-04-12 DIAGNOSIS — Z90.5 SINGLE KIDNEY: ICD-10-CM

## 2022-04-12 DIAGNOSIS — N18.31 STAGE 3A CHRONIC KIDNEY DISEASE (HCC): ICD-10-CM

## 2022-04-12 DIAGNOSIS — E66.01 OBESITY, MORBID (HCC): ICD-10-CM

## 2022-04-12 DIAGNOSIS — R30.0 DYSURIA: Primary | ICD-10-CM

## 2022-04-12 LAB
BACTERIA UR QL AUTO: ABNORMAL /HPF
BILIRUB UR QL STRIP: NEGATIVE
CLARITY UR: ABNORMAL
COLOR UR: ABNORMAL
GLUCOSE UR STRIP-MCNC: NEGATIVE MG/DL
HGB UR QL STRIP.AUTO: NEGATIVE
KETONES UR STRIP-MCNC: NEGATIVE MG/DL
LEUKOCYTE ESTERASE UR QL STRIP: ABNORMAL
MUCOUS THREADS UR QL AUTO: ABNORMAL
NITRITE UR QL STRIP: POSITIVE
NON-SQ EPI CELLS URNS QL MICRO: ABNORMAL /HPF
PH UR STRIP.AUTO: 5.5 [PH]
PROT UR STRIP-MCNC: NEGATIVE MG/DL
RBC #/AREA URNS AUTO: ABNORMAL /HPF
SP GR UR STRIP.AUTO: 1.01 (ref 1–1.03)
UROBILINOGEN UR STRIP-ACNC: <2 MG/DL
WBC #/AREA URNS AUTO: ABNORMAL /HPF

## 2022-04-12 PROCEDURE — 3074F SYST BP LT 130 MM HG: CPT | Performed by: FAMILY MEDICINE

## 2022-04-12 PROCEDURE — 3288F FALL RISK ASSESSMENT DOCD: CPT | Performed by: FAMILY MEDICINE

## 2022-04-12 PROCEDURE — 1101F PT FALLS ASSESS-DOCD LE1/YR: CPT | Performed by: FAMILY MEDICINE

## 2022-04-12 PROCEDURE — 87186 SC STD MICRODIL/AGAR DIL: CPT | Performed by: FAMILY MEDICINE

## 2022-04-12 PROCEDURE — 3078F DIAST BP <80 MM HG: CPT | Performed by: FAMILY MEDICINE

## 2022-04-12 PROCEDURE — 87086 URINE CULTURE/COLONY COUNT: CPT | Performed by: FAMILY MEDICINE

## 2022-04-12 PROCEDURE — 1036F TOBACCO NON-USER: CPT | Performed by: FAMILY MEDICINE

## 2022-04-12 PROCEDURE — 81001 URINALYSIS AUTO W/SCOPE: CPT | Performed by: FAMILY MEDICINE

## 2022-04-12 PROCEDURE — 99214 OFFICE O/P EST MOD 30 MIN: CPT | Performed by: FAMILY MEDICINE

## 2022-04-12 PROCEDURE — 87077 CULTURE AEROBIC IDENTIFY: CPT | Performed by: FAMILY MEDICINE

## 2022-04-12 PROCEDURE — 1160F RVW MEDS BY RX/DR IN RCRD: CPT | Performed by: FAMILY MEDICINE

## 2022-04-12 PROCEDURE — 1003F LEVEL OF ACTIVITY ASSESS: CPT | Performed by: FAMILY MEDICINE

## 2022-04-12 RX ORDER — CEPHALEXIN 500 MG/1
500 CAPSULE ORAL EVERY 8 HOURS
Qty: 15 CAPSULE | Refills: 0 | Status: SHIPPED | OUTPATIENT
Start: 2022-04-12 | End: 2022-04-17

## 2022-04-12 RX ORDER — GABAPENTIN 100 MG/1
CAPSULE ORAL
Qty: 270 CAPSULE | Refills: 0
Start: 2022-04-12 | End: 2022-05-23

## 2022-04-12 NOTE — PROGRESS NOTES
Assessment/Plan:    1  Dysuria  - will tx with keflex for presumed UTI  Urinalysis and Culture sent  We can certainly adjust/pull-back therapy prn  She has PCN allergy but has had keflex, since and denied having issues  Will adjust dose to her CrCl  - Urinalysis with microscopic  - Urine culture; Future  - cephalexin (KEFLEX) 500 mg capsule; Take 1 capsule (500 mg total) by mouth every 8 (eight) hours for 5 days  Dispense: 15 capsule; Refill: 0    2  Stage 3a chronic kidney disease (HCC)  - cephalexin (KEFLEX) 500 mg capsule; Take 1 capsule (500 mg total) by mouth every 8 (eight) hours for 5 days  Dispense: 15 capsule; Refill: 0  - gabapentin (NEURONTIN) 100 mg capsule; 1 in AM and 2 at night  Dispense: 270 capsule; Refill: 0    3  Single kidney  - cephalexin (KEFLEX) 500 mg capsule; Take 1 capsule (500 mg total) by mouth every 8 (eight) hours for 5 days  Dispense: 15 capsule; Refill: 0    4  Junctional bradycardia    5  Obesity, morbid ([address] )  BMI Counseling: Body mass index is 36 96 kg/m²  The BMI is above normal  Nutrition recommendations include decreasing portion sizes, encouraging healthy choices of fruits and vegetables, decreasing fast food intake, consuming healthier snacks, limiting drinks that contain sugar, reducing intake of saturated and trans fat and reducing intake of cholesterol  Exercise recommendations include exercising 3-5 times per week and strength training exercises  Rationale for BMI follow-up plan is due to patient being overweight or obese  RTC in May with Dr Piyush Lorenzo for [facility] visit  Pt inquiring about her dx of Parkinson's  Telling me she never knew she had this  Unsure about this, she can check with her PCP who she is well known too  Patient/Caretaker verbalized understanding and were in agreement with today's assessment and plan  Time was taken to address any questions patient/caretaker had        Indication/Risks/Benefits of medication(s) as prescribed were discussed with the patient/caretaker  The patient verbalized understanding and agreement and elects to take medications as prescribed  Time was taken to answer any questions the patient/caretaker may have had  Chief Complaint   Patient presents with    Urinary Tract Infection    Tremors     pt saw parkinsons dx and states she  was unaware of this but has had difficulty grasping objects and some speech difficulty       Subjective:      Patient ID: Rebecca Ro is a 78 y o  female  One week history of dysuria  She has frequent urination on a daily basis due to her medications  She is not with f/c/s  There is some abdominal discomfort as well  No n/v/d  She feels she may have a UTI and worries due to having a unilateral kidney  She has known CKD3  Tremors - she has had these for some time  She is worried about her dx of Parkinson's Dz  She is with tremor, seems to be worse with intention  She is wondering if she has Parkinson's  Does not remember anyone telling her that  No falls but she does us a walker  The following portions of the patient's history were reviewed and updated as appropriate: allergies, current medications, past family history, past medical history, past social history, past surgical history and problem list     Review of Systems   All other systems reviewed and are negative  Objective:      /60   Pulse 60   Temp 98 2 °F (36 8 °C)   Resp 18   Ht 4' 7" (1 397 m)   Wt 72 1 kg (159 lb)   SpO2 98%   BMI 36 96 kg/m²          Physical Exam  Vitals and nursing note reviewed  Constitutional:       Appearance: Normal appearance  Cardiovascular:      Rate and Rhythm: Normal rate and regular rhythm  Pulmonary:      Effort: Pulmonary effort is normal       Breath sounds: Normal breath sounds  No wheezing, rhonchi or rales  Abdominal:      Palpations: Abdomen is soft  Comments: Obese  There is suprapubic ttp         Musculoskeletal:      Cervical back: Neck supple  Comments: Walks forward flexed at the hips and is using a walker  Skin:     General: Skin is warm and dry  Neurological:      General: No focal deficit present  Mental Status: She is alert  Psychiatric:         Mood and Affect: Mood normal          Behavior: Behavior normal          Thought Content:  Thought content normal          Judgment: Judgment normal

## 2022-04-22 ENCOUNTER — NURSE TRIAGE (OUTPATIENT)
Dept: OTHER | Facility: OTHER | Age: 82
End: 2022-04-22

## 2022-04-23 NOTE — TELEPHONE ENCOUNTER
Regarding: Constipated   ----- Message from Alina Ambrocio sent at 4/22/2022 10:02 PM EDT -----  '' I am constipated and there is a little bit of blood coming out I'm on a blood thinner my stomach hurts concern what to do ''

## 2022-04-23 NOTE — TELEPHONE ENCOUNTER
Reason for Disposition   [1] Minor bleeding from rectum (e g , blood just on toilet paper, few drops, streaks on surface of normal formed BM) AND [2] only 1 or 2 times    Answer Assessment - Initial Assessment Questions  1  STOOL PATTERN OR FREQUENCY: "How often do you pass bowel movements (BMs)?"  (Normal range: tid to q 3 days)  "When was the last BM passed?"        Couple days ago    2  STRAINING: "Do you have to strain to have a BM?"       Yes    3  RECTAL PAIN: "Does your rectum hurt when the stool comes out?" If Yes, ask: "Do you have hemorrhoids? How bad is the pain?"  (Scale 1-10; or mild, moderate, severe)      Denies    4  STOOL COMPOSITION: "Are the stools hard?"       Yes    5  BLOOD ON STOOLS: "Has there been any blood on the toilet tissue or on the surface of the BM?" If Yes, ask: "When was the last time?"       Yes, a little bit mixed in the stool, happened once    6  CHRONIC CONSTIPATION: "Is this a new problem for you?"  If no, ask: "How long have you had this problem?" (days, weeks, months)       Denies    7  CHANGES IN DIET OR HYDRATION: "Have there been any recent changes in your diet?" "How much fluids are you drinking consuming on a daily basis?"  "How much have you had to drink today?"      Denies    8  MEDICATIONS: "Have you been taking any new medications?" "Are you taking any narcotic pain medications?" (e g , Vicoden, Percocet, morphine, dilaudid)      Denies    9  LAXATIVES: "Have you been using any stool softeners, laxatives, or enemas?"  If yes, ask "What, how often, and when was the last time?"  Denies    10  ACTIVITY:  "How much walking do you do every day? on a daily basis?"  "Has your activity level decreased in the past week?"         Not much    11  CAUSE: "What do you think is causing the constipation?"         unknown    12  OTHER SYMPTOMS: "Do you have any other symptoms?" (e g , abdominal pain, bloating, fever, vomiting)  Had abdominal pain but gone now         13   MEDICAL HISTORY: "Do you have a history of hemorrhoids, rectal fissures, or rectal surgery or rectal abscess?"    Protocols used: CONSTIPATION-ADULT-AH

## 2022-04-25 ENCOUNTER — NURSE TRIAGE (OUTPATIENT)
Dept: OTHER | Facility: OTHER | Age: 82
End: 2022-04-25

## 2022-04-25 DIAGNOSIS — N39.0 E-COLI UTI: Primary | ICD-10-CM

## 2022-04-25 DIAGNOSIS — B96.20 E-COLI UTI: Primary | ICD-10-CM

## 2022-04-25 RX ORDER — CEFDINIR 300 MG/1
300 CAPSULE ORAL EVERY 12 HOURS SCHEDULED
Qty: 14 CAPSULE | Refills: 0 | Status: SHIPPED | OUTPATIENT
Start: 2022-04-25 | End: 2022-05-02

## 2022-04-25 NOTE — TELEPHONE ENCOUNTER
Regarding: waist/back pain, finished with ABX for recent UTI, still not feeling well  ----- Message from Vidal Fox sent at 4/25/2022 10:39 AM EDT -----  "I have back pain and waist pain; I am still awaiting my results of my urine tests and have finished the antibiotics but still not feeling well "

## 2022-04-25 NOTE — TELEPHONE ENCOUNTER
Unfortunately, other than IV formulations, this class is what we have to treat this - shows it should be sensitive - perhaps did not treat long enough? I sent her Omnicef - different generation medication than keflex  She can take this twice daily for 7 days  If she continues to have pain/issues, she will require a f/u visit with new specimen/cultures  TY    Jacklyn Yeboah, DO

## 2022-04-25 NOTE — TELEPHONE ENCOUNTER
Called pt  ABX are finished, finished course of her keflex from 4/12 from Dr Jyothi Green    At that time chart review shows pt was asymptomatic  Pt states started 2days ago with right sided flank pain  Burning with urination x2days as well  Please advise

## 2022-04-25 NOTE — TELEPHONE ENCOUNTER
Patient would like a call back to discuss her UA results and to discuss treatment options  She is still having some R side flank pain  Reason for Disposition   Taking antibiotic < 72 hours (3 days) for UTI and flank or lower back pain not improved    Answer Assessment - Initial Assessment Questions  1  ANTIBIOTIC: "What antibiotic are you taking?" "How many times per day?"      Keflex  2  DURATION: "When was the antibiotic started?"      4/22/22  3  MAIN SYMPTOM: "What is the main symptom you are concerned about?"     Pain in right side around her waist, in the back   4  FEVER: "Do you have a fever?" If Yes, ask: "What is it, how was it measured, and when did it start?"      Denies   5  PAIN: "How bad is the pain?" (Scale 1 - 10; mild, moderate or severe)  -MILD (1-3): doesn't interfere with normal activities  -MODERATE (4-7): interferes with normal activities (e g , work or school) or awakens from sleep  -SEVERE (8-10): excruciating pain and patient unable to do any normal activities      Moderate  6   OTHER SYMPTOMS: "Do you have any other symptoms?" (e g , flank pain, vaginal discharge, blood in urine)  REGNANCY: "Is there any chance you are pregnant?" "When was your last menstrual period?"      Denies    Protocols used: URINARY TRACT INFECTION ON ANTIBIOTIC FOLLOW-UP CALL - FEMALE-ADULT-[place]

## 2022-04-29 ENCOUNTER — NURSE TRIAGE (OUTPATIENT)
Dept: OTHER | Facility: OTHER | Age: 82
End: 2022-04-29

## 2022-04-29 DIAGNOSIS — I50.32 CHRONIC DIASTOLIC CHF (CONGESTIVE HEART FAILURE) (HCC): Primary | ICD-10-CM

## 2022-04-29 RX ORDER — ISOSORBIDE MONONITRATE 30 MG/1
30 TABLET, EXTENDED RELEASE ORAL DAILY
Refills: 1 | OUTPATIENT
Start: 2022-04-29

## 2022-04-29 RX ORDER — ISOSORBIDE MONONITRATE 30 MG/1
TABLET, EXTENDED RELEASE ORAL
Qty: 30 TABLET | Refills: 5 | Status: SHIPPED | OUTPATIENT
Start: 2022-04-29 | End: 2022-08-08 | Stop reason: SDUPTHER

## 2022-04-29 NOTE — TELEPHONE ENCOUNTER
Spoke with pt, confirmed her med refills will be taken care of at doctors discretion   Pt understands

## 2022-04-29 NOTE — TELEPHONE ENCOUNTER
Reason for Disposition   General information question, no triage required and triager able to answer question    Answer Assessment - Initial Assessment Questions  1  REASON FOR CALL or QUESTION: "What is your reason for calling today?" or "How can I best help you?" or "What question do you have that I can help answer?"      I need my Isosorbide Mononitrate the pharmacy said they didn't have it      Protocols used: INFORMATION ONLY CALL-ADULT-

## 2022-04-29 NOTE — TELEPHONE ENCOUNTER
Regarding: isosorbide mononitrate (IMDUR) 30 mg 24 hr tablet   ----- Message from Viri Rios sent at 4/29/2022 10:42 AM EDT -----  "I am out of my isosorbide mononitrate (IMDUR) 30 mg 24 hr tablet "

## 2022-05-17 ENCOUNTER — RA CDI HCC (OUTPATIENT)
Dept: OTHER | Facility: HOSPITAL | Age: 82
End: 2022-05-17

## 2022-05-17 NOTE — PROGRESS NOTES
[facility]  coding opportunities          Chart Reviewed number of suggestions sent to Provider: 1   [id number]    Patients Insurance     Medicare Insurance: [employer] Advantage

## 2022-05-23 ENCOUNTER — OFFICE VISIT (OUTPATIENT)
Dept: FAMILY MEDICINE CLINIC | Facility: CLINIC | Age: 82
End: 2022-05-23
Payer: COMMERCIAL

## 2022-05-23 VITALS
TEMPERATURE: 98 F | DIASTOLIC BLOOD PRESSURE: 70 MMHG | RESPIRATION RATE: 18 BRPM | WEIGHT: 159 LBS | HEART RATE: 77 BPM | HEIGHT: 55 IN | BODY MASS INDEX: 36.8 KG/M2 | SYSTOLIC BLOOD PRESSURE: 116 MMHG | OXYGEN SATURATION: 94 %

## 2022-05-23 DIAGNOSIS — I10 ESSENTIAL HYPERTENSION: ICD-10-CM

## 2022-05-23 DIAGNOSIS — M51.16 LUMBAR DISC HERNIATION WITH RADICULOPATHY: ICD-10-CM

## 2022-05-23 DIAGNOSIS — G62.89 OTHER POLYNEUROPATHY: ICD-10-CM

## 2022-05-23 DIAGNOSIS — K21.9 GERD WITHOUT ESOPHAGITIS: ICD-10-CM

## 2022-05-23 DIAGNOSIS — N18.31 STAGE 3A CHRONIC KIDNEY DISEASE (HCC): ICD-10-CM

## 2022-05-23 DIAGNOSIS — Z00.00 MEDICARE ANNUAL WELLNESS VISIT, SUBSEQUENT: ICD-10-CM

## 2022-05-23 DIAGNOSIS — I25.10 CORONARY ARTERY DISEASE INVOLVING NATIVE HEART, UNSPECIFIED VESSEL OR LESION TYPE, UNSPECIFIED WHETHER ANGINA PRESENT: ICD-10-CM

## 2022-05-23 DIAGNOSIS — I48.91 ATRIAL FIBRILLATION, UNSPECIFIED TYPE (HCC): ICD-10-CM

## 2022-05-23 DIAGNOSIS — Z90.5 SINGLE KIDNEY: Chronic | ICD-10-CM

## 2022-05-23 DIAGNOSIS — M46.87 OTHER SPECIFIED INFLAMMATORY SPONDYLOPATHIES, LUMBOSACRAL REGION (HCC): ICD-10-CM

## 2022-05-23 DIAGNOSIS — M54.10 BACK PAIN WITH RIGHT-SIDED RADICULOPATHY: ICD-10-CM

## 2022-05-23 DIAGNOSIS — Z23 NEED FOR SHINGLES VACCINE: ICD-10-CM

## 2022-05-23 DIAGNOSIS — R00.1 JUNCTIONAL BRADYCARDIA: ICD-10-CM

## 2022-05-23 DIAGNOSIS — E66.01 OBESITY, MORBID (HCC): ICD-10-CM

## 2022-05-23 DIAGNOSIS — E55.9 VITAMIN D DEFICIENCY: ICD-10-CM

## 2022-05-23 DIAGNOSIS — G20 PARKINSON'S DISEASE (HCC): ICD-10-CM

## 2022-05-23 DIAGNOSIS — Z00.00 MEDICARE ANNUAL WELLNESS VISIT, INITIAL: Primary | ICD-10-CM

## 2022-05-23 DIAGNOSIS — I50.32 CHRONIC DIASTOLIC CHF (CONGESTIVE HEART FAILURE) (HCC): ICD-10-CM

## 2022-05-23 PROCEDURE — 3288F FALL RISK ASSESSMENT DOCD: CPT | Performed by: FAMILY MEDICINE

## 2022-05-23 PROCEDURE — 99215 OFFICE O/P EST HI 40 MIN: CPT | Performed by: FAMILY MEDICINE

## 2022-05-23 PROCEDURE — 3725F SCREEN DEPRESSION PERFORMED: CPT | Performed by: FAMILY MEDICINE

## 2022-05-23 PROCEDURE — 1036F TOBACCO NON-USER: CPT | Performed by: FAMILY MEDICINE

## 2022-05-23 PROCEDURE — 1125F AMNT PAIN NOTED PAIN PRSNT: CPT | Performed by: FAMILY MEDICINE

## 2022-05-23 PROCEDURE — 1160F RVW MEDS BY RX/DR IN RCRD: CPT | Performed by: FAMILY MEDICINE

## 2022-05-23 PROCEDURE — 3078F DIAST BP <80 MM HG: CPT | Performed by: FAMILY MEDICINE

## 2022-05-23 PROCEDURE — 3074F SYST BP LT 130 MM HG: CPT | Performed by: FAMILY MEDICINE

## 2022-05-23 PROCEDURE — G0439 PPPS, SUBSEQ VISIT: HCPCS | Performed by: FAMILY MEDICINE

## 2022-05-23 PROCEDURE — 1170F FXNL STATUS ASSESSED: CPT | Performed by: FAMILY MEDICINE

## 2022-05-23 PROCEDURE — 1090F PRES/ABSN URINE INCON ASSESS: CPT | Performed by: FAMILY MEDICINE

## 2022-05-23 RX ORDER — LANSOPRAZOLE 30 MG/1
30 CAPSULE, DELAYED RELEASE ORAL DAILY
Qty: 90 CAPSULE | Refills: 3 | Status: SHIPPED | OUTPATIENT
Start: 2022-05-23

## 2022-05-23 RX ORDER — BISACODYL 10 MG
10 SUPPOSITORY, RECTAL RECTAL AS NEEDED
COMMUNITY
Start: 2022-05-06 | End: 2022-06-20

## 2022-05-23 RX ORDER — ZOSTER VACCINE RECOMBINANT, ADJUVANTED 50 MCG/0.5
0.5 KIT INTRAMUSCULAR ONCE
Qty: 1 EACH | Refills: 1 | Status: SHIPPED | OUTPATIENT
Start: 2022-05-23 | End: 2022-05-23

## 2022-05-23 RX ORDER — LOSARTAN POTASSIUM 25 MG/1
25 TABLET ORAL DAILY
COMMUNITY
Start: 2022-05-11 | End: 2022-06-20

## 2022-05-23 NOTE — PROGRESS NOTES
Assessment and Plan:     Problem List Items Addressed This Visit    None     Visit Diagnoses     Medicare annual wellness visit, initial    -  Primary           Preventive health issues were discussed with patient, and age appropriate screening tests were ordered as noted in patient's After Visit Summary  Personalized health advice and appropriate referrals for health education or preventive services given if needed, as noted in patient's After Visit Summary  History of Present Illness:     Patient presents for Welcome to Medicare visit       Patient Care Team:  Linette Jain MD as PCP - Merary Mcgregor MD (Cardiology)  Aide Saldana MD (Nephrology)  Eliane Chong MD (Gastroenterology)  Alondra Le (Obstetrics and Gynecology)  Ines Wilks MD (Pain Medicine)     Review of Systems:     Review of Systems   Problem List:     Patient Active Problem List   Diagnosis    Other chest pain    Essential hypertension    Biceps tendinitis of right shoulder    Chronic diastolic CHF (congestive heart failure) ([facility])    Closed head injury    Diastolic dysfunction    Dyspnea on exertion    GERD without esophagitis    Headache, worsening    Lateral epicondylitis of right elbow    Low serum vitamin B12    Lumbar disc herniation with radiculopathy    Peripheral edema    Peripheral neuropathy    B12 deficiency    Osteoporosis    Chronic left shoulder pain    BMI 36 0-36 9,adult    A-fib ([facility] )    Ambulatory dysfunction    Anxiety    Junctional bradycardia    Stage 3a chronic kidney disease ([facility] )    Single kidney    Vitamin D deficiency    CAD (coronary artery disease)    H/O left nephrectomy    Back pain with right-sided radiculopathy    H/O: rheumatic fever    Osteoarthritis    Peripheral venous insufficiency    Sciatica    Renal cyst    Parkinson's disease ([address] )    LUPE (acute kidney injury) ([address] )    Other specified inflammatory spondylopathies, lumbosacral region ([address] )    Constipation    Abnormal stress test    Fecal incontinence    Ground glass opacity present on imaging of lung    NSTEMI (non-ST elevated myocardial infarction) (HCC)    Prediabetes    Prolapse of vaginal walls    Anemia      Past Medical and Surgical History:     Past Medical History:   Diagnosis Date    Hypercholesteremia     Hypertension     IBS (irritable bowel syndrome)     Rheumatic fever     Skin lesion      Past Surgical History:   Procedure Laterality Date    ANKLE FRACTURE SURGERY      LAST ASSESSED [id number]    HYSTERECTOMY      KIDNEY SURGERY      TONSILLECTOMY        Family History:     Family History   Problem Relation Age of Onset    Hypertension Mother     Asthma Father     Cancer Sister       Social History:     Social History     Socioeconomic History    Marital status:       Spouse name: None    Number of children: None    Years of education: None    Highest education level: None   Occupational History    None   Tobacco Use    Smoking status: Never Smoker    Smokeless tobacco: Never Used   Substance and Sexual Activity    Alcohol use: No    Drug use: No    Sexual activity: None   Other Topics Concern    None   Social History Narrative    None     Social Determinants of Health     Financial Resource Strain: Not on file   Food Insecurity: Not on file   Transportation Needs: Not on file   Physical Activity: Not on file   Stress: Not on file   Social Connections: Not on file   Intimate Partner Violence: Not on file   Housing Stability: Not on file      Medications and Allergies:     Current Outpatient Medications   Medication Sig Dispense Refill    acetaminophen (TYLENOL) 500 mg tablet Take 500 mg by mouth every 6 (six) hours as needed        ascorbic acid (VITAMIN C) 500 MG tablet Take 1 tablet (500 mg total) by mouth daily 90 tablet 3    bisacodyl (DULCOLAX) 10 mg suppository Insert 10 mg into the rectum if needed      cholecalciferol (VITAMIN D3) 1,000 units tablet Take 1 tablet (1,000 Units total) by mouth daily 90 tablet 1    clopidogrel (PLAVIX) 75 mg tablet Take 1 tablet (75 mg total) by mouth daily 90 tablet 1    Eliquis 2 5 MG Take 1 tablet (2 5 mg total) by mouth 2 (two) times a day 60 tablet 1    ezetimibe (ZETIA) 10 mg tablet Take 1 tablet (10 mg total) by mouth daily 90 tablet 1    fluticasone (FLONASE) 50 mcg/act nasal spray 2 sprays into each nostril daily as needed        furosemide (LASIX) 20 mg tablet Take 1 tablet (20 mg total) by mouth 2 (two) times a day 60 tablet 0    hydrocortisone (ANUSOL-HC) 2 5 % rectal cream Apply topically daily at bedtime 28 g 1    isosorbide mononitrate (IMDUR) 30 mg 24 hr tablet take 1 tablet by mouth once daily 30 tablet 5    lansoprazole (PREVACID) 30 mg capsule Take 1 capsule (30 mg total) by mouth daily 90 capsule 3    losartan (COZAAR) 25 mg tablet Take 25 mg by mouth in the morning and 25 mg at noon        nitroglycerin (NITROSTAT) 0 4 mg SL tablet Place 0 4 mg under the tongue every 5 (five) minutes as needed prn       omega-3-acid ethyl esters (LOVAZA) 1 g capsule Take 1 g by mouth daily        potassium chloride (MICRO-K) 10 MEQ CR capsule Take 10 mEq by mouth daily        zinc sulfate (ZINCATE) 220 mg capsule Take 1 capsule (220 mg total) by mouth daily 90 capsule 3    gabapentin (NEURONTIN) 100 mg capsule 1 in AM and 2 at night (Patient not taking: Reported on 5/23/2022) 270 capsule 0     Current Facility-Administered Medications   Medication Dose Route Frequency Provider Last Rate Last Admin    cyanocobalamin injection 1,000 mcg  1,000 mcg Intramuscular Q30 Days Luna Mora MD   1,000 mcg at 02/19/18 1008     Allergies   Allergen Reactions    Amoxicillin Hives    Ciprofloxacin Hives    Erythromycin Hives    Statins Other (See Comments)     unknown    Amoxicillin-Pot Clavulanate Other (See Comments)     pt does not remember    Clindamycin Other (See Comments)     PT DOES NOT RECALL REACTION        Immunizations:     Immunization History   Administered Date(s) Administered    COVID-19 MODERNA VACC 0 5 ML IM 03/16/2021, 04/13/2021    Influenza, high dose seasonal 0 7 mL 10/09/2018, 10/22/2019, 10/25/2021    Pneumococcal Conjugate 13-Valent 10/09/2018    Pneumococcal Polysaccharide PPV23 11/14/2016      Health Maintenance:         Topic Date Due    Colorectal Cancer Screening  05/28/2022         Topic Date Due    COVID-19 Vaccine (3 - Booster for Wallace Radish series) 09/13/2021      Medicare Screening Tests and Risk Assessments:     Mk Lopez is here for her Subsequent Wellness visit  Last Medicare Wellness visit information reviewed, patient interviewed and updates made to the record today  Health Risk Assessment:   Patient rates overall health as fair  Patient feels that their physical health rating is much worse  Patient is satisfied with their life  Eyesight was rated as much worse  Hearing was rated as slightly worse  Patient feels that their emotional and mental health rating is much better  Patients states they are never, rarely angry  Patient states they are often unusually tired/fatigued  Pain experienced in the last 7 days has been a lot  Patient's pain rating has been 10/10  Patient states that she has experienced no weight loss or gain in last 6 months  Depression Screening:   PHQ-2 Score: 0      Fall Risk Screening: In the past year, patient has experienced: no history of falling in past year      Urinary Incontinence Screening:   Patient has leaked urine accidently in the last six months  Home Safety:  Patient does not have trouble with stairs inside or outside of their home  Patient has working smoke alarms and has no working carbon monoxide detector  Home safety hazards include: none  Medications:   Patient is not currently taking any over-the-counter supplements  Patient is able to manage medications       Activities of Daily Living (ADLs)/Instrumental Activities of Daily Living (IADLs):   Walk and transfer into and out of bed and chair?: Yes  Dress and groom yourself?: Yes    Bathe or shower yourself?: Yes    Feed yourself? Yes  Do your laundry/housekeeping?: No  Manage your money, pay your bills and track your expenses?: No  Make your own meals?: No    Do your own shopping?: No    ADL comments: Pt said Sheba take care of laundry, money, bills, meals and do shopping for her    Previous Hospitalizations:   Any hospitalizations or ED visits within the last 12 months?: Yes      Hospitalization Comments: Back issues, CHF    Advance Care Planning:   Living will: Yes    Durable POA for healthcare: Yes    Advanced directive: Yes      Cognitive Screening:   Provider or family/friend/caregiver concerned regarding cognition?: No    PREVENTIVE SCREENINGS      Cardiovascular Screening:    General: Screening Current      Diabetes Screening:     General: Screening Current      Colorectal Cancer Screening:     General: Screening Current      Breast Cancer Screening:     General: Screening Current      Cervical Cancer Screening:    General: Screening Not Indicated      Osteoporosis Screening:    General: Screening Not Indicated and History Osteoporosis      Abdominal Aortic Aneurysm (AAA) Screening:        General: Screening Not Indicated      Lung Cancer Screening:     General: Screening Not Indicated      Hepatitis C Screening:    General: Screening Not Indicated    Screening, Brief Intervention, and Referral to Treatment (SBIRT)    Screening  Typical number of drinks in a day: 0  Typical number of drinks in a week: 0  Interpretation: Low risk drinking behavior      Single Item Drug Screening:  How often have you used an illegal drug (including marijuana) or a prescription medication for non-medical reasons in the past year? never    Single Item Drug Screen Score: 0  Interpretation: Negative screen for possible drug use disorder    No exam data present     Physical Exam:     BP 116/70 (BP Location: Left arm, Patient Position: Standing, Cuff Size: Large)   Pulse 77   Temp 98 °F (36 7 °C) (Temporal)   Resp 18   Ht 4' 7" (1 397 m)   Wt 72 1 kg (159 lb)   SpO2 94%   BMI 36 96 kg/m²     Physical Exam     Ana Carrion MD

## 2022-05-23 NOTE — PROGRESS NOTES
Assessment/Plan:    Essential hypertension  Stable  Continue current  Chronic diastolic CHF (congestive heart failure) (HCC)  Wt Readings from Last 3 Encounters:   05/23/22 72 1 kg (159 lb)   04/12/22 72 1 kg (159 lb)   03/08/22 71 7 kg (158 lb)       Stable  Continue current  A-fib (HCC)  Stable  Continue current  CAD (coronary artery disease)  Stable  Continue current  NSTEMI (non-ST elevated myocardial infarction) ([place] [address] )  Stable  Continue current  Lumbar disc herniation with radiculopathy  Stable  Continue current  Other specified inflammatory spondylopathies, lumbosacral region ([place] [address] )  Continue home PT       Diagnoses and all orders for this visit:    Medicare annual wellness visit, initial  -     Lipid panel; Future  -     Comprehensive metabolic panel; Future  -     HEMOGLOBIN A1C W/ EAG ESTIMATION; Future  -     TSH, 3rd generation; Future  -     CBC and differential; Future  -     NT-BNP PRO; Future    Chronic diastolic CHF (congestive heart failure) (HCC)  -     Lipid panel; Future  -     Comprehensive metabolic panel; Future  -     HEMOGLOBIN A1C W/ EAG ESTIMATION; Future  -     TSH, 3rd generation; Future  -     CBC and differential; Future  -     NT-BNP PRO; Future  -     lansoprazole (PREVACID) 30 mg capsule; Take 1 capsule (30 mg total) by mouth in the morning  Obesity, morbid ([place] [address] )  -     Lipid panel; Future  -     Comprehensive metabolic panel; Future  -     HEMOGLOBIN A1C W/ EAG ESTIMATION; Future  -     TSH, 3rd generation; Future  -     CBC and differential; Future  -     NT-BNP PRO; Future  -     lansoprazole (PREVACID) 30 mg capsule; Take 1 capsule (30 mg total) by mouth in the morning  Atrial fibrillation, unspecified type ([place] [address] )  -     Lipid panel; Future  -     Comprehensive metabolic panel; Future  -     HEMOGLOBIN A1C W/ EAG ESTIMATION; Future  -     TSH, 3rd generation; Future  -     CBC and differential; Future  -     NT-BNP PRO;  Future  -     lansoprazole (PREVACID) 30 mg capsule; Take 1 capsule (30 mg total) by mouth in the morning  Coronary artery disease involving native heart, unspecified vessel or lesion type, unspecified whether angina present  -     Lipid panel; Future  -     Comprehensive metabolic panel; Future  -     HEMOGLOBIN A1C W/ EAG ESTIMATION; Future  -     TSH, 3rd generation; Future  -     CBC and differential; Future  -     NT-BNP PRO; Future  -     lansoprazole (PREVACID) 30 mg capsule; Take 1 capsule (30 mg total) by mouth in the morning  Other specified inflammatory spondylopathies, lumbosacral region ([facility] )  -     NT-BNP PRO; Future  -     lansoprazole (PREVACID) 30 mg capsule; Take 1 capsule (30 mg total) by mouth in the morning  Medicare annual wellness visit, subsequent    GERD without esophagitis    Essential hypertension  -     lansoprazole (PREVACID) 30 mg capsule; Take 1 capsule (30 mg total) by mouth in the morning  Lumbar disc herniation with radiculopathy    Other polyneuropathy    Back pain with right-sided radiculopathy    Stage 3a chronic kidney disease (HCC)  -     lansoprazole (PREVACID) 30 mg capsule; Take 1 capsule (30 mg total) by mouth in the morning  Single kidney  -     lansoprazole (PREVACID) 30 mg capsule; Take 1 capsule (30 mg total) by mouth in the morning  Vitamin D deficiency  -     lansoprazole (PREVACID) 30 mg capsule; Take 1 capsule (30 mg total) by mouth in the morning  Junctional bradycardia  -     lansoprazole (PREVACID) 30 mg capsule; Take 1 capsule (30 mg total) by mouth in the morning  Parkinson's disease ([place] [address] )  -     lansoprazole (PREVACID) 30 mg capsule; Take 1 capsule (30 mg total) by mouth in the morning  Other orders  -     bisacodyl (DULCOLAX) 10 mg suppository; Insert 10 mg into the rectum if needed  -     losartan (COZAAR) 25 mg tablet; Take 25 mg by mouth in the morning and 25 mg at noon  Subjective:      Patient ID: Jazlyn Horowitz is a 78 y o  female      She has Afib   She is anticoagulated with Eliquis adjusted for CKD  She has no palpitations and no near syncope or syncope  Her BP is at goal on her current regimen  She has no CP or SOB  She has no HA or vision changes  She has chronic CHF  Her weight is stable and she has no PND or orthopnea  She has spinal stenosis  She has ambulatory dysfunction secondary to this  She has a walker and is getting home PT  The following portions of the patient's history were reviewed and updated as appropriate:   She  has a past medical history of Hypercholesteremia, Hypertension, IBS (irritable bowel syndrome), Rheumatic fever, and Skin lesion    She   Patient Active Problem List    Diagnosis Date Noted    Medicare annual wellness visit, subsequent 05/23/2022    Anemia 03/08/2022    Ground glass opacity present on imaging of lung 07/09/2021    Prediabetes 07/09/2021    NSTEMI (non-ST elevated myocardial infarction) ([facility] ) 06/17/2021    Fecal incontinence 03/23/2021    Parkinson's disease ([facility] ) 12/01/2020    LUPE (acute kidney injury) ([facility] ) 12/01/2020    Other specified inflammatory spondylopathies, lumbosacral region ([facility] ) 12/01/2020    Constipation 12/01/2020    Renal cyst 10/23/2020    Back pain with right-sided radiculopathy 03/11/2020    CAD (coronary artery disease) 12/17/2019    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    Single kidney 09/03/2019    Vitamin D deficiency 09/03/2019    Anxiety 08/20/2019    Abnormal stress test 08/20/2019    Ambulatory dysfunction 07/26/2019    A-fib [facility] ) 07/23/2019    Stage 3a chronic kidney disease ([facility] ) 07/23/2019    Junctional bradycardia 07/17/2019    BMI 36 0-36 9,adult 04/30/2019    Chronic left shoulder pain 10/09/2018    H/O: rheumatic fever 07/12/2018    Sciatica 07/12/2018    Peripheral neuropathy 02/13/2018    B12 deficiency 02/13/2018    Osteoporosis 02/13/2018    Closed head injury 12/20/2017    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  Headache, worsening 08/14/2017    Chronic diastolic CHF (congestive heart failure) ([facility] ) 05/15/2017    Low serum vitamin B12 02/13/2017    Peripheral edema 02/13/2017    Other chest pain 08/01/2016    Biceps tendinitis of right shoulder 13/36/2852    Diastolic dysfunction 56/86/0159    Lateral epicondylitis of right elbow 04/11/2016    Essential hypertension 03/14/2016    Dyspnea on exertion 03/14/2016    GERD without esophagitis 03/14/2016    Osteoarthritis 09/17/2012    Peripheral venous insufficiency 09/17/2012    Prolapse of vaginal walls 12/30/2003     She  has a past surgical history that includes Hysterectomy; Kidney surgery; Tonsillectomy; and Ankle fracture surgery  Her family history includes Asthma in her father; Cancer in her sister; Hypertension in her mother  She  reports that she has never smoked  She has never used smokeless tobacco  She reports that she does not drink alcohol and does not use drugs    Current Outpatient Medications   Medication Sig Dispense Refill    acetaminophen (TYLENOL) 500 mg tablet Take 500 mg by mouth every 6 (six) hours as needed        ascorbic acid (VITAMIN C) 500 MG tablet Take 1 tablet (500 mg total) by mouth daily 90 tablet 3    bisacodyl (DULCOLAX) 10 mg suppository Insert 10 mg into the rectum if needed      cholecalciferol (VITAMIN D3) 1,000 units tablet Take 1 tablet (1,000 Units total) by mouth daily 90 tablet 1    clopidogrel (PLAVIX) 75 mg tablet Take 1 tablet (75 mg total) by mouth daily 90 tablet 1    Eliquis 2 5 MG Take 1 tablet (2 5 mg total) by mouth 2 (two) times a day 60 tablet 1    ezetimibe (ZETIA) 10 mg tablet Take 1 tablet (10 mg total) by mouth daily 90 tablet 1    fluticasone (FLONASE) 50 mcg/act nasal spray 2 sprays into each nostril daily as needed        furosemide (LASIX) 20 mg tablet Take 1 tablet (20 mg total) by mouth 2 (two) times a day 60 tablet 0    hydrocortisone (ANUSOL-HC) 2 5 % rectal cream Apply topically daily at bedtime 28 g 1    isosorbide mononitrate (IMDUR) 30 mg 24 hr tablet take 1 tablet by mouth once daily 30 tablet 5    losartan (COZAAR) 25 mg tablet Take 25 mg by mouth in the morning and 25 mg at noon   nitroglycerin (NITROSTAT) 0 4 mg SL tablet Place 0 4 mg under the tongue every 5 (five) minutes as needed prn       omega-3-acid ethyl esters (LOVAZA) 1 g capsule Take 1 g by mouth daily        potassium chloride (MICRO-K) 10 MEQ CR capsule Take 10 mEq by mouth daily        zinc sulfate (ZINCATE) 220 mg capsule Take 1 capsule (220 mg total) by mouth daily 90 capsule 3    gabapentin (NEURONTIN) 100 mg capsule 1 in AM and 2 at night (Patient not taking: Reported on 5/23/2022) 270 capsule 0    lansoprazole (PREVACID) 30 mg capsule Take 1 capsule (30 mg total) by mouth in the morning   90 capsule 3     Current Facility-Administered Medications   Medication Dose Route Frequency Provider Last Rate Last Admin    cyanocobalamin injection 1,000 mcg  1,000 mcg Intramuscular Q30 Days Tylor Dodd MD   1,000 mcg at 02/19/18 1008     Current Outpatient Medications on File Prior to Visit   Medication Sig    acetaminophen (TYLENOL) 500 mg tablet Take 500 mg by mouth every 6 (six) hours as needed      ascorbic acid (VITAMIN C) 500 MG tablet Take 1 tablet (500 mg total) by mouth daily    bisacodyl (DULCOLAX) 10 mg suppository Insert 10 mg into the rectum if needed    cholecalciferol (VITAMIN D3) 1,000 units tablet Take 1 tablet (1,000 Units total) by mouth daily    clopidogrel (PLAVIX) 75 mg tablet Take 1 tablet (75 mg total) by mouth daily    Eliquis 2 5 MG Take 1 tablet (2 5 mg total) by mouth 2 (two) times a day    ezetimibe (ZETIA) 10 mg tablet Take 1 tablet (10 mg total) by mouth daily    fluticasone (FLONASE) 50 mcg/act nasal spray 2 sprays into each nostril daily as needed      furosemide (LASIX) 20 mg tablet Take 1 tablet (20 mg total) by mouth 2 (two) times a day    hydrocortisone (ANUSOL-HC) 2 5 % rectal cream Apply topically daily at bedtime    isosorbide mononitrate (IMDUR) 30 mg 24 hr tablet take 1 tablet by mouth once daily    losartan (COZAAR) 25 mg tablet Take 25 mg by mouth in the morning and 25 mg at noon   nitroglycerin (NITROSTAT) 0 4 mg SL tablet Place 0 4 mg under the tongue every 5 (five) minutes as needed prn     omega-3-acid ethyl esters (LOVAZA) 1 g capsule Take 1 g by mouth daily      potassium chloride (MICRO-K) 10 MEQ CR capsule Take 10 mEq by mouth daily      zinc sulfate (ZINCATE) 220 mg capsule Take 1 capsule (220 mg total) by mouth daily    [DISCONTINUED] lansoprazole (PREVACID) 30 mg capsule Take 1 capsule (30 mg total) by mouth daily    gabapentin (NEURONTIN) 100 mg capsule 1 in AM and 2 at night (Patient not taking: Reported on 5/23/2022)     Current Facility-Administered Medications on File Prior to Visit   Medication    cyanocobalamin injection 1,000 mcg     She is allergic to amoxicillin, ciprofloxacin, erythromycin, statins, amoxicillin-pot clavulanate, and clindamycin       Review of Systems   All other systems reviewed and are negative  Objective:      /70 (BP Location: Left arm, Patient Position: Standing, Cuff Size: Large)   Pulse 77   Temp 98 °F (36 7 °C) (Temporal)   Resp 18   Ht 4' 7" (1 397 m)   Wt 72 1 kg (159 lb)   SpO2 94%   BMI 36 96 kg/m²          Physical Exam  Vitals and nursing note reviewed  Constitutional:       Appearance: Normal appearance  She is obese  Cardiovascular:      Rate and Rhythm: Normal rate and regular rhythm  Pulses: Normal pulses  Heart sounds: Normal heart sounds  Pulmonary:      Effort: Pulmonary effort is normal       Breath sounds: Normal breath sounds  Abdominal:      General: Abdomen is flat  Bowel sounds are normal       Palpations: Abdomen is soft  Musculoskeletal:         General: Normal range of motion  Cervical back: Normal range of motion and neck supple  Skin:     General: Skin is warm and dry  Capillary Refill: Capillary refill takes less than 2 seconds  Neurological:      General: No focal deficit present  Mental Status: She is alert and oriented to person, place, and time  Mental status is at baseline  Psychiatric:         Mood and Affect: Mood normal          Behavior: Behavior normal          Thought Content:  Thought content normal          Judgment: Judgment normal

## 2022-05-23 NOTE — PATIENT INSTRUCTIONS
Medicare Preventive Visit Patient Instructions  Thank you for completing your Welcome to Medicare Visit or Medicare Annual Wellness Visit today  Your next wellness visit will be due in one year (5/24/2023)  The screening/preventive services that you may require over the next 5-10 years are detailed below  Some tests may not apply to you based off risk factors and/or age  Screening tests ordered at today's visit but not completed yet may show as past due  Also, please note that scanned in results may not display below  Preventive Screenings:  Service Recommendations Previous Testing/Comments   Colorectal Cancer Screening  * Colonoscopy    * Fecal Occult Blood Test (FOBT)/Fecal Immunochemical Test (FIT)  * Fecal DNA/Cologuard Test  * Flexible Sigmoidoscopy Age: 54-65 years old   Colonoscopy: every 10 years (may be performed more frequently if at higher risk)  OR  FOBT/FIT: every 1 year  OR  Cologuard: every 3 years  OR  Sigmoidoscopy: every 5 years  Screening may be recommended earlier than age 48 if at higher risk for colorectal cancer  Also, an individualized decision between you and your healthcare provider will decide whether screening between the ages of 74-80 would be appropriate  Colonoscopy: 05/28/2019  FOBT/FIT: Not on file  Cologuard: Not on file  Sigmoidoscopy: Not on file          Breast Cancer Screening Age: 36 years old  Frequency: every 1-2 years  Not required if history of left and right mastectomy Mammogram: 04/03/2018        Cervical Cancer Screening Between the ages of 21-29, pap smear recommended once every 3 years  Between the ages of 33-67, can perform pap smear with HPV co-testing every 5 years     Recommendations may differ for women with a history of total hysterectomy, cervical cancer, or abnormal pap smears in past  Pap Smear: Not on file        Hepatitis C Screening Once for adults born between [facility]  More frequently in patients at high risk for Hepatitis C Hep C Antibody: Not on file        Diabetes Screening 1-2 times per year if you're at risk for diabetes or have pre-diabetes Fasting glucose: 104 mg/dL   A1C: 6 2 %        Cholesterol Screening Once every 5 years if you don't have a lipid disorder  May order more often based on risk factors  Lipid panel: 10/27/2021          Other Preventive Screenings Covered by Medicare:  1  Abdominal Aortic Aneurysm (AAA) Screening: covered once if your at risk  You're considered to be at risk if you have a family history of AAA  2  Lung Cancer Screening: covers low dose CT scan once per year if you meet all of the following conditions: (1) Age 50-69; (2) No signs or symptoms of lung cancer; (3) Current smoker or have quit smoking within the last 15 years; (4) You have a tobacco smoking history of at least 30 pack years (packs per day multiplied by number of years you smoked); (5) You get a written order from a healthcare provider  3  Glaucoma Screening: covered annually if you're considered high risk: (1) You have diabetes OR (2) Family history of glaucoma OR (3)  aged 48 and older OR (3)  American aged 72 and older  3  Osteoporosis Screening: covered every 2 years if you meet one of the following conditions: (1) You're estrogen deficient and at risk for osteoporosis based off medical history and other findings; (2) Have a vertebral abnormality; (3) On glucocorticoid therapy for more than 3 months; (4) Have primary hyperparathyroidism; (5) On osteoporosis medications and need to assess response to drug therapy  · Last bone density test (DXA Scan): 04/27/2018  5  HIV Screening: covered annually if you're between the age of 12-76  Also covered annually if you are younger than 13 and older than 72 with risk factors for HIV infection  For pregnant patients, it is covered up to 3 times per pregnancy      Immunizations:  Immunization Recommendations   Influenza Vaccine Annual influenza vaccination during flu season is recommended for all persons aged >= 6 months who do not have contraindications   Pneumococcal Vaccine (Prevnar and Pneumovax)  * Prevnar = PCV13  * Pneumovax = PPSV23   Adults 25-60 years old: 1-3 doses may be recommended based on certain risk factors  Adults 72 years old: Prevnar (PCV13) vaccine recommended followed by Pneumovax (PPSV23) vaccine  If already received PPSV23 since turning 65, then PCV13 recommended at least one year after PPSV23 dose  Hepatitis B Vaccine 3 dose series if at intermediate or high risk (ex: diabetes, end stage renal disease, liver disease)   Tetanus (Td) Vaccine - COST NOT COVERED BY MEDICARE PART B Following completion of primary series, a booster dose should be given every 10 years to maintain immunity against tetanus  Td may also be given as tetanus wound prophylaxis  Tdap Vaccine - COST NOT COVERED BY MEDICARE PART B Recommended at least once for all adults  For pregnant patients, recommended with each pregnancy  Shingles Vaccine (Shingrix) - COST NOT COVERED BY MEDICARE PART B  2 shot series recommended in those aged 48 and above     Health Maintenance Due:      Topic Date Due    Colorectal Cancer Screening  05/28/2022     Immunizations Due:      Topic Date Due    COVID-19 Vaccine (3 - Booster for [place] Batch series) 09/13/2021     Advance Directives   What are advance directives? Advance directives are legal documents that state your wishes and plans for medical care  These plans are made ahead of time in case you lose your ability to make decisions for yourself  Advance directives can apply to any medical decision, such as the treatments you want, and if you want to donate organs  What are the types of advance directives? There are many types of advance directives, and each state has rules about how to use them  You may choose a combination of any of the following:  · Living will: This is a written record of the treatment you want   You can also choose which treatments you do not want, which to limit, and which to stop at a certain time  This includes surgery, medicine, IV fluid, and tube feedings  · Durable power of  for healthcare [facility]): This is a written record that states who you want to make healthcare choices for you when you are unable to make them for yourself  This person, called a proxy, is usually a family member or a friend  You may choose more than 1 proxy  · Do not resuscitate (DNR) order:  A DNR order is used in case your heart stops beating or you stop breathing  It is a request not to have certain forms of treatment, such as CPR  A DNR order may be included in other types of advance directives  · Medical directive: This covers the care that you want if you are in a coma, near death, or unable to make decisions for yourself  You can list the treatments you want for each condition  Treatment may include pain medicine, surgery, blood transfusions, dialysis, IV or tube feedings, and a ventilator (breathing machine)  · Values history: This document has questions about your views, beliefs, and how you feel and think about life  This information can help others choose the care that you would choose  Why are advance directives important? An advance directive helps you control your care  Although spoken wishes may be used, it is better to have your wishes written down  Spoken wishes can be misunderstood, or not followed  Treatments may be given even if you do not want them  An advance directive may make it easier for your family to make difficult choices about your care  Weight Management   Why it is important to manage your weight:  Being overweight increases your risk of health conditions such as heart disease, high blood pressure, type 2 diabetes, and certain types of cancer  It can also increase your risk for osteoarthritis, sleep apnea, and other respiratory problems  Aim for a slow, steady weight loss   Even a small amount of weight loss can lower your risk of health problems  How to lose weight safely:  A safe and healthy way to lose weight is to eat fewer calories and get regular exercise  You can lose up about 1 pound a week by decreasing the number of calories you eat by 500 calories each day  Healthy meal plan for weight management:  A healthy meal plan includes a variety of foods, contains fewer calories, and helps you stay healthy  A healthy meal plan includes the following:  · Eat whole-grain foods more often  A healthy meal plan should contain fiber  Fiber is the part of grains, fruits, and vegetables that is not broken down by your body  Whole-grain foods are healthy and provide extra fiber in your diet  Some examples of whole-grain foods are whole-wheat breads and pastas, oatmeal, brown rice, and bulgur  · Eat a variety of vegetables every day  Include dark, leafy greens such as spinach, kale, remi greens, and mustard greens  Eat yellow and orange vegetables such as carrots, sweet potatoes, and winter squash  · Eat a variety of fruits every day  Choose fresh or canned fruit (canned in its own juice or light syrup) instead of juice  Fruit juice has very little or no fiber  · Eat low-fat dairy foods  Drink fat-free (skim) milk or 1% milk  Eat fat-free yogurt and low-fat cottage cheese  Try low-fat cheeses such as mozzarella and other reduced-fat cheeses  · Choose meat and other protein foods that are low in fat  Choose beans or other legumes such as split peas or lentils  Choose fish, skinless poultry (chicken or turkey), or lean cuts of red meat (beef or pork)  Before you cook meat or poultry, cut off any visible fat  · Use less fat and oil  Try baking foods instead of frying them  Add less fat, such as margarine, sour cream, regular salad dressing and mayonnaise to foods  Eat fewer high-fat foods  Some examples of high-fat foods include french fries, doughnuts, ice cream, and cakes  · Eat fewer sweets    Limit foods and drinks that are high in sugar  This includes candy, cookies, regular soda, and sweetened drinks  Exercise:  Exercise at least 30 minutes per day on most days of the week  Some examples of exercise include walking, biking, dancing, and swimming  You can also fit in more physical activity by taking the stairs instead of the elevator or parking farther away from stores  Ask your healthcare provider about the best exercise plan for you  © Copyright [employer] 2018 Information is for End User's use only and may not be sold, redistributed or otherwise used for commercial purposes   All illustrations and images included in CareNotes® are the copyrighted property of A D A M , Inc  or [address] [place] [address]

## 2022-06-01 LAB — HBA1C MFR BLD HPLC: 6.5 %

## 2022-06-09 ENCOUNTER — TELEPHONE (OUTPATIENT)
Dept: FAMILY MEDICINE CLINIC | Facility: CLINIC | Age: 82
End: 2022-06-09

## 2022-06-09 DIAGNOSIS — R39.9 UTI SYMPTOMS: Primary | ICD-10-CM

## 2022-06-11 ENCOUNTER — NURSE TRIAGE (OUTPATIENT)
Dept: OTHER | Facility: OTHER | Age: 82
End: 2022-06-11

## 2022-06-11 NOTE — TELEPHONE ENCOUNTER
Regarding: UTI Medication  ----- Message from Cabrera Cole sent at 6/11/2022 10:56 AM EDT -----  "My patient has a UTI   The nurse was there yesterday and all her vitals were good, but the medication for her UTI was not called in "

## 2022-06-11 NOTE — TELEPHONE ENCOUNTER
Caregiver is calling pt seems disoriented, and not herself  Yesterday was weak and she does not seem like herself  Hoping to get her antibiotics but she has not had the urine sample collected yet   Pt caregiver is not on the communication consent, said calling the pt will not be beneficial

## 2022-06-11 NOTE — TELEPHONE ENCOUNTER
Pt asked to call her daughter  She is having difficulty speaking  Dtr in agreeance to call EMS  Left them connected and called spoke to dispatch #[id number]  Reason for Disposition   Stroke suspected (e g , sudden onset of weakness of the face, arm or leg on one side of the body)    Answer Assessment - Initial Assessment Questions  1  ACUTE COMPLAINT: "What is the main problem?" "Tell me what happened?"      UTI no antibiotics called in- pt not able to speak per caregiver is not able to reach family and pt does not sound well      Protocols used: 035 [id number]

## 2022-06-15 ENCOUNTER — APPOINTMENT (EMERGENCY)
Dept: CT IMAGING | Facility: HOSPITAL | Age: 82
DRG: 641 | End: 2022-06-15
Payer: COMMERCIAL

## 2022-06-15 ENCOUNTER — HOSPITAL ENCOUNTER (INPATIENT)
Facility: HOSPITAL | Age: 82
LOS: 4 days | Discharge: NURSING, REHAB OR LONG-TERM CARE | DRG: 641 | End: 2022-06-20
Attending: EMERGENCY MEDICINE | Admitting: FAMILY MEDICINE
Payer: COMMERCIAL

## 2022-06-15 ENCOUNTER — APPOINTMENT (EMERGENCY)
Dept: RADIOLOGY | Facility: HOSPITAL | Age: 82
DRG: 641 | End: 2022-06-15
Payer: COMMERCIAL

## 2022-06-15 DIAGNOSIS — N28.1 RENAL CYST: ICD-10-CM

## 2022-06-15 DIAGNOSIS — R53.81 PHYSICAL DECONDITIONING: ICD-10-CM

## 2022-06-15 DIAGNOSIS — I25.10 CORONARY ARTERY DISEASE INVOLVING NATIVE HEART, UNSPECIFIED VESSEL OR LESION TYPE, UNSPECIFIED WHETHER ANGINA PRESENT: ICD-10-CM

## 2022-06-15 DIAGNOSIS — R26.2 AMBULATORY DYSFUNCTION: Primary | ICD-10-CM

## 2022-06-15 DIAGNOSIS — N30.00 ACUTE CYSTITIS WITHOUT HEMATURIA: ICD-10-CM

## 2022-06-15 LAB
ALBUMIN SERPL BCP-MCNC: 3 G/DL (ref 3.5–5)
ALBUMIN SERPL BCP-MCNC: 3.2 G/DL (ref 3.5–5)
ALP SERPL-CCNC: 85 U/L (ref 46–116)
ALP SERPL-CCNC: 87 U/L (ref 46–116)
ALT SERPL W P-5'-P-CCNC: 17 U/L (ref 12–78)
ALT SERPL W P-5'-P-CCNC: 17 U/L (ref 12–78)
ANION GAP SERPL CALCULATED.3IONS-SCNC: 10 MMOL/L (ref 4–13)
ANION GAP SERPL CALCULATED.3IONS-SCNC: 8 MMOL/L (ref 4–13)
APTT PPP: 32 SECONDS (ref 23–37)
AST SERPL W P-5'-P-CCNC: 14 U/L (ref 5–45)
AST SERPL W P-5'-P-CCNC: 21 U/L (ref 5–45)
BASE EX.OXY STD BLDV CALC-SCNC: 77.9 % (ref 60–80)
BASE EXCESS BLDV CALC-SCNC: -1.5 MMOL/L
BASOPHILS # BLD AUTO: 0.03 THOUSANDS/ΜL (ref 0–0.1)
BASOPHILS NFR BLD AUTO: 0 % (ref 0–1)
BILIRUB SERPL-MCNC: 0.27 MG/DL (ref 0.2–1)
BILIRUB SERPL-MCNC: 0.32 MG/DL (ref 0.2–1)
BILIRUB UR QL STRIP: NEGATIVE
BUN SERPL-MCNC: 35 MG/DL (ref 5–25)
BUN SERPL-MCNC: 35 MG/DL (ref 5–25)
CALCIUM ALBUM COR SERPL-MCNC: 9.2 MG/DL (ref 8.3–10.1)
CALCIUM ALBUM COR SERPL-MCNC: 9.2 MG/DL (ref 8.3–10.1)
CALCIUM SERPL-MCNC: 8.4 MG/DL (ref 8.3–10.1)
CALCIUM SERPL-MCNC: 8.6 MG/DL (ref 8.3–10.1)
CARDIAC TROPONIN I PNL SERPL HS: 6 NG/L
CHLORIDE SERPL-SCNC: 103 MMOL/L (ref 100–108)
CHLORIDE SERPL-SCNC: 104 MMOL/L (ref 100–108)
CLARITY UR: CLEAR
CO2 SERPL-SCNC: 25 MMOL/L (ref 21–32)
CO2 SERPL-SCNC: 26 MMOL/L (ref 21–32)
COLOR UR: YELLOW
CREAT SERPL-MCNC: 1.34 MG/DL (ref 0.6–1.3)
CREAT SERPL-MCNC: 1.39 MG/DL (ref 0.6–1.3)
EOSINOPHIL # BLD AUTO: 0.08 THOUSAND/ΜL (ref 0–0.61)
EOSINOPHIL NFR BLD AUTO: 1 % (ref 0–6)
ERYTHROCYTE [DISTWIDTH] IN BLOOD BY AUTOMATED COUNT: 16.8 % (ref 11.6–15.1)
FLUAV RNA RESP QL NAA+PROBE: NEGATIVE
FLUBV RNA RESP QL NAA+PROBE: NEGATIVE
GFR SERPL CREATININE-BSD FRML MDRD: 35 ML/MIN/1.73SQ M
GFR SERPL CREATININE-BSD FRML MDRD: 36 ML/MIN/1.73SQ M
GLUCOSE SERPL-MCNC: 113 MG/DL (ref 65–140)
GLUCOSE SERPL-MCNC: 128 MG/DL (ref 65–140)
GLUCOSE UR STRIP-MCNC: NEGATIVE MG/DL
HCO3 BLDV-SCNC: 23.5 MMOL/L (ref 24–30)
HCT VFR BLD AUTO: 37.5 % (ref 34.8–46.1)
HGB BLD-MCNC: 11.5 G/DL (ref 11.5–15.4)
HGB UR QL STRIP.AUTO: NEGATIVE
IMM GRANULOCYTES # BLD AUTO: 0.04 THOUSAND/UL (ref 0–0.2)
IMM GRANULOCYTES NFR BLD AUTO: 1 % (ref 0–2)
INR PPP: 1.1 (ref 0.84–1.19)
KETONES UR STRIP-MCNC: NEGATIVE MG/DL
LACTATE SERPL-SCNC: 1.1 MMOL/L (ref 0.5–2)
LEUKOCYTE ESTERASE UR QL STRIP: NEGATIVE
LYMPHOCYTES # BLD AUTO: 1.45 THOUSANDS/ΜL (ref 0.6–4.47)
LYMPHOCYTES NFR BLD AUTO: 20 % (ref 14–44)
MAGNESIUM SERPL-MCNC: 2.2 MG/DL (ref 1.6–2.6)
MCH RBC QN AUTO: 25.8 PG (ref 26.8–34.3)
MCHC RBC AUTO-ENTMCNC: 30.7 G/DL (ref 31.4–37.4)
MCV RBC AUTO: 84 FL (ref 82–98)
MONOCYTES # BLD AUTO: 0.71 THOUSAND/ΜL (ref 0.17–1.22)
MONOCYTES NFR BLD AUTO: 10 % (ref 4–12)
NEUTROPHILS # BLD AUTO: 4.95 THOUSANDS/ΜL (ref 1.85–7.62)
NEUTS SEG NFR BLD AUTO: 68 % (ref 43–75)
NITRITE UR QL STRIP: NEGATIVE
NRBC BLD AUTO-RTO: 0 /100 WBCS
NT-PROBNP SERPL-MCNC: 197 PG/ML
O2 CT BLDV-SCNC: 13.8 ML/DL
PCO2 BLDV: 40.9 MM HG (ref 42–50)
PH BLDV: 7.38 [PH] (ref 7.3–7.4)
PH UR STRIP.AUTO: 6 [PH]
PLATELET # BLD AUTO: 322 THOUSANDS/UL (ref 149–390)
PMV BLD AUTO: 11.7 FL (ref 8.9–12.7)
PO2 BLDV: 43.5 MM HG (ref 35–45)
POTASSIUM SERPL-SCNC: 4.8 MMOL/L (ref 3.5–5.3)
POTASSIUM SERPL-SCNC: 5 MMOL/L (ref 3.5–5.3)
PROCALCITONIN SERPL-MCNC: 0.07 NG/ML
PROT SERPL-MCNC: 6 G/DL (ref 6.4–8.2)
PROT SERPL-MCNC: 6.6 G/DL (ref 6.4–8.2)
PROT UR STRIP-MCNC: NEGATIVE MG/DL
PROTHROMBIN TIME: 14.1 SECONDS (ref 11.6–14.5)
RBC # BLD AUTO: 4.46 MILLION/UL (ref 3.81–5.12)
RSV RNA RESP QL NAA+PROBE: NEGATIVE
SARS-COV-2 RNA RESP QL NAA+PROBE: NEGATIVE
SODIUM SERPL-SCNC: 138 MMOL/L (ref 136–145)
SODIUM SERPL-SCNC: 138 MMOL/L (ref 136–145)
SP GR UR STRIP.AUTO: 1.01 (ref 1–1.03)
UROBILINOGEN UR QL STRIP.AUTO: 0.2 E.U./DL
WBC # BLD AUTO: 7.26 THOUSAND/UL (ref 4.31–10.16)

## 2022-06-15 PROCEDURE — 99285 EMERGENCY DEPT VISIT HI MDM: CPT

## 2022-06-15 PROCEDURE — 87040 BLOOD CULTURE FOR BACTERIA: CPT | Performed by: PHYSICIAN ASSISTANT

## 2022-06-15 PROCEDURE — 99285 EMERGENCY DEPT VISIT HI MDM: CPT | Performed by: PHYSICIAN ASSISTANT

## 2022-06-15 PROCEDURE — 85025 COMPLETE CBC W/AUTO DIFF WBC: CPT | Performed by: PHYSICIAN ASSISTANT

## 2022-06-15 PROCEDURE — 84484 ASSAY OF TROPONIN QUANT: CPT | Performed by: PHYSICIAN ASSISTANT

## 2022-06-15 PROCEDURE — 83605 ASSAY OF LACTIC ACID: CPT | Performed by: PHYSICIAN ASSISTANT

## 2022-06-15 PROCEDURE — 85730 THROMBOPLASTIN TIME PARTIAL: CPT | Performed by: PHYSICIAN ASSISTANT

## 2022-06-15 PROCEDURE — 71045 X-RAY EXAM CHEST 1 VIEW: CPT

## 2022-06-15 PROCEDURE — 96367 TX/PROPH/DG ADDL SEQ IV INF: CPT

## 2022-06-15 PROCEDURE — 84145 PROCALCITONIN (PCT): CPT | Performed by: PHYSICIAN ASSISTANT

## 2022-06-15 PROCEDURE — 93005 ELECTROCARDIOGRAM TRACING: CPT

## 2022-06-15 PROCEDURE — 0241U HB NFCT DS VIR RESP RNA 4 TRGT: CPT | Performed by: PHYSICIAN ASSISTANT

## 2022-06-15 PROCEDURE — 96365 THER/PROPH/DIAG IV INF INIT: CPT

## 2022-06-15 PROCEDURE — 96375 TX/PRO/DX INJ NEW DRUG ADDON: CPT

## 2022-06-15 PROCEDURE — 83735 ASSAY OF MAGNESIUM: CPT | Performed by: PHYSICIAN ASSISTANT

## 2022-06-15 PROCEDURE — 84145 PROCALCITONIN (PCT): CPT | Performed by: NURSE PRACTITIONER

## 2022-06-15 PROCEDURE — 80053 COMPREHEN METABOLIC PANEL: CPT | Performed by: PHYSICIAN ASSISTANT

## 2022-06-15 PROCEDURE — 85610 PROTHROMBIN TIME: CPT | Performed by: PHYSICIAN ASSISTANT

## 2022-06-15 PROCEDURE — 81003 URINALYSIS AUTO W/O SCOPE: CPT | Performed by: PHYSICIAN ASSISTANT

## 2022-06-15 PROCEDURE — G1004 CDSM NDSC: HCPCS

## 2022-06-15 PROCEDURE — 36415 COLL VENOUS BLD VENIPUNCTURE: CPT | Performed by: PHYSICIAN ASSISTANT

## 2022-06-15 PROCEDURE — 82805 BLOOD GASES W/O2 SATURATION: CPT | Performed by: PHYSICIAN ASSISTANT

## 2022-06-15 PROCEDURE — 83880 ASSAY OF NATRIURETIC PEPTIDE: CPT | Performed by: PHYSICIAN ASSISTANT

## 2022-06-15 PROCEDURE — 70450 CT HEAD/BRAIN W/O DYE: CPT

## 2022-06-15 RX ORDER — ACETAMINOPHEN 325 MG/1
650 TABLET ORAL EVERY 6 HOURS PRN
Status: DISCONTINUED | OUTPATIENT
Start: 2022-06-15 | End: 2022-06-20 | Stop reason: HOSPADM

## 2022-06-15 RX ORDER — CEFEPIME HYDROCHLORIDE 2 G/50ML
2000 INJECTION, SOLUTION INTRAVENOUS ONCE
Status: COMPLETED | OUTPATIENT
Start: 2022-06-15 | End: 2022-06-15

## 2022-06-15 RX ORDER — VANCOMYCIN HYDROCHLORIDE 1 G/200ML
15 INJECTION, SOLUTION INTRAVENOUS ONCE
Status: COMPLETED | OUTPATIENT
Start: 2022-06-15 | End: 2022-06-15

## 2022-06-15 RX ORDER — SODIUM CHLORIDE AND POTASSIUM CHLORIDE .9; .15 G/100ML; G/100ML
50 SOLUTION INTRAVENOUS CONTINUOUS
Status: DISCONTINUED | OUTPATIENT
Start: 2022-06-15 | End: 2022-06-16

## 2022-06-15 RX ORDER — DIPHENHYDRAMINE HYDROCHLORIDE 50 MG/ML
50 INJECTION INTRAMUSCULAR; INTRAVENOUS ONCE
Status: COMPLETED | OUTPATIENT
Start: 2022-06-15 | End: 2022-06-15

## 2022-06-15 RX ADMIN — VANCOMYCIN HYDROCHLORIDE 1000 MG: 1 INJECTION, SOLUTION INTRAVENOUS at 21:45

## 2022-06-15 RX ADMIN — CEFEPIME HYDROCHLORIDE 2000 MG: 2 INJECTION, SOLUTION INTRAVENOUS at 22:08

## 2022-06-15 RX ADMIN — DIPHENHYDRAMINE HYDROCHLORIDE 50 MG: 50 INJECTION, SOLUTION INTRAMUSCULAR; INTRAVENOUS at 22:08

## 2022-06-15 RX ADMIN — SODIUM CHLORIDE 500 ML: 0.9 INJECTION, SOLUTION INTRAVENOUS at 21:46

## 2022-06-16 ENCOUNTER — APPOINTMENT (INPATIENT)
Dept: NON INVASIVE DIAGNOSTICS | Facility: HOSPITAL | Age: 82
DRG: 641 | End: 2022-06-16
Payer: COMMERCIAL

## 2022-06-16 PROBLEM — F03.918 DEMENTIA WITH BEHAVIORAL DISTURBANCE: Status: ACTIVE | Noted: 2022-06-16

## 2022-06-16 PROBLEM — F03.91 DEMENTIA WITH BEHAVIORAL DISTURBANCE (HCC): Status: ACTIVE | Noted: 2022-06-16

## 2022-06-16 PROBLEM — N30.00 ACUTE CYSTITIS WITHOUT HEMATURIA: Status: ACTIVE | Noted: 2022-06-16

## 2022-06-16 PROBLEM — R53.81 PHYSICAL DECONDITIONING: Status: ACTIVE | Noted: 2022-06-16

## 2022-06-16 LAB
2HR DELTA HS TROPONIN: 8 NG/L
ALBUMIN SERPL BCP-MCNC: 2.7 G/DL (ref 3.5–5)
ALP SERPL-CCNC: 84 U/L (ref 46–116)
ALT SERPL W P-5'-P-CCNC: 16 U/L (ref 12–78)
ANION GAP SERPL CALCULATED.3IONS-SCNC: 8 MMOL/L (ref 4–13)
AORTIC ROOT: 2.6 CM
AORTIC VALVE MEAN VELOCITY: 7.9 M/S
APICAL FOUR CHAMBER EJECTION FRACTION: 60 %
ASCENDING AORTA: 2.5 CM
AST SERPL W P-5'-P-CCNC: 15 U/L (ref 5–45)
ATRIAL RATE: 71 BPM
ATRIAL RATE: 72 BPM
AV LVOT MEAN GRADIENT: 2 MMHG
AV LVOT PEAK GRADIENT: 4 MMHG
AV MEAN GRADIENT: 3 MMHG
AV PEAK GRADIENT: 6 MMHG
AV VELOCITY RATIO: 0.77
BASOPHILS # BLD AUTO: 0.04 THOUSANDS/ΜL (ref 0–0.1)
BASOPHILS NFR BLD AUTO: 1 % (ref 0–1)
BILIRUB SERPL-MCNC: 0.38 MG/DL (ref 0.2–1)
BUN SERPL-MCNC: 28 MG/DL (ref 5–25)
CALCIUM ALBUM COR SERPL-MCNC: 9.4 MG/DL (ref 8.3–10.1)
CALCIUM SERPL-MCNC: 8.4 MG/DL (ref 8.3–10.1)
CARDIAC TROPONIN I PNL SERPL HS: 14 NG/L
CHLORIDE SERPL-SCNC: 109 MMOL/L (ref 100–108)
CO2 SERPL-SCNC: 24 MMOL/L (ref 21–32)
CREAT SERPL-MCNC: 1.15 MG/DL (ref 0.6–1.3)
DOP CALC AO PEAK VEL: 1.24 M/S
DOP CALC AO VTI: 27.32 CM
DOP CALC LVOT PEAK VEL VTI: 20.75 CM
DOP CALC LVOT PEAK VEL: 0.96 M/S
E WAVE DECELERATION TIME: 250 MS
EOSINOPHIL # BLD AUTO: 0.1 THOUSAND/ΜL (ref 0–0.61)
EOSINOPHIL NFR BLD AUTO: 2 % (ref 0–6)
ERYTHROCYTE [DISTWIDTH] IN BLOOD BY AUTOMATED COUNT: 16.8 % (ref 11.6–15.1)
FRACTIONAL SHORTENING: 21 % (ref 28–44)
GFR SERPL CREATININE-BSD FRML MDRD: 44 ML/MIN/1.73SQ M
GLUCOSE P FAST SERPL-MCNC: 93 MG/DL (ref 65–99)
GLUCOSE SERPL-MCNC: 93 MG/DL (ref 65–140)
HCT VFR BLD AUTO: 34.7 % (ref 34.8–46.1)
HGB BLD-MCNC: 10.6 G/DL (ref 11.5–15.4)
IMM GRANULOCYTES # BLD AUTO: 0.02 THOUSAND/UL (ref 0–0.2)
IMM GRANULOCYTES NFR BLD AUTO: 0 % (ref 0–2)
INTERVENTRICULAR SEPTUM IN DIASTOLE (PARASTERNAL SHORT AXIS VIEW): 0.9 CM
INTERVENTRICULAR SEPTUM: 0.9 CM (ref 0.6–1.1)
LAAS-AP2: 12.9 CM2
LAAS-AP4: 10.8 CM2
LEFT ATRIUM SIZE: 3.7 CM
LEFT INTERNAL DIMENSION IN SYSTOLE: 3.1 CM (ref 2.1–4)
LEFT VENTRICLE DIASTOLIC VOLUME (MOD BIPLANE): 65 ML
LEFT VENTRICLE SYSTOLIC VOLUME (MOD BIPLANE): 27 ML
LEFT VENTRICULAR INTERNAL DIMENSION IN DIASTOLE: 3.9 CM (ref 3.5–6)
LEFT VENTRICULAR POSTERIOR WALL IN END DIASTOLE: 1 CM
LEFT VENTRICULAR STROKE VOLUME: 29 ML
LV EF: 59 %
LVSV (TEICH): 29 ML
LYMPHOCYTES # BLD AUTO: 1.44 THOUSANDS/ΜL (ref 0.6–4.47)
LYMPHOCYTES NFR BLD AUTO: 23 % (ref 14–44)
MAGNESIUM SERPL-MCNC: 2.1 MG/DL (ref 1.6–2.6)
MCH RBC QN AUTO: 25.5 PG (ref 26.8–34.3)
MCHC RBC AUTO-ENTMCNC: 30.5 G/DL (ref 31.4–37.4)
MCV RBC AUTO: 84 FL (ref 82–98)
MONOCYTES # BLD AUTO: 0.66 THOUSAND/ΜL (ref 0.17–1.22)
MONOCYTES NFR BLD AUTO: 11 % (ref 4–12)
MV E'TISSUE VEL-LAT: 9 CM/S
MV E'TISSUE VEL-SEP: 7 CM/S
MV PEAK A VEL: 0.82 M/S
MV PEAK E VEL: 70 CM/S
MV STENOSIS PRESSURE HALF TIME: 73 MS
MV VALVE AREA P 1/2 METHOD: 3.01 CM2
NEUTROPHILS # BLD AUTO: 3.97 THOUSANDS/ΜL (ref 1.85–7.62)
NEUTS SEG NFR BLD AUTO: 63 % (ref 43–75)
NRBC BLD AUTO-RTO: 0 /100 WBCS
P AXIS: 58 DEGREES
P AXIS: 59 DEGREES
PLATELET # BLD AUTO: 272 THOUSANDS/UL (ref 149–390)
PMV BLD AUTO: 11.2 FL (ref 8.9–12.7)
POTASSIUM SERPL-SCNC: 4.4 MMOL/L (ref 3.5–5.3)
PR INTERVAL: 264 MS
PR INTERVAL: 290 MS
PROCALCITONIN SERPL-MCNC: 0.08 NG/ML
PROT SERPL-MCNC: 5.5 G/DL (ref 6.4–8.2)
PV PEAK GRADIENT: 5 MMHG
QRS AXIS: -29 DEGREES
QRS AXIS: -43 DEGREES
QRSD INTERVAL: 96 MS
QRSD INTERVAL: 98 MS
QT INTERVAL: 396 MS
QT INTERVAL: 406 MS
QTC INTERVAL: 433 MS
QTC INTERVAL: 441 MS
RBC # BLD AUTO: 4.15 MILLION/UL (ref 3.81–5.12)
RIGHT ATRIUM AREA SYSTOLE A4C: 14.4 CM2
RIGHT VENTRICLE ID DIMENSION: 3.4 CM
SL CV LEFT ATRIUM LENGTH A2C: 4.4 CM
SL CV PED ECHO LEFT VENTRICLE DIASTOLIC VOLUME (MOD BIPLANE) 2D: 65 ML
SL CV PED ECHO LEFT VENTRICLE SYSTOLIC VOLUME (MOD BIPLANE) 2D: 37 ML
SODIUM SERPL-SCNC: 141 MMOL/L (ref 136–145)
T WAVE AXIS: 104 DEGREES
T WAVE AXIS: 99 DEGREES
TR MAX PG: 15 MMHG
TR PEAK VELOCITY: 2 M/S
TRICUSPID VALVE PEAK REGURGITATION VELOCITY: 1.96 M/S
VENTRICULAR RATE: 71 BPM
VENTRICULAR RATE: 72 BPM
WBC # BLD AUTO: 6.23 THOUSAND/UL (ref 4.31–10.16)

## 2022-06-16 PROCEDURE — 93005 ELECTROCARDIOGRAM TRACING: CPT

## 2022-06-16 PROCEDURE — 84484 ASSAY OF TROPONIN QUANT: CPT | Performed by: PHYSICIAN ASSISTANT

## 2022-06-16 PROCEDURE — 80053 COMPREHEN METABOLIC PANEL: CPT | Performed by: NURSE PRACTITIONER

## 2022-06-16 PROCEDURE — 97116 GAIT TRAINING THERAPY: CPT

## 2022-06-16 PROCEDURE — 97167 OT EVAL HIGH COMPLEX 60 MIN: CPT

## 2022-06-16 PROCEDURE — 85025 COMPLETE CBC W/AUTO DIFF WBC: CPT | Performed by: NURSE PRACTITIONER

## 2022-06-16 PROCEDURE — 93970 EXTREMITY STUDY: CPT | Performed by: SURGERY

## 2022-06-16 PROCEDURE — 99223 1ST HOSP IP/OBS HIGH 75: CPT | Performed by: FAMILY MEDICINE

## 2022-06-16 PROCEDURE — 93970 EXTREMITY STUDY: CPT

## 2022-06-16 PROCEDURE — 93306 TTE W/DOPPLER COMPLETE: CPT

## 2022-06-16 PROCEDURE — 83735 ASSAY OF MAGNESIUM: CPT | Performed by: NURSE PRACTITIONER

## 2022-06-16 PROCEDURE — 97535 SELF CARE MNGMENT TRAINING: CPT

## 2022-06-16 PROCEDURE — 97163 PT EVAL HIGH COMPLEX 45 MIN: CPT

## 2022-06-16 RX ORDER — CLOPIDOGREL BISULFATE 75 MG/1
75 TABLET ORAL DAILY
Status: DISCONTINUED | OUTPATIENT
Start: 2022-06-16 | End: 2022-06-20 | Stop reason: HOSPADM

## 2022-06-16 RX ORDER — PANTOPRAZOLE SODIUM 40 MG/1
40 TABLET, DELAYED RELEASE ORAL
Refills: 3 | Status: DISCONTINUED | OUTPATIENT
Start: 2022-06-16 | End: 2022-06-20 | Stop reason: HOSPADM

## 2022-06-16 RX ORDER — EZETIMIBE 10 MG/1
10 TABLET ORAL DAILY
Status: DISCONTINUED | OUTPATIENT
Start: 2022-06-16 | End: 2022-06-20 | Stop reason: HOSPADM

## 2022-06-16 RX ORDER — ISOSORBIDE MONONITRATE 30 MG/1
30 TABLET, EXTENDED RELEASE ORAL DAILY
Status: DISCONTINUED | OUTPATIENT
Start: 2022-06-16 | End: 2022-06-20 | Stop reason: HOSPADM

## 2022-06-16 RX ORDER — MEMANTINE HYDROCHLORIDE 5 MG/1
5 TABLET ORAL DAILY
Status: DISCONTINUED | OUTPATIENT
Start: 2022-06-16 | End: 2022-06-20 | Stop reason: HOSPADM

## 2022-06-16 RX ORDER — GABAPENTIN 100 MG/1
100 CAPSULE ORAL
COMMUNITY
End: 2022-07-12 | Stop reason: ALTCHOICE

## 2022-06-16 RX ORDER — NITROFURANTOIN MACROCRYSTALS 100 MG/1
100 CAPSULE ORAL 4 TIMES DAILY
COMMUNITY
Start: 2022-06-15 | End: 2022-06-20

## 2022-06-16 RX ORDER — MEMANTINE HYDROCHLORIDE 5 MG/1
5 TABLET ORAL DAILY
COMMUNITY

## 2022-06-16 RX ORDER — GABAPENTIN 100 MG/1
100 CAPSULE ORAL
Status: DISCONTINUED | OUTPATIENT
Start: 2022-06-16 | End: 2022-06-20 | Stop reason: HOSPADM

## 2022-06-16 RX ORDER — NITROFURANTOIN 25; 75 MG/1; MG/1
100 CAPSULE ORAL 2 TIMES DAILY WITH MEALS
Status: DISCONTINUED | OUTPATIENT
Start: 2022-06-16 | End: 2022-06-20 | Stop reason: HOSPADM

## 2022-06-16 RX ADMIN — ACETAMINOPHEN 650 MG: 325 TABLET ORAL at 10:59

## 2022-06-16 RX ADMIN — EZETIMIBE 10 MG: 10 TABLET ORAL at 08:55

## 2022-06-16 RX ADMIN — MEMANTINE 5 MG: 5 TABLET ORAL at 08:55

## 2022-06-16 RX ADMIN — GABAPENTIN 100 MG: 100 CAPSULE ORAL at 21:45

## 2022-06-16 RX ADMIN — PANTOPRAZOLE SODIUM 40 MG: 40 TABLET, DELAYED RELEASE ORAL at 08:56

## 2022-06-16 RX ADMIN — SODIUM CHLORIDE AND POTASSIUM CHLORIDE 50 ML/HR: .9; .15 SOLUTION INTRAVENOUS at 00:11

## 2022-06-16 RX ADMIN — APIXABAN 2.5 MG: 2.5 TABLET, FILM COATED ORAL at 08:55

## 2022-06-16 RX ADMIN — CLOPIDOGREL BISULFATE 75 MG: 75 TABLET ORAL at 08:55

## 2022-06-16 RX ADMIN — APIXABAN 2.5 MG: 2.5 TABLET, FILM COATED ORAL at 16:45

## 2022-06-16 RX ADMIN — NITROFURANTOIN (MONOHYDRATE/MACROCRYSTALS) 100 MG: 75; 25 CAPSULE ORAL at 16:45

## 2022-06-16 RX ADMIN — ISOSORBIDE MONONITRATE 30 MG: 30 TABLET, EXTENDED RELEASE ORAL at 08:55

## 2022-06-16 NOTE — PLAN OF CARE
Problem: OCCUPATIONAL THERAPY ADULT  Goal: Performs self-care activities at highest level of function for planned discharge setting  See evaluation for individualized goals  Description: Treatment Interventions: ADL retraining, Functional transfer training, UE strengthening/ROM, Endurance training, Cognitive reorientation, Patient/family training, Equipment evaluation/education, Neuromuscular reeducation, Compensatory technique education, Continued evaluation, Energy conservation, Activityengagement          See flowsheet documentation for full assessment, interventions and recommendations  Note: Limitation: Decreased ADL status, Decreased UE ROM, Decreased UE strength, Decreased Safe judgement during ADL, Decreased cognition, Decreased endurance, Decreased self-care trans, Decreased high-level ADLs  Prognosis: Good  Assessment: Pt is a 78 y o  female, admitted to [address] [place] [address] 6/15/2022 d/t experiencing inability to care for self at home and generalized weakness  Dx: *no active problem*  Pt admitted under Observation  Pt with PMHx impacting their performance during ADL tasks, including: Alzheimers dementia, aphasia, CHF, CAD, HTN, IBS, renal disorder, rheumatic fever  Prior to admission to the hospital Pt was performing ADLs without physical assistance  IADLs with physical assistance  Functional transfers/ambulation without physical assistance  Cognitive status was PTA was unknown  OT order placed to assess Pt's ADLs, cognitive status, and performance during functional tasks in order to maximize safety and independence while making most appropriate d/c recommendations   Pt's clinical presentation is currently unstable/unpredictable given new onset deficits that effect Pt's occupational performance and ability to safely return to OF including decrease activity tolerance, decrease standing balance, decrease sitting balance, decrease performance during ADL tasks, decrease cognition, decrease safety awareness , decrease BUE ROM, decrease UB MS, increased pain, decrease generalized strength, decrease activity engagement, decrease performance during functional transfers, decrease FM control, decrease GM control, high fall risk, limited insight to deficits, inability to express needs and decreased alertness combined with medical complications of hypertension , pain impacting overall mobility status, abnormal renal lab values, change in mental status, abnormal CBC, edema/swelling, decreased skin integrity, multiple readmissions, incontinence and need for input for mobility technique/safety  Personal factors affecting Pt at time of initial evaluation include: advanced age, past experience, behavioral pattern, inability to perform current job functions, inability to perform IADLs, inability to perform ADLs, new need for AD, inability to ambulate household distances, inability to navigate community distances, limited insight into impairments, decreased initiation and engagement and questionable non-compliance  post acute rehab upon d/c from [address] [place] [address]       OT Discharge Recommendation: Post acute rehabilitation services

## 2022-06-16 NOTE — ED NOTES
Pt reports intense itching generalized vanco stopped and benadryl given   Airway patient no observable rash     Ronnie Aguilera RN  06/15/22 4830

## 2022-06-16 NOTE — ASSESSMENT & PLAN NOTE
Lab Results   Component Value Date    EGFR 44 06/16/2022    EGFR 36 06/15/2022    EGFR 35 06/15/2022    CREATININE 1 15 06/16/2022    CREATININE 1 34 (H) 06/15/2022    CREATININE 1 39 (H) 06/15/2022     · Creatinine 1 39 with history creatinine 1 18  · GFR reduced at 36  · Losartan on hold pending creatinine improvement  · Was receiving Macrobid for UTI  · Repeat UA negative for leukocytes or nitrites  · Received a dose of vancomycin in the emergency department for UTI symptoms and developed allergic reaction  · Monitor creatinine  · Renally dose medications

## 2022-06-16 NOTE — PLAN OF CARE
Problem: MOBILITY - ADULT  Goal: Maintain or return to baseline ADL function  Description: INTERVENTIONS:  -  Assess patient's ability to carry out ADLs; assess patient's baseline for ADL function and identify physical deficits which impact ability to perform ADLs (bathing, care of mouth/teeth, toileting, grooming, dressing, etc )  - Assess/evaluate cause of self-care deficits   - Assess range of motion  - Assess patient's mobility; develop plan if impaired  - Assess patient's need for assistive devices and provide as appropriate  - Encourage maximum independence but intervene and supervise when necessary  - Involve family in performance of ADLs  - Assess for home care needs following discharge   - Consider OT consult to assist with ADL evaluation and planning for discharge  - Provide patient education as appropriate  6/16/2022 0921 by Suma Kaminski RN  Outcome: Progressing  6/16/2022 0921 by Suma Kaminski RN  Outcome: Progressing  Goal: Maintains/Returns to pre admission functional level  Description: INTERVENTIONS:  - Perform BMAT or MOVE assessment daily    - Set and communicate daily mobility goal to care team and patient/family/caregiver     - Collaborate with rehabilitation services on mobility goals if consulted    - Out of bed for toileting  - Record patient progress and toleration of activity level   6/16/2022 0921 by Suma Kaminski RN  Outcome: Progressing  6/16/2022 0921 by Suma Kaminski RN  Outcome: Progressing     Problem: PAIN - ADULT  Goal: Verbalizes/displays adequate comfort level or baseline comfort level  Description: Interventions:  - Encourage patient to monitor pain and request assistance  - Assess pain using appropriate pain scale  - Administer analgesics based on type and severity of pain and evaluate response  - Implement non-pharmacological measures as appropriate and evaluate response  - Consider cultural and social influences on pain and pain management  - Notify physician/advanced practitioner if interventions unsuccessful or patient reports new pain  6/16/2022 0921 by Phani Bhatti RN  Outcome: Progressing  6/16/2022 0921 by Phani Bhatti RN  Outcome: Progressing     Problem: INFECTION - ADULT  Goal: Absence or prevention of progression during hospitalization  Description: INTERVENTIONS:  - Assess and monitor for signs and symptoms of infection  - Monitor lab/diagnostic results  - Monitor all insertion sites, i e  indwelling lines, tubes, and drains  - Monitor endotracheal if appropriate and nasal secretions for changes in amount and color  - [place] appropriate cooling/warming therapies per order  - Administer medications as ordered  - Instruct and encourage patient and family to use good hand hygiene technique  - Identify and instruct in appropriate isolation precautions for identified infection/condition  6/16/2022 0921 by Phani Bhatti RN  Outcome: Progressing  6/16/2022 0921 by Phani Bhatti RN  Outcome: Progressing     Problem: SAFETY ADULT  Goal: Patient will remain free of falls  Description: INTERVENTIONS:  - Educate patient/family on patient safety including physical limitations  - Instruct patient to call for assistance with activity   - Consult OT/PT to assist with strengthening/mobility   - Keep Call bell within reach  - Keep bed low and locked with side rails adjusted as appropriate  - Keep care items and personal belongings within reach  - Initiate and maintain comfort rounds  - Make Fall Risk Sign visible to staff    - Apply yellow socks and bracelet for high fall risk patients  - Consider moving patient to room near nurses station  6/16/2022 0921 by Phani Bhatti RN  Outcome: Progressing  6/16/2022 0921 by Phani Bhatti RN  Outcome: Progressing     Problem: DISCHARGE PLANNING  Goal: Discharge to home or other facility with appropriate resources  Description: INTERVENTIONS:  - Identify barriers to discharge w/patient and caregiver  - Arrange for needed discharge resources and transportation as appropriate  - Identify discharge learning needs (meds, wound care, etc )  - Arrange for interpretive services to assist at discharge as needed  - Refer to Case Management Department for coordinating discharge planning if the patient needs post-hospital services based on physician/advanced practitioner order or complex needs related to functional status, cognitive ability, or social support system  6/16/2022 0921 by Delgado Johnson RN  Outcome: Progressing  6/16/2022 0921 by Delgado Johnson RN  Outcome: Progressing     Problem: Knowledge Deficit  Goal: Patient/family/caregiver demonstrates understanding of disease process, treatment plan, medications, and discharge instructions  Description: Complete learning assessment and assess knowledge base    Interventions:  - Provide teaching at level of understanding  - Provide teaching via preferred learning methods  6/16/2022 0921 by Delgado Johnson RN  Outcome: Progressing  6/16/2022 0921 by Delgado Johnson RN  Outcome: Progressing     Problem: Potential for Falls  Goal: Patient will remain free of falls  Description: INTERVENTIONS:  - Educate patient/family on patient safety including physical limitations  - Instruct patient to call for assistance with activity   - Consult OT/PT to assist with strengthening/mobility   - Keep Call bell within reach  - Keep bed low and locked with side rails adjusted as appropriate  - Keep care items and personal belongings within reach  - Initiate and maintain comfort rounds  - Make Fall Risk Sign visible to staff    - Apply yellow socks and bracelet for high fall risk patients  - Consider moving patient to room near nurses station  6/16/2022 0921 by Delgado Johnson RN  Outcome: Progressing  6/16/2022 0921 by Delgado Johnson RN  Outcome: Progressing     Problem: Prexisting or High Potential for Compromised Skin Integrity  Goal: Skin integrity is maintained or improved  Description: INTERVENTIONS:  - Identify patients at risk for skin breakdown  - Assess and monitor skin integrity  - Assess and monitor nutrition and hydration status  - Monitor labs   - Assess for incontinence   - Turn and reposition patient  - Assist with mobility/ambulation  - Relieve pressure over bony prominences  - Avoid friction and shearing  - Provide appropriate hygiene as needed including keeping skin clean and dry  - Evaluate need for skin moisturizer/barrier cream  - Collaborate with interdisciplinary team   - Patient/family teaching  - Consider wound care consult   6/16/2022 0921 by Suma Kaminski RN  Outcome: Progressing  6/16/2022 0921 by Suma Kaminski RN  Outcome: Progressing

## 2022-06-16 NOTE — PLAN OF CARE
Problem: MOBILITY - ADULT  Goal: Maintain or return to baseline ADL function  Description: INTERVENTIONS:  -  Assess patient's ability to carry out ADLs; assess patient's baseline for ADL function and identify physical deficits which impact ability to perform ADLs (bathing, care of mouth/teeth, toileting, grooming, dressing, etc )  - Assess/evaluate cause of self-care deficits   - Assess range of motion  - Assess patient's mobility; develop plan if impaired  - Assess patient's need for assistive devices and provide as appropriate  - Encourage maximum independence but intervene and supervise when necessary  - Involve family in performance of ADLs  - Assess for home care needs following discharge   - Consider OT consult to assist with ADL evaluation and planning for discharge  - Provide patient education as appropriate  Outcome: Progressing  Goal: Maintains/Returns to pre admission functional level  Description: INTERVENTIONS:  - Perform BMAT or MOVE assessment daily    - Set and communicate daily mobility goal to care team and patient/family/caregiver     - Collaborate with rehabilitation services on mobility goals if consulted  - Out of bed for toileting  - Record patient progress and toleration of activity level   Outcome: Progressing     Problem: PAIN - ADULT  Goal: Verbalizes/displays adequate comfort level or baseline comfort level  Description: Interventions:  - Encourage patient to monitor pain and request assistance  - Assess pain using appropriate pain scale  - Administer analgesics based on type and severity of pain and evaluate response  - Implement non-pharmacological measures as appropriate and evaluate response  - Consider cultural and social influences on pain and pain management  - Notify physician/advanced practitioner if interventions unsuccessful or patient reports new pain  Outcome: Progressing     Problem: INFECTION - ADULT  Goal: Absence or prevention of progression during hospitalization  Description: INTERVENTIONS:  - Assess and monitor for signs and symptoms of infection  - Monitor lab/diagnostic results  - Monitor all insertion sites, i e  indwelling lines, tubes, and drains  - Monitor endotracheal if appropriate and nasal secretions for changes in amount and color  - [place] appropriate cooling/warming therapies per order  - Administer medications as ordered  - Instruct and encourage patient and family to use good hand hygiene technique  - Identify and instruct in appropriate isolation precautions for identified infection/condition  Outcome: Progressing     Problem: SAFETY ADULT  Goal: Patient will remain free of falls  Description: INTERVENTIONS:  - Educate patient/family on patient safety including physical limitations  - Instruct patient to call for assistance with activity   - Consult OT/PT to assist with strengthening/mobility   - Keep Call bell within reach  - Keep bed low and locked with side rails adjusted as appropriate  - Keep care items and personal belongings within reach  - Initiate and maintain comfort rounds  - Make Fall Risk Sign visible to staff  - Apply yellow socks and bracelet for high fall risk patients  - Consider moving patient to room near nurses station  Outcome: Progressing     Problem: DISCHARGE PLANNING  Goal: Discharge to home or other facility with appropriate resources  Description: INTERVENTIONS:  - Identify barriers to discharge w/patient and caregiver  - Arrange for needed discharge resources and transportation as appropriate  - Identify discharge learning needs (meds, wound care, etc )  - Arrange for interpretive services to assist at discharge as needed  - Refer to Case Management Department for coordinating discharge planning if the patient needs post-hospital services based on physician/advanced practitioner order or complex needs related to functional status, cognitive ability, or social support system  Outcome: Progressing     Problem: Knowledge Deficit  Goal: Patient/family/caregiver demonstrates understanding of disease process, treatment plan, medications, and discharge instructions  Description: Complete learning assessment and assess knowledge base    Interventions:  - Provide teaching at level of understanding  - Provide teaching via preferred learning methods  Outcome: Progressing

## 2022-06-16 NOTE — CASE MANAGEMENT
Case Management Assessment & Discharge Planning Note    Patient name Rebecca Ro  Location [facility] [id number] MRN [id number]  : 1940 Date 2022       Current Admission Date: 6/15/2022  Current Admission Diagnosis:Physical deconditioning   Patient Active Problem List    Diagnosis Date Noted    Dementia with behavioral disturbance ([facility] ) 2022    Acute cystitis without hematuria 2022    Physical deconditioning 2022    Medicare annual wellness visit, subsequent 2022    Anemia 2022    Ground glass opacity present on imaging of lung 2021    Prediabetes 2021    NSTEMI (non-ST elevated myocardial infarction) 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 ) 2019    Stage 3a chronic kidney disease ([facility] ) 2019    Junctional bradycardia 2019    BMI 36 0-36 9,adult 2019    Chronic left shoulder pain 10/09/2018    H/O: rheumatic fever 2018    Sciatica 2018    Peripheral neuropathy 2018    B12 deficiency 2018    Osteoporosis 2018    Closed head injury 2017    Lumbar disc herniation with radiculopathy 2017    Headache, worsening 2017    Chronic diastolic CHF (congestive heart failure) ([facility] ) 05/15/2017    Low serum vitamin B12 2017    Peripheral edema 2017    Other chest pain 2016    Biceps tendinitis of right shoulder 46/85/7078    Diastolic dysfunction 02/03/8841    Lateral epicondylitis of right elbow 04/11/2016    Essential hypertension 03/14/2016    Dyspnea on exertion 03/14/2016    GERD without esophagitis 03/14/2016    Osteoarthritis 09/17/2012    Peripheral venous insufficiency 09/17/2012    Prolapse of vaginal walls 12/30/2003      LOS (days): 0  Geometric Mean LOS (GMLOS) (days): 2 60  Days to GMLOS:2 4     OBJECTIVE:    Risk of Unplanned Readmission Score: 14 01         Current admission status: Inpatient  Referral Reason:  (Post Acute Placement (specify))    Preferred Pharmacy:   [address] [facility], [address] [facility] [address] [facility]  [address] [facility]  [address] [address] [place] [id number]  Phone: [phone] Fax: [phone]    Primary Care Provider: Tylor Dodd MD    Primary Insurance: [facility]  Secondary Insurance:     ASSESSMENT:  Janet Ca Proxies    There are no active Health Care Proxies on file  Readmission Root Cause  30 Day Readmission: No    Patient Information  Admitted from[de-identified] Home  Mental Status: Other (Comment)  During Assessment patient was accompanied by: Other-Comment  Assessment information provided by[de-identified] Daughter  Primary Caregiver: Self  Support Systems: Family members, Ranjeet Man [address] of Residence: [address] [facility] [address] do you live in?: [facility] entry access options   Select all that apply : No steps to enter home, Elevator  Type of Current Residence: Apartment  Floor Level:  (elevator)  Upon entering residence, is there a bedroom on the main floor (no further steps)?: Yes  Upon entering residence, is there a bathroom on the main floor (no further steps)?: Yes  In the last 12 months, was there a time when you were not able to pay the mortgage or rent on time?: No  In the last 12 months, how many places have you lived?: 2  In the last 12 months, was there a time when you did not have a steady place to sleep or slept in a shelter (including now)?: No  Homeless/housing insecurity resource given?: N/A  Living Arrangements: Lives Alone  Is patient a ?: No    Activities of Daily Living Prior to Admission  Functional Status: Assistance  Completes ADLs independently?: No  Level of ADL dependence: Assistance  Ambulates independently?: No  Level of ambulatory dependence: Assistance  Does patient use assisted devices?: Yes  Assisted Devices (DME) used: Zaid Damon  Does patient currently own DME?: Yes  What DME does the patient currently own?: Zaid Damon  Does patient have a history of Outpatient Therapy (PT/OT)?: No  Does the patient have a history of Short-Term Rehab?: Yes [employer])  Does patient have a history of HHC?: Yes  Does patient currently have [facility]?: Yes (Cyndi)    Current [address] [facility]  Type of Current Home Care Services: Nurse visit  [address][de-identified] [address] [facility] Provider[de-identified] PCP    Patient Information Continued  Income Source: SSI/SSD  Within the past 12 months, you worried that your food would run out before you got the money to buy more : Never true  Within the past 12 months, the food you bought just didn't last and you didn't have money to get more : Never true  Food insecurity resource given?: N/A  Does patient receive dialysis treatments?: No  Does patient have a history of substance abuse?: No  Does patient have a history of Mental Health Diagnosis?: No         Means of Transportation  [facility][de-identified] Family transport  In the past 12 months, has lack of transportation kept you from medical appointments or from getting medications?: No  In the past 12 months, has lack of transportation kept you from meetings, work, or from getting things needed for daily living?: No  Was application for public transport provided?: N/A        DISCHARGE DETAILS:    Discharge planning discussed with[de-identified] daughterLakeshia  Freedom of Choice: Yes  Comments - Freedom of Choice: [facility]  CM contacted family/caregiver?: Yes             Contacts  Patient Contacts: Nathan Carrasquillo, daughter  Relationship to Patient[de-identified] Family  Contact Method: Phone  Phone Number: see face sheet  Reason/Outcome: Continuity of Care, Discharge Planning              Other Referral/Resources/Interventions Provided:  Interventions: SNF  Referral Comments: [facility]           CM contacted daughter, Nathan Carrasquillo, baseline information  was obtained  CM discussed the role of CM in helping the patient develop a discharge plan and assist the patient in carry out their plan  Patient went to [facility] in May 2022 for less than a week and was successfully discharged home to her apt with Aides through [facility] 3hr/week 3 days per week and VN through [facility]  Patient was positive for COVID December 2021  CM spoke with daughter, Nathan Carrasquillo, regarding therapy recommendation for SNF  Daughter requested referral to [facility] where patient was previously and did well  CM submitted [facility] referral to [facility]  CM submitted [facility] communication with [facility] regarding patient being at [address] [place] [address]

## 2022-06-16 NOTE — UTILIZATION REVIEW
Initial Clinical Review  WAS OBSERVATION 06/15/22 @2241 CONVERTED TO INPATIENT ADMISSION 6/16/22 @ 0953  DUE TO CONTINUED STAY REQUIRED TO CARE FOR PATIENT WITH NEED FOR SAFE D/C PLANNING FOR PT WITH DEMENTIA  Admission: Date/Time/Statement:   Admission Orders (From admission, onward)     Ordered        06/16/22 0953  Inpatient Admission  Once            06/15/22 2241  Place in Observation  Once                      Orders Placed This Encounter   Procedures    Inpatient Admission     Standing Status:   Standing     Number of Occurrences:   1     Order Specific Question:   Level of Care     Answer:   Med Surg [16]     Order Specific Question:   Estimated length of stay     Answer:   More than 2 Midnights     Order Specific Question:   Certification     Answer:   I certify that inpatient services are medically necessary for this patient for a duration of greater than two midnights  See H&P and MD Progress Notes for additional information about the patient's course of treatment  Comments:   Ambulatory dysfunction/deconditioning and [facility]     ED Arrival Information     Expected   -    Arrival   6/15/2022 19:25    Acuity   Urgent            Means of arrival   Wheelchair    Escorted by   Family Member    Service   Hospitalist    Admission type   Urgent            Arrival complaint   confusion/uti symptoms            Chief Complaint   Patient presents with    Possible UTI     Pt was diagnosed with a UTI on Tuesday  Continued complaints of painful urination  Family states pt was admitted to [address] and discharged today with no improvement of UTI symptoms    OBSERVATION:     Initial Presentation: 78 y o  female W/PMHX: PAF, Dementia, CKD, left nephrectomy, ambulatory dysfunction, lives alone  to ED from home,  admitted as observation, due to Dementia with behavioral disturbance  Presented with inability to care for self, and generalized weakness   Admitted 6/11 to outside facility for aphasia with full work up and underwent stroke eval with brain mri  Found to have UTI, on Macrobid, recommend to rehab at d/c as she lives alone  Both she and her family declined rehab at that time and took her  Home  Later the same day pt presented to this facility with worsening weakness, c/w dysuria, normal UA  Patient and family now agreeable to rehab placement during this hospitalization  Exam: unable to perform ROS d/t dementia, impaired memory, agitated behavior, impulsive arguing with nursing staff  Alert and oriented to person and year, [facility]  ED work up reveals: Elevated bun/cr, low h/h, CT head no acute intracranial abnormaltiy, Plan : OT/PT/Case management for rehab placement,  Trend serial labs with repletion as needed  C//w AP/AC, c/w home lasix, dvt ppx    INPATIENT:    Date: 6/16/22 : UTI s/s, oriented x 4, disoriented to place, generalized weakness  Ambulates with Rolator  Decreased strength and endurance, with impaired balance  Noted to have erythema and edema,  left distal leg  Date 6/17/22 Day 2: family had sign pt out of snf and brought her straight to ED,  Pt told PT did not like staff  Family agreeable for SNF for rehab  Physical deconditioning  PT/OT eval need subacute rehab for short term d/t safety for pt as she lives alone  Recent urine culture + enteroccoccus received IV vanco and cefepime here and, had an acute alert reaction  Placed back on Macrobid  And will trend her progress  UA neg here  Trend serial labs with repletion as needed, renally dosed meds  The patient will continue to require additional inpatient hospital stay due to Physical deconditioning  SNF placement pending authorization       Last data filed at 6/17/2022 1219      Gross per 24 hour   Intake 582 ml   Output 200 ml   Net 382 ml        ED Triage Vitals [06/15/22 1957]   Temperature Pulse Respirations Blood Pressure SpO2   97 5 °F (36 4 °C) 74 18 (!) 201/75 96 %      Temp Source Heart Rate Source Patient Position - Orthostatic VS BP Location FiO2 (%)   Temporal Monitor Lying Left arm --      Pain Score       No Pain          Wt Readings from Last 1 Encounters:   06/16/22 68 kg (150 lb)     Additional Vital Signs:   17/22 0900 -- -- -- -- -- -- None (Room air) --   06/17/22 07:17:28 97 2 °F (36 2 °C) Abnormal  64 -- 178/80 Abnormal  113 98 % -- --   06/16/22 22:40:17 98 5 °F (36 9 °C) 74 14 153/62 92 93 % -- --   06/16/22 15:32:21 98 °F (36 7 °C) 63 18 132/54 80 97 % -- --   06/16/22 1502 -- 73 -- 134/64 -- -- -- --       Date/Time Temp Pulse Resp BP MAP (mmHg) SpO2 O2 Device Patient Position - Orthostatic VS   06/16/22 07:27:23 98 3 °F (36 8 °C) 73 19 134/64 87 94 % -- --   06/15/22 2352 -- -- -- -- -- 97 % None (Room air) --   06/15/22 23:44:12 97 3 °F (36 3 °C) Abnormal  85 19 148/68 95 97 % None (Room air) --   06/15/22 2130 -- 69 36 Abnormal  113/51 73 100 % -- --       Pertinent Labs/Diagnostic Test Results:   VAS lower limb venous duplex study, complete bilateral   Final Result by Love Ashton MD (06/16 1734)      CT head without contrast   Final Result by Ileana Michael MD (06/15 2106)      No acute intracranial abnormality  Workstation performed: [id number]         XR chest portable   Final Result by Santy Iraheta MD (06/15 2121)      Stable findings without acute cardiopulmonary disease                    Workstation performed: [id number]           Results from last 7 days   Lab Units 06/17/22  1056 06/15/22  2034   SARS-COV-2  Negative Negative     Results from last 7 days   Lab Units 06/16/22  0651 06/15/22  2034   WBC Thousand/uL 6 23 7 26   HEMOGLOBIN g/dL 10 6* 11 5   HEMATOCRIT % 34 7* 37 5   PLATELETS Thousands/uL 272 322   NEUTROS ABS Thousands/µL 3 97 4 95         Results from last 7 days   Lab Units 06/17/22  0638 06/16/22  0651 06/15/22  2148 06/15/22  2034   SODIUM mmol/L 139 141 138 138   POTASSIUM mmol/L 4 2 4 4 4 8 5 0   CHLORIDE mmol/L 107 109* 104 103   CO2 mmol/L 25 24 26 25   ANION GAP mmol/L 7 8 8 10   BUN mg/dL 25 28* 35* 35*   CREATININE mg/dL 0 99 1 15 1 34* 1 39*   EGFR ml/min/1 73sq m 52 44 36 35   CALCIUM mg/dL 8 6 8 4 8 4 8 6   MAGNESIUM mg/dL  --  2 1  --  2 2     Results from last 7 days   Lab Units 06/17/22  0638 06/16/22  0651 06/15/22  2148 06/15/22  2034   AST U/L  --  15 14 21   ALT U/L  --  16 17 17   ALK PHOS U/L  --  84 85 87   TOTAL PROTEIN g/dL  --  5 5* 6 0* 6 6   ALBUMIN g/dL  --  2 7* 3 0* 3 2*   TOTAL BILIRUBIN mg/dL  --  0 38 0 32 0 27   AMMONIA umol/L <10*  --   --   --          Results from last 7 days   Lab Units 06/17/22  0638 06/16/22  0651 06/15/22  2148 06/15/22  2034   GLUCOSE RANDOM mg/dL 87 93 128 113         Results from last 7 days   Lab Units 06/15/22  2034   PH CHARAN  7 378   PCO2 CHARAN mm Hg 40 9*   PO2 CHRAAN mm Hg 43 5   HCO3 CHARAN mmol/L 23 5*   BASE EXC CHARAN mmol/L -1 5   O2 CONTENT CHARAN ml/dL 13 8   O2 HGB, VENOUS % 77 9             Results from last 7 days   Lab Units 06/16/22  0004 06/15/22  2034   HS TNI 0HR ng/L  --  6   HS TNI 2HR ng/L 14  --    HSTNI D2 ng/L 8  --          Results from last 7 days   Lab Units 06/15/22  2034   PROTIME seconds 14 1   INR  1 10   PTT seconds 32         Results from last 7 days   Lab Units 06/15/22  2148 06/15/22  2034   PROCALCITONIN ng/ml 0 08 0 07     Results from last 7 days   Lab Units 06/15/22  2034   LACTIC ACID mmol/L 1 1             Results from last 7 days   Lab Units 06/15/22  2034   NT-PRO BNP pg/mL 197               Results from last 7 days   Lab Units 06/15/22  2035   CLARITY UA  Clear   COLOR UA  Yellow   SPEC GRAV UA  1 015   PH UA  6 0   GLUCOSE UA mg/dl Negative   KETONES UA mg/dl Negative   BLOOD UA  Negative   PROTEIN UA mg/dl Negative   NITRITE UA  Negative   BILIRUBIN UA  Negative   UROBILINOGEN UA E U /dl 0 2   LEUKOCYTES UA  Negative     Results from last 7 days   Lab Units 06/17/22  1056 06/15/22  2034   INFLUENZA A PCR  Negative Negative   INFLUENZA B PCR  Negative Negative   RSV PCR  Negative Negative Results from last 7 days   Lab Units 06/15/22  2034   BLOOD CULTURE  No Growth at 24 hrs  No Growth at 24 hrs                 ED Treatment:   Medication Administration from 06/15/2022 1923 to 06/15/2022 2315       Date/Time Order Dose Route Action     06/15/2022 2146 sodium chloride 0 9 % bolus 500 mL 500 mL Intravenous New Bag     06/15/2022 2145 vancomycin (VANCOCIN) IVPB (premix in dextrose) 1,000 mg 200 mL 1,000 mg Intravenous New Bag     06/15/2022 2208 diphenhydrAMINE (BENADRYL) injection 50 mg 50 mg Intravenous Given     06/15/2022 2208 cefepime (MAXIPIME) IVPB (premix in dextrose) 2,000 mg 50 mL 2,000 mg Intravenous New Bag        Past Medical History:   Diagnosis Date    Alzheimer's dementia ([facility] [address] )     Aphasia     CHF [facility]congestive heart failure) [facility])     Coronary artery disease     Hypercholesteremia     Hypertension     IBS (irritable bowel syndrome)     Renal disorder     Rheumatic fever     Skin lesion      Present on Admission:   Dementia with behavioral disturbance ([facility])   Stage 3a chronic kidney disease ([facility])   Anemia   Chronic diastolic CHF (congestive heart failure) ([facility])   A-fib ([facility])      Admitting Diagnosis: UTI symptoms [R39 9]  Ambulatory dysfunction [R26 2]  Age/Sex: 78 y o  female  Admission Orders:scd, st cath, cardiac diet, pt/ot,   Scheduled Medications:  apixaban, 2 5 mg, Oral, BID  clopidogrel, 75 mg, Oral, Daily  ezetimibe, 10 mg, Oral, Daily  gabapentin, 100 mg, Oral, HS  isosorbide mononitrate, 30 mg, Oral, Daily  memantine, 5 mg, Oral, Daily  nitrofurantoin, 100 mg, Oral, BID With Meals  pantoprazole, 40 mg, Oral, Daily Before Breakfast      Continuous IV Infusions:none     PRN Meds:  acetaminophen, 650 mg, Oral, Q6H PRN        IP CONSULT TO CASE MANAGEMENT    Network Utilization Review Department  ATTENTION: Please call with any questions or concerns to [phone] and carefully listen to the prompts so that you are directed to the right person  All voicemails are confidential   Krissy Mclain all requests for admission clinical reviews, approved or denied determinations and any other requests to dedicated fax number below belonging to the campus where the patient is receiving treatment   List of dedicated fax numbers for the Facilities:  [address] DENIALS (Administrative/Medical Necessity) [phone]   1000  [facility] (Maternity/NICU/Pediatrics) [phone] [address]  [address] [address] [facility] [place] [address] [id number] [place] [address] [address] [facility] [phone]

## 2022-06-16 NOTE — ASSESSMENT & PLAN NOTE
Lab Results   Component Value Date    EGFR 36 06/15/2022    EGFR 35 06/15/2022    EGFR 42 02/28/2022    CREATININE 1 34 (H) 06/15/2022    CREATININE 1 39 (H) 06/15/2022    CREATININE 1 18 02/28/2022     · Creatinine 1 39 with history creatinine 1 18  · GFR reduced at 36  · Losartan on hold pending creatinine improvement  · Was receiving Macrobid for UTI  · Repeat UA negative for leukocytes or nitrites  · Received a dose of vancomycin in the emergency department for UTI symptoms and developed allergic reaction  · Monitor creatinine  · Renally dose medications

## 2022-06-16 NOTE — OCCUPATIONAL THERAPY NOTE
Occupational Therapy Evaluation     Evaluation: 3878-8712  Treatment: [id number]    Patient Name: Adan Duncan  MMBHS'Z Date: 6/16/2022  Problem List  Active Problems:    Chronic diastolic CHF (congestive heart failure) ([facility])    A-fib ([facility])    Stage 3a chronic kidney disease ([facility] )    Anemia    Dementia with behavioral disturbance ([facility] )    Past Medical History  Past Medical History:   Diagnosis Date    Alzheimer's dementia ([facility] )     Aphasia     CHF (congestive heart failure) ([place] [address] )     Coronary artery disease     Hypercholesteremia     Hypertension     IBS (irritable bowel syndrome)     Renal disorder     Rheumatic fever     Skin lesion      Past Surgical History  Past Surgical History:   Procedure Laterality Date    ANKLE FRACTURE SURGERY      LAST ASSESSED [id number]    HYSTERECTOMY      KIDNEY SURGERY      NEPHRECTOMY      TONSILLECTOMY           06/16/22 0741   Note Type   Note type Evaluation   Restrictions/Precautions   Other Precautions Cognitive; Chair Alarm; Bed Alarm; Fall Risk;Multiple lines;Pain   Pain Assessment   Pain Assessment Tool FLACC   Pain Location/Orientation Orientation: Left; Location: Foot   Pain Rating: FLACC (Rest) - Face 0   Pain Rating: FLACC (Rest) - Legs 0   Pain Rating: FLACC (Rest) - Activity 0   Pain Rating: FLACC (Rest) - Cry 0   Pain Rating: FLACC (Rest) - Consolability 0   Score: FLACC (Rest) 0   Pain Rating: FLACC (Activity) - Face 1   Pain Rating: FLACC (Activity) - Legs 0   Pain Rating: FLACC (Activity) - Activity 0   Pain Rating: FLACC (Activity) - Cry 1   Pain Rating: FLACC (Activity) - Consolability 0   Score: FLACC (Activity) 2   Home Living   Type of Home Apartment   Home Layout One level;Performs ADLs on one level; Able to live on main level with bedroom/bathroom; Elevator   Bathroom Shower/Tub Walk-in shower   Bathroom Toilet Standard   Bathroom Equipment Shower chair;Grab bars in shower;Grab bars around toilet   Home Equipment   (rollator)   Additional Comments Pt questionable historian, providing inconsistant information regarding home set-up and PLOF  will consult with ALBERTO ian obtain information   Prior Function   Receives Help From Personal care attendant  (3x/week for 3 hours, assists with IADLs, ? ADLs)   Comments Uncertain of Pt's recent PLOF  Per chart review Pt was at home alone, completing ADLs and functional ambulation @ Mod I  however recently it appears Pt has not been performing at that level  ADL   Where Assessed Edge of bed   Eating Assistance 4  Minimal Assistance  (Min progressing to S)   Eating Deficit Verbal cueing;Supervision/safety; Increased time to complete; Beverage management   Grooming Assistance 4  Minimal Assistance   Grooming Deficit Verbal cueing;Supervision/safety; Increased time to complete;Wash/dry face;Brushing hair   UB Bathing Assistance 4  Minimal Assistance   UB Bathing Deficit Verbal cueing;Supervision/safety; Increased time to complete; Chest;Right arm;Left arm; Abdomen   LB Bathing Assistance 2  Maximal Assistance   LB Bathing Deficit Steadying;Verbal cueing;Supervision/safety; Increased time to complete;Perineal area; Buttocks;Right upper leg;Left upper leg;Left lower leg including foot;Right lower leg including foot   UB Dressing Assistance 3  Moderate Assistance   UB Dressing Deficit Supervision/safety; Increased time to complete;Verbal cueing; Thread RUE; Thread LUE;Pull over head;Pull around back   LB Dressing Assistance 2  Maximal Assistance   LB Dressing Deficit Requires assistive device for steadying;Steadying;Verbal cueing;Supervision/safety; Increased time to complete; Don/doff R sock; Don/doff L sock; Thread RLE into pants; Thread LLE into pants;Pull up over hips   Toileting Assistance  2  Maximal Assistance   Toileting Deficit Steadying;Verbal cueing;Supervison/safety; Increased time to complete; Bedside commode;Clothing management up;Clothing management down;Perineal hygiene   Additional Comments Pt completing ADL tasks while seated at EOB  UB Drssing @ Mod A for HOHA and puling hsort over head/behind back  LB Dressing @ Max A for donning socks/pants around feet and CM aorund waist while standing at RW with Min-Mod A for balance and safety with BUE supported on RW  Please refer to treatmnet note fo rperformance during bathing, toileting, and self-feeding tasks  Bed Mobility   Supine to Sit 2  Maximal assistance   Additional items Increased time required;Verbal cues;LE management  (trunk management)   Additional Comments HOB flat, no bedrails  limited partiicpation d/t c/o back pain "siatica"   Transfers   Sit to Stand   [facility] Assist)   Additional items Increased time required;Verbal cues   Stand to Sit   (Steadying Assist)   Additional items Increased time required;Verbal cues   Stand pivot 4  Minimal assistance   Additional items Assist x 1; Increased time required;Verbal cues   Toilet transfer 4  Minimal assistance   Additional items Assist x 1; Increased time required;Verbal cues;Standard toilet   Additional Comments Pt completing functional transfers with use of RW for UB support  Pt requiring Steadying Assist for STS from EOB>RW with Mod cues for hand placement and technique  Min A for SPT with increased time and vc'ing for RW managmeent, hand placement, and weight shifting  Pt with 1 episode of buckling which required Mod A to correct   Please refer to treatment note for performance durign toilet transfer   Balance   Static Sitting Fair +   Dynamic Sitting Fair   Static Standing Fair -   Dynamic Standing Poor +   Activity Tolerance   Activity Tolerance Patient limited by fatigue;Patient limited by pain   Medical Staff Made Aware Spoke with PT, Maria M Cedeño   Nurse Made Aware Spoke with RN   RUE Assessment   RUE Assessment WFL   LUE Assessment   LUE Assessment WFL   Hand Function   Gross Motor Coordination Functional   Fine Motor Coordination Functional   Sensation   Light Touch No apparent deficits   Additional Comments Pt's BUE shoulder AROM limited to ~70*   AAROM able to achieve ~110*  Pt appears ataxic with BUE movements, increased time and effort noted while completing BUE coordination tasks  Able to utilize BUE functionally with increased time  Will continue to assess as able   Cognition   Overall Cognitive Status Impaired   Arousal/Participation Alert; Cooperative;Lethargic;Persistent stimuli required   Attention Difficulty attending to directions   Orientation Level Oriented to person;Oriented to place;Oriented to time;Oriented to situation  (cues for place)   Memory Unable to assess   Following Commands Follows one step commands with increased time or repetition   Comments Pt requiring increased time to respond and follow commands, multiple repitition of directions required  Pt requiring consistant vc'ing to remain attentive and to keep eyes open  Pt only able to name 5 animals in 60 seconds  Pt not approparite for a thorough cognitive assessment at this time given current level of alertness and participation   Assessment   Limitation Decreased ADL status; Decreased UE ROM; Decreased UE strength;Decreased Safe judgement during ADL;Decreased cognition;Decreased endurance;Decreased self-care trans;Decreased high-level ADLs   Prognosis Good   Assessment Pt is a 78 y o  female, admitted to [address] [place] [address] 6/15/2022 d/t experiencing inability to care for self at home and generalized weakness  Dx: *no active problem*  Pt admitted under Observation  Pt with PMHx impacting their performance during ADL tasks, including: Alzheimers dementia, aphasia, CHF, CAD, HTN, IBS, renal disorder, rheumatic fever  Prior to admission to the hospital Pt was performing ADLs without physical assistance  IADLs with physical assistance  Functional transfers/ambulation without physical assistance  Cognitive status was PTA was unknown   OT order placed to assess Pt's ADLs, cognitive status, and performance during functional tasks in order to maximize safety and independence while making most appropriate d/c recommendations  Pt's clinical presentation is currently unstable/unpredictable given new onset deficits that effect Pt's occupational performance and ability to safely return to PLOF including decrease activity tolerance, decrease standing balance, decrease sitting balance, decrease performance during ADL tasks, decrease cognition, decrease safety awareness , decrease BUE ROM, decrease UB MS, increased pain, decrease generalized strength, decrease activity engagement, decrease performance during functional transfers, decrease FM control, decrease GM control, high fall risk, limited insight to deficits, inability to express needs and decreased alertness combined with medical complications of hypertension , pain impacting overall mobility status, abnormal renal lab values, change in mental status, abnormal CBC, edema/swelling, decreased skin integrity, multiple readmissions, incontinence and need for input for mobility technique/safety  Personal factors affecting Pt at time of initial evaluation include: advanced age, past experience, behavioral pattern, inability to perform current job functions, inability to perform IADLs, inability to perform ADLs, new need for AD, inability to ambulate household distances, inability to navigate community distances, limited insight into impairments, decreased initiation and engagement and questionable non-compliance  Pt will benefit from continued skilled OT services to address deficits as defined above and to maximize level independence/participation during ADLs and functional tasks to facilitate return toward PLOF and improved quality of life  From an occupational therapy standpoint, recommendation at time of d/c would be post acute rehab      Plan   Treatment Interventions ADL retraining;Functional transfer training;UE strengthening/ROM; Endurance training;Cognitive reorientation;Patient/family training;Equipment evaluation/education; Neuromuscular reeducation; Compensatory technique education;Continued evaluation; Energy conservation; Activityengagement   Goal Expiration Date 06/30/22   OT Treatment Day 1   OT Frequency 3-5x/wk   Additional Treatment Session   Start Time 7547   End Time 0822   Treatment Assessment Pt seen for treatment session #1 this date  Pt alert and agreeable to participate at this time  Pt seated OOB on BSC while completing toileting tasks and bathing  Pt requiring Min A for UB bathing tasks for thoroughness and occaisonal HOHA and vc'ing to remain alert and participatory  LB bathing @ Max A for buttocks/pericare and lower legs  Min-Mod A for balance/safety while standin at RW  Pt requiring Max A for toileting tasks for thorough pericare and CM around waist while standin gat RW for UB support  Min A fo rSTS from [facility] with vc'ing for hand placement and technique  Pt then completing SPT to reclienr chair @ Min-Mod A  pt seated OOB in recliner chair at end of session with call bell in reach, chair alarm intact and all needs met at this time  Pt then set-up for [facility]  Pt earnest [facility] set-up including opening and cutting food  pt able to complete hand to mouth with hand held foods much easier and efficently compared to with utensils  Pt appears to be having a difficult time with FM/GC coordination during utensil management  fork frequently falling out of Pt's hand and increased time to bring to mouth  Occaionally Pt utilizes L hand to assist with stabilizing utensil when bringing to mouth  Pt ultimately able to complete self-feeding tasks @ S after complete set-up and proper OOB positioning although Pt noted to have Min-Mod difficulty with minimal spillage noted   Will continue to assess as able   Additional Treatment Day 1   Recommendation   OT Discharge Recommendation Post acute rehabilitation services   AM-PAC Daily Activity Inpatient   Lower Body Dressing 1   Bathing 2   Toileting 2   Upper Body Dressing 2   Grooming 3   Eating 3 Daily Activity Raw Score 13   Daily Activity Standardized Score (Calc for Raw Score >=11) 32 03   AM-PAC Applied Cognition Inpatient   Following a Speech/Presentation 2   Understanding Ordinary Conversation 3   Taking Medications 1   Remembering Where Things Are Placed or Put Away 2   Remembering List of 4-5 Errands 2   Taking Care of Complicated Tasks 1   Applied Cognition Raw Score 11   Applied Cognition Standardized Score 27 03       The patient's raw score on the AM-PAC Daily Activity inpatient short form is 13, standardized score is 32 03, less than 39 4  Patients at this level are likely to benefit from DC to post-acute rehabilitation services  Please refer to the recommendation of the Occupational Therapist for safe DC planning  Pt goals to be met by 6/30/2022    Pt will demonstrate ability to complete grooming/hygiene tasks @ S after set-up  Pt will demonstrate ability to complete supine<>sit @ S in order to increase safety and independence during ADL tasks  Pt will demonstrate ability to complete UB ADLs including washing/dressing @ S in order to increase performance and participation during meaningful tasks  Pt will demonstrate ability to complete LB dressing @ Min A in order to increase safety and independence during meaningful tasks  Pt will demonstrate ability to robinson/doff socks/shoes while sitting EOB @ Min A in order to increase safety and independence during meaningful tasks  Pt will demonstrate ability to complete toileting tasks including CM and pericare @ Min A in order to increase safety and independence during meaningful tasks  Pt will demonstrate ability to complete EOB, chair, toilet/commode transfers @ S in order to increase performance and participation during functional tasks  Pt will demonstrate ability to stand for 3-4 minutes while maintaining F+ balance with use of RW for UB support PRN    Pt will demonstrate ability to tolerate 30-35 minute OT session with no vc'ing for deep breathing or use of energy conservation techniques in order to increase activity tolerance during functional tasks  Pt will demonstrate Good carryover of use of energy conservation/compensatory strategies during ADLs and functional tasks in order to increase safety and reduce risk for falls  Pt will demonstrate Good attention and participation in continued evaluation of functional ambulation house hold distances in order to assist with safe d/c planning  Pt will attend to continued cognitive assessments 100% of the time in order to provide most appropriate d/c recommendations  Pt will follow 100% simple 2-step commands and be A&O x4 consistently with environmental cues to increase participation in functional activities  Pt will identify 3 areas of interest/hobbies and 1 intervention on how to incorporate into daily life in order to increase interaction with environment and peers as well as increase participation in meaningful tasks  Pt will demonstrate 100% carryover of BUE HEP in order to increase BUE MS and increase performance during functional tasks upon d/c home      J Carlos Villalba OTR/L

## 2022-06-16 NOTE — PLAN OF CARE
Problem: PHYSICAL THERAPY ADULT  Goal: Performs mobility at highest level of function for planned discharge setting  See evaluation for individualized goals  Description: Treatment/Interventions: ADL retraining, Functional transfer training, LE strengthening/ROM, Therapeutic exercise, Endurance training, Patient/family training, Equipment eval/education, Bed mobility, Gait training, Compensatory technique education, Spoke to nursing, Spoke to case management, OT  Equipment Recommended:  (walker-pt has)       See flowsheet documentation for full assessment, interventions and recommendations  Note: Prognosis: Good  Problem List: Decreased strength, Decreased endurance, Impaired balance, Decreased mobility, Decreased cognition, Impaired judgement, Decreased safety awareness, Obesity, Decreased skin integrity, Pain  Assessment: Pt is a 78 y o  female seen for PT evaluation s/p admission to [address] [place] [address] on 6/15/2022 with UTI, ambulatory dysfunction  Order placed for PT services    Upon evaluation: Pt is presenting with impaired functional mobility due to pain, decreased strength, decreased endurance, impaired balance, impaired coordination, gait deviations, impaired cognition, decreased safety awareness, impaired judgment, fall risk, LE edema and impaired skin integrity requiring maximal assistance for bed mobility, steadying to minimal assistance for transfers and steadying/minimal to moderate assistance for ambulation with RW  Pt's clinical presentation is currently unstable/unpredictable given the functional mobility deficits above, especially weakness, decreased ROM, edema of extremities, decreased skin integrity, decreased endurance, impaired coordination, gait deviations, pain, decreased activity tolerance, decreased functional mobility tolerance, decreased safety awareness, impaired judgement and decreased cognition, coupled with fall risks as indicated by AM-PAC 6-Clicks: 09/46 as well as impaired balance, polypharmacy, impaired coordination, impaired judgement, decreased safety awareness and decreased cognition and combined with medical complications of pain impacting overall mobility status, abnormal renal lab values, abnormal H&H, multiple readmissions and need for input for mobility technique/safety  Pt's PMHx and comorbidities that may affect physical performance and progress include: CHF, CKD, A fib, HTN, Dementia, CAD and Covid-19, NSTEMI, obesity, Parkinson's disease, spinal stenosis, B sacroilitis, recent UTI, left nephrectomy  Personal factors affecting pt at time of IE include: inaccessible home environment, limited home support, advanced age, inability to perform IADLs, inability to perform ADLs, inability to navigate level surfaces without external assistance, inability to ambulate household distances and limited insight into impairments  Pt will benefit from continued skilled PT services to address deficits as defined above and to maximize level of functional mobility to facilitate return toward PLOF and improved QOL  From PT/mobility standpoint, recommendation at time of d/c would be Short term rehab pending progress in order to reduce fall risk and maximize pt's functional independence and consistency with mobility in order to facilitate return to PLOF  Recommend trial with walker next 1-2 sessions and ther ex next 1-2 sessions  Barriers to Discharge: Decreased caregiver support, Inaccessible home environment  Barriers to Discharge Comments: requires assistance to complete all mobility     PT Discharge Recommendation: Post acute rehabilitation services          See flowsheet documentation for full assessment

## 2022-06-16 NOTE — ASSESSMENT & PLAN NOTE
Wt Readings from Last 3 Encounters:   06/15/22 68 3 kg (150 lb 9 2 oz)   05/23/22 72 1 kg (159 lb)   04/12/22 72 1 kg (159 lb)     · Euvolemic on exam  · Continue Lasix 20 mg daily  · Outpatient follow-up

## 2022-06-16 NOTE — ASSESSMENT & PLAN NOTE
· Medical record review patient with Alzheimer's dementia  · One admission alert to person and year but convinced of this place is a fire house  · Argumentative with the nursing staff  · Appreciate OT cognitive evaluation  · PT/OT/case management for rehabilitation placement  · Admitted 06/11 to outside hospital with aphasia, had full stroke workup including MRI brain and at time of discharge on 06/15 was recommended rehabilitation placement, family wanted patient to get stronger before she left the hospital and signed mother out of the hospital against medical advice and brought her straight to our ED    · Now family is agreeable to going to SNF for rehab

## 2022-06-16 NOTE — ASSESSMENT & PLAN NOTE
Recent urine culture shows Enterococcus  Received IV vanco and cefepime here and had acute allergic reaction  Will place her back on Macrobid and monitor for now    Repeat UA here is negative

## 2022-06-16 NOTE — PHYSICAL THERAPY NOTE
PHYSICAL THERAPY EVALUATION  NAME:  Dean Dill  DATE: 06/16/22    AGE:   78 y o  Mrn:   [id number]  ADMIT DX:  UTI symptoms [R39 9]  Ambulatory dysfunction [R26 2]    Past Medical History:   Diagnosis Date    [facility]'s dementia ([facility] [address] )     Aphasia     [facility] (congest[facility] failure) ([facility])     Coronary artery disease     Hypercholesteremia     Hypertension     IBS (irritable bowel syndrome)     Renal disorder     Rheumatic fever     Skin lesion      Length Of Stay: 0  Performed at least 2 patient identifiers during session: Name and Birthday  PHYSICAL THERAPY EVALUATION :    06/16/22 0740   PT Last Visit   PT Visit Date 06/16/22   Note Type   Note type Evaluation   Pain Assessment   Pain Assessment Tool FLACC   Pain Location/Orientation Orientation: Left; Location: Leg;Orientation: Right;Location: Back  (distal anterior leg)   Pain Rating: FLACC (Rest) - Face 1   Pain Rating: FLACC (Rest) - Legs 0   Pain Rating: FLACC (Rest) - Activity 0   Pain Rating: FLACC (Rest) - Cry 1   Pain Rating: FLACC (Rest) - Consolability 0   Score: FLACC (Rest) 2   Pain Rating: FLACC (Activity) - Face 1   Pain Rating: FLACC (Activity) - Legs 1   Pain Rating: FLACC (Activity) - Activity 1   Pain Rating: FLACC (Activity) - Cry 1   Pain Rating: FLACC (Activity) - Consolability 0   Score: FLACC (Activity) 4   Restrictions/Precautions   Other Precautions Cognitive; Chair Alarm; Bed Alarm;Multiple lines; Fall Risk;Pain   Home Living   Type of [address]; One level;Bed/bath upstairs; Performs ADLs on one level   Bathroom Shower/Tub Walk-in shower   Bathroom Toilet Standard   Bathroom Equipment Shower chair;Grab bars in shower;Grab bars around toilet   Home Equipment   (rollator)   Additional Comments Pt reports living in an apartment with elevator to access  Reports using a rollator for mobility and having an aide to assist with IADLs  Pt is questionabl historian     Prior Function   Level of [place] Independent with ADLs and functional mobility   Lives With Alone   Receives Help From Personal care attendant  (3x/week for 3 hours, assists with IADLs, ? ADLs)   ADL Assistance Independent   IADLs Needs assistance   Falls in the last 6 months 0  (reports no falls, but questionable)   Comments Reports being independent with rollator for mobility, ADLs and has assistance for IADLs  General   Additional Pertinent History (S)  Per Care everywhere, patient was admitted to outside hospital 5/1/22 to 5/6/22 with ambulatory dysfunction due to lumbar radiculopathy and B sacroilitis  Discharged to [facility] for rehab 5/6/22 to 5/12/22  Then returned to hospital  on 6/11/22 to 6/15/22 with aphasia  Pt had recent UTI  MRI and CT head negative for CVA  Pt and family declined post acute rehab and discharged from outside hospital and immediately came to this facility ED  Cognition   Arousal/Participation Lethargic   Orientation Level Oriented X4  (initial reports [address] for place, but with further questioning reported hospital )   Following Commands Follows one step commands with increased time or repetition   Comments increased time to respond and repeated questioning at times  cues for increased alertness and increased focus on activity  Subjective   Subjective "We didn't like the doctors there "   RLE Assessment   RLE Assessment WFL  (except hip flexion < 90 degrees; strength 3/5 except hip flexion 3-/5)   LLE Assessment   LLE Assessment WFL  (except hip flexion < 90 degrees and ankle DF to < neutral, strength 3-/5)   Coordination   Rapid Alternating Movements Impaired   Light Touch   RLE Light Touch Grossly intact   LLE Light Touch Grossly intact  (hypersensitive L distal anterior leg with increased erythema, edema and warmth  Dr Margo Olmstead made aware )   Bed Mobility   Supine to Sit 2  Maximal assistance   Additional items Assist x 1; Increased time required;Verbal cues  (trunk management)   Additional Comments HOB flat without bedrail  increased time and effort with manual cues for trunk management  Transfers   Sit to Stand   (steadying assistance)   Additional items Assist x 1; Increased time required;Verbal cues   Stand to Sit   (steadying assistance)   Additional items Assist x 1; Increased time required;Verbal cues   Stand pivot 4  Minimal assistance   Additional items Assist x 1; Increased time required;Verbal cues   Additional Comments use of RW  min cues for hand placement for safety with RW  sit<>stand with steadying assistance with verbal cues for uprihgt posture  sit to stand from lower surface with minAx1  spt with RW with miNAx1 with manual cues for wt shifting and balance  Ambulation/Elevation   Gait pattern Wide LAYA; Decreased foot clearance; Short stride; Excessively slow;L Knee Frances   Gait Assistance   (steadying to minAx1-->moderate assistance due to L knee buckling)   Additional items Assist x 1;Verbal cues   Assistive Device Rolling walker   Distance 8'x1 with RW with steadying to minAx1 then moderate assistance due to left knee buckling  manual cues to recover  verbal cues for increased step length and foot clearance  further ambulation deferred due to safety concerns  Balance   Static Sitting Fair   Dynamic Sitting Fair -   Static Standing Fair -   Dynamic Standing Poor +   Ambulatory Poor   Endurance Deficit   Endurance Deficit Yes   Endurance Deficit Description fatigue   Activity Tolerance   Activity Tolerance Patient limited by fatigue;Patient limited by pain   Medical Staff Made Aware Albert WILSON   Nurse Made Aware RN   Assessment   Prognosis Good   Problem List Decreased strength;Decreased endurance; Impaired balance;Decreased mobility; Decreased cognition; Impaired judgement;Decreased safety awareness; Obesity; Decreased skin integrity;Pain   Barriers to Discharge Decreased caregiver support; Inaccessible home environment   Barriers to Discharge Comments requires assistance to complete all mobility Goals   Patient Goals none stated   STG Expiration Date 06/30/22   PT Treatment Day 1   Plan   Treatment/Interventions ADL retraining;Functional transfer training;LE strengthening/ROM; Therapeutic exercise; Endurance training;Patient/family training;Equipment eval/education; Bed mobility;Gait training; Compensatory technique education;Spoke to nursing;Spoke to case management;OT   PT Frequency 3-5x/wk   Recommendation   PT Discharge Recommendation Post acute rehabilitation services   Equipment Recommended   (walker-pt has)   AM-PAC Basic Mobility Inpatient   Turning in Bed Without Bedrails 2   Lying on Back to Sitting on Edge of Flat Bed 2   Moving Bed to Chair 3   Standing Up From Chair 3   Walk in Room 2   Climb 3-5 Stairs 1   Basic Mobility Inpatient Raw Score 13   Basic Mobility Standardized Score 33 99   Highest Level Of Mobility   -[facility] Goal 4: Move to chair/commode   -HL Achieved 6: Walk 10 steps or more   Additional Treatment Session   Start Time 0800   End Time 0810   Treatment Assessment Pt tolerated session fairly  She continues to require assistance with mobility and ambulaiton distance is limtied by decreased strength with L knee buckling, balance and endurance and increased pain in B legs  She requires increased time to complete mobility and increased verbal cues for tehcnique and safety  She will continue to benefit from PT services to maximize LOF  Equipment Use use of RW  sit<>stand with RW with minimal-->steadying assistance  verbal cues for hand placement and upright posture  spt with RW with minAx1 with manual cues for balance and RW management  ambulated 10'x1 with RW with minAx1 with slight L knee buckling with min manual cues for wt shifting and balance and verbal cues for inc step length and foot clearance  chair follow for safety  further ambulation limited by fatigue with L knee buckling  End of Consult   Patient Position at End of Consult Bedside chair;Bed/Chair alarm activated; All needs within reach      (Please find full objective findings from PT assessment regarding body systems outlined above)  Assessment: Pt is a 78 y o  female seen for PT evaluation s/p admission to [address] [place] [address] on 6/15/2022 with UTI, ambulatory dysfunction  Order placed for PT services  Upon evaluation: Pt is presenting with impaired functional mobility due to pain, decreased strength, decreased endurance, impaired balance, impaired coordination, gait deviations, impaired cognition, decreased safety awareness, impaired judgment, fall risk, LE edema and impaired skin integrity requiring maximal assistance for bed mobility, steadying to minimal assistance for transfers and steadying/minimal to moderate assistance for ambulation with RW  Pt's clinical presentation is currently unstable/unpredictable given the functional mobility deficits above, especially weakness, decreased ROM, edema of extremities, decreased skin integrity, decreased endurance, impaired coordination, gait deviations, pain, decreased activity tolerance, decreased functional mobility tolerance, decreased safety awareness, impaired judgement and decreased cognition, coupled with fall risks as indicated by AM-PAC 6-Clicks: 42/35 as well as impaired balance, polypharmacy, impaired coordination, impaired judgement, decreased safety awareness and decreased cognition and combined with medical complications of pain impacting overall mobility status, abnormal renal lab values, abnormal H&H, multiple readmissions and need for input for mobility technique/safety  Pt's PMHx and comorbidities that may affect physical performance and progress include: CHF, CKD, A fib, HTN, Dementia, CAD and Covid-19, NSTEMI, obesity, Parkinson's disease, spinal stenosis, B sacroilitis, recent UTI, left nephrectomy   Personal factors affecting pt at time of IE include: inaccessible home environment, limited home support, advanced age, inability to perform IADLs, inability to perform ADLs, inability to navigate level surfaces without external assistance, inability to ambulate household distances and limited insight into impairments  Pt will benefit from continued skilled PT services to address deficits as defined above and to maximize level of functional mobility to facilitate return toward PLOF and improved QOL  From PT/mobility standpoint, recommendation at time of d/c would be Short term rehab pending progress in order to reduce fall risk and maximize pt's functional independence and consistency with mobility in order to facilitate return to PLOF  Recommend trial with walker next 1-2 sessions and ther ex next 1-2 sessions  The patient's AM-PAC Basic Mobility Inpatient Short Form Raw Score is 13  A Raw score of less than or equal to 16 suggests the patient may benefit from discharge to post-acute rehabilitation services  Please also refer to the recommendation of the Physical Therapist for safe discharge planning  Goals: Pt will: Perform bed mobility tasks with Supervision to reposition in bed and prepare for transfers  Pt will perform transfers with Supervision to decrease burden of care, decrease risk for falls and improve activity tolerance and prepare for ambulation  Pt will ambulate with RW for >/= 76' with  consistent supervision  to decrease burden of care, decrease risk for falls, improve activity tolerance and improve gait quality and to access home environment  Pt will participate in objective balance assessment to determine baseline fall risk  Pt will increase B LE strength >/= 1/2 MMT grade to facilitate functional mobility        Seb Issa, PT,DPT

## 2022-06-16 NOTE — H&P
[address]  H&P- Amber Keene 1940, 78 y o  female MRN: [id number]  Unit/Bed#: -[facility]issa Encounter: [id number]  Primary Care Provider: Patricia Moise MD   Date and time admitted to hospital: 6/15/2022  7:51 PM    Dementia with behavioral disturbance [facility])  Assessment & Plan  · Medical record review patient with Alzheimer's dementia  · One admission alert to person and year but convinced of this place is a fire house  · Argumentative with the nursing staff  · Appreciate OT cognitive evaluation  · PT/OT/case management for rehabilitation placement  · Admitted 06/11 to outside hospital with aphasia, had full stroke workup including MRI brain and at time of discharge on 06/15 was recommended rehabilitation placement, family and patient both refused and returned to this facility on the same day as she could not care for herself  Review of hospital notes from outside facility, daughter adamantly denied that patient has history of dementia even though she takes [place] daily  Stage 3a chronic kidney disease [facility])  Assessment & Plan  Lab Results   Component Value Date    EGFR 36 06/15/2022    EGFR 35 06/15/2022    EGFR 42 02/28/2022    CREATININE 1 34 (H) 06/15/2022    CREATININE 1 39 (H) 06/15/2022    CREATININE 1 18 02/28/2022     · Creatinine 1 39 with history creatinine 1 18  · GFR reduced at 36  · Losartan on hold pending creatinine improvement  · Was receiving Macrobid for UTI  · Repeat UA negative for leukocytes or nitrites  · Received a dose of vancomycin in the emergency department for UTI symptoms and developed allergic reaction  · Monitor creatinine  · Renally dose medications    A-fib ([facility])  Assessment & Plan  · Rate controlled  · Continue Eliquis 2 5 mg b i d      Chronic diastolic CHF (congestive heart failure) ([facility])  Assessment & Plan  Wt Readings from Last 3 Encounters:   06/15/22 68 3 kg (150 lb 9 2 oz)   05/23/22 72 1 kg (159 lb)   04/12/22 72 1 kg (159 lb) · Euvolemic on exam  · Continue Lasix 20 mg daily  · Outpatient follow-up          VTE Pharmacologic Prophylaxis: VTE Score: 4 Moderate Risk (Score 3-4) - Pharmacological DVT Prophylaxis Ordered: apixaban (Eliquis)  Code Status: Level 1 - Full Code      Anticipated Length of Stay: Patient will be admitted on an observation basis with an anticipated length of stay of less than 2 midnights secondary to placement  Total Time for Visit, including Counseling / Coordination of Care: 30 minutes Greater than 50% of this total time spent on direct patient counseling and coordination of care  Chief Complaint: dysuria    History of Present Illness:  Unique Light is a 78 y o  female with a PMH of PAF, dementia, CKD, left nephrectomy, ambulatory dysfunction, lives alone who presents with inability to care for herself at home and generalized weakness  Admitted to outside facility on 06/11 for aphasia, word-finding difficulty and underwent stroke evaluation with MRI brain  Patient found to have UTI in discharged on Macrobid  On 06/15 at day of discharge patient was recommended to be placed in rehabilitation facility as she lives alone but both patient and her family declined this and took her home  Later that day she presented to this facility with worsening weakness, continued dysuria with normal UA on arrival   Patient and family are now agreeable to rehabilitation placement during this hospitalization      Review of Systems:  Review of Systems   Unable to perform ROS: Dementia       Past Medical and Surgical History:   Past Medical History:   Diagnosis Date    Alzheimer's dementia ([facility] [address] )     Aphasia     CHF (congestive heart failure) ([facility] [address] )     Coronary artery disease     Hypercholesteremia     Hypertension     IBS (irritable bowel syndrome)     Renal disorder     Rheumatic fever     Skin lesion        Past Surgical History:   Procedure Laterality Date    ANKLE FRACTURE SURGERY      LAST ASSESSED [id number]    HYSTERECTOMY      KIDNEY SURGERY      NEPHRECTOMY      TONSILLECTOMY         Meds/Allergies:  Prior to Admission medications    Medication Sig Start Date End Date Taking? Authorizing Provider   acetaminophen (TYLENOL) 500 mg tablet Take 500 mg by mouth every 6 (six) hours as needed   5/12/20   Historical Provider, MD   ascorbic acid (VITAMIN C) 500 MG tablet Take 1 tablet (500 mg total) by mouth daily 2/2/22   Leroy Raya MD   bisacodyl (DULCOLAX) 10 mg suppository Insert 10 mg into the rectum if needed 5/6/22   Historical Provider, MD   cholecalciferol (VITAMIN D3) 1,000 units tablet Take 1 tablet (1,000 Units total) by mouth daily 2/2/22   Leroy Raya MD   clopidogrel (PLAVIX) 75 mg tablet Take 1 tablet (75 mg total) by mouth daily 3/11/22   Loulou Bowie DO   Eliquis 2 5 MG Take 1 tablet (2 5 mg total) by mouth 2 (two) times a day 3/28/22   Leroy Raya MD   ezetimibe (ZETIA) 10 mg tablet Take 1 tablet (10 mg total) by mouth daily 3/11/22   Loulou Bowie DO   fluticasone (FLONASE) 50 mcg/act nasal spray 2 sprays into each nostril daily as needed      Historical Provider, MD   furosemide (LASIX) 20 mg tablet Take 1 tablet (20 mg total) by mouth 2 (two) times a day 12/23/21   Loulou Bowie DO   hydrocortisone (ANUSOL-HC) 2 5 % rectal cream Apply topically daily at bedtime 12/23/21   Loulou Hatfield DO   isosorbide mononitrate (IMDUR) 30 mg 24 hr tablet take 1 tablet by mouth once daily 4/29/22   Leroy Raya MD   lansoprazole (PREVACID) 30 mg capsule Take 1 capsule (30 mg total) by mouth in the morning  5/23/22   Leroy Raya MD   losartan (COZAAR) 25 mg tablet Take 25 mg by mouth in the morning and 25 mg at noon   5/11/22   Historical Provider, MD   nitroglycerin (NITROSTAT) 0 4 mg SL tablet Place 0 4 mg under the tongue every 5 (five) minutes as needed prn     Yissel Garcia MD   omega-3-acid ethyl esters (LOVAZA) 1 g capsule Take 1 g by mouth daily Historical Provider, MD   potassium chloride (MICRO-K) 10 MEQ CR capsule Take 10 mEq by mouth daily   12/10/21   Historical Provider, MD   zinc sulfate (ZINCATE) 220 mg capsule Take 1 capsule (220 mg total) by mouth daily 2/2/22   Awilda Vasquez MD     I have reviewed home medications using recent Epic encounter  Allergies: Allergies   Allergen Reactions    Amoxicillin Hives    Ciprofloxacin Hives    Erythromycin Hives    Statins Other (See Comments)     unknown    Vancomycin Itching    Amoxicillin-Pot Clavulanate Other (See Comments)     pt does not remember    Clindamycin Other (See Comments)     PT DOES NOT RECALL REACTION         Social History:  Marital Status:    Occupation: retired  Patient Pre-hospital Living Situation: Alone  Patient Pre-hospital Level of Mobility: unable to be assessed at time of evaluation  Patient Pre-hospital Diet Restrictions: none  Substance Use History:   Social History     Substance and Sexual Activity   Alcohol Use No     Social History     Tobacco Use   Smoking Status Never Smoker   Smokeless Tobacco Never Used     Social History     Substance and Sexual Activity   Drug Use No       Family History:  Family History   Problem Relation Age of Onset    Hypertension Mother     Asthma Father     Cancer Sister        Physical Exam:     Vitals:   Blood Pressure: 148/68 (06/15/22 2344)  Pulse: 85 (06/15/22 2344)  Temperature: (!) 97 3 °F (36 3 °C) (06/15/22 2344)  Temp Source: Oral (06/15/22 2344)  Respirations: 19 (06/15/22 2344)  Height: 4' 7" (139 7 cm) (06/15/22 2344)  Weight - Scale: 68 3 kg (150 lb 9 2 oz) (06/15/22 2344)  SpO2: 97 % (06/15/22 2352)    Physical Exam  Vitals and nursing note reviewed  Constitutional:       General: She is not in acute distress  Appearance: She is well-developed  HENT:      Head: Normocephalic and atraumatic     Eyes:      Conjunctiva/sclera: Conjunctivae normal    Cardiovascular:      Rate and Rhythm: Normal rate and regular rhythm  Heart sounds: No murmur heard  Pulmonary:      Effort: Pulmonary effort is normal  No respiratory distress  Breath sounds: Normal breath sounds  Abdominal:      Palpations: Abdomen is soft  Tenderness: There is no abdominal tenderness  Musculoskeletal:      Cervical back: Neck supple  Skin:     General: Skin is warm and dry  Neurological:      Mental Status: She is alert  Psychiatric:         Behavior: Behavior is agitated  Cognition and Memory: Cognition is impaired  Memory is impaired  Judgment: Judgment is impulsive  Additional Data:     Lab Results:  Results from last 7 days   Lab Units 06/16/22  0651   WBC Thousand/uL 6 23   HEMOGLOBIN g/dL 10 6*   HEMATOCRIT % 34 7*   PLATELETS Thousands/uL 272   NEUTROS PCT % 63   LYMPHS PCT % 23   MONOS PCT % 11   EOS PCT % 2     Results from last 7 days   Lab Units 06/15/22  2148   SODIUM mmol/L 138   POTASSIUM mmol/L 4 8   CHLORIDE mmol/L 104   CO2 mmol/L 26   BUN mg/dL 35*   CREATININE mg/dL 1 34*   ANION GAP mmol/L 8   CALCIUM mg/dL 8 4   ALBUMIN g/dL 3 0*   TOTAL BILIRUBIN mg/dL 0 32   ALK PHOS U/L 85   ALT U/L 17   AST U/L 14   GLUCOSE RANDOM mg/dL 128     Results from last 7 days   Lab Units 06/15/22  2034   INR  1 10             Results from last 7 days   Lab Units 06/15/22  2148 06/15/22  2034   LACTIC ACID mmol/L  --  1 1   PROCALCITONIN ng/ml 0 08 0 07       Imaging: Reviewed radiology reports from this admission including: CT head  CT head without contrast   Final Result by Jyotsna Gonzalez MD (06/15 2106)      No acute intracranial abnormality  Workstation performed: [id number]         XR chest portable   Final Result by Enoc Price MD (06/15 2121)      Stable findings without acute cardiopulmonary disease                    Workstation performed: [id number]             EKG and Other Studies Reviewed on Admission:   All    ** Please Note: This note has been constructed using a voice recognition system   **

## 2022-06-16 NOTE — ASSESSMENT & PLAN NOTE
· Medical record review patient with Alzheimer's dementia  · One admission alert to person and year but convinced of this place is a fire house  · Argumentative with the nursing staff  · Appreciate OT cognitive evaluation  · PT/OT/case management for rehabilitation placement  · Admitted 06/11 to outside hospital with aphasia, had full stroke workup including MRI brain and at time of discharge on 06/15 was recommended rehabilitation placement, family and patient both refused and returned to this facility on the same day as she could not care for herself  Review of hospital notes from outside facility, daughter adamantly denied that patient has history of dementia even though she takes [place] daily

## 2022-06-16 NOTE — PLAN OF CARE
Problem: PHYSICAL THERAPY ADULT  Goal: Performs mobility at highest level of function for planned discharge setting  See evaluation for individualized goals  Description: Treatment/Interventions: ADL retraining, Functional transfer training, LE strengthening/ROM, Therapeutic exercise, Endurance training, Patient/family training, Equipment eval/education, Bed mobility, Gait training, Compensatory technique education, Spoke to nursing, Spoke to case management, OT  Equipment Recommended:  (walker-pt has)       See flowsheet documentation for full assessment, interventions and recommendations  [id number] by Pankaj Owusu PT  Note: Prognosis: Good  Problem List: Decreased strength, Decreased endurance, Impaired balance, Decreased mobility, Decreased cognition, Impaired judgement, Decreased safety awareness, Obesity, Decreased skin integrity, Pain  Pt tolerated session fairly  She continues to require assistance with mobility and ambulaiton distance is limtied by decreased strength with L knee buckling, balance and endurance and increased pain in B legs  She requires increased time to complete mobility and increased verbal cues for tehcnique and safety  She will continue to benefit from PT services to maximize LOF  Barriers to Discharge: Decreased caregiver support, Inaccessible home environment  Barriers to Discharge Comments: requires assistance to complete all mobility     PT Discharge Recommendation: Post acute rehabilitation services          See flowsheet documentation for full assessment

## 2022-06-16 NOTE — ED PROVIDER NOTES
History  Chief Complaint   Patient presents with    Possible UTI     Pt was diagnosed with a UTI on Tuesday  Continued complaints of painful urination  Family states pt was admitted to [address] and discharged today with no improvement of UTI symptoms      The patient is an 18-year-old female with a past medical history of hypertension, who presents emergency department today with the chief complaint of increased confusion and generalized weakness per family from home  The patient's daughter states that patient does live by herself  Typically ambulates well with a walker  The patient approximately 1 week ago began to have increased weakness, fatigue and bouts of confusion at home  Daughter states that this is similar in nature to previous episodes of UTIs  Patient was urged to seek medical attention however refused at the time  The patient does have home health visitors 3 days a week  Approximately 4 days ago the patient started to have worsening expressive aphasia, generalized weakness and fatigue and was urgently sent to the emergency department at [facility]  Patient due to expressive aphasia, was evaluated for her acute deficit  Imaging studies were negative for any acute stroke or intracranial abnormality  The patient was monitored for a few days inpatient  Patient had repeat urine sample performed a few days ago which then illustrated bacterial culture of E  Facelis sensitive to macrobid  The patient was discharged home after recommendation was given for acute rehab facility, they declined and refused at the time and came here for further evaluation after having persistent weakness  The patient does live by herself and typically ambulated well prior to admission and now is requiring significant assistance        Medical Problem  Location:  Patient is overall weak and unable to walk without assistance , lives at home alone, concern for UTI from family,   Quality:  No pain  Severity: Moderate  Onset quality:  Gradual  Timing:  Constant  Progression:  Unchanged  Chronicity:  New  Context:  Recent [facility] discharge today   Associated symptoms: no abdominal pain, no chest pain, no cough, no ear pain, no fever, no headaches, no rash, no shortness of breath, no sore throat, no vomiting and no wheezing        Prior to Admission Medications   Prescriptions Last Dose Informant Patient Reported? Taking? Eliquis 2 5 MG   No No   Sig: Take 1 tablet (2 5 mg total) by mouth 2 (two) times a day   acetaminophen (TYLENOL) 500 mg tablet   Yes No   Sig: Take 500 mg by mouth every 6 (six) hours as needed     ascorbic acid (VITAMIN C) 500 MG tablet   No No   Sig: Take 1 tablet (500 mg total) by mouth daily   bisacodyl (DULCOLAX) 10 mg suppository   Yes No   Sig: Insert 10 mg into the rectum if needed   cholecalciferol (VITAMIN D3) 1,000 units tablet   No No   Sig: Take 1 tablet (1,000 Units total) by mouth daily   clopidogrel (PLAVIX) 75 mg tablet   No No   Sig: Take 1 tablet (75 mg total) by mouth daily   ezetimibe (ZETIA) 10 mg tablet   No No   Sig: Take 1 tablet (10 mg total) by mouth daily   fluticasone (FLONASE) 50 mcg/act nasal spray   Yes No   Si sprays into each nostril daily as needed     furosemide (LASIX) 20 mg tablet   No No   Sig: Take 1 tablet (20 mg total) by mouth 2 (two) times a day   hydrocortisone (ANUSOL-HC) 2 5 % rectal cream   No No   Sig: Apply topically daily at bedtime   isosorbide mononitrate (IMDUR) 30 mg 24 hr tablet   No No   Sig: take 1 tablet by mouth once daily   lansoprazole (PREVACID) 30 mg capsule   No No   Sig: Take 1 capsule (30 mg total) by mouth in the morning  losartan (COZAAR) 25 mg tablet   Yes No   Sig: Take 25 mg by mouth in the morning and 25 mg at noon     nitroglycerin (NITROSTAT) 0 4 mg SL tablet   Yes No   Sig: Place 0 4 mg under the tongue every 5 (five) minutes as needed prn    omega-3-acid ethyl esters (LOVAZA) 1 g capsule   Yes No   Sig: Take 1 g by mouth daily     potassium chloride (MICRO-K) 10 MEQ CR capsule   Yes No   Sig: Take 10 mEq by mouth daily     zinc sulfate (ZINCATE) 220 mg capsule   No No   Sig: Take 1 capsule (220 mg total) by mouth daily      Facility-Administered Medications Last Administration Doses Remaining   cyanocobalamin injection 1,000 mcg 2/19/2018 10:08 AM           Past Medical History:   Diagnosis Date    Hypercholesteremia     Hypertension     IBS (irritable bowel syndrome)     Rheumatic fever     Skin lesion        Past Surgical History:   Procedure Laterality Date    ANKLE FRACTURE SURGERY      LAST ASSESSED [id number]    HYSTERECTOMY      KIDNEY SURGERY      TONSILLECTOMY         Family History   Problem Relation Age of Onset    Hypertension Mother     Asthma Father     Cancer Sister      I have reviewed and agree with the history as documented  E-Cigarette/Vaping     E-Cigarette/Vaping Substances     Social History     Tobacco Use    Smoking status: Never Smoker    Smokeless tobacco: Never Used   Substance Use Topics    Alcohol use: No    Drug use: No       Review of Systems   Constitutional: Negative for chills and fever  HENT: Negative for ear pain and sore throat  Eyes: Negative for pain and visual disturbance  Respiratory: Negative for cough, shortness of breath and wheezing  Cardiovascular: Negative for chest pain, palpitations and leg swelling  Gastrointestinal: Negative for abdominal pain and vomiting  Genitourinary: Negative for dysuria and hematuria  Musculoskeletal: Negative for arthralgias and back pain  Skin: Negative for color change and rash  Neurological: Negative for dizziness, seizures, syncope and headaches  All other systems reviewed and are negative  Physical Exam  Physical Exam  Vitals and nursing note reviewed  Constitutional:       General: She is not in acute distress  Appearance: She is well-developed  HENT:      Head: Normocephalic and atraumatic  Right Ear: External ear normal       Left Ear: External ear normal       Mouth/Throat:      Mouth: Mucous membranes are dry  Eyes:      Extraocular Movements: Extraocular movements intact  Pupils: Pupils are equal, round, and reactive to light  Cardiovascular:      Rate and Rhythm: Normal rate and regular rhythm  Heart sounds: No murmur heard  Pulmonary:      Effort: Pulmonary effort is normal  No respiratory distress  Breath sounds: Normal breath sounds  No wheezing  Abdominal:      General: Bowel sounds are normal       Palpations: Abdomen is soft  There is no mass  Tenderness: There is no abdominal tenderness  There is no right CVA tenderness, left CVA tenderness or rebound  Hernia: No hernia is present  Musculoskeletal:      Cervical back: Normal range of motion and neck supple  Skin:     General: Skin is warm and dry  Capillary Refill: Capillary refill takes less than 2 seconds  Neurological:      Mental Status: She is alert and oriented to person, place, and time        Coordination: Coordination normal       Comments: Patient eating significant assistance with ambulation    Psychiatric:         Behavior: Behavior normal          Vital Signs  ED Triage Vitals [06/15/22 1957]   Temperature Pulse Respirations Blood Pressure SpO2   97 5 °F (36 4 °C) 74 18 (!) 201/75 96 %      Temp Source Heart Rate Source Patient Position - Orthostatic VS BP Location FiO2 (%)   Temporal Monitor Lying Left arm --      Pain Score       No Pain           Vitals:    06/15/22 1957 06/15/22 2130   BP: (!) 201/75 113/51   Pulse: 74 69   Patient Position - Orthostatic VS: Lying          Visual Acuity      ED Medications  Medications   sodium chloride 0 9 % bolus 500 mL (0 mL Intravenous Stopped 6/15/22 2246)   vancomycin (VANCOCIN) IVPB (premix in dextrose) 1,000 mg 200 mL (0 mg/kg × 72 1 kg Intravenous Stopped 6/15/22 2208)   diphenhydrAMINE (BENADRYL) injection 50 mg (50 mg Intravenous Given 6/15/22 2208)   cefepime (MAXIPIME) IVPB (premix in dextrose) 2,000 mg 50 mL (0 mg Intravenous Stopped 6/15/22 2238)       Diagnostic Studies  Results Reviewed     Procedure Component Value Units Date/Time    HS Troponin I 4hr [[id number]]     Lab Status: No result Specimen: Blood     Comprehensive metabolic panel [[id number]]  (Abnormal) Collected: 06/15/22 2148    Lab Status: Final result Specimen: Blood from Arm, Left Updated: 06/15/22 2207     Sodium 138 mmol/L      Potassium 4 8 mmol/L      Chloride 104 mmol/L      CO2 26 mmol/L      ANION GAP 8 mmol/L      BUN 35 mg/dL      Creatinine 1 34 mg/dL      Glucose 128 mg/dL      Calcium 8 4 mg/dL      Corrected Calcium 9 2 mg/dL      AST 14 U/L      ALT 17 U/L      Alkaline Phosphatase 85 U/L      Total Protein 6 0 g/dL      Albumin 3 0 g/dL      Total Bilirubin 0 32 mg/dL      eGFR 36 ml/min/1 73sq m     Narrative:      [place] guidelines for Chronic Kidney Disease (CKD):     Stage 1 with normal or high GFR (GFR > 90 mL/min/1 73 square meters)    Stage 2 Mild CKD (GFR = 60-89 mL/min/1 73 square meters)    Stage 3A Moderate CKD (GFR = 45-59 mL/min/1 73 square meters)    Stage 3B Moderate CKD (GFR = 30-44 mL/min/1 73 square meters)    Stage 4 Severe CKD (GFR = 15-29 mL/min/1 73 square meters)    Stage 5 End Stage CKD (GFR <15 mL/min/1 73 square meters)  Note: GFR calculation is accurate only with a steady state creatinine    COVID/FLU/RSV - 2 hour TAT [[id number]]  (Normal) Collected: 06/15/22 2034    Lab Status: Final result Specimen: Nares from Nose Updated: 06/15/22 2131     SARS-CoV-2 Negative     INFLUENZA A PCR Negative     INFLUENZA B PCR Negative     RSV PCR Negative    Narrative:      FOR PEDIATRIC PATIENTS - copy/paste COVID Guidelines URL to browser: [email or web address]    SARS-CoV-2 assay is a Nucleic Acid Amplification assay intended for the  qualitative detection of nucleic acid from SARS-CoV-2 in nasopharyngeal  swabs  Results are for the presumptive identification of SARS-CoV-2 RNA  Positive results are indicative of infection with SARS-CoV-2, the virus  causing COVID-19, but do not rule out bacterial infection or co-infection  with other viruses  Laboratories within the [place] and its  territories are required to report all positive results to the appropriate  public health authorities  Negative results do not preclude SARS-CoV-2  infection and should not be used as the sole basis for treatment or other  patient management decisions  Negative results must be combined with  clinical observations, patient history, and epidemiological information  This test has not been FDA cleared or approved  This test has been authorized by FDA under an Emergency Use Authorization  (EUA)  This test is only authorized for the duration of time the  declaration that circumstances exist justifying the authorization of the  emergency use of an in vitro diagnostic tests for detection of SARS-CoV-2  virus and/or diagnosis of COVID-19 infection under section 564(b)(1) of  the Act, 21 U  S C  765BYI-3(F)(9), unless the authorization is terminated  or revoked sooner  The test has been validated but independent review by FDA  and CLIA is pending  Test performed using [employer] GeneXpert: This RT-PCR assay targets N2,  a region unique to SARS-CoV-2  A conserved region in the E-gene was chosen  for pan-Sarbecovirus detection which includes SARS-CoV-2      Procalcitonin [[id number]]  (Normal) Collected: 06/15/22 2034    Lab Status: Final result Specimen: Blood from Arm, Left Updated: 06/15/22 2123     Procalcitonin 0 07 ng/ml     Comprehensive metabolic panel [[id number]]  (Abnormal) Collected: 06/15/22 2034    Lab Status: Final result Specimen: Blood from Arm, Left Updated: 06/15/22 2122     Sodium 138 mmol/L      Potassium 5 0 mmol/L      Chloride 103 mmol/L      CO2 25 mmol/L      ANION GAP 10 mmol/L      BUN 35 mg/dL      Creatinine 1 39 mg/dL      Glucose 113 mg/dL      Calcium 8 6 mg/dL      Corrected Calcium 9 2 mg/dL      AST 21 U/L      ALT 17 U/L      Alkaline Phosphatase 87 U/L      Total Protein 6 6 g/dL      Albumin 3 2 g/dL      Total Bilirubin 0 27 mg/dL      eGFR 35 ml/min/1 73sq m     Narrative:      [place] guidelines for Chronic Kidney Disease (CKD):     Stage 1 with normal or high GFR (GFR > 90 mL/min/1 73 square meters)    Stage 2 Mild CKD (GFR = 60-89 mL/min/1 73 square meters)    Stage 3A Moderate CKD (GFR = 45-59 mL/min/1 73 square meters)    Stage 3B Moderate CKD (GFR = 30-44 mL/min/1 73 square meters)    Stage 4 Severe CKD (GFR = 15-29 mL/min/1 73 square meters)    Stage 5 End Stage CKD (GFR <15 mL/min/1 73 square meters)  Note: GFR calculation is accurate only with a steady state creatinine    NT-BNP PRO [[id number]]  (Normal) Collected: 06/15/22 2034    Lab Status: Final result Specimen: Blood from Arm, Left Updated: 06/15/22 2122     NT-proBNP 197 pg/mL     Magnesium [[id number]]  (Normal) Collected: 06/15/22 2034    Lab Status: Final result Specimen: Blood from Arm, Left Updated: 06/15/22 2122     Magnesium 2 2 mg/dL     HS Troponin 0hr (reflex protocol) [[id number]]  (Normal) Collected: 06/15/22 2034    Lab Status: Final result Specimen: Blood from Arm, Left Updated: 06/15/22 2122     hs TnI 0hr 6 ng/L     HS Troponin I 2hr [[id number]]     Lab Status: No result Specimen: Blood     Lactic acid [[id number]]  (Normal) Collected: 06/15/22 2034    Lab Status: Final result Specimen: Blood from Arm, Left Updated: 06/15/22 2114     LACTIC ACID 1 1 mmol/L     Narrative:      Result may be elevated if tourniquet was used during collection      Margaux Trammell [[id number]]  (Normal) Collected: 06/15/22 2034    Lab Status: Final result Specimen: Blood from Arm, Left Updated: 06/15/22 2111     Protime 14 1 seconds      INR 1 10    APTT [[id number]]  (Normal) Collected: 06/15/22 2034    Lab Status: Final result Specimen: Blood from Arm, Left Updated: 06/15/22 2111     PTT 32 seconds     UA w Reflex to Microscopic w Reflex to Culture [[id number]] Collected: 06/15/22 2035    Lab Status: Final result Specimen: Urine, Clean Catch Updated: 06/15/22 2059     Color, UA Yellow     Clarity, UA Clear     Specific Gravity, UA 1 015     pH, UA 6 0     Leukocytes, UA Negative     Nitrite, UA Negative     Protein, UA Negative mg/dl      Glucose, UA Negative mg/dl      Ketones, UA Negative mg/dl      Urobilinogen, UA 0 2 E U /dl      Bilirubin, UA Negative     Blood, UA Negative    Blood gas, Venous [[id number]]  (Abnormal) Collected: 06/15/22 2034    Lab Status: Final result Specimen: Blood from Arm, Left Updated: 06/15/22 2055     pH, Tone 7 378     pCO2, Tone 40 9 mm Hg      pO2, Tone 43 5 mm Hg      HCO3, Tone 23 5 mmol/L      Base Excess, Tone -1 5 mmol/L      O2 Content, Tone 13 8 ml/dL      O2 HGB, VENOUS 77 9 %     CBC and differential [[id number]]  (Abnormal) Collected: 06/15/22 2034    Lab Status: Final result Specimen: Blood from Arm, Left Updated: 06/15/22 2055     WBC 7 26 Thousand/uL      RBC 4 46 Million/uL      Hemoglobin 11 5 g/dL      Hematocrit 37 5 %      MCV 84 fL      MCH 25 8 pg      MCHC 30 7 g/dL      RDW 16 8 %      MPV 11 7 fL      Platelets 897 Thousands/uL      nRBC 0 /100 WBCs      Neutrophils Relative 68 %      Immat GRANS % 1 %      Lymphocytes Relative 20 %      Monocytes Relative 10 %      Eosinophils Relative 1 %      Basophils Relative 0 %      Neutrophils Absolute 4 95 Thousands/µL      Immature Grans Absolute 0 04 Thousand/uL      Lymphocytes Absolute 1 45 Thousands/µL      Monocytes Absolute 0 71 Thousand/µL      Eosinophils Absolute 0 08 Thousand/µL      Basophils Absolute 0 03 Thousands/µL     Blood culture #1 [[id number]] Collected: 06/15/22 2034    Lab Status:  In process Specimen: Blood from Hand, Right Updated: 06/15/22 2053    Blood culture #2 [[id number]] Collected: 06/15/22 2034    Lab Status: In process Specimen: Blood from Arm, Left Updated: 06/15/22 2053                 CT head without contrast   Final Result by Amber Dumas MD (06/15 2106)      No acute intracranial abnormality  Workstation performed: [id number]         XR chest portable   Final Result by Braulio Navas MD (06/15 2121)      Stable findings without acute cardiopulmonary disease  Workstation performed: [id number]                    Procedures  Procedures         ED Course  ED Course as of 06/15/22 2329   Wed Abad 15, 2022   2126 Creatinine(!): 1 39  Patient from                                              [facility]  Number of Diagnoses or Management Options  Ambulatory dysfunction  Diagnosis management comments: Patient's urine culture illustrated bacteria and patient bacteria was sensitive to Macrobid  Patient's lab work did not demonstrate any acute abnormality, imaging did not demonstrate any acute abnormality  Patient is able to tolerate p o  Medications as she was prescribed earlier today  Upon discharge earlier they did recommend initially she would be transferred for acute rehab care  Family and patient declined at that time and they went home however the patient does now require significant assistance at home where she lives by herself  The patient is not safe at home, as she is alone  Patient, son and daughter all at bedside and agree for inpatient rehab facility, PT OT             Amount and/or Complexity of Data Reviewed  Clinical lab tests: reviewed and ordered  Tests in the radiology section of CPT®: ordered and reviewed  Decide to obtain previous medical records or to obtain history from someone other than the patient: yes  Obtain history from someone other than the patient: yes  Review and summarize past medical records: yes  Discuss the patient with other providers: yes  Independent visualization of images, tracings, or specimens: yes    Risk of Complications, Morbidity, and/or Mortality  Presenting problems: moderate  Diagnostic procedures: moderate  Management options: moderate    Patient Progress  Patient progress: stable      Disposition  Final diagnoses:   Ambulatory dysfunction     Time reflects when diagnosis was documented in both MDM as applicable and the Disposition within this note     Time User Action Codes Description Comment    6/15/2022 10:40 PM Karly Barraza Hebert [R26 [id number]] Ambulatory dysfunction       ED Disposition     ED Disposition   Admit    Condition   Stable    Date/Time   Wed Abad 15, 2022 10:41 PM    Comment   Case was discussed with Ronny naqvi  and the patient's admission status was agreed to be Admission Status: observation status to the service of Dr Sanjay Guillen   Follow-up Information    None         Current Discharge Medication List      CONTINUE these medications which have NOT CHANGED    Details   acetaminophen (TYLENOL) 500 mg tablet Take 500 mg by mouth every 6 (six) hours as needed        ascorbic acid (VITAMIN C) 500 MG tablet Take 1 tablet (500 mg total) by mouth daily  Qty: 90 tablet, Refills: 3    Associated Diagnoses: Essential hypertension; Chronic diastolic CHF (congestive heart failure) ([facility] ); Atrial fibrillation, unspecified type ([facility] ); Coronary artery disease involving native heart, unspecified vessel or lesion type, unspecified whether angina present; Stage 3a chronic kidney disease ([facility] ); Single kidney; Vitamin D deficiency; Junctional bradycardia; Other specified inflammatory spondylopathies, lumbosacral region [facility]); Parkinson's disease ([facility] );  Obesity, morbid (HCC)      bisacodyl (DULCOLAX) 10 mg suppository Insert 10 mg into the rectum if needed      cholecalciferol (VITAMIN D3) 1,000 units tablet Take 1 tablet (1,000 Units total) by mouth daily  Qty: 90 tablet, Refills: 1    Associated Diagnoses: Vitamin D deficiency      clopidogrel (PLAVIX) 75 mg tablet Take 1 tablet (75 mg total) by mouth daily  Qty: 90 tablet, Refills: 1    Associated Diagnoses: Chronic diastolic CHF (congestive heart failure) ([facility])      Eliquis 2 5 MG Take 1 tablet (2 5 mg total) by mouth 2 (two) times a day  Qty: 60 tablet, Refills: 1    Associated Diagnoses: Atrial fibrillation, unspecified type ([facility])      ezetimibe (ZETIA) 10 mg tablet Take 1 tablet (10 mg total) by mouth daily  Qty: 90 tablet, Refills: 1    Associated Diagnoses: Chronic diastolic CHF (congestive heart failure) ([facility])      fluticasone (FLONASE) 50 mcg/act nasal spray 2 sprays into each nostril daily as needed        furosemide (LASIX) 20 mg tablet Take 1 tablet (20 mg total) by mouth 2 (two) times a day  Qty: 60 tablet, Refills: 0    Associated Diagnoses: Coronary artery disease involving native heart, unspecified vessel or lesion type, unspecified whether angina present      hydrocortisone (ANUSOL-HC) 2 5 % rectal cream Apply topically daily at bedtime  Qty: 28 g, Refills: 1    Associated Diagnoses: External hemorrhoids      isosorbide mononitrate (IMDUR) 30 mg 24 hr tablet take 1 tablet by mouth once daily  Qty: 30 tablet, Refills: 5    Associated Diagnoses: Chronic diastolic CHF (congestive heart failure) ([facility])      lansoprazole (PREVACID) 30 mg capsule Take 1 capsule (30 mg total) by mouth in the morning  Qty: 90 capsule, Refills: 3    Associated Diagnoses: Chronic diastolic CHF (congestive heart failure) ([facility] ); Obesity, morbid ([facility] ); Atrial fibrillation, unspecified type ([facility] ); Coronary artery disease involving native heart, unspecified vessel or lesion type, unspecified whether angina present; Other specified inflammatory spondylopathies, lumbosacral region [facility]); Essential hypertension; Stage 3a chronic kidney disease ([facility] ); Single kidney; Vitamin D deficiency; Junctional bradycardia; Parkinson's disease ([facility])      losartan (COZAAR) 25 mg tablet Take 25 mg by mouth in the morning and 25 mg at noon        nitroglycerin (NITROSTAT) 0 4 mg SL tablet Place 0 4 mg under the tongue every 5 (five) minutes as needed prn       omega-3-acid ethyl esters (LOVAZA) 1 g capsule Take 1 g by mouth daily        potassium chloride (MICRO-K) 10 MEQ CR capsule Take 10 mEq by mouth daily        zinc sulfate (ZINCATE) 220 mg capsule Take 1 capsule (220 mg total) by mouth daily  Qty: 90 capsule, Refills: 3    Associated Diagnoses: Essential hypertension; Chronic diastolic CHF (congestive heart failure) ([facility] ); Atrial fibrillation, unspecified type ([facility] ); Coronary artery disease involving native heart, unspecified vessel or lesion type, unspecified whether angina present; Stage 3a [facility]  ); Single kidney; Vitamin D deficiency; Junctional bradycardia; Other specified inflammatory spondylopathies, lumbosacral region [facility]); Parkinson's disease ([facility] ); Obesity, morbid ([facility] )             No discharge procedures on file      PDMP Review       Value Time User    PDMP Reviewed  Yes 3/27/2020 12:20 PM Srinivas Chopra           Provider  Electronically Signed by           Clotilde Pederson PA-C  06/15/22 9062

## 2022-06-16 NOTE — ASSESSMENT & PLAN NOTE
physical deconditioning requiring PT OT eval   Need subacute rehab for short-term rehab as patient lives alone

## 2022-06-17 LAB
AMMONIA PLAS-SCNC: <10 UMOL/L (ref 11–35)
ANION GAP SERPL CALCULATED.3IONS-SCNC: 7 MMOL/L (ref 4–13)
BUN SERPL-MCNC: 25 MG/DL (ref 5–25)
CALCIUM SERPL-MCNC: 8.6 MG/DL (ref 8.3–10.1)
CHLORIDE SERPL-SCNC: 107 MMOL/L (ref 100–108)
CO2 SERPL-SCNC: 25 MMOL/L (ref 21–32)
CREAT SERPL-MCNC: 0.99 MG/DL (ref 0.6–1.3)
FLUAV RNA RESP QL NAA+PROBE: NEGATIVE
FLUBV RNA RESP QL NAA+PROBE: NEGATIVE
GFR SERPL CREATININE-BSD FRML MDRD: 52 ML/MIN/1.73SQ M
GLUCOSE SERPL-MCNC: 87 MG/DL (ref 65–140)
POTASSIUM SERPL-SCNC: 4.2 MMOL/L (ref 3.5–5.3)
RSV RNA RESP QL NAA+PROBE: NEGATIVE
SARS-COV-2 RNA RESP QL NAA+PROBE: NEGATIVE
SODIUM SERPL-SCNC: 139 MMOL/L (ref 136–145)

## 2022-06-17 PROCEDURE — 97116 GAIT TRAINING THERAPY: CPT

## 2022-06-17 PROCEDURE — 82140 ASSAY OF AMMONIA: CPT | Performed by: FAMILY MEDICINE

## 2022-06-17 PROCEDURE — 0241U HB NFCT DS VIR RESP RNA 4 TRGT: CPT | Performed by: FAMILY MEDICINE

## 2022-06-17 PROCEDURE — 97535 SELF CARE MNGMENT TRAINING: CPT

## 2022-06-17 PROCEDURE — 99232 SBSQ HOSP IP/OBS MODERATE 35: CPT | Performed by: FAMILY MEDICINE

## 2022-06-17 PROCEDURE — 80048 BASIC METABOLIC PNL TOTAL CA: CPT | Performed by: FAMILY MEDICINE

## 2022-06-17 PROCEDURE — 97129 THER IVNTJ 1ST 15 MIN: CPT

## 2022-06-17 PROCEDURE — 97530 THERAPEUTIC ACTIVITIES: CPT

## 2022-06-17 RX ADMIN — NITROFURANTOIN (MONOHYDRATE/MACROCRYSTALS) 100 MG: 75; 25 CAPSULE ORAL at 08:30

## 2022-06-17 RX ADMIN — APIXABAN 2.5 MG: 2.5 TABLET, FILM COATED ORAL at 17:04

## 2022-06-17 RX ADMIN — CLOPIDOGREL BISULFATE 75 MG: 75 TABLET ORAL at 08:30

## 2022-06-17 RX ADMIN — NITROFURANTOIN (MONOHYDRATE/MACROCRYSTALS) 100 MG: 75; 25 CAPSULE ORAL at 17:04

## 2022-06-17 RX ADMIN — ISOSORBIDE MONONITRATE 30 MG: 30 TABLET, EXTENDED RELEASE ORAL at 08:30

## 2022-06-17 RX ADMIN — GABAPENTIN 100 MG: 100 CAPSULE ORAL at 21:23

## 2022-06-17 RX ADMIN — PANTOPRAZOLE SODIUM 40 MG: 40 TABLET, DELAYED RELEASE ORAL at 08:30

## 2022-06-17 RX ADMIN — MEMANTINE 5 MG: 5 TABLET ORAL at 08:30

## 2022-06-17 RX ADMIN — APIXABAN 2.5 MG: 2.5 TABLET, FILM COATED ORAL at 08:30

## 2022-06-17 RX ADMIN — EZETIMIBE 10 MG: 10 TABLET ORAL at 08:30

## 2022-06-17 NOTE — PLAN OF CARE
Problem: MOBILITY - ADULT  Goal: Maintain or return to baseline ADL function  Description: INTERVENTIONS:  -  Assess patient's ability to carry out ADLs; assess patient's baseline for ADL function and identify physical deficits which impact ability to perform ADLs (bathing, care of mouth/teeth, toileting, grooming, dressing, etc )  - Assess/evaluate cause of self-care deficits   - Assess range of motion  - Assess patient's mobility; develop plan if impaired  - Assess patient's need for assistive devices and provide as appropriate  - Encourage maximum independence but intervene and supervise when necessary  - Involve family in performance of ADLs  - Assess for home care needs following discharge   - Consider OT consult to assist with ADL evaluation and planning for discharge  - Provide patient education as appropriate  Outcome: Progressing  Goal: Maintains/Returns to pre admission functional level  Description: INTERVENTIONS:  - Perform BMAT or MOVE assessment daily    - Set and communicate daily mobility goal to care team and patient/family/caregiver  - Collaborate with rehabilitation services on mobility goals if consulted  - Perform Range of Motion   times a day  - Reposition patient every   hours  - Dangle patient   times a day  - Stand patient   times a day  - Ambulate patient   times a day  - Out of bed to chair   times a day   - Out of bed for meals    times a day  - Out of bed for toileting  - Record patient progress and toleration of activity level   Outcome: Progressing     Problem: PAIN - ADULT  Goal: Verbalizes/displays adequate comfort level or baseline comfort level  Description: Interventions:  - Encourage patient to monitor pain and request assistance  - Assess pain using appropriate pain scale  - Administer analgesics based on type and severity of pain and evaluate response  - Implement non-pharmacological measures as appropriate and evaluate response  - Consider cultural and social influences on pain and pain management  - Notify physician/advanced practitioner if interventions unsuccessful or patient reports new pain  Outcome: Progressing     Problem: INFECTION - ADULT  Goal: Absence or prevention of progression during hospitalization  Description: INTERVENTIONS:  - Assess and monitor for signs and symptoms of infection  - Monitor lab/diagnostic results  - Monitor all insertion sites, i e  indwelling lines, tubes, and drains  - Monitor endotracheal if appropriate and nasal secretions for changes in amount and color  - Leonard appropriate cooling/warming therapies per order  - Administer medications as ordered  - Instruct and encourage patient and family to use good hand hygiene technique  - Identify and instruct in appropriate isolation precautions for identified infection/condition  Outcome: Progressing     Problem: SAFETY ADULT  Goal: Patient will remain free of falls  Description: INTERVENTIONS:  - Educate patient/family on patient safety including physical limitations  - Instruct patient to call for assistance with activity   - Consult OT/PT to assist with strengthening/mobility   - Keep Call bell within reach  - Keep bed low and locked with side rails adjusted as appropriate  - Keep care items and personal belongings within reach  - Initiate and maintain comfort rounds  - Make Fall Risk Sign visible to staff  - Offer Toileting every   Hours, in advance of need  - Initiate/Maintain   alarm  - Obtain necessary fall risk management equipment:     - Apply yellow socks and bracelet for high fall risk patients  - Consider moving patient to room near nurses station  Outcome: Progressing     Problem: DISCHARGE PLANNING  Goal: Discharge to home or other facility with appropriate resources  Description: INTERVENTIONS:  - Identify barriers to discharge w/patient and caregiver  - Arrange for needed discharge resources and transportation as appropriate  - Identify discharge learning needs (meds, wound care, etc )  - Arrange for interpretive services to assist at discharge as needed  - Refer to Case Management Department for coordinating discharge planning if the patient needs post-hospital services based on physician/advanced practitioner order or complex needs related to functional status, cognitive ability, or social support system  Outcome: Progressing     Problem: Knowledge Deficit  Goal: Patient/family/caregiver demonstrates understanding of disease process, treatment plan, medications, and discharge instructions  Description: Complete learning assessment and assess knowledge base  Interventions:  - Provide teaching at level of understanding  - Provide teaching via preferred learning methods  Outcome: Progressing     Problem: Potential for Falls  Goal: Patient will remain free of falls  Description: INTERVENTIONS:  - Educate patient/family on patient safety including physical limitations  - Instruct patient to call for assistance with activity   - Consult OT/PT to assist with strengthening/mobility   - Keep Call bell within reach  - Keep bed low and locked with side rails adjusted as appropriate  - Keep care items and personal belongings within reach  - Initiate and maintain comfort rounds  - Make Fall Risk Sign visible to staff  - Offer Toileting every   Hours, in advance of need  - Initiate/Maintain   alarm  - Obtain necessary fall risk management equipment:      - Apply yellow socks and bracelet for high fall risk patients  - Consider moving patient to room near nurses station  Outcome: Progressing     Problem: Prexisting or High Potential for Compromised Skin Integrity  Goal: Skin integrity is maintained or improved  Description: INTERVENTIONS:  - Identify patients at risk for skin breakdown  - Assess and monitor skin integrity  - Assess and monitor nutrition and hydration status  - Monitor labs   - Assess for incontinence   - Turn and reposition patient  - Assist with mobility/ambulation  - Relieve pressure over bony prominences  - Avoid friction and shearing  - Provide appropriate hygiene as needed including keeping skin clean and dry  - Evaluate need for skin moisturizer/barrier cream  - Collaborate with interdisciplinary team   - Patient/family teaching  - Consider wound care consult   Outcome: Progressing

## 2022-06-17 NOTE — OCCUPATIONAL THERAPY NOTE
Occupational Therapy Progress Note     Patient Name: Mala Jenkins  BWEDT'M Date: 6/17/2022  Problem List  Principal Problem:    Physical deconditioning  Active Problems:    Chronic diastolic CHF (congestive heart failure) ([facility])    A-fib (HCC)    Stage 3a chronic kidney disease ([facility] )    Anemia    Dementia with behavioral disturbance ([facility] )    Acute cystitis without hematuria       06/17/22 1436   Note Type   Note Type Treatment   Restrictions/Precautions   Other Precautions Chair Alarm; Bed Alarm; Fall Risk;Pain;Cognitive   Pain Assessment   Pain Assessment Tool 0-10   Pain Score 8   Pain Location/Orientation Location: Back   ADL   Grooming Assistance   (SBA)   Grooming Comments Pt able to brush teeth while standing at sink @ SBA d/t decreased standing tolerance/balance   LB Dressing Assistance 3  Moderate Assistance   LB Dressing Deficit Don/doff L sock; Don/doff R sock; Thread LLE into underwear; Thread RLE into underwear   LB Dressing Comments Pt required assist to don socks and thread LLE into brief while seated at EOB, able to assist threading RLE  Able to assist with CM while standing with 1UE support on RW @ Steadying assist    Bed Mobility   Supine to Sit 3  Moderate assistance   Additional items Assist x 1; Increased time required;Verbal cues;LE management  (trunk management)   Sit to Supine 4  Minimal assistance   Additional items Assist x 1; Increased time required;LE management;Verbal cues   Additional Comments Pt supine in bed at beginning of session  Supine to sit @ Mod A x1  At end of session, sit to supine @ Min A for LE management  Pt supine in bed with bed alarm intact and call bell within reach  All needs met     Transfers   Sit to Stand   (SBA)   Additional items Increased time required;Verbal cues   Stand to Sit   (SBA)   Additional items Increased time required;Verbal cues   Stand pivot   (Steadying assist)   Additional items Increased time required;Verbal cues   Additional Comments STS from EOB to RW @ SBA  Pt able to complete in-room functional mobility with RW @ Steadying assist d/t decreased steadiness  Cues required for RW management and hand placement throughout  Cognition   Overall Cognitive Status WFL   Arousal/Participation Alert; Responsive; Cooperative   Attention Within functional limits   Orientation Level Oriented X4   Memory Within functional limits   Following Commands Follows one step commands without difficulty   Comments (S)  Pt participated in the [address] ([facility]) Examination to further assess cognition on this date  Pt scored a total of 12/30 which indicates Dementia  Pt demonstrated deficits in the following categories: attention, immediate recall, delayed recall, numeric calculation and registration, visual spatial functioning, comprehension and executive functioning  Pt still demonstrates improved arousal, attention and orientation compared to initial evaluation  Activity Tolerance   Activity Tolerance Patient limited by fatigue   Medical Staff Made Aware Spoke with PT Sima and RN Gaby Horan   Assessment   Assessment Pt completed OT tx session #2 on this date focused on ADL performance, functional mobility and further cognitive assessment  Pt alert and agreeable to participate  Pt demonstrating improvements in endurance, transfer status, LB ADL performance, and cognition this session  Pt given [facility] cognition assessment in which she scored a 12/30 indicating Dementia  Despite poor cognition, pt demonstrated improvements in arousal, attention, and orientation since initial evaluation  Pt currently completing UB ADLs @ Min A and LB ADLs @ Mod A, Steadying assist required for standing ADLs   Pt demonstrating Good potential to achieve goals but is currently limited by decreased bed mobility, decreased functional mobility, decraesed standing tolerance/balance, decreased activity tolerance, decreased endurance, decreased ADL performance, decreased cognition and decreased safety awareness  Continue to recommend post acute rehabilitation services at this time to increase safety and independence in ADL performance and functional mobility  Plan   Treatment Interventions ADL retraining;Functional transfer training; Endurance training   Goal Expiration Date 06/30/22   OT Treatment Day 2   OT Frequency 3-5x/wk   Recommendation   OT Discharge Recommendation Post acute rehabilitation services   AM-PAC Daily Activity Inpatient   Lower Body Dressing 2   Bathing 2   Toileting 2   Upper Body Dressing 3   Grooming 3   Eating 4   Daily Activity Raw Score 16   Daily Activity Standardized Score (Calc for Raw Score >=11) 35 96   AM-PAC Applied Cognition Inpatient   Following a Speech/Presentation 3   Understanding Ordinary Conversation 3   Taking Medications 2   Remembering Where Things Are Placed or Put Away 3   Remembering List of 4-5 Errands 2   Taking Care of Complicated Tasks 2   Applied Cognition Raw Score 15   Applied Cognition Standardized Score 33 54     Pt goals to be met by 6/30/2022     Pt will demonstrate ability to complete grooming/hygiene tasks @ S after set-up  Pt will demonstrate ability to complete supine<>sit @ S in order to increase safety and independence during ADL tasks  Pt will demonstrate ability to complete UB ADLs including washing/dressing @ S in order to increase performance and participation during meaningful tasks  Pt will demonstrate ability to complete LB dressing @ Min A in order to increase safety and independence during meaningful tasks  Pt will demonstrate ability to robinson/doff socks/shoes while sitting EOB @ Min A in order to increase safety and independence during meaningful tasks  Pt will demonstrate ability to complete toileting tasks including CM and pericare @ Min A in order to increase safety and independence during meaningful tasks    Pt will demonstrate ability to complete EOB, chair, toilet/commode transfers @ S in order to increase performance and participation during functional tasks  Pt will demonstrate ability to stand for 3-4 minutes while maintaining F+ balance with use of RW for UB support PRN  Pt will demonstrate ability to tolerate 30-35 minute OT session with no vc'ing for deep breathing or use of energy conservation techniques in order to increase activity tolerance during functional tasks  Pt will demonstrate Good carryover of use of energy conservation/compensatory strategies during ADLs and functional tasks in order to increase safety and reduce risk for falls  Pt will demonstrate Good attention and participation in continued evaluation of functional ambulation house hold distances in order to assist with safe d/c planning  Pt will attend to continued cognitive assessments 100% of the time in order to provide most appropriate d/c recommendations  Pt will follow 100% simple 2-step commands and be A&O x4 consistently with environmental cues to increase participation in functional activities  Pt will identify 3 areas of interest/hobbies and 1 intervention on how to incorporate into daily life in order to increase interaction with environment and peers as well as increase participation in meaningful tasks  Pt will demonstrate 100% carryover of BUE HEP in order to increase BUE MS and increase performance during functional tasks upon d/c home      Rannie Kehr, OTR/L

## 2022-06-17 NOTE — CASE MANAGEMENT
Case Management Discharge Planning Note    Patient name Todd Rodriguez  Location [facility] [id number] MRN [id number]  : 1940 Date 2022       Current Admission Date: 6/15/2022  Current Admission Diagnosis:Physical deconditioning   Patient Active Problem List    Diagnosis Date Noted    Dementia with behavioral disturbance ([facility] ) 2022    Acute cystitis without hematuria 2022    Physical deconditioning 2022    Medicare annual wellness visit, subsequent 2022    Anemia 2022    Ground glass opacity present on imaging of lung 2021    Prediabetes 2021    NSTEMI (non-ST elevated myocardial infarction) ([facility] ) 2021    Fecal incontinence 2021    Parkinson's disease ([place] [address] ) 2020    LUPE (acute kidney injury) ([place] [address] ) 2020    Other specified inflammatory spondylopathies, lumbosacral region ([place] [address] ) 2020    Constipation 2020    Renal cyst 10/23/2020    Back pain with right-sided radiculopathy 2020    CAD (coronary artery disease) 2019    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    Single kidney 2019    Vitamin D deficiency 2019    Anxiety 2019    Abnormal stress test 2019    Ambulatory dysfunction 2019    A-fib ([facility] ) 2019    Stage 3a chronic kidney disease ([place] [address] ) 2019    Junctional bradycardia 2019    BMI 36 0-36 9,adult 2019    Chronic left shoulder pain 10/09/2018    H/O: rheumatic fever 2018    Sciatica 2018    Peripheral neuropathy 2018    B12 deficiency 2018    Osteoporosis 2018    Closed head injury 2017    Lumbar disc herniation with radiculopathy 2017    Headache, worsening 2017    Chronic diastolic CHF (congestive heart failure) ([facility] ) 05/15/2017    Low serum vitamin B12 2017    Peripheral edema 2017    Other chest pain 2016    Biceps tendinitis of right shoulder 2016    Diastolic dysfunction 30/30/2385    Lateral epicondylitis of right elbow 04/11/2016    Essential hypertension 03/14/2016    Dyspnea on exertion 03/14/2016    GERD without esophagitis 03/14/2016    Osteoarthritis 09/17/2012    Peripheral venous insufficiency 09/17/2012    Prolapse of vaginal walls 12/30/2003      LOS (days): 1  Geometric Mean LOS (GMLOS) (days): 2 60  Days to GMLOS:1 4     OBJECTIVE:  Risk of Unplanned Readmission Score: 12 56         Current admission status: Inpatient   Preferred Pharmacy:   [address] [place] [address] [address] [facility]  [address] [address]  Phone: [phone] Fax: [phone]    Primary Care Provider: Pawan Iverson MD    Primary Insurance: Jelly WILDE  Secondary Insurance:     [address] [place] [address] Number: Submitted SNF auth request to [employer], pending ref# [id number] for [facility]: 6/18/22 NPI [id number] Luis Myers NPI [id number]   Clinicals attached in availity

## 2022-06-17 NOTE — PROGRESS NOTES
[address]  Progress Note - Fausto Gilbert 1940, 78 y o  female MRN: [id number]  Unit/Bed#: [facility] Encounter: [id number]  Primary Care Provider: Amauri Ely MD   Date and time admitted to hospital: 6/15/2022  7:51 PM    Dementia with behavioral disturbance [facility])  Assessment & Plan  · Medical record review patient with Alzheimer's dementia  · One admission alert to person and year but convinced of this place is a fire house  · Argumentative with the nursing staff  · Appreciate OT cognitive evaluation  · PT/OT/case management for rehabilitation placement  · Admitted 06/11 to outside hospital with aphasia, had full stroke workup including MRI brain and at time of discharge on 06/15 was recommended rehabilitation placement, family wanted patient to get stronger before she left the hospital and signed mother out of the hospital against medical advice and brought her straight to our ED  · Now family is agreeable to going to SNF for rehab      * Physical deconditioning  Assessment & Plan   physical deconditioning requiring PT OT eval   Need subacute rehab for short-term rehab as patient lives alone      Acute cystitis without hematuria  Assessment & Plan   Recent urine culture shows Enterococcus  Received IV vanco and cefepime here and had acute allergic reaction  Will place her back on Macrobid and monitor for now    Repeat UA here is negative    Anemia  Assessment & Plan  Stable hemoglobin 10 6    Stage 3a chronic kidney disease [facility])  Assessment & Plan  Lab Results   Component Value Date    EGFR 44 06/16/2022    EGFR 36 06/15/2022    EGFR 35 06/15/2022    CREATININE 1 15 06/16/2022    CREATININE 1 34 (H) 06/15/2022    CREATININE 1 39 (H) 06/15/2022     · Creatinine 1 39 with history creatinine 1 18  · GFR reduced at 36  · Losartan on hold pending creatinine improvement  · Was receiving Macrobid for UTI  · Repeat UA negative for leukocytes or nitrites  · Received a dose of vancomycin in the emergency department for UTI symptoms and developed allergic reaction  · Monitor creatinine  · Renally dose medications    A-fib (HCC)  Assessment & Plan  · Rate controlled  · Continue Eliquis 2 5 mg b i d  Chronic diastolic CHF (congestive heart failure) ([facility])  Assessment & Plan  Wt Readings from Last 3 Encounters:   06/15/22 68 3 kg (150 lb 9 2 oz)   22 72 1 kg (159 lb)   22 72 1 kg (159 lb)     · Euvolemic on exam  · Continue Lasix 20 mg daily  · Outpatient follow-up            VTE Pharmacologic Prophylaxis:   Pharmacologic: Apixaban (Eliquis)  Mechanical VTE Prophylaxis in Place: Yes    Patient Centered Rounds: I have performed bedside rounds with nursing staff today  Discussions with Specialists or Other Care Team Provider:  Discussed with case management    Education and Discussions with Family / Patient:  Discussed with patient at bedside    Time Spent for Care: 30 minutes  More than 50% of total time spent on counseling and coordination of care as described above  Current Length of Stay: 1 day(s)    Current Patient Status: Inpatient   Certification Statement: The patient will continue to require additional inpatient hospital stay due to Physical deconditioning    Discharge Plan:   SNF placement  Authorization pending    Code Status: Level 1 - Full Code      Subjective:   Patient denies any chest pain or shortness of breath or abdominal pain  She states she feels well today    Objective:     Vitals:   Temp (24hrs), Av 9 °F (36 6 °C), Min:97 2 °F (36 2 °C), Max:98 5 °F (36 9 °C)    Temp:  [97 2 °F (36 2 °C)-98 5 °F (36 9 °C)] 97 2 °F (36 2 °C)  HR:  [63-74] 64  Resp:  [14-18] 14  BP: (132-178)/(54-80) 178/80  SpO2:  [93 %-98 %] 98 %  Body mass index is 34 86 kg/m²  Input and Output Summary (last 24 hours):        Intake/Output Summary (Last 24 hours) at 2022 1335  Last data filed at 2022 1219  Gross per 24 hour   Intake 582 ml   Output 200 ml   Net 382 ml       Physical Exam:     Physical Exam  Vitals and nursing note reviewed  Constitutional:       Appearance: Normal appearance  HENT:      Head: Normocephalic and atraumatic  Right Ear: External ear normal       Left Ear: External ear normal       Nose: Nose normal       Mouth/Throat:      Pharynx: Oropharynx is clear  Cardiovascular:      Rate and Rhythm: Normal rate and regular rhythm  Heart sounds: Normal heart sounds  Pulmonary:      Effort: Pulmonary effort is normal       Breath sounds: Normal breath sounds  Abdominal:      General: Bowel sounds are normal       Palpations: Abdomen is soft  Tenderness: There is no abdominal tenderness  Musculoskeletal:         General: Normal range of motion  Cervical back: Normal range of motion and neck supple  Skin:     General: Skin is warm and dry  Capillary Refill: Capillary refill takes less than 2 seconds  Neurological:      General: No focal deficit present  Mental Status: She is alert        Comments: Oriented to person and place   Psychiatric:         Mood and Affect: Mood normal            Additional Data:     Labs:    Results from last 7 days   Lab Units 06/16/22  0651   WBC Thousand/uL 6 23   HEMOGLOBIN g/dL 10 6*   HEMATOCRIT % 34 7*   PLATELETS Thousands/uL 272   NEUTROS PCT % 63   LYMPHS PCT % 23   MONOS PCT % 11   EOS PCT % 2     Results from last 7 days   Lab Units 06/17/22  0638 06/16/22  0651   SODIUM mmol/L 139 141   POTASSIUM mmol/L 4 2 4 4   CHLORIDE mmol/L 107 109*   CO2 mmol/L 25 24   BUN mg/dL 25 28*   CREATININE mg/dL 0 99 1 15   ANION GAP mmol/L 7 8   CALCIUM mg/dL 8 6 8 4   ALBUMIN g/dL  --  2 7*   TOTAL BILIRUBIN mg/dL  --  0 38   ALK PHOS U/L  --  84   ALT U/L  --  16   AST U/L  --  15   GLUCOSE RANDOM mg/dL 87 93     Results from last 7 days   Lab Units 06/15/22  2034   INR  1 10             Results from last 7 days   Lab Units 06/15/22  2148 06/15/22  2034   LACTIC ACID mmol/L  --  1 1 PROCALCITONIN ng/ml 0 08 0 07           * I Have Reviewed All Lab Data Listed Above  * Additional Pertinent Lab Tests Reviewed: [facility] Admission Reviewed    Imaging:    Imaging Reports Reviewed Today Include:  Venous Doppler bilateral lower extremity  Imaging Personally Reviewed by Myself Includes:  None    Recent Cultures (last 7 days):     Results from last 7 days   Lab Units 06/15/22  2034   BLOOD CULTURE  No Growth at 24 hrs  No Growth at 24 hrs  Last 24 Hours Medication List:   Current Facility-Administered Medications   Medication Dose Route Frequency Provider Last Rate    acetaminophen  650 mg Oral Q6H PRN Lissette S Martínez, CRNP      apixaban  2 5 mg Oral BID Lissette S Martínez, CRNP      clopidogrel  75 mg Oral Daily Lissette S Martínez, CRNP      ezetimibe  10 mg Oral Daily Lissette S Martínez, CRNP      gabapentin  100 mg Oral HS Lissette S Martínez, CRNP      isosorbide mononitrate  30 mg Oral Daily Lissette S Martínez, CRNP      memantine  5 mg Oral Daily Lissette S Martínez, CRNP      nitrofurantoin  100 mg Oral BID With Meals Emelia Mesa MD      pantoprazole  40 mg Oral Daily Before Breakfast Lissette S Martínez, CRNP          Today, Patient Was Seen By: Emelia Mesa MD    ** Please Note: Dictation voice to text software may have been used in the creation of this document   **

## 2022-06-17 NOTE — CASE MANAGEMENT
Case Management Discharge Planning Note    Patient name Kizzy Kahn  Location [facility] [address] 332/-01 MRN [id number]  : 1940 Date 2022       Current Admission Date: 6/15/2022  Current Admission Diagnosis:Physical deconditioning   Patient Active Problem List    Diagnosis Date Noted    Dementia with behavioral disturbance ([facility] ) 2022    Acute cystitis without hematuria 2022    Physical deconditioning 2022    Medicare annual wellness visit, subsequent 2022    Anemia 2022    Ground glass opacity present on imaging of lung 2021    Prediabetes 2021    NSTEMI (non-ST elevated myocardial infarction) ([facility] ) 2021    Fecal incontinence 2021    Parkinson's disease ([facility] ) 2020    LUPE (acute kidney injury) ([place] [address] ) 2020    Other specified inflammatory spondylopathies, lumbosacral region ([place] [address] ) 2020    Constipation 2020    Renal cyst 10/23/2020    Back pain with right-sided radiculopathy 2020    CAD (coronary artery disease) 2019    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    Single kidney 2019    Vitamin D deficiency 2019    Anxiety 2019    Abnormal stress test 2019    Ambulatory dysfunction 2019    A-fib ([facility] ) 2019    Stage 3a chronic kidney disease ([place] [address] ) 2019    Junctional bradycardia 2019    BMI 36 0-36 9,adult 2019    Chronic left shoulder pain 10/09/2018    H/O: rheumatic fever 2018    Sciatica 2018    Peripheral neuropathy 2018    B12 deficiency 2018    Osteoporosis 2018    Closed head injury 2017    Lumbar disc herniation with radiculopathy 2017    Headache, worsening 2017    Chronic diastolic CHF (congestive heart failure) ([facility] ) 05/15/2017    Low serum vitamin B12 2017    Peripheral edema 2017    Other chest pain 2016    Biceps tendinitis of right shoulder 2016    Diastolic dysfunction 26/86/6598    Lateral epicondylitis of right elbow 04/11/2016    Essential hypertension 03/14/2016    Dyspnea on exertion 03/14/2016    GERD without esophagitis 03/14/2016    Osteoarthritis 09/17/2012    Peripheral venous insufficiency 09/17/2012    Prolapse of vaginal walls 12/30/2003      LOS (days): 1  Geometric Mean LOS (GMLOS) (days): 2 60  Days to GMLOS:1 4     OBJECTIVE:  Risk of Unplanned Readmission Score: 12 56         Current admission status: Inpatient   Preferred Pharmacy:   [address] [place] [address] [address] [facility]  [address] [address]  Phone: [phone] Fax: [phone]    Primary Care Provider: Amee Gutierres MD    Primary Insurance: Isabel WILDE  Secondary Insurance:     DISCHARGE DETAILS:   faxed Current Medication List to [address] [place] [address] Name, [address] : [facility]  Receiving Facility/Agency Phone Number: [phone]  Facility/Agency Fax Number: [phone]

## 2022-06-17 NOTE — PLAN OF CARE
Problem: OCCUPATIONAL THERAPY ADULT  Goal: Performs self-care activities at highest level of function for planned discharge setting  See evaluation for individualized goals  Description: Treatment Interventions: ADL retraining, Functional transfer training, UE strengthening/ROM, Endurance training, Cognitive reorientation, Patient/family training, Equipment evaluation/education, Neuromuscular reeducation, Compensatory technique education, Continued evaluation, Energy conservation, Activityengagement          See flowsheet documentation for full assessment, interventions and recommendations  Outcome: Progressing  Note: Limitation: Decreased ADL status, Decreased UE ROM, Decreased UE strength, Decreased Safe judgement during ADL, Decreased cognition, Decreased endurance, Decreased self-care trans, Decreased high-level ADLs  Prognosis: Good  Assessment: Pt completed OT tx session #2 on this date focused on ADL performance, functional mobility and further cognitive assessment  Pt alert and agreeable to participate  Pt demonstrating improvements in endurance, transfer status, LB ADL performance, and cognition this session  Pt given SLUMS cognition assessment in which she scored a 12/30 indicating Dementia  Despite poor cognition, pt demonstrated improvements in arousal, attention, and orientation since initial evaluation  Pt currently completing UB ADLs @ Min A and LB ADLs @ Mod A, Steadying assist required for standing ADLs  Pt demonstrating Good potential to achieve goals but is currently limited by decreased bed mobility, decreased functional mobility, decraesed standing tolerance/balance, decreased activity tolerance, decreased endurance, decreased ADL performance, decreased cognition and decreased safety awareness  Continue to recommend post acute rehabilitation services at this time to increase safety and independence in ADL performance and functional mobility       OT Discharge Recommendation: Post acute rehabilitation services

## 2022-06-17 NOTE — CASE MANAGEMENT
Case Management Discharge Planning Note    Patient name Kelsey Velasco  Location [facility] [address] 332/-01 MRN [id number]  : 1940 Date 2022       Current Admission Date: 6/15/2022  Current Admission Diagnosis:Physical deconditioning   Patient Active Problem List    Diagnosis Date Noted    Dementia with behavioral disturbance ([facility] ) 2022    Acute cystitis without hematuria 2022    Physical deconditioning 2022    Medicare annual wellness visit, subsequent 2022    Anemia 2022    Ground glass opacity present on imaging of lung 2021    Prediabetes 2021    NSTEMI (non-ST elevated myocardial infarction) ([facility] ) 2021    Fecal incontinence 2021    Parkinson's disease ([facility] ) 2020    LUPE (acute kidney injury) ([place] [address] ) 2020    Other specified inflammatory spondylopathies, lumbosacral region ([place] [address] ) 2020    Constipation 2020    Renal cyst 10/23/2020    Back pain with right-sided radiculopathy 2020    CAD (coronary artery disease) 2019    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    Single kidney 2019    Vitamin D deficiency 2019    Anxiety 2019    Abnormal stress test 2019    Ambulatory dysfunction 2019    A-fib ([facility] ) 2019    Stage 3a chronic kidney disease ([place] [address] ) 2019    Junctional bradycardia 2019    BMI 36 0-36 9,adult 2019    Chronic left shoulder pain 10/09/2018    H/O: rheumatic fever 2018    Sciatica 2018    Peripheral neuropathy 2018    B12 deficiency 2018    Osteoporosis 2018    Closed head injury 2017    Lumbar disc herniation with radiculopathy 2017    Headache, worsening 2017    Chronic diastolic CHF (congestive heart failure) ([facility] ) 05/15/2017    Low serum vitamin B12 2017    Peripheral edema 2017    Other chest pain 2016    Biceps tendinitis of right shoulder 2016    Diastolic dysfunction 02/22/4967    Lateral epicondylitis of right elbow 04/11/2016    Essential hypertension 03/14/2016    Dyspnea on exertion 03/14/2016    GERD without esophagitis 03/14/2016    Osteoarthritis 09/17/2012    Peripheral venous insufficiency 09/17/2012    Prolapse of vaginal walls 12/30/2003      LOS (days): 1  Geometric Mean LOS (GMLOS) (days): 2 60  Days to GMLOS:1 5     OBJECTIVE:  Risk of Unplanned Readmission Score: 12 56         Current admission status: Inpatient   Preferred Pharmacy:   [address] [place] [address] [address] [facility]  1185 [facility]  [address] [place] [address]  Phone: [phone] Fax: [phone]    Primary Care Provider: Pawan Iverson MD    Primary Insurance: [address] [place] [address],[address] REP  Secondary Insurance:     DISCHARGE DETAILS:  CM received [facility] inquiring if patient is boostered  CM spoke with [facility] who was able to find patients card for booster 3/8/22 at [facility]  CM communicated information to [facility] and and patient was accepted  CM submitted task for [facility] N[facility] for [facility], effective 6/18/22

## 2022-06-17 NOTE — PHYSICAL THERAPY NOTE
PHYSICAL THERAPY TREATMENT NOTE  NAME:  Jaimie Bello  DATE: 06/17/22    Length Of Stay: 1  Performed at least 2 patient identifiers during session: Name and Birthday  Treatment time: 5044-0291  Treatment length: 24 min       06/17/22 1437   Note Type   Note Type Treatment   Pain Assessment   Pain Assessment Tool 0-10   Pain Score 8   Pain Location/Orientation Orientation: Lower; Location: Back   Restrictions/Precautions   Other Precautions Chair Alarm; Bed Alarm; Fall Risk;Pain   General   Chart Reviewed Yes   Response to Previous Treatment Patient with no complaints from previous session  Cognition   Overall Cognitive Status WFL   Orientation Level Oriented X4   Following Commands Follows one step commands without difficulty   Bed Mobility   Supine to Sit 3  Moderate assistance   Additional items Assist x 1; Increased time required;Verbal cues;LE management;HOB elevated  (trunk management)   Additional Comments HOB slightly elevated; mod A x 1 to sit EOB with VC for technique   Transfers   Sit to Stand   (SBA)   Additional items Increased time required;Verbal cues   Stand to Sit   (SBA)   Additional items Increased time required;Verbal cues   Stand pivot   (SBA)   Additional items Increased time required;Verbal cues   Additional Comments RW for transfers, VC for hand placement with decreased carryover of commands throughout session   Ambulation/Elevation   Gait pattern Wide LAYA; Decreased foot clearance; Short stride; Excessively slow   Gait Assistance   (steadying assist)   Additional items Verbal cues   Assistive Device Rolling walker   Distance 30' + 15' with RW, steadying assist for safety, no LOB, VC for increased step length   Balance   Static Sitting Good   Dynamic Sitting Fair +   Static Standing Fair   Dynamic Standing Fair -   Ambulatory Fair -   Endurance Deficit   Endurance Deficit Yes   Endurance Deficit Description fatigue   Activity Tolerance   Activity Tolerance Patient limited by fatigue   Medical Staff Made Aware Phong WILSON   Nurse Made Aware RNEfren   Exercises   Balance training  Piriformis stretch supine 3 x 30' with extensive education on sciatic nerve pain and benefits of stretching, encouraged follow-up with OPPT following D/C from STR   Assessment   Prognosis Good   Problem List Decreased strength;Decreased endurance; Impaired balance;Decreased mobility; Impaired judgement;Decreased safety awareness; Obesity;Pain;Decreased skin integrity   Barriers to Discharge Inaccessible home environment;Decreased caregiver support   Barriers to Discharge Comments pt requires increased assistance for mobility   Goals   PT Treatment Day 2   Plan   Treatment/Interventions Functional transfer training;LE strengthening/ROM; Therapeutic exercise; Endurance training;Patient/family training;Equipment eval/education; Bed mobility;Gait training; Compensatory technique education;Spoke to nursing;OT   Progress Slow progress, decreased activity tolerance   PT Frequency 3-5x/wk   Recommendation   PT Discharge Recommendation Post acute rehabilitation services   Equipment Recommended   (pt owns RW)   AM-PAC Basic Mobility Inpatient   Turning in Bed Without Bedrails 3   Lying on Back to Sitting on Edge of Flat Bed 2   Moving Bed to Chair 3   Standing Up From Chair 3   Walk in Room 3   Climb 3-5 Stairs 2   Basic Mobility Inpatient Raw Score 16   Basic Mobility Standardized Score 38 32   Highest Level Of Mobility   JH-HLM Goal 5: Stand one or more mins   JH-HLM Achieved 7: Walk 25 feet or more   Education   Education Provided Mobility training;Home exercise program   Patient Demonstrates acceptance/verbal understanding   End of Consult   Patient Position at End of Consult Other (comment)  (standing with OT present at sink)     The patient's AM-PAC Basic Mobility Inpatient Short Form Raw Score is 16  A Raw score of less than or equal to 16 suggests the patient may benefit from discharge to post-acute rehabilitation services   Please also refer to the recommendation of the Physical Therapist for safe discharge planning  Assessment: Pt seen for PT treatment session this date with interventions consisting of gait training w/ emphasis on improving pt's ability to ambulate level surfaces x 30' + 15' with steadying assist provided by therapist with RW and Therapeutic exercise consisting of: AROM 3 x 30" piriformis stretch in supine with education provided regarding stretch and HEP B LE in supine position  Pt agreeable to PT treatment session upon arrival, pt found supine in bed w/ HOB elevated, in no apparent distress  In comparison to previous session, pt with improvements in pt able to ambulate increased distance with decreased assistance   Post session: pt left in care of OT completing ADL  Continue to recommend STR at time of d/c in order to maximize pt's functional independence and safety w/ mobility  Pt continues to be functioning below baseline level, and remains limited 2* factors listed above and including decreased strength, balance, mobility, safety awareness, and decreased activity tolerance  PT will continue to see pt while here in order to address the deficits listed above and provide interventions consistent w/ POC in effort to achieve STGs       [place] Rom, PT,DPT

## 2022-06-17 NOTE — PLAN OF CARE
Problem: PHYSICAL THERAPY ADULT  Goal: Performs mobility at highest level of function for planned discharge setting  See evaluation for individualized goals  Description: Treatment/Interventions: ADL retraining, Functional transfer training, LE strengthening/ROM, Therapeutic exercise, Endurance training, Patient/family training, Equipment eval/education, Bed mobility, Gait training, Compensatory technique education, Spoke to nursing, Spoke to case management, OT  Equipment Recommended:  (walker-pt has)       See flowsheet documentation for full assessment, interventions and recommendations  Outcome: Progressing  Note: Prognosis: Good  Problem List: Decreased strength, Decreased endurance, Impaired balance, Decreased mobility, Impaired judgement, Decreased safety awareness, Obesity, Pain, Decreased skin integrity  Assessment: Pt seen for PT treatment session this date with interventions consisting of gait training w/ emphasis on improving pt's ability to ambulate level surfaces x 30' + 15' with steadying assist provided by therapist with RW and Therapeutic exercise consisting of: AROM 3 x 30" piriformis stretch in supine with education provided regarding stretch and HEP B LE in supine position  Pt agreeable to PT treatment session upon arrival, pt found supine in bed w/ HOB elevated, in no apparent distress  In comparison to previous session, pt with improvements in pt able to ambulate increased distance with decreased assistance   Post session: pt left in care of OT completing ADL  Continue to recommend STR at time of d/c in order to maximize pt's functional independence and safety w/ mobility  Pt continues to be functioning below baseline level, and remains limited 2* factors listed above and including decreased strength, balance, mobility, safety awareness, and decreased activity tolerance   PT will continue to see pt while here in order to address the deficits listed above and provide interventions consistent w/ POC in effort to achieve STGs  Barriers to Discharge: Inaccessible home environment, Decreased caregiver support  Barriers to Discharge Comments: pt requires increased assistance for mobility     PT Discharge Recommendation: Post acute rehabilitation services          See flowsheet documentation for full assessment

## 2022-06-17 NOTE — PLAN OF CARE
Problem: MOBILITY - ADULT  Goal: Maintain or return to baseline ADL function  Description: INTERVENTIONS:  -  Assess patient's ability to carry out ADLs; assess patient's baseline for ADL function and identify physical deficits which impact ability to perform ADLs (bathing, care of mouth/teeth, toileting, grooming, dressing, etc )  - Assess/evaluate cause of self-care deficits   - Assess range of motion  - Assess patient's mobility; develop plan if impaired  - Assess patient's need for assistive devices and provide as appropriate  - Encourage maximum independence but intervene and supervise when necessary  - Involve family in performance of ADLs  - Assess for home care needs following discharge   - Consider OT consult to assist with ADL evaluation and planning for discharge  - Provide patient education as appropriate  Outcome: Progressing  Goal: Maintains/Returns to pre admission functional level  Description: INTERVENTIONS:  - Perform BMAT or MOVE assessment daily    - Set and communicate daily mobility goal to care team and patient/family/caregiver     - Collaborate with rehabilitation services on mobility goals if consulted    - Out of bed for toileting  - Record patient progress and toleration of activity level   Outcome: Progressing     Problem: PAIN - ADULT  Goal: Verbalizes/displays adequate comfort level or baseline comfort level  Description: Interventions:  - Encourage patient to monitor pain and request assistance  - Assess pain using appropriate pain scale  - Administer analgesics based on type and severity of pain and evaluate response  - Implement non-pharmacological measures as appropriate and evaluate response  - Consider cultural and social influences on pain and pain management  - Notify physician/advanced practitioner if interventions unsuccessful or patient reports new pain  Outcome: Progressing     Problem: INFECTION - ADULT  Goal: Absence or prevention of progression during hospitalization  Description: INTERVENTIONS:  - Assess and monitor for signs and symptoms of infection  - Monitor lab/diagnostic results  - Monitor all insertion sites, i e  indwelling lines, tubes, and drains  - Monitor endotracheal if appropriate and nasal secretions for changes in amount and color  - Apex appropriate cooling/warming therapies per order  - Administer medications as ordered  - Instruct and encourage patient and family to use good hand hygiene technique  - Identify and instruct in appropriate isolation precautions for identified infection/condition  Outcome: Progressing     Problem: SAFETY ADULT  Goal: Patient will remain free of falls  Description: INTERVENTIONS:  - Educate patient/family on patient safety including physical limitations  - Instruct patient to call for assistance with activity   - Consult OT/PT to assist with strengthening/mobility   - Keep Call bell within reach  - Keep bed low and locked with side rails adjusted as appropriate  - Keep care items and personal belongings within reach  - Initiate and maintain comfort rounds  - Make Fall Risk Sign visible to staff    - Apply yellow socks and bracelet for high fall risk patients  - Consider moving patient to room near nurses station  Outcome: Progressing     Problem: DISCHARGE PLANNING  Goal: Discharge to home or other facility with appropriate resources  Description: INTERVENTIONS:  - Identify barriers to discharge w/patient and caregiver  - Arrange for needed discharge resources and transportation as appropriate  - Identify discharge learning needs (meds, wound care, etc )  - Arrange for interpretive services to assist at discharge as needed  - Refer to Case Management Department for coordinating discharge planning if the patient needs post-hospital services based on physician/advanced practitioner order or complex needs related to functional status, cognitive ability, or social support system  Outcome: Progressing     Problem: Knowledge Deficit  Goal: Patient/family/caregiver demonstrates understanding of disease process, treatment plan, medications, and discharge instructions  Description: Complete learning assessment and assess knowledge base    Interventions:  - Provide teaching at level of understanding  - Provide teaching via preferred learning methods  Outcome: Progressing     Problem: Potential for Falls  Goal: Patient will remain free of falls  Description: INTERVENTIONS:  - Educate patient/family on patient safety including physical limitations  - Instruct patient to call for assistance with activity   - Consult OT/PT to assist with strengthening/mobility   - Keep Call bell within reach  - Keep bed low and locked with side rails adjusted as appropriate  - Keep care items and personal belongings within reach  - Initiate and maintain comfort rounds  - Make Fall Risk Sign visible to staff    - Apply yellow socks and bracelet for high fall risk patients  - Consider moving patient to room near nurses station  Outcome: Progressing     Problem: Prexisting or High Potential for Compromised Skin Integrity  Goal: Skin integrity is maintained or improved  Description: INTERVENTIONS:  - Identify patients at risk for skin breakdown  - Assess and monitor skin integrity  - Assess and monitor nutrition and hydration status  - Monitor labs   - Assess for incontinence   - Turn and reposition patient  - Assist with mobility/ambulation  - Relieve pressure over bony prominences  - Avoid friction and shearing  - Provide appropriate hygiene as needed including keeping skin clean and dry  - Evaluate need for skin moisturizer/barrier cream  - Collaborate with interdisciplinary team   - Patient/family teaching  - Consider wound care consult   Outcome: Progressing

## 2022-06-18 PROCEDURE — 99231 SBSQ HOSP IP/OBS SF/LOW 25: CPT | Performed by: FAMILY MEDICINE

## 2022-06-18 RX ORDER — NYSTATIN 100000 [USP'U]/G
POWDER TOPICAL 2 TIMES DAILY
Status: DISCONTINUED | OUTPATIENT
Start: 2022-06-18 | End: 2022-06-20 | Stop reason: HOSPADM

## 2022-06-18 RX ADMIN — MEMANTINE 5 MG: 5 TABLET ORAL at 08:39

## 2022-06-18 RX ADMIN — CLOPIDOGREL BISULFATE 75 MG: 75 TABLET ORAL at 08:39

## 2022-06-18 RX ADMIN — PANTOPRAZOLE SODIUM 40 MG: 40 TABLET, DELAYED RELEASE ORAL at 05:24

## 2022-06-18 RX ADMIN — NITROFURANTOIN (MONOHYDRATE/MACROCRYSTALS) 100 MG: 75; 25 CAPSULE ORAL at 17:00

## 2022-06-18 RX ADMIN — ACETAMINOPHEN 650 MG: 325 TABLET ORAL at 08:44

## 2022-06-18 RX ADMIN — APIXABAN 2.5 MG: 2.5 TABLET, FILM COATED ORAL at 08:38

## 2022-06-18 RX ADMIN — APIXABAN 2.5 MG: 2.5 TABLET, FILM COATED ORAL at 17:00

## 2022-06-18 RX ADMIN — NYSTATIN: 100000 POWDER TOPICAL at 17:00

## 2022-06-18 RX ADMIN — NITROFURANTOIN (MONOHYDRATE/MACROCRYSTALS) 100 MG: 75; 25 CAPSULE ORAL at 08:39

## 2022-06-18 RX ADMIN — EZETIMIBE 10 MG: 10 TABLET ORAL at 08:39

## 2022-06-18 RX ADMIN — ISOSORBIDE MONONITRATE 30 MG: 30 TABLET, EXTENDED RELEASE ORAL at 08:38

## 2022-06-18 RX ADMIN — GABAPENTIN 100 MG: 100 CAPSULE ORAL at 20:55

## 2022-06-18 NOTE — ASSESSMENT & PLAN NOTE
Wt Readings from Last 3 Encounters:   06/16/22 68 kg (150 lb)   05/23/22 72 1 kg (159 lb)   04/12/22 72 1 kg (159 lb)     · Euvolemic on exam  · Continue Lasix 20 mg daily  · Outpatient follow-up

## 2022-06-18 NOTE — PROGRESS NOTES
[address]  Progress Note - Todd Rodriguez 1940, 78 y o  female MRN: [id number]  Unit/Bed#: [facility] Encounter: [id number]  Primary Care Provider: Seble Caceres MD   Date and time admitted to hospital: 6/15/2022  7:51 PM    Dementia with behavioral disturbance [facility])  Assessment & Plan  · Medical record review patient with Alzheimer's dementia  · One admission alert to person and year but convinced of this place is a fire house  · Argumentative with the nursing staff  · Appreciate OT cognitive evaluation  · PT/OT/case management for rehabilitation placement  · Admitted 06/11 to outside hospital with aphasia, had full stroke workup including MRI brain and at time of discharge on 06/15 was recommended rehabilitation placement, family wanted patient to get stronger before she left the hospital and signed mother out of the hospital against medical advice and brought her straight to our ED  · Now family is agreeable to going to SNF for rehab      * Physical deconditioning  Assessment & Plan   physical deconditioning requiring PT OT eval   Need subacute rehab for short-term rehab as patient lives alone      Acute cystitis without hematuria  Assessment & Plan   Recent urine culture shows Enterococcus  Received IV vanco and cefepime here and had acute allergic reaction  Will place her back on Macrobid for total 5 days and monitor for now  Repeat UA here is negative    Anemia  Assessment & Plan  Stable hemoglobin 10 6    Stage 3a chronic kidney disease [facility])  Assessment & Plan  Lab Results   Component Value Date    EGFR 52 06/17/2022    EGFR 44 06/16/2022    EGFR 36 06/15/2022    CREATININE 0 99 06/17/2022    CREATININE 1 15 06/16/2022    CREATININE 1 34 (H) 06/15/2022     · Creatinine 1 39 with history creatinine 1 18  · GFR reduced at 36  · Losartan on hold   Creatinine at baseline    A-fib [facility])  Assessment & Plan  · Rate controlled  · Continue Eliquis 2 5 mg b i d      Chronic diastolic CHF (congestive heart failure) ([facility])  Assessment & Plan  Wt Readings from Last 3 Encounters:   22 68 kg (150 lb)   22 72 1 kg (159 lb)   22 72 1 kg (159 lb)     · Euvolemic on exam  · Continue Lasix 20 mg daily  · Outpatient follow-up            VTE Pharmacologic Prophylaxis:   Pharmacologic: Apixaban (Eliquis)  Mechanical VTE Prophylaxis in Place: Yes    Patient Centered Rounds: I have performed bedside rounds with nursing staff today  Discussions with Specialists or Other Care Team Provider:  Discussed with case management    Education and Discussions with Family / Patient:  Discussed with patient at bedside    Time Spent for Care: 20 minutes  More than 50% of total time spent on counseling and coordination of care as described above  Current Length of Stay: 2 day(s)    Current Patient Status: Inpatient   Certification Statement: The patient will continue to require additional inpatient hospital stay due to Ambulatory dysfunction    Discharge Plan:  Await [facility] for skilled nursing facility rehab    Code Status: Level 1 - Full Code      Subjective:   Patient denies any complaints  States she feels well    Objective:     Vitals:   Temp (24hrs), Av 3 °F (36 8 °C), Min:97 9 °F (36 6 °C), Max:98 6 °F (37 °C)    Temp:  [97 9 °F (36 6 °C)-98 6 °F (37 °C)] 98 3 °F (36 8 °C)  HR:  [62-72] 62  Resp:  [16-18] 18  BP: (130-171)/(56-87) 146/57  SpO2:  [94 %-97 %] 94 %  Body mass index is 34 86 kg/m²  Input and Output Summary (last 24 hours): Intake/Output Summary (Last 24 hours) at 2022 1255  Last data filed at 2022 1730  Gross per 24 hour   Intake 120 ml   Output --   Net 120 ml       Physical Exam:     Physical Exam  Vitals and nursing note reviewed  Constitutional:       Appearance: Normal appearance  HENT:      Head: Normocephalic and atraumatic        Right Ear: External ear normal       Left Ear: External ear normal       Nose: Nose normal  Mouth/Throat:      Pharynx: Oropharynx is clear  Cardiovascular:      Rate and Rhythm: Normal rate and regular rhythm  Heart sounds: Normal heart sounds  Pulmonary:      Effort: Pulmonary effort is normal       Breath sounds: Normal breath sounds  Abdominal:      General: Bowel sounds are normal       Palpations: Abdomen is soft  Tenderness: There is no abdominal tenderness  Musculoskeletal:         General: Normal range of motion  Cervical back: Normal range of motion and neck supple  Skin:     General: Skin is warm and dry  Capillary Refill: Capillary refill takes less than 2 seconds  Neurological:      General: No focal deficit present  Mental Status: She is alert  Comments: Oriented to person and place   Psychiatric:         Mood and Affect: Mood normal            Additional Data:     Labs:    Results from last 7 days   Lab Units 06/16/22  0651   WBC Thousand/uL 6 23   HEMOGLOBIN g/dL 10 6*   HEMATOCRIT % 34 7*   PLATELETS Thousands/uL 272   NEUTROS PCT % 63   LYMPHS PCT % 23   MONOS PCT % 11   EOS PCT % 2     Results from last 7 days   Lab Units 06/17/22  0638 06/16/22  0651   SODIUM mmol/L 139 141   POTASSIUM mmol/L 4 2 4 4   CHLORIDE mmol/L 107 109*   CO2 mmol/L 25 24   BUN mg/dL 25 28*   CREATININE mg/dL 0 99 1 15   ANION GAP mmol/L 7 8   CALCIUM mg/dL 8 6 8 4   ALBUMIN g/dL  --  2 7*   TOTAL BILIRUBIN mg/dL  --  0 38   ALK PHOS U/L  --  84   ALT U/L  --  16   AST U/L  --  15   GLUCOSE RANDOM mg/dL 87 93     Results from last 7 days   Lab Units 06/15/22  2034   INR  1 10             Results from last 7 days   Lab Units 06/15/22  2148 06/15/22  2034   LACTIC ACID mmol/L  --  1 1   PROCALCITONIN ng/ml 0 08 0 07           * I Have Reviewed All Lab Data Listed Above  * Additional Pertinent Lab Tests Reviewed:  All Labs For Current Hospital Admission Reviewed    Imaging:    Imaging Reports Reviewed Today Include: none  Imaging Personally Reviewed by Myself Includes: none    Recent Cultures (last 7 days):     Results from last 7 days   Lab Units 06/15/22  2034   BLOOD CULTURE  No Growth at 48 hrs  No Growth at 48 hrs  Last 24 Hours Medication List:   Current Facility-Administered Medications   Medication Dose Route Frequency Provider Last Rate    acetaminophen  650 mg Oral Q6H PRN Lissette S Mratínez, CRNP      apixaban  2 5 mg Oral BID Lissette S Martínez, CRNP      clopidogrel  75 mg Oral Daily Lissette S Martínez, CRNP      ezetimibe  10 mg Oral Daily Lissette S Martínez, CRNP      gabapentin  100 mg Oral HS Lissette S Martínez, CRNP      isosorbide mononitrate  30 mg Oral Daily Lissette S Martínez, CRNP      memantine  5 mg Oral Daily Lissette S Martínez, CRNP      nitrofurantoin  100 mg Oral BID With Meals Kimberly Santacruz MD      pantoprazole  40 mg Oral Daily Before Breakfast Lissette S Martínez, CRNP          Today, Patient Was Seen By: Kimberly Santacruz MD    ** Please Note: Dictation voice to text software may have been used in the creation of this document   **

## 2022-06-18 NOTE — PLAN OF CARE
Problem: MOBILITY - ADULT  Goal: Maintain or return to baseline ADL function  Description: INTERVENTIONS:  -  Assess patient's ability to carry out ADLs; assess patient's baseline for ADL function and identify physical deficits which impact ability to perform ADLs (bathing, care of mouth/teeth, toileting, grooming, dressing, etc )  - Assess/evaluate cause of self-care deficits encourage OOB to chair, ambulation, assistance with ADL's  - Assess range of motion  - Assess patient's mobility; develop plan if impaired  - Assess patient's need for assistive devices and provide as appropriate  - Encourage maximum independence but intervene and supervise when necessary  - Involve family in performance of ADLs  - Assess for home care needs following discharge   - Consider OT consult to assist with ADL evaluation and planning for discharge  - Provide patient education as appropriate  Outcome: Progressing

## 2022-06-18 NOTE — ASSESSMENT & PLAN NOTE
Recent urine culture shows Enterococcus  Received IV vanco and cefepime here and had acute allergic reaction  Will place her back on Macrobid for total 5 days and monitor for now    Repeat UA here is negative

## 2022-06-18 NOTE — CASE MANAGEMENT
Case Management Discharge Planning Note    Patient name Jazlyn Horowitz  Location [address] [facility] [address] [id number] MRN [id number]  : 1940 Date 2022       Current Admission Date: 6/15/2022  Current Admission Diagnosis:Physical deconditioning   Patient Active Problem List    Diagnosis Date Noted    Dementia with behavioral disturbance ([facility] ) 2022    Acute cystitis without hematuria 2022    Physical deconditioning 2022    Medicare annual wellness visit, subsequent 2022    Anemia 2022    Ground glass opacity present on imaging of lung 2021    Prediabetes 2021    NSTEMI (non-ST elevated myocardial infarction) ([facility] ) 2021    Fecal incontinence 2021    Parkinson's disease ([facility] ) 2020    LUPE (acute kidney injury) ([place] [address] ) 2020    Other specified inflammatory spondylopathies, lumbosacral region ([place] [address] ) 2020    Constipation 2020    Renal cyst 10/23/2020    Back pain with right-sided radiculopathy 2020    CAD (coronary artery disease) 2019    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    Single kidney 2019    Vitamin D deficiency 2019    Anxiety 2019    Abnormal stress test 2019    Ambulatory dysfunction 2019    A-fib ([facility] ) 2019    Stage 3a chronic kidney disease ([place] [address] ) 2019    Junctional bradycardia 2019    BMI 36 0-36 9,adult 2019    Chronic left shoulder pain 10/09/2018    H/O: rheumatic fever 2018    Sciatica 2018    Peripheral neuropathy 2018    B12 deficiency 2018    Osteoporosis 2018    Closed head injury 2017    Lumbar disc herniation with radiculopathy 2017    Headache, worsening 2017    Chronic diastolic CHF (congestive heart failure) ([facility] ) 05/15/2017    Low serum vitamin B12 2017    Peripheral edema 2017    Other chest pain 2016    Biceps tendinitis of right shoulder 2016    Diastolic dysfunction 59/10/6118    Lateral epicondylitis of right elbow 04/11/2016    Essential hypertension 03/14/2016    Dyspnea on exertion 03/14/2016    GERD without esophagitis 03/14/2016    Osteoarthritis 09/17/2012    Peripheral venous insufficiency 09/17/2012    Prolapse of vaginal walls 12/30/2003      LOS (days): 2  Geometric Mean LOS (GMLOS) (days): 2 60  Days to GMLOS:0 5     OBJECTIVE:  Risk of Unplanned Readmission Score: 12 72         Current admission status: Inpatient   Preferred Pharmacy:   [address] [place] [id number]  Phone: [phone] Fax: [phone]    Primary Care Provider: Reddy Araujo MD    Primary Insurance: Chelly Sanabria [facility]  Secondary Insurance:     DISCHARGE DETAILS:    Checked Availity Website Auth is still pending review for SNF

## 2022-06-18 NOTE — ASSESSMENT & PLAN NOTE
Lab Results   Component Value Date    EGFR 52 06/17/2022    EGFR 44 06/16/2022    EGFR 36 06/15/2022    CREATININE 0 99 06/17/2022    CREATININE 1 15 06/16/2022    CREATININE 1 34 (H) 06/15/2022     · Creatinine 1 39 with history creatinine 1 18  · GFR reduced at 36  · Losartan on hold     Creatinine at baseline

## 2022-06-19 PROCEDURE — 99232 SBSQ HOSP IP/OBS MODERATE 35: CPT | Performed by: FAMILY MEDICINE

## 2022-06-19 RX ADMIN — APIXABAN 2.5 MG: 2.5 TABLET, FILM COATED ORAL at 17:05

## 2022-06-19 RX ADMIN — NITROFURANTOIN (MONOHYDRATE/MACROCRYSTALS) 100 MG: 75; 25 CAPSULE ORAL at 08:08

## 2022-06-19 RX ADMIN — CLOPIDOGREL BISULFATE 75 MG: 75 TABLET ORAL at 08:08

## 2022-06-19 RX ADMIN — MEMANTINE 5 MG: 5 TABLET ORAL at 08:08

## 2022-06-19 RX ADMIN — NITROFURANTOIN (MONOHYDRATE/MACROCRYSTALS) 100 MG: 75; 25 CAPSULE ORAL at 17:05

## 2022-06-19 RX ADMIN — GABAPENTIN 100 MG: 100 CAPSULE ORAL at 22:06

## 2022-06-19 RX ADMIN — NYSTATIN: 100000 POWDER TOPICAL at 17:05

## 2022-06-19 RX ADMIN — APIXABAN 2.5 MG: 2.5 TABLET, FILM COATED ORAL at 08:09

## 2022-06-19 RX ADMIN — PANTOPRAZOLE SODIUM 40 MG: 40 TABLET, DELAYED RELEASE ORAL at 04:50

## 2022-06-19 RX ADMIN — ACETAMINOPHEN 650 MG: 325 TABLET ORAL at 12:30

## 2022-06-19 RX ADMIN — ISOSORBIDE MONONITRATE 30 MG: 30 TABLET, EXTENDED RELEASE ORAL at 08:08

## 2022-06-19 RX ADMIN — EZETIMIBE 10 MG: 10 TABLET ORAL at 08:08

## 2022-06-19 RX ADMIN — NYSTATIN: 100000 POWDER TOPICAL at 08:09

## 2022-06-19 NOTE — PLAN OF CARE
Problem: MOBILITY - ADULT  Goal: Maintain or return to baseline ADL function  Description: INTERVENTIONS:  -  Assess patient's ability to carry out ADLs; assess patient's baseline for ADL function and identify physical deficits which impact ability to perform ADLs (bathing, care of mouth/teeth, toileting, grooming, dressing, etc )  - Assess/evaluate cause of self-care deficits encourage OOB to chair, ambulation, assistance with ADL's  - Assess range of motion  - Assess patient's mobility; develop plan if impaired  - Assess patient's need for assistive devices and provide as appropriate  - Encourage maximum independence but intervene and supervise when necessary  - Involve family in performance of ADLs  - Assess for home care needs following discharge   - Consider OT consult to assist with ADL evaluation and planning for discharge  - Provide patient education as appropriate  Outcome: Not Progressing  ion of activity level   Outcome: Not Progressing

## 2022-06-19 NOTE — PROGRESS NOTES
[address]  Progress Note - Erwin Italo 1940, 78 y o  female MRN: [id number]  Unit/Bed#: [facility] Encounter: [id number]  Primary Care Provider: Annie Denise MD   Date and time admitted to hospital: 6/15/2022  7:51 PM    Dementia with behavioral disturbance [facility])  Assessment & Plan  · Medical record review patient with Alzheimer's dementia  · One admission alert to person and year but convinced of this place is a fire house  · Argumentative with the nursing staff  · Appreciate OT cognitive evaluation  · PT/OT/case management for rehabilitation placement  · Admitted 06/11 to outside hospital with aphasia, had full stroke workup including MRI brain and at time of discharge on 06/15 was recommended rehabilitation placement, family wanted patient to get stronger before she left the hospital and signed mother out of the hospital against medical advice and brought her straight to our ED  · Now family is agreeable to going to SNF for rehab      * Physical deconditioning  Assessment & Plan   physical deconditioning requiring PT OT eval   Need subacute rehab for short-term rehab as patient lives alone      Acute cystitis without hematuria  Assessment & Plan   Recent urine culture shows Enterococcus  Received IV vanco and cefepime here and had acute allergic reaction  Will place her back on Macrobid for total 5 days and monitor for now  Repeat UA here is negative    Anemia  Assessment & Plan  Stable hemoglobin 10 6    Stage 3a chronic kidney disease [facility])  Assessment & Plan  Lab Results   Component Value Date    EGFR 52 06/17/2022    EGFR 44 06/16/2022    EGFR 36 06/15/2022    CREATININE 0 99 06/17/2022    CREATININE 1 15 06/16/2022    CREATININE 1 34 (H) 06/15/2022     · Creatinine 1 39 with history creatinine 1 18  · GFR reduced at 36  · Losartan on hold   Creatinine at baseline    A-fib [facility])  Assessment & Plan  · Rate controlled  · Continue Eliquis 2 5 mg b i d      Chronic On appropriate medications and managed by PCP. We discussed the importance of managing all secondary stroke risk factors, including hyperlipidemia.     diastolic CHF (congestive heart failure) ([facility])  Assessment & Plan  Wt Readings from Last 3 Encounters:   22 68 kg (150 lb)   22 72 1 kg (159 lb)   22 72 1 kg (159 lb)     · Euvolemic on exam  · Continue Lasix 20 mg daily  · Outpatient follow-up            VTE Pharmacologic Prophylaxis:   Pharmacologic: Apixaban (Eliquis)  Mechanical VTE Prophylaxis in Place: Yes    Patient Centered Rounds: I have performed bedside rounds with nursing staff today  Discussions with Specialists or Other Care Team Provider:  None    Education and Discussions with Family / Patient:  Discussed with patient at bedside    Time Spent for Care: 30 minutes  More than 50% of total time spent on counseling and coordination of care as described above  Current Length of Stay: 3 day(s)    Current Patient Status: Inpatient   Certification Statement: The patient will continue to require additional inpatient hospital stay due to Physical deconditioning    Discharge Plan:  Await [facility] for rehab    Code Status: Level 1 - Full Code      Subjective:   Patient denies any complaints  Feels well    Objective:     Vitals:   Temp (24hrs), Av 1 °F (36 7 °C), Min:97 7 °F (36 5 °C), Max:98 4 °F (36 9 °C)    Temp:  [97 7 °F (36 5 °C)-98 4 °F (36 9 °C)] 98 1 °F (36 7 °C)  HR:  [60-67] 60  Resp:  [17] 17  BP: (143-158)/(57-63) 158/63  SpO2:  [96 %] 96 %  Body mass index is 34 86 kg/m²  Input and Output Summary (last 24 hours): Intake/Output Summary (Last 24 hours) at 2022 1059  Last data filed at 2022 0701  Gross per 24 hour   Intake 600 ml   Output 200 ml   Net 400 ml       Physical Exam:     Physical Exam  Vitals and nursing note reviewed  Constitutional:       Appearance: Normal appearance  HENT:      Head: Normocephalic and atraumatic  Right Ear: External ear normal       Left Ear: External ear normal       Nose: Nose normal       Mouth/Throat:      Pharynx: Oropharynx is clear     Cardiovascular: Rate and Rhythm: Normal rate and regular rhythm  Heart sounds: Normal heart sounds  Pulmonary:      Effort: Pulmonary effort is normal       Breath sounds: Normal breath sounds  Abdominal:      General: Bowel sounds are normal       Palpations: Abdomen is soft  Tenderness: There is no abdominal tenderness  Musculoskeletal:         General: Normal range of motion  Cervical back: Normal range of motion and neck supple  Skin:     General: Skin is warm and dry  Capillary Refill: Capillary refill takes less than 2 seconds  Neurological:      Mental Status: She is alert  Mental status is at baseline  Comments: Oriented to person and place   Psychiatric:         Mood and Affect: Mood normal            Additional Data:     Labs:    Results from last 7 days   Lab Units 06/16/22  0651   WBC Thousand/uL 6 23   HEMOGLOBIN g/dL 10 6*   HEMATOCRIT % 34 7*   PLATELETS Thousands/uL 272   NEUTROS PCT % 63   LYMPHS PCT % 23   MONOS PCT % 11   EOS PCT % 2     Results from last 7 days   Lab Units 06/17/22  0638 06/16/22  0651   SODIUM mmol/L 139 141   POTASSIUM mmol/L 4 2 4 4   CHLORIDE mmol/L 107 109*   CO2 mmol/L 25 24   BUN mg/dL 25 28*   CREATININE mg/dL 0 99 1 15   ANION GAP mmol/L 7 8   CALCIUM mg/dL 8 6 8 4   ALBUMIN g/dL  --  2 7*   TOTAL BILIRUBIN mg/dL  --  0 38   ALK PHOS U/L  --  84   ALT U/L  --  16   AST U/L  --  15   GLUCOSE RANDOM mg/dL 87 93     Results from last 7 days   Lab Units 06/15/22  2034   INR  1 10             Results from last 7 days   Lab Units 06/15/22  2148 06/15/22  2034   LACTIC ACID mmol/L  --  1 1   PROCALCITONIN ng/ml 0 08 0 07           * I Have Reviewed All Lab Data Listed Above  * Additional Pertinent Lab Tests Reviewed:  [place] [address] Admission Reviewed    Imaging:    Imaging Reports Reviewed Today Include: none  Imaging Personally Reviewed by Myself Includes:  none    Recent Cultures (last 7 days):     Results from last 7 days   Lab Units 06/15/22  2034   BLOOD CULTURE  No Growth at 72 hrs  No Growth at 72 hrs  Last 24 Hours Medication List:   Current Facility-Administered Medications   Medication Dose Route Frequency Provider Last Rate    acetaminophen  650 mg Oral Q6H PRN Lissette S Martínez, CRNP      apixaban  2 5 mg Oral BID Lissette S Martínez, CRNP      clopidogrel  75 mg Oral Daily Lissette S Martínez, CRNP      ezetimibe  10 mg Oral Daily Lissette S Martínez, CRNP      gabapentin  100 mg Oral HS Lissette S Martínez, CRNP      isosorbide mononitrate  30 mg Oral Daily Lissette S Martínez, CRNP      memantine  5 mg Oral Daily Lissette S Martínez, CRNP      nitrofurantoin  100 mg Oral BID With Meals Yeny Traylor MD      nystatin   Topical BID Yeny Traylor MD      pantoprazole  40 mg Oral Daily Before Breakfast Lissette S Martínez, CRNP          Today, Patient Was Seen By: Yeny Traylor MD    ** Please Note: Dictation voice to text software may have been used in the creation of this document   **

## 2022-06-20 ENCOUNTER — TRANSITIONAL CARE MANAGEMENT (OUTPATIENT)
Dept: FAMILY MEDICINE CLINIC | Facility: CLINIC | Age: 82
End: 2022-06-20

## 2022-06-20 VITALS
DIASTOLIC BLOOD PRESSURE: 49 MMHG | HEART RATE: 63 BPM | HEIGHT: 55 IN | TEMPERATURE: 97.8 F | SYSTOLIC BLOOD PRESSURE: 141 MMHG | WEIGHT: 150 LBS | OXYGEN SATURATION: 98 % | RESPIRATION RATE: 16 BRPM | BODY MASS INDEX: 34.71 KG/M2

## 2022-06-20 PROCEDURE — 99239 HOSP IP/OBS DSCHRG MGMT >30: CPT | Performed by: FAMILY MEDICINE

## 2022-06-20 RX ORDER — FUROSEMIDE 20 MG/1
20 TABLET ORAL 3 TIMES WEEKLY
Qty: 60 TABLET | Refills: 0
Start: 2022-06-20 | End: 2022-06-20 | Stop reason: SDUPTHER

## 2022-06-20 RX ORDER — NITROFURANTOIN 25; 75 MG/1; MG/1
100 CAPSULE ORAL 2 TIMES DAILY WITH MEALS
Qty: 6 CAPSULE | Refills: 0
Start: 2022-06-20 | End: 2022-06-23

## 2022-06-20 RX ORDER — NYSTATIN 100000 [USP'U]/G
POWDER TOPICAL 2 TIMES DAILY
Qty: 15 G | Refills: 0
Start: 2022-06-20 | End: 2022-07-12 | Stop reason: ALTCHOICE

## 2022-06-20 RX ORDER — FUROSEMIDE 20 MG/1
20 TABLET ORAL
Qty: 60 TABLET | Refills: 0
Start: 2022-06-21 | End: 2022-07-12 | Stop reason: SDUPTHER

## 2022-06-20 RX ADMIN — ISOSORBIDE MONONITRATE 30 MG: 30 TABLET, EXTENDED RELEASE ORAL at 08:12

## 2022-06-20 RX ADMIN — EZETIMIBE 10 MG: 10 TABLET ORAL at 08:12

## 2022-06-20 RX ADMIN — CLOPIDOGREL BISULFATE 75 MG: 75 TABLET ORAL at 08:12

## 2022-06-20 RX ADMIN — APIXABAN 2.5 MG: 2.5 TABLET, FILM COATED ORAL at 08:12

## 2022-06-20 RX ADMIN — MEMANTINE 5 MG: 5 TABLET ORAL at 08:12

## 2022-06-20 RX ADMIN — NYSTATIN 1 APPLICATION: 100000 POWDER TOPICAL at 08:12

## 2022-06-20 RX ADMIN — PANTOPRAZOLE SODIUM 40 MG: 40 TABLET, DELAYED RELEASE ORAL at 06:40

## 2022-06-20 RX ADMIN — NITROFURANTOIN (MONOHYDRATE/MACROCRYSTALS) 100 MG: 75; 25 CAPSULE ORAL at 06:40

## 2022-06-20 NOTE — PLAN OF CARE
Problem: MOBILITY - ADULT  Goal: Maintain or return to baseline ADL function  Description: INTERVENTIONS:  -  Assess patient's ability to carry out ADLs; assess patient's baseline for ADL function and identify physical deficits which impact ability to perform ADLs (bathing, care of mouth/teeth, toileting, grooming, dressing, etc )  - Assess/evaluate cause of self-care deficits encourage OOB to chair, ambulation, assistance with ADL's  - Assess range of motion  - Assess patient's mobility; develop plan if impaired  - Assess patient's need for assistive devices and provide as appropriate  - Encourage maximum independence but intervene and supervise when necessary  - Involve family in performance of ADLs  - Assess for home care needs following discharge   - Consider OT consult to assist with ADL evaluation and planning for discharge  - Provide patient education as appropriate  Outcome: Progressing  Goal: Maintains/Returns to pre admission functional level  Description: INTERVENTIONS:  - Perform BMAT or MOVE assessment daily    - Set and communicate daily mobility goal to care team and patient/family/caregiver  - Collaborate with rehabilitation services on mobility goals if consulted  - Perform Range of Motion   times a day  - Reposition patient every   hours  - Dangle patient   times a day  - Stand patient   times a day  - Ambulate patient   times a day  - Out of bed to chair   times a day   - Out of bed for meals    times a day  - Out of bed for toileting  - Record patient progress and toleration of activity level   Outcome: Progressing     Problem: PAIN - ADULT  Goal: Verbalizes/displays adequate comfort level or baseline comfort level  Description: Interventions:  - Encourage patient to monitor pain and request assistance  - Assess pain using appropriate pain scale0-10  - Administer analgesics based on type and severity of pain and evaluate response  - Implement non-pharmacological measures as appropriate and evaluate response  - Consider cultural and social influences on pain and pain management  - Notify physician/advanced practitioner if interventions unsuccessful or patient reports new pain  Outcome: Progressing     Problem: INFECTION - ADULT  Goal: Absence or prevention of progression during hospitalization  Description: INTERVENTIONS:  - Assess and monitor for signs and symptoms of infection  - Monitor lab/diagnostic results  - Monitor all insertion sites, i e  indwelling lines, tubes, and drains  - Monitor endotracheal if appropriate and nasal secretions for changes in amount and color  - Tyler appropriate cooling/warming therapies per order  - Administer medications as ordered  - Instruct and encourage patient and family to use good hand hygiene technique  - Identify and instruct in appropriate isolation precautions for identified infection/condition  Outcome: Progressing     Problem: SAFETY ADULT  Goal: Patient will remain free of falls  Description: INTERVENTIONS:  - Educate patient/family on patient safety including physical limitations  - Instruct patient to call for assistance with activity   - Consult OT/PT to assist with strengthening/mobility   - Keep Call bell within reach  - Keep bed low and locked with side rails adjusted as appropriate  - Keep care items and personal belongings within reach  - Initiate and maintain comfort rounds  - Make Fall Risk Sign visible to staff  - Offer Toileting every   Hours, in advance of need  - Initiate/Maintain   [place]   alarm  - Obtain necessary fall risk management equipment:     - Apply yellow socks and bracelet for high fall risk patients  - Consider moving patient to room near nurses station  Outcome: Progressing     Problem: DISCHARGE PLANNING  Goal: Discharge to home or other facility with appropriate resources  Description: INTERVENTIONS:  - Identify barriers to discharge w/patient and caregiver  - Arrange for needed discharge resources and transportation as appropriate  - Identify discharge learning needs (meds, wound care, etc )  - Arrange for interpretive services to assist at discharge as needed  - Refer to Case Management Department for coordinating discharge planning if the patient needs post-hospital services based on physician/advanced practitioner order or complex needs related to functional status, cognitive ability, or social support system  Outcome: Progressing     Problem: Knowledge Deficit  Goal: Patient/family/caregiver demonstrates understanding of disease process, treatment plan, medications, and discharge instructions  Description: Complete learning assessment and assess knowledge base  Interventions:  - Provide teaching at level of understanding  - Provide teaching via preferred learning methods  Outcome: Progressing     Problem: Potential for Falls  Goal: Patient will remain free of falls  Description: INTERVENTIONS:  - Educate patient/family on patient safety including physical limitations  - Instruct patient to call for assistance with activity   - Consult OT/PT to assist with strengthening/mobility   - Keep Call bell within reach  - Keep bed low and locked with side rails adjusted as appropriate  - Keep care items and personal belongings within reach  - Initiate and maintain comfort rounds  - Make Fall Risk Sign visible to staff  - Offer Toileting every   Hours, in advance of need  - Initiate/Maintain   alarm  - Obtain necessary fall risk management equipment:     - Apply yellow socks and bracelet for high fall risk patients  - Consider moving patient to room near nurses station  Outcome: Progressing     Problem: Prexisting or High Potential for Compromised Skin Integrity  Goal: Skin integrity is maintained or improved  Description: INTERVENTIONS:  - Identify patients at risk for skin breakdown  - Assess and monitor skin integrity  - Assess and monitor nutrition and hydration status  - Monitor labs   - Assess for incontinence   - Turn and reposition patient  - Assist with mobility/ambulation  - Relieve pressure over bony prominences  - Avoid friction and shearing  - Provide appropriate hygiene as needed including keeping skin clean and dry  - Evaluate need for skin moisturizer/barrier cream  - Collaborate with interdisciplinary team   - Patient/family teaching  - Consider wound care consult   Outcome: Progressing

## 2022-06-20 NOTE — CASE MANAGEMENT
Case Management Discharge Planning Note    Patient name Jaimie Comes  Location [address] 332/-01 MRN [id number]  : 1940 Date 2022       Current Admission Date: 6/15/2022  Current Admission Diagnosis:Physical deconditioning   Patient Active Problem List    Diagnosis Date Noted    Dementia with behavioral disturbance ([facility] ) 2022    Acute cystitis without hematuria 2022    Physical deconditioning 2022    Medicare annual wellness visit, subsequent 2022    Anemia 2022    Ground glass opacity present on imaging of lung 2021    Prediabetes 2021    NSTEMI (non-ST elevated myocardial infarction) ([facility] ) 2021    Fecal incontinence 2021    Parkinson's disease ([facility] ) 2020    LUPE (acute kidney injury) ([facility] ) 2020    Other specified inflammatory spondylopathies, lumbosacral region ([place] [address] ) 2020    Constipation 2020    Renal cyst 10/23/2020    Back pain with right-sided radiculopathy 2020    CAD (coronary artery disease) 2019    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    Single kidney 2019    Vitamin D deficiency 2019    Anxiety 2019    Abnormal stress test 2019    Ambulatory dysfunction 2019    [facility] ) 2019    Stage 3a chronic kidney disease ([facility] ) 2019    Junctional bradycardia 2019    BMI 36 0-36 9,adult 2019    Chronic left shoulder pain 10/09/2018    H/O: rheumatic fever 2018    Sciatica 2018    Peripheral neuropathy 2018    B12 deficiency 2018    Osteoporosis 2018    Closed head injury 2017    Lumbar disc herniation with radiculopathy 2017    Headache, worsening 2017    Chronic diastolic CHF (congestive heart failure) ([facility] ) 05/15/2017    Low serum vitamin B12 2017    Peripheral edema 2017    Other chest pain 2016    Biceps tendinitis of right shoulder 2016    Diastolic dysfunction 02/87/3127    Lateral epicondylitis of right elbow 04/11/2016    Essential hypertension 03/14/2016    Dyspnea on exertion 03/14/2016    GERD without esophagitis 03/14/2016    Osteoarthritis 09/17/2012    Peripheral venous insufficiency 09/17/2012    Prolapse of vaginal walls 12/30/2003      LOS (days): 4  Geometric Mean LOS (GMLOS) (days): 2 60  Days to GMLOS:-1 5     OBJECTIVE:  Risk of Unplanned Readmission Score: 12 37         Current admission status: Inpatient   Preferred Pharmacy:   [address] [place] [address] [address] [facility]  [address] [address]  Phone: [phone] Fax: [phone]    Primary Care Provider: Ana Carrion MD    Primary Insurance: [facility]  Secondary Insurance:     DISCHARGE DETAILS:        CM contacted [facility] to let them know transport is here for patient, I had not received confirmation of  time from [facility]  CM left voice mail for daughter, Angelique Murray, to let her know transport here for patient

## 2022-06-20 NOTE — CASE MANAGEMENT
Case Management Discharge Planning Note    Patient name Lilo Henning  Location [address] 332/-01 MRN [id number]  : 1940 Date 2022       Current Admission Date: 6/15/2022  Current Admission Diagnosis:Physical deconditioning   Patient Active Problem List    Diagnosis Date Noted    Dementia with behavioral disturbance ([facility] ) 2022    Acute cystitis without hematuria 2022    Physical deconditioning 2022    Medicare annual wellness visit, subsequent 2022    Anemia 2022    Ground glass opacity present on imaging of lung 2021    Prediabetes 2021    NSTEMI (non-ST elevated myocardial infarction) ([facility] ) 2021    Fecal incontinence 2021    Parkinson's disease ([facility] ) 2020    LUPE (acute kidney injury) ([place] [address] ) 2020    Other specified inflammatory spondylopathies, lumbosacral region ([place] [address] ) 2020    Constipation 2020    Renal cyst 10/23/2020    Back pain with right-sided radiculopathy 2020    CAD (coronary artery disease) 2019    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    Single kidney 2019    Vitamin D deficiency 2019    Anxiety 2019    Abnormal stress test 2019    Ambulatory dysfunction 2019    A-fib ([facility] ) 2019    Stage 3a chronic kidney disease ([facility] ) 2019    Junctional bradycardia 2019    BMI 36 0-36 9,adult 2019    Chronic left shoulder pain 10/09/2018    H/O: rheumatic fever 2018    Sciatica 2018    Peripheral neuropathy 2018    B12 deficiency 2018    Osteoporosis 2018    Closed head injury 2017    Lumbar disc herniation with radiculopathy 2017    Headache, worsening 2017    Chronic diastolic CHF (congestive heart failure) ([facility] ) 05/15/2017    Low serum vitamin B12 2017    Peripheral edema 2017    Other chest pain 2016    Biceps tendinitis of right shoulder 2016    Diastolic dysfunction 29/52/0308    Lateral epicondylitis of right elbow 04/11/2016    Essential hypertension 03/14/2016    Dyspnea on exertion 03/14/2016    GERD without esophagitis 03/14/2016    Osteoarthritis 09/17/2012    Peripheral venous insufficiency 09/17/2012    Prolapse of vaginal walls 12/30/2003      LOS (days): 4  Geometric Mean LOS (GMLOS) (days): 2 60  Days to GMLOS:-1 5     OBJECTIVE:  Risk of Unplanned Readmission Score: 12 07         Current admission status: Inpatient   Preferred Pharmacy:   [address] [place] [address] [address] [facility]  1185 [facility]  [address] [place] [address]  Phone: [phone] Fax: [phone]    Primary Care Provider: Ana Carrion MD    Primary Insurance: [facility]  Secondary Insurance:     DISCHARGE DETAILS:    Discharge planning discussed with[de-identified] patient  Freedom of Choice: Yes  Comments - Freedom of Choice: [place] 0731 contacted family/caregiver?: Yes  Were Treatment Team discharge recommendations reviewed with patient/caregiver?: Yes  Did patient/caregiver verbalize understanding of patient care needs?: Yes  Were patient/caregiver advised of the risks associated with not following Treatment Team discharge recommendations?: Yes           Other Referral/Resources/Interventions Provided:  Interventions: SNF  Referral Comments: [facility]         Treatment Team Recommendation: SNF  Discharge Destination Plan[de-identified] SNF  Transport at Discharge : [facility]        IMM Given (Date):: 06/20/22  IMM Given to[de-identified] Patient  Family notified[de-identified] appeal rights provided to patient        CM met with patient to let her know she will be going to [facility] today, approved for 7 days  Patient in agreement  CM spoke to patient at the bedside, reviewed DC IMM with patient and informed that patient can stay an additional 4 hours for reconsidering appealing the discharge as the medicare rights were review on the day of discharge   Pt verbalized understanding and feels ready to go to SNF and does not intend to stay 4 hours to reconsider  Discussed with patient that transportation via [facility] is not covered by insurance and patient will be billed for this service  Verbalized understanding      CM submitted WC transport for patient in [facility]

## 2022-06-20 NOTE — DISCHARGE SUMMARY
[address]  Discharge- Kizzy Kahn 1940, 78 y o  female MRN: [id number]  Unit/Bed#: -[facility] Encounter: [id number]  Primary Care Provider: Amee Gutierres MD   Date and time admitted to hospital: 6/15/2022  7:51 PM    Dementia with behavioral disturbance [facility])  Assessment & Plan  · Medical record review patient with Alzheimer's dementia  · One admission alert to person and year but convinced of this place is a fire house  · Argumentative with the nursing staff  · Appreciate OT cognitive evaluation  · PT/OT/case management for rehabilitation placement  · Admitted 06/11 to outside hospital with aphasia, had full stroke workup including MRI brain and at time of discharge on 06/15 was recommended rehabilitation placement, family wanted patient to get stronger before she left the hospital and signed mother out of the hospital against medical advice and brought her straight to our ED  · Now family is agreeable to going to SNF for rehab      * Physical deconditioning  Assessment & Plan   physical deconditioning requiring PT OT eval   Need subacute rehab for short-term rehab as patient lives alone      Acute cystitis without hematuria  Assessment & Plan   Recent urine culture shows Enterococcus  Received IV vanco and cefepime here and had acute allergic reaction  Will place her back on Macrobid for total 5 days and monitor for now  Repeat UA here is negative    Anemia  Assessment & Plan  Stable hemoglobin 10 6    Stage 3a chronic kidney disease [facility])  Assessment & Plan  Lab Results   Component Value Date    EGFR 52 06/17/2022    EGFR 44 06/16/2022    EGFR 36 06/15/2022    CREATININE 0 99 06/17/2022    CREATININE 1 15 06/16/2022    CREATININE 1 34 (H) 06/15/2022     · Creatinine 1 39 with history creatinine 1 18  · GFR reduced at 36  · Losartan on hold   Creatinine at baseline    A-fib [facility])  Assessment & Plan  · Rate controlled  · Continue Eliquis 2 5 mg b i d      Chronic diastolic CHF (congestive heart failure) ([facility])  Assessment & Plan  Wt Readings from Last 3 Encounters:   06/16/22 68 kg (150 lb)   05/23/22 72 1 kg (159 lb)   04/12/22 72 1 kg (159 lb)     · Euvolemic on exam  · Continue Lasix 20 mg daily  · Outpatient follow-up            Discharging Physician / Practitioner: Rhea Burgess MD  PCP: Sunshine Cruz MD  Admission Date:   Admission Orders (From admission, onward)     Ordered        06/16/22 0953  Inpatient Admission  Once            06/15/22 2241  Place in Observation  Once                      Discharge Date: 06/20/22    Medical Problems             Resolved Problems  Date Reviewed: 6/20/2022   None                 Consultations During Hospital Stay:  · Pt/ot    Procedures Performed:   · none    Significant Findings / Test Results:   XR chest portable    Result Date: 6/15/2022  Impression: Stable findings without acute cardiopulmonary disease  Workstation performed: [id number]     CT head without contrast    Result Date: 6/15/2022  Impression: No acute intracranial abnormality  Workstation performed: [id number]     Incidental Findings:   · none    Test Results Pending at Discharge (will require follow up):   · none     Outpatient Tests Requested:  · none    Complications:  none    Reason for Admission:  Generalized weakness    Hospital Course:     Reuben Cortez is a 78 y o  female patient who originally presented to the hospital on 6/15/2022 due to generalized weakness/ambulatory dysfunction and confusion and found to have recent acute cystitis without hematuria being treated with antibiotics  Patient's family signed her out from [facility] and brought her to our hospital to be re-evaluated  Received gentle hydration following which patient's mental status improved and continued oral Macrobid as she does have allergic reaction to vancomycin    Clinically improving here and back to her baseline mental status however does require subacute rehab and is being discharged to subacute rehab facility today    Please see above list of diagnoses and related plan for additional information  Condition at Discharge: good     Discharge Day Visit / Exam:     Subjective:  Patient denies any chest pain or shortness of breath or abdominal pain  No complaints  Vitals: Blood Pressure: (!) 141/49 (06/20/22 0714)  Pulse: 63 (06/20/22 0714)  Temperature: 97 8 °F (36 6 °C) (06/20/22 0714)  Temp Source: Oral (06/15/22 2344)  Respirations: 16 (06/20/22 0714)  Height: 4' 7" (139 7 cm) (06/16/22 1502)  Weight - Scale: 68 kg (150 lb) (06/16/22 1502)  SpO2: 98 % (06/20/22 0714)  Exam:   Physical Exam  Vitals and nursing note reviewed  Constitutional:       Appearance: Normal appearance  HENT:      Head: Normocephalic and atraumatic  Right Ear: External ear normal       Left Ear: External ear normal       Nose: Nose normal       Mouth/Throat:      Pharynx: Oropharynx is clear  Cardiovascular:      Rate and Rhythm: Normal rate and regular rhythm  Heart sounds: Normal heart sounds  Pulmonary:      Effort: Pulmonary effort is normal       Breath sounds: Normal breath sounds  Abdominal:      General: Bowel sounds are normal       Palpations: Abdomen is soft  Tenderness: There is no abdominal tenderness  Musculoskeletal:         General: Normal range of motion  Cervical back: Normal range of motion and neck supple  Skin:     General: Skin is warm and dry  Capillary Refill: Capillary refill takes less than 2 seconds  Neurological:      General: No focal deficit present  Mental Status: She is alert  Comments: Oriented to person place   Psychiatric:         Mood and Affect: Mood normal          Discussion with Family:  Discussed with daughter    Discharge instructions/Information to patient and family:   See after visit summary for information provided to patient and family        Provisions for Follow-Up Care:  See after visit summary for information related to follow-up care and any pertinent home health orders  Disposition:     Other [facility]      For Discharges to [facility] SNF:   · Not Applicable to this Patient - Not Applicable to this Patient    Planned Readmission:  None     Discharge Statement:  I spent 35 minutes discharging the patient  This time was spent on the day of discharge  I had direct contact with the patient on the day of discharge  Greater than 50% of the total time was spent examining patient, answering all patient questions, arranging and discussing plan of care with patient as well as directly providing post-discharge instructions  Additional time then spent on discharge activities  Discharge Medications:  See after visit summary for reconciled discharge medications provided to patient and family        ** Please Note: This note has been constructed using a voice recognition system **

## 2022-06-20 NOTE — CASE MANAGEMENT
Case Management Discharge Planning Note    Patient name Melinda Valdes  Location [address] 332/-01 MRN [id number]  : 1940 Date 2022       Current Admission Date: 6/15/2022  Current Admission Diagnosis:Physical deconditioning   Patient Active Problem List    Diagnosis Date Noted    Dementia with behavioral disturbance ([facility] ) 2022    Acute cystitis without hematuria 2022    Physical deconditioning 2022    Medicare annual wellness visit, subsequent 2022    Anemia 2022    Ground glass opacity present on imaging of lung 2021    Prediabetes 2021    NSTEMI (non-ST elevated myocardial infarction) ([facility] ) 2021    Fecal incontinence 2021    Parkinson's disease ([place] [address] ) 2020    LUPE (acute kidney injury) ([place] [address] ) 2020    Other specified inflammatory spondylopathies, lumbosacral region ([place] [address] ) 2020    Constipation 2020    Renal cyst 10/23/2020    Back pain with right-sided radiculopathy 2020    CAD (coronary artery disease) 2019    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    Single kidney 2019    Vitamin D deficiency 2019    Anxiety 2019    Abnormal stress test 2019    Ambulatory dysfunction 2019    A-fib ([place] [address] ) 2019    Stage 3a chronic kidney disease ([place] [address] ) 2019    Junctional bradycardia 2019    BMI 36 0-36 9,adult 2019    Chronic left shoulder pain 10/09/2018    H/O: rheumatic fever 2018    Sciatica 2018    Peripheral neuropathy 2018    B12 deficiency 2018    Osteoporosis 2018    Closed head injury 2017    Lumbar disc herniation with radiculopathy 2017    Headache, worsening 2017    Chronic diastolic CHF (congestive heart failure) ([facility] ) 05/15/2017    Low serum vitamin B12 2017    Peripheral edema 2017    Other chest pain 2016    Biceps tendinitis of right shoulder 2016    Diastolic dysfunction 67/65/6537    Lateral epicondylitis of right elbow 04/11/2016    Essential hypertension 03/14/2016    Dyspnea on exertion 03/14/2016    GERD without esophagitis 03/14/2016    Osteoarthritis 09/17/2012    Peripheral venous insufficiency 09/17/2012    Prolapse of vaginal walls 12/30/2003      LOS (days): 4  Geometric Mean LOS (GMLOS) (days): 2 60  Days to GMLOS:-1 4     OBJECTIVE:  Risk of Unplanned Readmission Score: 12 07         Current admission status: Inpatient   Preferred Pharmacy:   [address] [place] [address] [address] [facility]  [address] [place] [address] [facility] [address] [address]  Phone: [phone] Fax: [phone]    Primary Care Provider: Brandon Petty MD    Primary Insurance: [facility]  Secondary Insurance:     [address] [place] [address] Number: Approved SNF auth # [id number] [facility] NRD: 6/27 Care Cood: Katie Camarena P# [phone] Fax review to [phone]   Any changes to [facility] can be called into Gregory P# [phone]

## 2022-06-20 NOTE — PLAN OF CARE
Problem: MOBILITY - ADULT  Goal: Maintain or return to baseline ADL function  Description: INTERVENTIONS:  -  Assess patient's ability to carry out ADLs; assess patient's baseline for ADL function and identify physical deficits which impact ability to perform ADLs (bathing, care of mouth/teeth, toileting, grooming, dressing, etc )  - Assess/evaluate cause of self-care deficits encourage OOB to chair, ambulation, assistance with ADL's  - Assess range of motion  - Assess patient's mobility; develop plan if impaired  - Assess patient's need for assistive devices and provide as appropriate  - Encourage maximum independence but intervene and supervise when necessary  - Involve family in performance of ADLs  - Assess for home care needs following discharge   - Consider OT consult to assist with ADL evaluation and planning for discharge  - Provide patient education as appropriate  6/20/2022 1349 by Sara Cassidy RN  Outcome: Adequate for Discharge  6/20/2022 1010 by Sara Cassidy RN  Outcome: Progressing  Goal: Maintains/Returns to pre admission functional level  Description: INTERVENTIONS:  - Perform BMAT or MOVE assessment daily    - Set and communicate daily mobility goal to care team and patient/family/caregiver  - Collaborate with rehabilitation services on mobility goals if consulted  - Perform Range of Motion   times a day  - Reposition patient every   hours  - Dangle patient   times a day  - Stand patient   times a day  - Ambulate patient   times a day  - Out of bed to chair   times a day   - Out of bed for meals    times a day  - Out of bed for toileting  - Record patient progress and toleration of activity level   6/20/2022 1349 by Sara Cassidy RN  Outcome: Adequate for Discharge  6/20/2022 1010 by Sara Cassidy RN  Outcome: Progressing     Problem: PAIN - ADULT  Goal: Verbalizes/displays adequate comfort level or baseline comfort level  Description: Interventions:  - Encourage patient to monitor pain and request assistance  - Assess pain using appropriate pain scale0-10  - Administer analgesics based on type and severity of pain and evaluate response  - Implement non-pharmacological measures as appropriate and evaluate response  - Consider cultural and social influences on pain and pain management  - Notify physician/advanced practitioner if interventions unsuccessful or patient reports new pain  6/20/2022 1349 by Zora Bojorquez RN  Outcome: Adequate for Discharge  6/20/2022 1010 by Zora Bojorquez RN  Outcome: Progressing     Problem: INFECTION - ADULT  Goal: Absence or prevention of progression during hospitalization  Description: INTERVENTIONS:  - Assess and monitor for signs and symptoms of infection  - Monitor lab/diagnostic results  - Monitor all insertion sites, i e  indwelling lines, tubes, and drains  - Monitor endotracheal if appropriate and nasal secretions for changes in amount and color  - [place] appropriate cooling/warming therapies per order  - Administer medications as ordered  - Instruct and encourage patient and family to use good hand hygiene technique  - Identify and instruct in appropriate isolation precautions for identified infection/condition  6/20/2022 1349 by Zora Bojorquez RN  Outcome: Adequate for Discharge  6/20/2022 1010 by Zora Bojorquez RN  Outcome: Progressing     Problem: SAFETY ADULT  Goal: Patient will remain free of falls  Description: INTERVENTIONS:  - Educate patient/family on patient safety including physical limitations  - Instruct patient to call for assistance with activity   - Consult OT/PT to assist with strengthening/mobility   - Keep Call bell within reach  - Keep bed low and locked with side rails adjusted as appropriate  - Keep care items and personal belongings within reach  - Initiate and maintain comfort rounds  - Make Fall Risk Sign visible to staff  - Offer Toileting every   Hours, in advance of need  - Initiate/Maintain   alarm  - Obtain necessary fall risk management equipment:    - Apply yellow socks and bracelet for high fall risk patients  - Consider moving patient to room near nurses station  6/20/2022 1349 by Tyra Martini RN  Outcome: Adequate for Discharge  6/20/2022 1010 by Tyra Martini RN  Outcome: Progressing     Problem: DISCHARGE PLANNING  Goal: Discharge to home or other facility with appropriate resources  Description: INTERVENTIONS:  - Identify barriers to discharge w/patient and caregiver  - Arrange for needed discharge resources and transportation as appropriate  - Identify discharge learning needs (meds, wound care, etc )  - Arrange for interpretive services to assist at discharge as needed  - Refer to Case Management Department for coordinating discharge planning if the patient needs post-hospital services based on physician/advanced practitioner order or complex needs related to functional status, cognitive ability, or social support system  6/20/2022 1349 by Tyra Martini RN  Outcome: Adequate for Discharge  6/20/2022 1010 by Tyra Martini RN  Outcome: Progressing     Problem: Knowledge Deficit  Goal: Patient/family/caregiver demonstrates understanding of disease process, treatment plan, medications, and discharge instructions  Description: Complete learning assessment and assess knowledge base    Interventions:  - Provide teaching at level of understanding  - Provide teaching via preferred learning methods  6/20/2022 1349 by Tyra Martini RN  Outcome: Adequate for Discharge  6/20/2022 1010 by Tyra Martini RN  Outcome: Progressing     Problem: Potential for Falls  Goal: Patient will remain free of falls  Description: INTERVENTIONS:  - Educate patient/family on patient safety including physical limitations  - Instruct patient to call for assistance with activity   - Consult OT/PT to assist with strengthening/mobility   - Keep Call bell within reach  - Keep bed low and locked with side rails adjusted as appropriate  - Keep care items and personal belongings within reach  - Initiate and maintain comfort rounds  - Make Fall Risk Sign visible to staff  - Offer Toileting every   Hours, in advance of need  - Initiate/Maintain   alarm  - Obtain necessary fall risk management equipment:   [place]      - Apply yellow socks and bracelet for high fall risk patients  - Consider moving patient to room near nurses station  6/20/2022 1349 by Sole Devries RN  Outcome: Adequate for Discharge  6/20/2022 1010 by Sole Devries RN  Outcome: Progressing     Problem: Prexisting or High Potential for Compromised Skin Integrity  Goal: Skin integrity is maintained or improved  Description: INTERVENTIONS:  - Identify patients at risk for skin breakdown  - Assess and monitor skin integrity  - Assess and monitor nutrition and hydration status  - Monitor labs   - Assess for incontinence   - Turn and reposition patient  - Assist with mobility/ambulation  - Relieve pressure over bony prominences  - Avoid friction and shearing  - Provide appropriate hygiene as needed including keeping skin clean and dry  - Evaluate need for skin moisturizer/barrier cream  - Collaborate with interdisciplinary team   - Patient/family teaching  - Consider wound care consult   6/20/2022 1349 by Sole Devries RN  Outcome: Adequate for Discharge  6/20/2022 1010 by Sole Devries RN  Outcome: Progressing     Problem: OCCUPATIONAL THERAPY ADULT  Goal: Performs self-care activities at highest level of function for planned discharge setting  See evaluation for individualized goals  Description: Treatment Interventions: ADL retraining, Functional transfer training, UE strengthening/ROM, Endurance training, Cognitive reorientation, Patient/family training, Equipment evaluation/education, Neuromuscular reeducation, Compensatory technique education, Continued evaluation, Energy conservation, Activityengagement          See flowsheet documentation for full assessment, interventions and recommendations     Outcome: Adequate for Discharge     Problem: PHYSICAL THERAPY ADULT  Goal: Performs mobility at highest level of function for planned discharge setting  See evaluation for individualized goals  Description: Treatment/Interventions: ADL retraining, Functional transfer training, LE strengthening/ROM, Therapeutic exercise, Endurance training, Patient/family training, Equipment eval/education, Bed mobility, Gait training, Compensatory technique education, Spoke to nursing, Spoke to case management, OT  Equipment Recommended:  (walker-pt has)       See flowsheet documentation for full assessment, interventions and recommendations    Outcome: Adequate for Discharge

## 2022-06-20 NOTE — NURSING NOTE
Discharge instructions reviewed with Magdalene nursing supervisor at [facility]  Verbalized understanding of same  Magdalene made aware of Lasix change on discharge instructions  New AVS faxed to [facility]

## 2022-06-21 LAB
BACTERIA BLD CULT: NORMAL
BACTERIA BLD CULT: NORMAL

## 2022-06-21 NOTE — UTILIZATION REVIEW
Notification of Discharge   This is a Notification of Discharge from our facility [address]  Please be advised that this patient has been discharge from our facility  Below you will find the admission and discharge date and time including the patients disposition  UTILIZATION REVIEW CONTACT:  [address] [place]  Utilization   Network Utilization Review Department  Phone: [phone] x carefully listen to the prompts  All voicemails are confidential   Email: [email or web address]     PHYSICIAN ADVISORY SERVICES:  FOR [id number] REVIEW - MEDICAL NECESSITY DENIAL  Phone: [phone]  Fax: [phone]  Email: [email or web address]     PRESENTATION DATE: 6/15/2022  7:51 PM  OBERVATION ADMISSION DATE:   INPATIENT ADMISSION DATE: 6/16/22  9:53 AM   DISCHARGE DATE: 6/20/2022  1:51 PM  DISPOSITION: Non [facility] SN[facility]/SNU Non [facility] SNF/TCU/SNU      IMPORTANT INFORMATION:  Send all requests for admission clinical reviews, approved or denied determinations and any other requests to dedicated fax number below belonging to the campus where the patient is receiving treatment   List of dedicated fax numbers:  [address] DENIALS (Administrative/Medical Necessity) [phone]   [address] (Maternity/NICU/Pediatrics) [phone]   Nallely Keita [phone]   130 [facility] [phone]   [address] [place] [address] [phone]   [address] [phone]   [facility]  [phone]   [address] [place] [address], [facility]  2401 [facility] 1000 [facility] [phone]

## 2022-07-12 ENCOUNTER — OFFICE VISIT (OUTPATIENT)
Dept: FAMILY MEDICINE CLINIC | Facility: CLINIC | Age: 82
End: 2022-07-12
Payer: COMMERCIAL

## 2022-07-12 VITALS
HEART RATE: 65 BPM | RESPIRATION RATE: 18 BRPM | DIASTOLIC BLOOD PRESSURE: 60 MMHG | WEIGHT: 158 LBS | TEMPERATURE: 98.1 F | OXYGEN SATURATION: 99 % | BODY MASS INDEX: 36.57 KG/M2 | SYSTOLIC BLOOD PRESSURE: 128 MMHG | HEIGHT: 55 IN

## 2022-07-12 DIAGNOSIS — B35.1 ONYCHOMYCOSIS: ICD-10-CM

## 2022-07-12 DIAGNOSIS — R39.9 UTI SYMPTOMS: ICD-10-CM

## 2022-07-12 DIAGNOSIS — I25.10 CORONARY ARTERY DISEASE INVOLVING NATIVE HEART, UNSPECIFIED VESSEL OR LESION TYPE, UNSPECIFIED WHETHER ANGINA PRESENT: ICD-10-CM

## 2022-07-12 DIAGNOSIS — M79.10 MYALGIA: ICD-10-CM

## 2022-07-12 DIAGNOSIS — E66.01 OBESITY, MORBID (HCC): ICD-10-CM

## 2022-07-12 DIAGNOSIS — M51.16 LUMBAR DISC HERNIATION WITH RADICULOPATHY: ICD-10-CM

## 2022-07-12 DIAGNOSIS — R53.81 PHYSICAL DECONDITIONING: ICD-10-CM

## 2022-07-12 DIAGNOSIS — I10 ESSENTIAL HYPERTENSION: Primary | ICD-10-CM

## 2022-07-12 DIAGNOSIS — N30.00 ACUTE CYSTITIS WITHOUT HEMATURIA: Primary | ICD-10-CM

## 2022-07-12 DIAGNOSIS — L84 CORN OF FOOT: ICD-10-CM

## 2022-07-12 LAB
BACTERIA UR QL AUTO: ABNORMAL /HPF
BILIRUB UR QL STRIP: NEGATIVE
CLARITY UR: ABNORMAL
COLOR UR: YELLOW
GLUCOSE UR STRIP-MCNC: NEGATIVE MG/DL
HGB UR QL STRIP.AUTO: NEGATIVE
KETONES UR STRIP-MCNC: NEGATIVE MG/DL
LEUKOCYTE ESTERASE UR QL STRIP: ABNORMAL
MUCOUS THREADS UR QL AUTO: ABNORMAL
NITRITE UR QL STRIP: POSITIVE
NON-SQ EPI CELLS URNS QL MICRO: ABNORMAL /HPF
PH UR STRIP.AUTO: 6 [PH]
PROT UR STRIP-MCNC: ABNORMAL MG/DL
RBC #/AREA URNS AUTO: ABNORMAL /HPF
SP GR UR STRIP.AUTO: 1.02 (ref 1–1.03)
UROBILINOGEN UR STRIP-ACNC: <2 MG/DL
WBC #/AREA URNS AUTO: ABNORMAL /HPF
WBC CLUMPS # UR AUTO: PRESENT /UL

## 2022-07-12 PROCEDURE — 81001 URINALYSIS AUTO W/SCOPE: CPT | Performed by: FAMILY MEDICINE

## 2022-07-12 PROCEDURE — 1160F RVW MEDS BY RX/DR IN RCRD: CPT | Performed by: FAMILY MEDICINE

## 2022-07-12 PROCEDURE — 87086 URINE CULTURE/COLONY COUNT: CPT | Performed by: FAMILY MEDICINE

## 2022-07-12 PROCEDURE — 3074F SYST BP LT 130 MM HG: CPT | Performed by: FAMILY MEDICINE

## 2022-07-12 PROCEDURE — 3078F DIAST BP <80 MM HG: CPT | Performed by: FAMILY MEDICINE

## 2022-07-12 PROCEDURE — 87186 SC STD MICRODIL/AGAR DIL: CPT | Performed by: FAMILY MEDICINE

## 2022-07-12 PROCEDURE — 87077 CULTURE AEROBIC IDENTIFY: CPT | Performed by: FAMILY MEDICINE

## 2022-07-12 PROCEDURE — 99215 OFFICE O/P EST HI 40 MIN: CPT | Performed by: FAMILY MEDICINE

## 2022-07-12 PROCEDURE — 1003F LEVEL OF ACTIVITY ASSESS: CPT | Performed by: FAMILY MEDICINE

## 2022-07-12 RX ORDER — CEPHALEXIN 500 MG/1
500 CAPSULE ORAL EVERY 6 HOURS SCHEDULED
Qty: 28 CAPSULE | Refills: 0 | Status: SHIPPED | OUTPATIENT
Start: 2022-07-12 | End: 2022-07-19

## 2022-07-12 RX ORDER — FUROSEMIDE 20 MG/1
20 TABLET ORAL DAILY
Qty: 90 TABLET | Refills: 0
Start: 2022-07-12 | End: 2022-10-11

## 2022-07-12 NOTE — PROGRESS NOTES
Assessment/Plan:    1  Essential hypertension  - stable  She is on board with Cardiology as well due to afib  She has edema but baseline  She is unsure how she is taking her lasix  Should be taking daily, and we can certainly boost to 40 mg/day if need be  Renal function is stable/lytes are stable  Discussed the importance of maintaining a healthy lifestyle through heart healthy diet and exercise  2  UTI symptoms  - will recheck  Has chronic UTI  - Urinalysis with microscopic  - Urine culture; Future    3  Obesity, morbid ([facility] [address] )  - BMI Counseling: Body mass index is 36 72 kg/m²  The BMI is above normal  Nutrition recommendations include decreasing portion sizes, encouraging healthy choices of fruits and vegetables, decreasing fast food intake, consuming healthier snacks, limiting drinks that contain sugar, reducing intake of saturated and trans fat and reducing intake of cholesterol  Exercise recommendations include exercising 3-5 times per week and strength training exercises  Rationale for BMI follow-up plan is due to patient being overweight or obese  4  Lumbar disc herniation with radiculopathy  - she tells me she is taking massimo but marked not taking from prior encounter  She would like to remove from her list as she does not feel it is helping her much  We will monitor her progress  Has h/o significant dz for which she was receiving injections at one time  For worsening, can certainly entertain sending her back to Pain  No red flag symptoms  5/2022 - MRI Lspine  IMPRESSION:   1   Multilevel degenerative changes resulting in central canal, lateral recess,   and foraminal stenosis as discussed above  2   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    3   STIR hyperintensities in T12 and L5, possibly osseous hemangiomas  4   Severe degenerative changes in bilateral sacroiliac joints  STIR   hyperintensities in bilateral aspects of the sacrum, possibly degenerative   change     5   Follow-up is recommended  5  Myalgia  - can work on hydration but balanced -- not more than 1 liter/day due to CHF  She is drinking soda  We discussed water/electrolyte replacements that can be helpful;rory in lieu of lasix  6  Corn of foot  - referral podiatry  Prefers close to home  Does not want to see Dr Mike Lima  - Ambulatory Referral to Podiatry; Future    7  Onychomycosis  - Ambulatory Referral to Podiatry; Future    8  Coronary artery disease involving native heart, unspecified vessel or lesion type, unspecified whether angina present  - stable  Weight up from June but unsure how reliable this is and other weights are more c/w her weight today  Does not appear volume overloaded  - furosemide (LASIX) 20 mg tablet; Take 1 tablet (20 mg total) by mouth in the morning  Dispense: 90 tablet; Refill: 0    9  Physical deconditioning  - needs PT  She is unsure if she is getting this though went to SNF for this  She will check and if not, will do referral   She does have an aid that helps her with meds, etc   She lives in [facility] in [place] - independent living  She was alert and oriented today  Keep appt with Dr Charlee Costa in September  Total time I spent caring for this patient on the day of the encounter - 40 minutes  10 minutes spent before the visit  25 minutes spent during the visit  4 minutes spent after the visit       Patient/Caretaker verbalized understanding and were in agreement with today's assessment and plan  Time was taken to address any questions patient/caretaker had  Indication/Risks/Benefits of medication(s) as prescribed were discussed with the patient/caretaker  The patient verbalized understanding and agreement and elects to take medications as prescribed  Time was taken to answer any questions the patient/caretaker may have had  Chief Complaint   Patient presents with   [address] [place] [address] follow up  Burning on urination, and straining  Stiff neck  Bilateral rib pain  Bilateral big toe soreness  Back pain  Subjective:      Patient ID: Jesusita Barrera is a 78 y o  female  Was in Rehab for deconditioning  She should be doing PT and was checking on this to let me know if they plan on coming -- she is not sure  She is not currently doing such and cont to be deconditioned  She is with stiff neck  Worse in AM and as the day goes on, this is helpful  Takes tylenol and this helps  She is c/o bilateral rib pain and also has h/o low back pain with radiation to her extremities  "I have sciatica "  She has seen PM and had injections in the past   She tells me she was told she needed surgery  MRI as above  Uses her walker and this helps her stabilize  Does not think she could walk without it  She leaks urine, this is not changed, no issues with stool  Unsure if she is on massimo -- prior note suggests this was d/c'd  She prefers to not take if she does not have too  She is with dysuria and has h/o recurrent infections  No odor but she struggles to smell  No f/c/s  Afib - on anticoagulation and tolerating this  No worsening cp, sob, dizziness, etc       Edema -- worse to LLE, she has h/o trauma to that leg so is always worse  No worsening sob  Taking lasix but unsure how much  Should be daily  She is with sore foot - R great toe and does not cut toenails  Would like to see podiatry       The following portions of the patient's history were reviewed and updated as appropriate: allergies, current medications, past family history, past medical history, past social history, past surgical history and problem list     Review of Systems   All other systems reviewed and are negative          Objective:      /60 (BP Location: Right arm, Patient Position: Sitting, Cuff Size: Large)   Pulse 65   Temp 98 1 °F (36 7 °C) (Temporal)   Resp 18   Ht 4' 7" (1 397 m)   Wt 71 7 kg (158 lb)   SpO2 99%   BMI 36 72 kg/m² Physical Exam  Vitals and nursing note reviewed  Constitutional:       Appearance: Normal appearance  She is obese  HENT:      Head: Normocephalic and atraumatic  Eyes:      Conjunctiva/sclera: Conjunctivae normal    Cardiovascular:      Comments: Irregularly, irregular    Pulmonary:      Effort: Pulmonary effort is normal       Breath sounds: Normal breath sounds  No wheezing, rhonchi or rales  Abdominal:      Palpations: Abdomen is soft  Musculoskeletal:      Cervical back: Neck supple  Comments: Walks flexed at hips and with rollator  She has ttp in her b/l SIJs  Strength is 4/5 eq and b/l LEs     Skin:     General: Skin is warm and dry  Comments: She has a corn on the medial aspect of her R great toe  There is ttp  There is onychomycosis to toenails noted   2+ DP pulses b/l  Cap refill is < 3 sec     Neurological:      General: No focal deficit present  Mental Status: She is alert and oriented to person, place, and time  Psychiatric:         Mood and Affect: Mood normal          Behavior: Behavior normal          Thought Content:  Thought content normal          Judgment: Judgment normal

## 2022-07-14 ENCOUNTER — TELEPHONE (OUTPATIENT)
Dept: FAMILY MEDICINE CLINIC | Facility: CLINIC | Age: 82
End: 2022-07-14

## 2022-07-14 LAB
BACTERIA UR CULT: ABNORMAL
BACTERIA UR CULT: ABNORMAL

## 2022-07-14 NOTE — TELEPHONE ENCOUNTER
[facility] from [facility] called and wanted pt last office not and medication list, and it was faxed to [phone]

## 2022-08-08 DIAGNOSIS — I48.91 ATRIAL FIBRILLATION, UNSPECIFIED TYPE (HCC): ICD-10-CM

## 2022-08-08 DIAGNOSIS — I50.32 CHRONIC DIASTOLIC CHF (CONGESTIVE HEART FAILURE) (HCC): ICD-10-CM

## 2022-08-08 RX ORDER — ISOSORBIDE MONONITRATE 30 MG/1
30 TABLET, EXTENDED RELEASE ORAL DAILY
Qty: 30 TABLET | Refills: 5 | Status: SHIPPED | OUTPATIENT
Start: 2022-08-08 | End: 2022-10-05 | Stop reason: DRUGHIGH

## 2022-08-08 RX ORDER — APIXABAN 2.5 MG/1
2.5 TABLET, FILM COATED ORAL 2 TIMES DAILY
Qty: 60 TABLET | Refills: 1 | Status: SHIPPED | OUTPATIENT
Start: 2022-08-08 | End: 2022-10-05

## 2022-08-15 ENCOUNTER — TELEPHONE (OUTPATIENT)
Dept: FAMILY MEDICINE CLINIC | Facility: CLINIC | Age: 82
End: 2022-08-15

## 2022-08-15 DIAGNOSIS — F03.90 DEMENTIA WITHOUT BEHAVIORAL DISTURBANCE, UNSPECIFIED DEMENTIA TYPE: Primary | ICD-10-CM

## 2022-08-15 RX ORDER — MEMANTINE HYDROCHLORIDE 5 MG-10 MG
KIT ORAL SEE ADMIN INSTRUCTIONS
Qty: 50 TABLET | Refills: 0 | Status: SHIPPED | OUTPATIENT
Start: 2022-08-15 | End: 2022-09-12

## 2022-08-15 NOTE — TELEPHONE ENCOUNTER
Called pt to relay this information  Pt states she would prefer to not take this medication  Per pt, this medication makes her 'forget how to talk sometimes' and 'it makes me stutter'  Pt plans to wait until she comes in to see you to discuss further before restarting this medication

## 2022-08-15 NOTE — TELEPHONE ENCOUNTER
Pt called and her care taker  told her she has to start back taking Memantine, but she said it was discontinue  Should she start back taking?

## 2022-09-07 ENCOUNTER — TELEPHONE (OUTPATIENT)
Dept: FAMILY MEDICINE CLINIC | Facility: CLINIC | Age: 82
End: 2022-09-07

## 2022-09-08 NOTE — TELEPHONE ENCOUNTER
Care giver called and is aware, and was asking if things change to please give her an call because everything usually don't come to her right away   [phone]

## 2022-09-12 ENCOUNTER — OFFICE VISIT (OUTPATIENT)
Dept: FAMILY MEDICINE CLINIC | Facility: CLINIC | Age: 82
End: 2022-09-12
Payer: COMMERCIAL

## 2022-09-12 ENCOUNTER — APPOINTMENT (OUTPATIENT)
Dept: LAB | Facility: CLINIC | Age: 82
End: 2022-09-12
Payer: COMMERCIAL

## 2022-09-12 VITALS
HEIGHT: 55 IN | TEMPERATURE: 97.6 F | OXYGEN SATURATION: 98 % | SYSTOLIC BLOOD PRESSURE: 140 MMHG | DIASTOLIC BLOOD PRESSURE: 80 MMHG | BODY MASS INDEX: 35.41 KG/M2 | HEART RATE: 68 BPM | WEIGHT: 153 LBS | RESPIRATION RATE: 18 BRPM

## 2022-09-12 DIAGNOSIS — I50.32 CHRONIC DIASTOLIC CHF (CONGESTIVE HEART FAILURE) (HCC): ICD-10-CM

## 2022-09-12 DIAGNOSIS — I10 ESSENTIAL HYPERTENSION: ICD-10-CM

## 2022-09-12 DIAGNOSIS — I48.91 ATRIAL FIBRILLATION, UNSPECIFIED TYPE (HCC): ICD-10-CM

## 2022-09-12 DIAGNOSIS — R39.9 UTI SYMPTOMS: Primary | ICD-10-CM

## 2022-09-12 DIAGNOSIS — E66.01 OBESITY, MORBID (HCC): ICD-10-CM

## 2022-09-12 DIAGNOSIS — F03.90 DEMENTIA WITHOUT BEHAVIORAL DISTURBANCE, UNSPECIFIED DEMENTIA TYPE: ICD-10-CM

## 2022-09-12 DIAGNOSIS — R39.9 UTI SYMPTOMS: ICD-10-CM

## 2022-09-12 LAB
ALBUMIN SERPL BCP-MCNC: 3.3 G/DL (ref 3.5–5)
ALP SERPL-CCNC: 90 U/L (ref 46–116)
ALT SERPL W P-5'-P-CCNC: 13 U/L (ref 12–78)
ANION GAP SERPL CALCULATED.3IONS-SCNC: 4 MMOL/L (ref 4–13)
AST SERPL W P-5'-P-CCNC: 10 U/L (ref 5–45)
BACTERIA UR QL AUTO: ABNORMAL /HPF
BASOPHILS # BLD AUTO: 0.04 THOUSANDS/ΜL (ref 0–0.1)
BASOPHILS NFR BLD AUTO: 1 % (ref 0–1)
BILIRUB SERPL-MCNC: 0.32 MG/DL (ref 0.2–1)
BILIRUB UR QL STRIP: NEGATIVE
BUN SERPL-MCNC: 20 MG/DL (ref 5–25)
CALCIUM ALBUM COR SERPL-MCNC: 9.8 MG/DL (ref 8.3–10.1)
CALCIUM SERPL-MCNC: 9.2 MG/DL (ref 8.3–10.1)
CHLORIDE SERPL-SCNC: 110 MMOL/L (ref 96–108)
CHOLEST SERPL-MCNC: 195 MG/DL
CLARITY UR: ABNORMAL
CO2 SERPL-SCNC: 27 MMOL/L (ref 21–32)
COLOR UR: YELLOW
CREAT SERPL-MCNC: 1.06 MG/DL (ref 0.6–1.3)
EOSINOPHIL # BLD AUTO: 0.08 THOUSAND/ΜL (ref 0–0.61)
EOSINOPHIL NFR BLD AUTO: 1 % (ref 0–6)
ERYTHROCYTE [DISTWIDTH] IN BLOOD BY AUTOMATED COUNT: 16.7 % (ref 11.6–15.1)
GFR SERPL CREATININE-BSD FRML MDRD: 48 ML/MIN/1.73SQ M
GLUCOSE P FAST SERPL-MCNC: 93 MG/DL (ref 65–99)
GLUCOSE UR STRIP-MCNC: NEGATIVE MG/DL
HCT VFR BLD AUTO: 37.8 % (ref 34.8–46.1)
HDLC SERPL-MCNC: 51 MG/DL
HGB BLD-MCNC: 11.4 G/DL (ref 11.5–15.4)
HGB UR QL STRIP.AUTO: ABNORMAL
IMM GRANULOCYTES # BLD AUTO: 0.02 THOUSAND/UL (ref 0–0.2)
IMM GRANULOCYTES NFR BLD AUTO: 0 % (ref 0–2)
KETONES UR STRIP-MCNC: NEGATIVE MG/DL
LDLC SERPL CALC-MCNC: 113 MG/DL (ref 0–100)
LEUKOCYTE ESTERASE UR QL STRIP: ABNORMAL
LYMPHOCYTES # BLD AUTO: 1.82 THOUSANDS/ΜL (ref 0.6–4.47)
LYMPHOCYTES NFR BLD AUTO: 25 % (ref 14–44)
MCH RBC QN AUTO: 26 PG (ref 26.8–34.3)
MCHC RBC AUTO-ENTMCNC: 30.2 G/DL (ref 31.4–37.4)
MCV RBC AUTO: 86 FL (ref 82–98)
MONOCYTES # BLD AUTO: 0.55 THOUSAND/ΜL (ref 0.17–1.22)
MONOCYTES NFR BLD AUTO: 8 % (ref 4–12)
MUCOUS THREADS UR QL AUTO: ABNORMAL
NEUTROPHILS # BLD AUTO: 4.66 THOUSANDS/ΜL (ref 1.85–7.62)
NEUTS SEG NFR BLD AUTO: 65 % (ref 43–75)
NITRITE UR QL STRIP: NEGATIVE
NON-SQ EPI CELLS URNS QL MICRO: ABNORMAL /HPF
NONHDLC SERPL-MCNC: 144 MG/DL
NRBC BLD AUTO-RTO: 0 /100 WBCS
NT-PROBNP SERPL-MCNC: 398 PG/ML
PH UR STRIP.AUTO: 6.5 [PH]
PLATELET # BLD AUTO: 322 THOUSANDS/UL (ref 149–390)
PMV BLD AUTO: 12.1 FL (ref 8.9–12.7)
POTASSIUM SERPL-SCNC: 3.8 MMOL/L (ref 3.5–5.3)
PROT SERPL-MCNC: 6.7 G/DL (ref 6.4–8.4)
PROT UR STRIP-MCNC: ABNORMAL MG/DL
RBC # BLD AUTO: 4.38 MILLION/UL (ref 3.81–5.12)
RBC #/AREA URNS AUTO: ABNORMAL /HPF
SODIUM SERPL-SCNC: 141 MMOL/L (ref 135–147)
SP GR UR STRIP.AUTO: 1.02 (ref 1–1.03)
TRANS CELLS #/AREA URNS HPF: PRESENT /[HPF]
TRIGL SERPL-MCNC: 156 MG/DL
TSH SERPL DL<=0.05 MIU/L-ACNC: 1.3 UIU/ML (ref 0.45–4.5)
UROBILINOGEN UR STRIP-ACNC: <2 MG/DL
WBC # BLD AUTO: 7.17 THOUSAND/UL (ref 4.31–10.16)
WBC #/AREA URNS AUTO: ABNORMAL /HPF
WBC CLUMPS # UR AUTO: PRESENT /UL

## 2022-09-12 PROCEDURE — 3079F DIAST BP 80-89 MM HG: CPT | Performed by: FAMILY MEDICINE

## 2022-09-12 PROCEDURE — 85025 COMPLETE CBC W/AUTO DIFF WBC: CPT

## 2022-09-12 PROCEDURE — 99215 OFFICE O/P EST HI 40 MIN: CPT | Performed by: FAMILY MEDICINE

## 2022-09-12 PROCEDURE — 87077 CULTURE AEROBIC IDENTIFY: CPT | Performed by: FAMILY MEDICINE

## 2022-09-12 PROCEDURE — 83880 ASSAY OF NATRIURETIC PEPTIDE: CPT

## 2022-09-12 PROCEDURE — 81001 URINALYSIS AUTO W/SCOPE: CPT | Performed by: FAMILY MEDICINE

## 2022-09-12 PROCEDURE — 80053 COMPREHEN METABOLIC PANEL: CPT

## 2022-09-12 PROCEDURE — 1160F RVW MEDS BY RX/DR IN RCRD: CPT | Performed by: FAMILY MEDICINE

## 2022-09-12 PROCEDURE — 80061 LIPID PANEL: CPT

## 2022-09-12 PROCEDURE — 87086 URINE CULTURE/COLONY COUNT: CPT | Performed by: FAMILY MEDICINE

## 2022-09-12 PROCEDURE — 3077F SYST BP >= 140 MM HG: CPT | Performed by: FAMILY MEDICINE

## 2022-09-12 PROCEDURE — 84443 ASSAY THYROID STIM HORMONE: CPT

## 2022-09-12 PROCEDURE — 87186 SC STD MICRODIL/AGAR DIL: CPT | Performed by: FAMILY MEDICINE

## 2022-09-12 PROCEDURE — 36415 COLL VENOUS BLD VENIPUNCTURE: CPT

## 2022-09-12 RX ORDER — NITROFURANTOIN 25; 75 MG/1; MG/1
100 CAPSULE ORAL 2 TIMES DAILY
Qty: 10 CAPSULE | Refills: 0 | Status: SHIPPED | OUTPATIENT
Start: 2022-09-12 | End: 2022-09-17

## 2022-09-12 RX ORDER — DONEPEZIL HYDROCHLORIDE 5 MG/1
5 TABLET, FILM COATED ORAL
Qty: 30 TABLET | Refills: 5 | Status: SHIPPED | OUTPATIENT
Start: 2022-09-12 | End: 2022-10-11 | Stop reason: ALTCHOICE

## 2022-09-12 NOTE — PROGRESS NOTES
Assessment/Plan:    No problem-specific Assessment & Plan notes found for this encounter  Diagnoses and all orders for this visit:    UTI symptoms  -     UA w Reflex to Microscopic w Reflex to Culture - Clinic Collect  -     nitrofurantoin (MACROBID) 100 mg capsule; Take 1 capsule (100 mg total) by mouth 2 (two) times a day for 5 days  -     CBC and differential; Future  -     Comprehensive metabolic panel; Future  -     TSH, 3rd generation; Future  -     NT-BNP PRO; Future  -     Lipid panel; Future    Dementia without behavioral disturbance, unspecified dementia type (HCC)  -     donepezil (ARICEPT) 5 mg tablet; Take 1 tablet (5 mg total) by mouth daily at bedtime  -     CBC and differential; Future  -     Comprehensive metabolic panel; Future  -     TSH, 3rd generation; Future  -     NT-BNP PRO; Future  -     Lipid panel; Future    Atrial fibrillation, unspecified type (HCC)  -     CBC and differential; Future  -     Comprehensive metabolic panel; Future  -     TSH, 3rd generation; Future  -     NT-BNP PRO; Future  -     Lipid panel; Future    Chronic diastolic CHF (congestive heart failure) (HCC)  -     CBC and differential; Future  -     Comprehensive metabolic panel; Future  -     TSH, 3rd generation; Future  -     NT-BNP PRO; Future  -     Lipid panel; Future    Essential hypertension  -     CBC and differential; Future  -     Comprehensive metabolic panel; Future  -     TSH, 3rd generation; Future  -     NT-BNP PRO; Future  -     Lipid panel; Future    Obesity, morbid (HCC)  -     CBC and differential; Future  -     Comprehensive metabolic panel; Future  -     TSH, 3rd generation; Future  -     NT-BNP PRO; Future  -     Lipid panel; Future          Subjective:      Patient ID: Karla Perez is a 78 y o  female  She has side effects from Trbonje  She feels like it causes her to stutter  She c/o burning with urination  Her symptoms started this morning  She has no fever or chills    She is allergic to Augmentin and Cipro  She has taken Macrobid in the past     She has PAF  She is rate controlled and anticoagulated  She has no CP or SOB  He has no palpitations or bleeding episodes  She has as h/o CHF  Her weight is stable  She has no PND or orthopnea  She has mid to upper back pain  She has it daily, especially when she gets up in the morning  It lasts about a half hour and goes way  She gets it throughout the day  It comes on suddenly and goes away after a few minutes  She says "it takes my breath away "      The following portions of the patient's history were reviewed and updated as appropriate:   She  has a past medical history of Alzheimer's dementia ([facility] ), Aphasia, CHF (congestive heart failure) ([facility] ), Coronary artery disease, Hypercholesteremia, Hypertension, IBS (irritable bowel syndrome), Renal disorder, Rheumatic fever, and Skin lesion    She   Patient Active Problem List    Diagnosis Date Noted    Dementia with behavioral disturbance ([place] [address] ) 06/16/2022    Acute cystitis without hematuria 06/16/2022    Physical deconditioning 06/16/2022    Medicare annual wellness visit, subsequent 05/23/2022    Anemia 03/08/2022    Ground glass opacity present on imaging of lung 07/09/2021    Prediabetes 07/09/2021    NSTEMI (non-ST elevated myocardial infarction) ([facility] ) 06/17/2021    Fecal incontinence 03/23/2021    [facility]'s disease ([facility] ) 12/01/2020    LUPE (acute kidney injury) ([facility] ) 12/01/2020    Other specified inflammatory spondylopathies, lumbosacral region ([facility] ) 12/01/2020    Constipation 12/01/2020    Renal cyst 10/23/2020    Back pain with right-sided radiculopathy 03/11/2020    CAD (coronary artery disease) 12/17/2019    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    Single kidney 09/03/2019    Vitamin D deficiency 09/03/2019    Anxiety 08/20/2019    Abnormal stress test 08/20/2019    Ambulatory dysfunction 07/26/2019    A-fib ([facility] ) 07/23/2019    Stage 3a chronic kidney disease ([facility] ) 07/23/2019    Junctional bradycardia 07/17/2019    BMI 36 0-36 9,adult 04/30/2019    Chronic left shoulder pain 10/09/2018    H/O: rheumatic fever 07/12/2018    Sciatica 07/12/2018    Peripheral neuropathy 02/13/2018    B12 deficiency 02/13/2018    Osteoporosis 02/13/2018    Closed head injury 12/20/2017    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    Headache, worsening 08/14/2017    Chronic diastolic CHF (congestive heart failure) ([facility] ) 05/15/2017    Low serum vitamin B12 02/13/2017    Peripheral edema 02/13/2017    Other chest pain 08/01/2016    Biceps tendinitis of right shoulder 91/48/7868    Diastolic dysfunction 84/24/9300    Lateral epicondylitis of right elbow 04/11/2016    Essential hypertension 03/14/2016    Dyspnea on exertion 03/14/2016    GERD without esophagitis 03/14/2016    Osteoarthritis 09/17/2012    Peripheral venous insufficiency 09/17/2012    Prolapse of vaginal walls 12/30/2003     She  has a past surgical history that includes Hysterectomy; Kidney surgery; Tonsillectomy; Ankle fracture surgery; and Nephrectomy  Her family history includes Asthma in her father; Cancer in her sister; Hypertension in her mother  She  reports that she has never smoked  She has never used smokeless tobacco  She reports that she does not drink alcohol and does not use drugs    Current Outpatient Medications   Medication Sig Dispense Refill    acetaminophen (TYLENOL) 500 mg tablet Take 500 mg by mouth every 6 (six) hours as needed        cholecalciferol (VITAMIN D3) 1,000 units tablet Take 1 tablet (1,000 Units total) by mouth daily 90 tablet 1    clopidogrel (PLAVIX) 75 mg tablet Take 1 tablet (75 mg total) by mouth daily 90 tablet 1    donepezil (ARICEPT) 5 mg tablet Take 1 tablet (5 mg total) by mouth daily at bedtime 30 tablet 5    Eliquis 2 5 MG Take 1 tablet (2 5 mg total) by mouth 2 (two) times a day 60 tablet 1    ezetimibe (ZETIA) 10 mg tablet Take 1 tablet (10 mg total) by mouth daily 90 tablet 1    furosemide (LASIX) 20 mg tablet Take 1 tablet (20 mg total) by mouth in the morning 90 tablet 0    isosorbide mononitrate (IMDUR) 30 mg 24 hr tablet Take 1 tablet (30 mg total) by mouth daily 30 tablet 5    lansoprazole (PREVACID) 30 mg capsule Take 1 capsule (30 mg total) by mouth in the morning  90 capsule 3    nitrofurantoin (MACROBID) 100 mg capsule Take 1 capsule (100 mg total) by mouth 2 (two) times a day for 5 days 10 capsule 0     Current Facility-Administered Medications   Medication Dose Route Frequency Provider Last Rate Last Admin    cyanocobalamin injection 1,000 mcg  1,000 mcg Intramuscular Q30 Days Bonita Bonner MD   1,000 mcg at 02/19/18 1008     Current Outpatient Medications on File Prior to Visit   Medication Sig    acetaminophen (TYLENOL) 500 mg tablet Take 500 mg by mouth every 6 (six) hours as needed      cholecalciferol (VITAMIN D3) 1,000 units tablet Take 1 tablet (1,000 Units total) by mouth daily    clopidogrel (PLAVIX) 75 mg tablet Take 1 tablet (75 mg total) by mouth daily    Eliquis 2 5 MG Take 1 tablet (2 5 mg total) by mouth 2 (two) times a day    ezetimibe (ZETIA) 10 mg tablet Take 1 tablet (10 mg total) by mouth daily    furosemide (LASIX) 20 mg tablet Take 1 tablet (20 mg total) by mouth in the morning    isosorbide mononitrate (IMDUR) 30 mg 24 hr tablet Take 1 tablet (30 mg total) by mouth daily    lansoprazole (PREVACID) 30 mg capsule Take 1 capsule (30 mg total) by mouth in the morning   [DISCONTINUED] memantine (NAMENDA) 5 mg tablet Take 5 mg by mouth daily    [DISCONTINUED] memantine (NAMENDA TITRATION PACK) Take by mouth see administration instructions Follow package directions   (Patient not taking: Reported on 9/12/2022)     Current Facility-Administered Medications on File Prior to Visit   Medication    cyanocobalamin injection 1,000 mcg     She is allergic to amoxicillin, ciprofloxacin, erythromycin, statins, vancomycin, amoxicillin-pot clavulanate, and clindamycin       Review of Systems   All other systems reviewed and are negative  Objective:      /80 (BP Location: Right arm, Patient Position: Sitting, Cuff Size: Standard)   Pulse 68   Temp 97 6 °F (36 4 °C) (Temporal)   Resp 18   Ht 4' 7" (1 397 m)   Wt 69 4 kg (153 lb)   SpO2 98%   BMI 35 56 kg/m²          Physical Exam  Vitals and nursing note reviewed  Constitutional:       Appearance: Normal appearance  She is obese  Cardiovascular:      Rate and Rhythm: Normal rate and regular rhythm  Pulses: Normal pulses  Heart sounds: Normal heart sounds  Pulmonary:      Effort: Pulmonary effort is normal       Breath sounds: Normal breath sounds  Abdominal:      General: Abdomen is flat  Bowel sounds are normal       Palpations: Abdomen is soft  Musculoskeletal:         General: Normal range of motion  Cervical back: Normal range of motion and neck supple  Skin:     General: Skin is warm and dry  Capillary Refill: Capillary refill takes less than 2 seconds  Neurological:      General: No focal deficit present  Mental Status: She is alert and oriented to person, place, and time  Mental status is at baseline  Psychiatric:         Mood and Affect: Mood normal          Behavior: Behavior normal          Thought Content:  Thought content normal          Judgment: Judgment normal

## 2022-09-13 ENCOUNTER — TELEPHONE (OUTPATIENT)
Dept: FAMILY MEDICINE CLINIC | Facility: CLINIC | Age: 82
End: 2022-09-13

## 2022-09-13 NOTE — TELEPHONE ENCOUNTER
Pt was in office on 9/12/22 and Dr Shiela Coulter ask for me to call and see if Cardiologist can see pt before the end of the month  I called they said they can see pt 9/16/22 but pt said she did not have an ride she only have rides Monday's and Tuesday, so they said they will have to send doctor an message, and they will have to call pt on the phone to schedule her

## 2022-09-15 LAB
BACTERIA UR CULT: ABNORMAL
BACTERIA UR CULT: ABNORMAL

## 2022-09-30 ENCOUNTER — TELEPHONE (OUTPATIENT)
Dept: FAMILY MEDICINE CLINIC | Facility: CLINIC | Age: 82
End: 2022-09-30

## 2022-10-01 DIAGNOSIS — G20 PARKINSON'S DISEASE (HCC): Primary | ICD-10-CM

## 2022-10-01 RX ORDER — MEMANTINE HYDROCHLORIDE 5 MG-10 MG
KIT ORAL SEE ADMIN INSTRUCTIONS
Qty: 60 TABLET | Refills: 0 | Status: SHIPPED | OUTPATIENT
Start: 2022-10-01 | End: 2022-10-05 | Stop reason: SDUPTHER

## 2022-10-05 DIAGNOSIS — G20 PARKINSON'S DISEASE (HCC): ICD-10-CM

## 2022-10-05 DIAGNOSIS — I48.91 ATRIAL FIBRILLATION, UNSPECIFIED TYPE (HCC): Primary | ICD-10-CM

## 2022-10-05 RX ORDER — APIXABAN 5 MG/1
5 TABLET, FILM COATED ORAL
COMMUNITY
Start: 2022-09-20 | End: 2022-10-05 | Stop reason: SDUPTHER

## 2022-10-05 RX ORDER — APIXABAN 5 MG/1
5 TABLET, FILM COATED ORAL 2 TIMES DAILY
Qty: 180 TABLET | Refills: 3 | Status: SHIPPED | OUTPATIENT
Start: 2022-10-05

## 2022-10-05 RX ORDER — MEMANTINE HYDROCHLORIDE 5 MG-10 MG
KIT ORAL SEE ADMIN INSTRUCTIONS
Qty: 60 TABLET | Refills: 0 | Status: SHIPPED | OUTPATIENT
Start: 2022-10-05 | End: 2022-10-11 | Stop reason: SDUPTHER

## 2022-10-05 RX ORDER — ISOSORBIDE MONONITRATE 60 MG/1
60 TABLET, EXTENDED RELEASE ORAL
COMMUNITY
Start: 2022-09-20 | End: 2022-10-05 | Stop reason: SDUPTHER

## 2022-10-05 RX ORDER — ISOSORBIDE MONONITRATE 60 MG/1
60 TABLET, EXTENDED RELEASE ORAL DAILY
Qty: 90 TABLET | Refills: 3 | Status: SHIPPED | OUTPATIENT
Start: 2022-10-05

## 2022-10-11 ENCOUNTER — OFFICE VISIT (OUTPATIENT)
Dept: FAMILY MEDICINE CLINIC | Facility: CLINIC | Age: 82
End: 2022-10-11
Payer: COMMERCIAL

## 2022-10-11 VITALS
HEIGHT: 55 IN | BODY MASS INDEX: 34.85 KG/M2 | SYSTOLIC BLOOD PRESSURE: 128 MMHG | WEIGHT: 150.6 LBS | RESPIRATION RATE: 18 BRPM | DIASTOLIC BLOOD PRESSURE: 78 MMHG | TEMPERATURE: 98 F | OXYGEN SATURATION: 99 % | HEART RATE: 64 BPM

## 2022-10-11 DIAGNOSIS — R53.81 PHYSICAL DECONDITIONING: ICD-10-CM

## 2022-10-11 DIAGNOSIS — G20 PARKINSON'S DISEASE (HCC): ICD-10-CM

## 2022-10-11 DIAGNOSIS — N18.31 STAGE 3A CHRONIC KIDNEY DISEASE (HCC): ICD-10-CM

## 2022-10-11 DIAGNOSIS — Z23 NEEDS FLU SHOT: ICD-10-CM

## 2022-10-11 DIAGNOSIS — M51.16 LUMBAR DISC HERNIATION WITH RADICULOPATHY: ICD-10-CM

## 2022-10-11 DIAGNOSIS — I48.91 ATRIAL FIBRILLATION, UNSPECIFIED TYPE (HCC): ICD-10-CM

## 2022-10-11 DIAGNOSIS — R21 RASH AND NONSPECIFIC SKIN ERUPTION: ICD-10-CM

## 2022-10-11 DIAGNOSIS — I25.10 CORONARY ARTERY DISEASE INVOLVING NATIVE HEART, UNSPECIFIED VESSEL OR LESION TYPE, UNSPECIFIED WHETHER ANGINA PRESENT: Primary | ICD-10-CM

## 2022-10-11 PROBLEM — Z00.00 MEDICARE ANNUAL WELLNESS VISIT, SUBSEQUENT: Status: RESOLVED | Noted: 2022-05-23 | Resolved: 2022-10-11

## 2022-10-11 PROBLEM — N30.00 ACUTE CYSTITIS WITHOUT HEMATURIA: Status: RESOLVED | Noted: 2022-06-16 | Resolved: 2022-10-11

## 2022-10-11 PROCEDURE — G0008 ADMIN INFLUENZA VIRUS VAC: HCPCS | Performed by: FAMILY MEDICINE

## 2022-10-11 PROCEDURE — 99215 OFFICE O/P EST HI 40 MIN: CPT | Performed by: FAMILY MEDICINE

## 2022-10-11 PROCEDURE — 90662 IIV NO PRSV INCREASED AG IM: CPT | Performed by: FAMILY MEDICINE

## 2022-10-11 RX ORDER — FUROSEMIDE 20 MG/1
20 TABLET ORAL 2 TIMES DAILY
Qty: 90 TABLET | Refills: 0 | Status: SHIPPED
Start: 2022-10-11 | End: 2022-10-20 | Stop reason: SDUPTHER

## 2022-10-11 RX ORDER — GABAPENTIN 100 MG/1
100 CAPSULE ORAL
COMMUNITY

## 2022-10-11 RX ORDER — ASPIRIN 81 MG/1
81 TABLET ORAL DAILY
COMMUNITY

## 2022-10-11 RX ORDER — SENNOSIDES 8.6 MG
650 CAPSULE ORAL EVERY 8 HOURS PRN
Qty: 90 TABLET | Refills: 2 | Status: SHIPPED | OUTPATIENT
Start: 2022-10-11 | End: 2022-10-20 | Stop reason: SDUPTHER

## 2022-10-11 RX ORDER — LOSARTAN POTASSIUM 25 MG/1
25 TABLET ORAL DAILY
COMMUNITY
Start: 2022-09-30

## 2022-10-11 RX ORDER — MEMANTINE HYDROCHLORIDE 5 MG/1
5 TABLET ORAL DAILY
Qty: 90 TABLET | Refills: 0
Start: 2022-10-11 | End: 2022-10-20 | Stop reason: SDUPTHER

## 2022-10-11 NOTE — PROGRESS NOTES
Assessment/Plan:    1  Coronary artery disease involving native heart, unspecified vessel or lesion type, unspecified whether angina present  - stable  Weight is down  Tolerating her meds  No e/o fluid overload on exam   Labs utd  - furosemide (LASIX) 20 mg tablet; Take 1 tablet (20 mg total) by mouth 2 (two) times a day  Dispense: 90 tablet; Refill: 0    2  Atrial fibrillation, unspecified type ([facility] )  - stable  On Eliquis and tolerating this  Saw cardiology recently  3  Parkinson's disease ([facility] )  Stable  This is followed by Dr Mary Joe  Her med list had a starter pack listed though her Rx is for 5 mg  I adjusted meds  Can be adjusted in f/u per Dr You Bishop discretion  She tells me she tried to come off of this and did not do well  - memantine (NAMENDA) 5 mg tablet; Take 1 tablet (5 mg total) by mouth daily  Dispense: 90 tablet; Refill: 0    4  Lumbar disc herniation with radiculopathy  - has some burning pain in her LLE/dorsum of her foot  Nightly massimo - could increase if need be  Upped her Tylenol to 650 mg today as well  She is on board  - acetaminophen (TYLENOL) 650 mg CR tablet; Take 1 tablet (650 mg total) by mouth every 8 (eight) hours as needed for mild pain or moderate pain  Dispense: 90 tablet; Refill: 2  Has h/o significant dz for which she was receiving injections at one time  For worsening, can certainly entertain sending her back to Pain  No red flag symptoms  5/2022 - MRI Lspine  IMPRESSION:   1   Multilevel degenerative changes resulting in central canal, lateral recess,   and foraminal stenosis as discussed above  2   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    3   STIR hyperintensities in T12 and L5, possibly osseous hemangiomas  4   Severe degenerative changes in bilateral sacroiliac joints  STIR   hyperintensities in bilateral aspects of the sacrum, possibly degenerative   change  5   Follow-up is recommended       5  Rash and nonspecific skin eruption  - she is asking for Zinc cream   Rx provided  Helped her in the past     - zinc oxide (DESITIN) 40 % PSTE; Apply topically daily  Dispense: 57 g; Refill: 1    6  Needs flu shot  - influenza vaccine, high-dose, PF 0 7 mL (FLUZONE HIGH-DOSE)    7  Physical deconditioning  Has an aid  Has family help as well  8  Stage 3a chronic kidney disease ([facility] )  - stable  She did see Podiatry as well and this was helpful  Patient/Caretaker verbalized understanding and were in agreement with today's assessment and plan  Time was taken to address any questions patient/caretaker had  Indication/Risks/Benefits of medication(s) as prescribed were discussed with the patient/caretaker  The patient verbalized understanding and agreement and elects to take medications as prescribed  Time was taken to answer any questions the patient/caretaker may have had  Chief Complaint   Patient presents with   • Follow-up     Subjective:      Patient ID: Faisal Umana is a 78 y o  female  Labs done at prior visit and stable  Saw Dr Deneen Hannon in September  Subsequently saw Cardiology  He stopped her Plavix, Started ASA, 81 mg/day, Increased Imdur to 60 mg/day and increased Eliquis to 5 mg bid  She is taking meds and tolerating these  She is c/o bilateral rib pain and also has h/o low back pain with radiation to her extremities  "I have sciatica "  She has seen PM and had injections in the past   She tells me she was told she needed surgery  MRI as above  Uses her walker and this helps her stabilize  Does not think she could walk without it  She leaks urine, this is not changed, no issues with stool  Today, it is mostly in her L foot/lower leg  No falls  Afib - on anticoagulation and tolerating this  No worsening cp, sob, dizziness, etc       Edema -- worse to LLE, she has h/o trauma to that leg so is always worse  No worsening sob  Should be on bid Lasix        CKD stage III with solitary functioning kidney and history of left nephrectomy  The following portions of the patient's history were reviewed and updated as appropriate: allergies, current medications, past family history, past medical history, past social history, past surgical history and problem list     Review of Systems   All other systems reviewed and are negative  Objective:      /78 (BP Location: Left arm, Patient Position: Sitting, Cuff Size: Standard)   Pulse 64   Temp 98 °F (36 7 °C) (Temporal)   Resp 18   Ht 4' 7" (1 397 m)   Wt 68 3 kg (150 lb 9 6 oz)   SpO2 99%   BMI 35 00 kg/m²          Physical Exam  Vitals and nursing note reviewed  Constitutional:       Appearance: Normal appearance  She is obese  HENT:      Head: Normocephalic and atraumatic  Eyes:      Conjunctiva/sclera: Conjunctivae normal    Cardiovascular:      Comments: Irregularly, irregular    Trace b/l LE edema  Pulmonary:      Effort: Pulmonary effort is normal       Breath sounds: Normal breath sounds  No wheezing, rhonchi or rales  Abdominal:      Palpations: Abdomen is soft  Musculoskeletal:      Cervical back: Neck supple  Comments: Walks flexed at hips and with rollator  She has ttp in her b/l SIJs  Strength is 4/5 eq and b/l LEs    There is burning pain to her distal LLE and dorsum of her L foot  She has radiculopathy    No acute signs on exam      Skin:     General: Skin is warm and dry  Neurological:      General: No focal deficit present  Mental Status: She is alert and oriented to person, place, and time  Psychiatric:         Mood and Affect: Mood normal          Behavior: Behavior normal          Thought Content: Thought content normal          Judgment: Judgment normal          Total time I spent caring for this patient on the day of the encounter - 40 minutes      10 minutes spent before the visit  25 minutes spent during the visit  5 minutes spent after the visit

## 2022-10-19 DIAGNOSIS — I25.10 CORONARY ARTERY DISEASE INVOLVING NATIVE HEART, UNSPECIFIED VESSEL OR LESION TYPE, UNSPECIFIED WHETHER ANGINA PRESENT: ICD-10-CM

## 2022-10-19 RX ORDER — FUROSEMIDE 20 MG/1
20 TABLET ORAL 2 TIMES DAILY
Qty: 90 TABLET | Refills: 0 | Status: CANCELLED | OUTPATIENT
Start: 2022-10-19

## 2022-10-20 DIAGNOSIS — R21 RASH AND NONSPECIFIC SKIN ERUPTION: ICD-10-CM

## 2022-10-20 DIAGNOSIS — G20 PARKINSON'S DISEASE (HCC): ICD-10-CM

## 2022-10-20 DIAGNOSIS — I25.10 CORONARY ARTERY DISEASE INVOLVING NATIVE HEART, UNSPECIFIED VESSEL OR LESION TYPE, UNSPECIFIED WHETHER ANGINA PRESENT: ICD-10-CM

## 2022-10-20 DIAGNOSIS — M51.16 LUMBAR DISC HERNIATION WITH RADICULOPATHY: ICD-10-CM

## 2022-10-20 RX ORDER — FUROSEMIDE 20 MG/1
20 TABLET ORAL 2 TIMES DAILY
Qty: 90 TABLET | Refills: 0 | Status: CANCELLED | OUTPATIENT
Start: 2022-10-20

## 2022-10-20 RX ORDER — FUROSEMIDE 20 MG/1
20 TABLET ORAL 2 TIMES DAILY
Qty: 180 TABLET | Refills: 0 | Status: SHIPPED | OUTPATIENT
Start: 2022-10-20

## 2022-10-20 RX ORDER — MEMANTINE HYDROCHLORIDE 5 MG/1
5 TABLET ORAL DAILY
Qty: 90 TABLET | Refills: 0 | Status: SHIPPED | OUTPATIENT
Start: 2022-10-20

## 2022-10-20 RX ORDER — SENNOSIDES 8.6 MG
650 CAPSULE ORAL EVERY 8 HOURS PRN
Qty: 90 TABLET | Refills: 2 | Status: SHIPPED | OUTPATIENT
Start: 2022-10-20

## 2022-11-30 ENCOUNTER — TELEPHONE (OUTPATIENT)
Dept: FAMILY MEDICINE CLINIC | Facility: CLINIC | Age: 82
End: 2022-11-30

## 2022-11-30 DIAGNOSIS — I50.32 CHRONIC DIASTOLIC CHF (CONGESTIVE HEART FAILURE) (HCC): ICD-10-CM

## 2022-11-30 RX ORDER — EZETIMIBE 10 MG/1
10 TABLET ORAL DAILY
Qty: 90 TABLET | Refills: 1 | Status: SHIPPED | OUTPATIENT
Start: 2022-11-30

## 2022-11-30 NOTE — TELEPHONE ENCOUNTER
Received vm from pt    I forgot to ask you if it  I think I need a cough medicine  I've been coughing   I bought that tussin over the counter, but I only took it once and I thought maybe I shouldn't because it says about that's it not to take take them over the counter

## 2022-12-02 ENCOUNTER — TELEPHONE (OUTPATIENT)
Dept: FAMILY MEDICINE CLINIC | Facility: CLINIC | Age: 82
End: 2022-12-02

## 2022-12-02 DIAGNOSIS — R05.9 COUGH, UNSPECIFIED TYPE: Primary | ICD-10-CM

## 2022-12-02 RX ORDER — BENZONATATE 100 MG/1
100 CAPSULE ORAL 3 TIMES DAILY PRN
Qty: 20 CAPSULE | Refills: 0 | Status: SHIPPED | OUTPATIENT
Start: 2022-12-02

## 2022-12-02 NOTE — TELEPHONE ENCOUNTER
Pt stating Keyla isnt working and she would like an rx for her cough and she also states her eyes are red

## 2022-12-05 ENCOUNTER — TELEPHONE (OUTPATIENT)
Dept: FAMILY MEDICINE CLINIC | Facility: CLINIC | Age: 82
End: 2022-12-05

## 2022-12-05 DIAGNOSIS — J32.9 SINUSITIS, UNSPECIFIED CHRONICITY, UNSPECIFIED LOCATION: Primary | ICD-10-CM

## 2022-12-05 RX ORDER — DOXYCYCLINE 100 MG/1
100 CAPSULE ORAL 2 TIMES DAILY
Qty: 14 CAPSULE | Refills: 0 | Status: SHIPPED | OUTPATIENT
Start: 2022-12-05 | End: 2022-12-12

## 2022-12-05 NOTE — TELEPHONE ENCOUNTER
Pt taking RX for cough medicine you prescribed and said its not helping   She has also developed and ear ache and is asking for possible antibiotics

## 2022-12-16 DIAGNOSIS — M51.16 LUMBAR DISC HERNIATION WITH RADICULOPATHY: ICD-10-CM

## 2022-12-16 RX ORDER — GABAPENTIN 100 MG/1
100 CAPSULE ORAL
Qty: 90 CAPSULE | Refills: 3 | Status: SHIPPED | OUTPATIENT
Start: 2022-12-16

## 2022-12-16 RX ORDER — SENNOSIDES 8.6 MG
650 CAPSULE ORAL EVERY 8 HOURS PRN
Qty: 90 TABLET | Refills: 2 | Status: SHIPPED | OUTPATIENT
Start: 2022-12-16

## 2023-01-03 DIAGNOSIS — I48.91 ATRIAL FIBRILLATION, UNSPECIFIED TYPE (HCC): ICD-10-CM

## 2023-01-03 RX ORDER — APIXABAN 5 MG/1
5 TABLET, FILM COATED ORAL 2 TIMES DAILY
Qty: 180 TABLET | Refills: 3 | Status: SHIPPED | OUTPATIENT
Start: 2023-01-03

## 2023-01-11 ENCOUNTER — RA CDI HCC (OUTPATIENT)
Dept: OTHER | Facility: HOSPITAL | Age: 83
End: 2023-01-11

## 2023-01-11 DIAGNOSIS — I25.10 CORONARY ARTERY DISEASE INVOLVING NATIVE HEART, UNSPECIFIED VESSEL OR LESION TYPE, UNSPECIFIED WHETHER ANGINA PRESENT: ICD-10-CM

## 2023-01-11 RX ORDER — FUROSEMIDE 20 MG/1
TABLET ORAL
Qty: 180 TABLET | Refills: 0 | Status: SHIPPED | OUTPATIENT
Start: 2023-01-11

## 2023-01-11 NOTE — PROGRESS NOTES
[facility]  coding opportunities          Chart Reviewed number of suggestions sent to Provider: 1   I13 [id number]    Patients Insurance     Medicare Insurance: [employer]

## 2023-01-16 ENCOUNTER — LAB (OUTPATIENT)
Dept: LAB | Facility: CLINIC | Age: 83
End: 2023-01-16

## 2023-01-16 ENCOUNTER — APPOINTMENT (OUTPATIENT)
Dept: RADIOLOGY | Facility: CLINIC | Age: 83
End: 2023-01-16

## 2023-01-16 ENCOUNTER — OFFICE VISIT (OUTPATIENT)
Dept: FAMILY MEDICINE CLINIC | Facility: CLINIC | Age: 83
End: 2023-01-16

## 2023-01-16 ENCOUNTER — TELEPHONE (OUTPATIENT)
Dept: FAMILY MEDICINE CLINIC | Facility: CLINIC | Age: 83
End: 2023-01-16

## 2023-01-16 VITALS
OXYGEN SATURATION: 99 % | HEIGHT: 58 IN | TEMPERATURE: 97.2 F | SYSTOLIC BLOOD PRESSURE: 137 MMHG | HEART RATE: 50 BPM | DIASTOLIC BLOOD PRESSURE: 65 MMHG | WEIGHT: 148 LBS | BODY MASS INDEX: 31.07 KG/M2 | RESPIRATION RATE: 18 BRPM

## 2023-01-16 DIAGNOSIS — I10 ESSENTIAL HYPERTENSION: ICD-10-CM

## 2023-01-16 DIAGNOSIS — G62.9 PERIPHERAL POLYNEUROPATHY: Primary | ICD-10-CM

## 2023-01-16 DIAGNOSIS — G20 PARKINSON'S DISEASE (HCC): ICD-10-CM

## 2023-01-16 DIAGNOSIS — N18.31 STAGE 3A CHRONIC KIDNEY DISEASE (HCC): ICD-10-CM

## 2023-01-16 DIAGNOSIS — I50.32 CHRONIC DIASTOLIC (CONGESTIVE) HEART FAILURE (HCC): ICD-10-CM

## 2023-01-16 DIAGNOSIS — I48.91 ATRIAL FIBRILLATION, UNSPECIFIED TYPE (HCC): ICD-10-CM

## 2023-01-16 DIAGNOSIS — Z00.00 MEDICARE ANNUAL WELLNESS VISIT, INITIAL: ICD-10-CM

## 2023-01-16 DIAGNOSIS — R73.03 PREDIABETES: ICD-10-CM

## 2023-01-16 DIAGNOSIS — M46.87 OTHER SPECIFIED INFLAMMATORY SPONDYLOPATHIES, LUMBOSACRAL REGION (HCC): ICD-10-CM

## 2023-01-16 DIAGNOSIS — R53.83 OTHER FATIGUE: ICD-10-CM

## 2023-01-16 DIAGNOSIS — I50.32 CHRONIC DIASTOLIC CHF (CONGESTIVE HEART FAILURE) (HCC): ICD-10-CM

## 2023-01-16 DIAGNOSIS — I25.10 CORONARY ARTERY DISEASE INVOLVING NATIVE HEART, UNSPECIFIED VESSEL OR LESION TYPE, UNSPECIFIED WHETHER ANGINA PRESENT: ICD-10-CM

## 2023-01-16 DIAGNOSIS — E66.01 OBESITY, MORBID (HCC): ICD-10-CM

## 2023-01-16 DIAGNOSIS — G62.9 PERIPHERAL POLYNEUROPATHY: ICD-10-CM

## 2023-01-16 DIAGNOSIS — K21.9 GERD WITHOUT ESOPHAGITIS: ICD-10-CM

## 2023-01-16 LAB
ALBUMIN SERPL BCP-MCNC: 3.5 G/DL (ref 3.5–5)
ALP SERPL-CCNC: 94 U/L (ref 46–116)
ALT SERPL W P-5'-P-CCNC: 14 U/L (ref 12–78)
ANION GAP SERPL CALCULATED.3IONS-SCNC: 7 MMOL/L (ref 4–13)
AST SERPL W P-5'-P-CCNC: 17 U/L (ref 5–45)
BASOPHILS # BLD AUTO: 0.06 THOUSANDS/ÂΜL (ref 0–0.1)
BASOPHILS NFR BLD AUTO: 1 % (ref 0–1)
BILIRUB SERPL-MCNC: 0.29 MG/DL (ref 0.2–1)
BUN SERPL-MCNC: 32 MG/DL (ref 5–25)
CALCIUM SERPL-MCNC: 9.5 MG/DL (ref 8.3–10.1)
CHLORIDE SERPL-SCNC: 109 MMOL/L (ref 96–108)
CHOLEST SERPL-MCNC: 202 MG/DL
CO2 SERPL-SCNC: 27 MMOL/L (ref 21–32)
CREAT SERPL-MCNC: 1.33 MG/DL (ref 0.6–1.3)
EOSINOPHIL # BLD AUTO: 0.11 THOUSAND/ÂΜL (ref 0–0.61)
EOSINOPHIL NFR BLD AUTO: 2 % (ref 0–6)
ERYTHROCYTE [DISTWIDTH] IN BLOOD BY AUTOMATED COUNT: 15.5 % (ref 11.6–15.1)
EST. AVERAGE GLUCOSE BLD GHB EST-MCNC: 117 MG/DL
GFR SERPL CREATININE-BSD FRML MDRD: 36 ML/MIN/1.73SQ M
GLUCOSE P FAST SERPL-MCNC: 94 MG/DL (ref 65–99)
HBA1C MFR BLD: 5.7 %
HCT VFR BLD AUTO: 38.3 % (ref 34.8–46.1)
HDLC SERPL-MCNC: 55 MG/DL
HGB BLD-MCNC: 11.8 G/DL (ref 11.5–15.4)
IMM GRANULOCYTES # BLD AUTO: 0.01 THOUSAND/UL (ref 0–0.2)
IMM GRANULOCYTES NFR BLD AUTO: 0 % (ref 0–2)
LDLC SERPL CALC-MCNC: 115 MG/DL (ref 0–100)
LYMPHOCYTES # BLD AUTO: 1.44 THOUSANDS/ÂΜL (ref 0.6–4.47)
LYMPHOCYTES NFR BLD AUTO: 23 % (ref 14–44)
MCH RBC QN AUTO: 27.1 PG (ref 26.8–34.3)
MCHC RBC AUTO-ENTMCNC: 30.8 G/DL (ref 31.4–37.4)
MCV RBC AUTO: 88 FL (ref 82–98)
MONOCYTES # BLD AUTO: 0.54 THOUSAND/ÂΜL (ref 0.17–1.22)
MONOCYTES NFR BLD AUTO: 9 % (ref 4–12)
NEUTROPHILS # BLD AUTO: 4.14 THOUSANDS/ÂΜL (ref 1.85–7.62)
NEUTS SEG NFR BLD AUTO: 65 % (ref 43–75)
NONHDLC SERPL-MCNC: 147 MG/DL
NRBC BLD AUTO-RTO: 0 /100 WBCS
NT-PROBNP SERPL-MCNC: 259 PG/ML
PLATELET # BLD AUTO: 328 THOUSANDS/UL (ref 149–390)
PMV BLD AUTO: 12.6 FL (ref 8.9–12.7)
POTASSIUM SERPL-SCNC: 4.1 MMOL/L (ref 3.5–5.3)
PROT SERPL-MCNC: 7 G/DL (ref 6.4–8.4)
RBC # BLD AUTO: 4.36 MILLION/UL (ref 3.81–5.12)
SODIUM SERPL-SCNC: 143 MMOL/L (ref 135–147)
TRIGL SERPL-MCNC: 162 MG/DL
TSH SERPL DL<=0.05 MIU/L-ACNC: 1.92 UIU/ML (ref 0.45–4.5)
VIT B12 SERPL-MCNC: 281 PG/ML (ref 100–900)
WBC # BLD AUTO: 6.3 THOUSAND/UL (ref 4.31–10.16)

## 2023-01-16 NOTE — PROGRESS NOTES
Name: Debbie Flowers      : 1940      MRN: [id number]  Encounter Provider: Toni Pastrana MD  Encounter Date: 2023   Encounter department: [address] [place] [address]  Peripheral polyneuropathy  -     Ambulatory Referral to Podiatry; Future  -     CBC and differential; Future  -     Comprehensive metabolic panel; Future; Expected date: 2023  -     TSH, 3rd generation; Future  -     Vitamin B12; Future    2  Chronic diastolic (congestive) heart failure (HCC)  -     CBC and differential; Future  -     Comprehensive metabolic panel; Future; Expected date: 2023  -     TSH, 3rd generation; Future  -     Vitamin B12; Future    3  Atrial fibrillation, unspecified type (HCC)  -     CBC and differential; Future  -     Comprehensive metabolic panel; Future; Expected date: 2023  -     TSH, 3rd generation; Future  -     Vitamin B12; Future    4  Parkinson's disease ([facility] )  -     CBC and differential; Future  -     Comprehensive metabolic panel; Future; Expected date: 2023  -     TSH, 3rd generation; Future  -     Vitamin B12; Future    5  Other fatigue  -     CBC and differential; Future  -     Comprehensive metabolic panel; Future; Expected date: 2023  -     TSH, 3rd generation; Future  -     Vitamin B12; Future    6  GERD without esophagitis    7  Essential hypertension    8  Chronic diastolic CHF (congestive heart failure) ([facility] )    9  Coronary artery disease involving native heart, unspecified vessel or lesion type, unspecified whether angina present    10  Stage 3a chronic kidney disease ([facility] )           Subjective      She has a number of complaints today:    1  She has a stabbing pain in the the top of her head  It lasts a second or two and goes away on its own  It happens once every two weeks  2   She has peripheral neuropathy  She does not tolerate gabapentin  She would like to see podiatry  3   She c/o fatigue    She is sleeping well and has no new medication  4   She has A-fib  She is rate controlled and anticoagulated with Eliquis  She denies bleeding episodes  5   Her BP is well controlled on her current regimen  She has no CP  She has no vision changes  6   She ha swell compensated CHF  She is euvolemic in the office today  Her weight is stable  7   She had SOB last night  Her breathing was "heavy "    Review of Systems   All other systems reviewed and are negative  Current Outpatient Medications on File Prior to Visit   Medication Sig   • acetaminophen (TYLENOL) 650 mg CR tablet Take 1 tablet (650 mg total) by mouth every 8 (eight) hours as needed for mild pain or moderate pain   • aspirin (ECOTRIN LOW STRENGTH) 81 mg EC tablet Take 81 mg by mouth daily   • cholecalciferol (VITAMIN D3) 1,000 units tablet Take 1 tablet (1,000 Units total) by mouth daily   • Eliquis 5 MG Take 1 tablet (5 mg total) by mouth 2 (two) times a day   • ezetimibe (ZETIA) 10 mg tablet Take 1 tablet (10 mg total) by mouth daily   • furosemide (LASIX) 20 mg tablet take 1 tablet by mouth twice a day   • isosorbide mononitrate (IMDUR) 60 mg 24 hr tablet Take 1 tablet (60 mg total) by mouth daily   • lansoprazole (PREVACID) 30 mg capsule Take 1 capsule (30 mg total) by mouth in the morning     • losartan (COZAAR) 25 mg tablet Take 25 mg by mouth daily   • memantine (NAMENDA) 5 mg tablet Take 1 tablet (5 mg total) by mouth daily   • zinc oxide (DESITIN) 40 % PSTE Apply topically daily   • [DISCONTINUED] gabapentin (NEURONTIN) 100 mg capsule Take 1 capsule (100 mg total) by mouth daily at bedtime   • benzonatate (TESSALON PERLES) 100 mg capsule Take 1 capsule (100 mg total) by mouth 3 (three) times a day as needed for cough (Patient not taking: Reported on 1/16/2023)       Objective     /65 (BP Location: Left arm, Patient Position: Sitting, Cuff Size: Large)   Pulse (!) 50   Temp (!) 97 2 °F (36 2 °C) (Temporal)   Resp 18   Ht 4' 10" (1 473 m)   Wt 67 1 kg (148 lb)   SpO2 99%   BMI 30 93 kg/m²     Physical Exam  Vitals and nursing note reviewed  Constitutional:       Appearance: Normal appearance  She is obese  Cardiovascular:      Rate and Rhythm: Normal rate and regular rhythm  Pulses: Normal pulses  Heart sounds: Normal heart sounds  Pulmonary:      Effort: Pulmonary effort is normal       Breath sounds: Normal breath sounds  Abdominal:      General: Abdomen is flat  Bowel sounds are normal       Palpations: Abdomen is soft  Musculoskeletal:         General: Normal range of motion  Cervical back: Normal range of motion and neck supple  Skin:     General: Skin is warm and dry  Capillary Refill: Capillary refill takes less than 2 seconds  Neurological:      General: No focal deficit present  Mental Status: She is alert and oriented to person, place, and time  Mental status is at baseline  Psychiatric:         Mood and Affect: Mood normal          Thought Content:  Thought content normal          Judgment: Judgment normal        Oj Herrera MD

## 2023-02-02 ENCOUNTER — TELEPHONE (OUTPATIENT)
Dept: FAMILY MEDICINE CLINIC | Facility: CLINIC | Age: 83
End: 2023-02-02

## 2023-02-02 NOTE — TELEPHONE ENCOUNTER
Received rosalind Johnson, it's Noemi Spare from Doctor Kelly's office calling regarding Conception Cleve date of birth, [id number] for peripheral polyneuropathy  Thank you for the referral  I'm just letting you know that I called her and she totally refused  She doesn't want no part of it  So I'm just letting you know again, this [place] [id number] refused Podiatry for neuropathy  Thanks  If you have any questions, call me back, [phone]  Thank you

## 2023-02-03 DIAGNOSIS — I50.32 CHRONIC DIASTOLIC (CONGESTIVE) HEART FAILURE (HCC): Primary | ICD-10-CM

## 2023-02-03 RX ORDER — LOSARTAN POTASSIUM 25 MG/1
TABLET ORAL
Qty: 90 TABLET | Refills: 3 | Status: SHIPPED | OUTPATIENT
Start: 2023-02-03

## 2023-02-15 DIAGNOSIS — E55.9 VITAMIN D DEFICIENCY: ICD-10-CM

## 2023-02-16 RX ORDER — MELATONIN
Qty: 90 TABLET | Refills: 1 | Status: SHIPPED | OUTPATIENT
Start: 2023-02-16

## 2023-02-28 ENCOUNTER — OFFICE VISIT (OUTPATIENT)
Dept: PODIATRY | Facility: CLINIC | Age: 83
End: 2023-02-28

## 2023-02-28 VITALS — WEIGHT: 148 LBS | HEIGHT: 58 IN | BODY MASS INDEX: 31.07 KG/M2

## 2023-02-28 DIAGNOSIS — G62.9 PERIPHERAL POLYNEUROPATHY: ICD-10-CM

## 2023-02-28 DIAGNOSIS — B35.1 ONYCHOMYCOSIS: ICD-10-CM

## 2023-02-28 DIAGNOSIS — L60.8 TOENAIL DEFORMITY: ICD-10-CM

## 2023-02-28 DIAGNOSIS — I73.9 PERIPHERAL VASCULAR DISEASE, UNSPECIFIED (HCC): Primary | ICD-10-CM

## 2023-02-28 DIAGNOSIS — L97.511 SKIN ULCER OF RIGHT GREAT TOE, LIMITED TO BREAKDOWN OF SKIN (HCC): ICD-10-CM

## 2023-02-28 NOTE — PROGRESS NOTES
Assessment/Plan:     Diagnoses and all orders for this visit:    Peripheral vascular disease, unspecified ([facility] )    Peripheral polyneuropathy  -     Ambulatory Referral to Podiatry  -     Ambulatory Referral to Pain Management; Future  -     Ambulatory referral to Physical Therapy; Future    Toenail deformity    Skin ulcer of right great toe, limited to breakdown of skin ([facility] )    Onychomycosis    Other orders  -     Debridement           IMPRESSION:  · Peripheral neuropathy, paresthesias B/L LE to toes  Hx sciatica per patient   · Right hallux plantar-medial IPJ ulceration  · PVD (Q9 findings) w/ Onychodystrophy     PLAN:  · I reviewed clinical exam with patient in detail today  I have discussed with the patient the pathophysiology of this diagnosis and reviewed how the examination correlates with this diagnosis  · PCP note and labs reviewed  A1c 5 7%, Vit B12 WNL, TSH WNL 1/16/23   Full foot exam performed today: Patient has significant lower extremity risk due to paresthesias/diminished sensation, varicosities w/ edema, and trophic skin changes to the lower extremity including thick toenail, atrophic skin, and decreased hair growth  Debridement of toenails x10 performed today  Using nail nipper, jamilah, and curette, nails were sharply debrided, reduced in thickness and length  Devitalized nail tissue and fungal debris excised and removed  Patient tolerated well  Right hallux plantar-medial IPJ with ulceration due to tight shoes  There are no SOI today and ulcer is superficial  See below for debridement  Rec daily dermagran and dsd dressing changes  Do not shower  Wear wider shoes to prevent rubbing  Discussed proper shoe gear, daily inspections of feet, and general foot health with patient  Patient has Q7  findings and is recommended for at risk foot care every 9-10 weeks  · Referral to Pain management (B/L LE PN) and PT (ambulatory dysfunction/LE weakness) made  · F/u 2 weeks for recheck right 1st toe ulcer  Debridement   Universal Protocol:  Consent: Verbal consent obtained  Risks and benefits: risks, benefits and alternatives were discussed  Consent given by: patient  Patient understanding: patient states understanding of the procedure being performed  Patient consent: the patient's understanding of the procedure matches consent given  Patient identity confirmed: verbally with patient      Performed by: physician  Debridement type: selective    Pre-debridement measurements  Length (cm): 0 1  Width (cm): 0 1  Depth (cm): 0 1  Surface Area (cm^2): 0 01  Volume (cm^3): 0    Post-debridement measurements  Length (cm): 0 1  Width (cm): 0 1  Depth (cm): 0 1  Percent debrided: 100%  Surface Area (cm^2): 0 01  Area debrided (cm^2): 0 01  Volume (cm^3): 0  Devitalized tissue debrided: biofilm and callus  Instrument(s) utilized: curette  Bleeding: none  Hemostasis obtained with: not applicable  Procedural pain (0-10): 0  Post-procedural pain: 1   Response to treatment: procedure was tolerated well          Subjective:      Patient ID: Michelle Oconnor is a 78 y o  female  Francesmunir Sesay presents to clinic today concerning N/T/B to B/L feet and LE  Symptoms worse at night and in cold  Has tried gabapentin however was unable to tolerate  Note shx of lower back pain with B/L LE pain, L>R  Would additionally like her nails trimmed as it is hard for her to do them  Ambulates with walker and notes she feels weak  The following portions of the patient's history were reviewed and updated as appropriate: allergies, current medications, past family history, past medical history, past social history, past surgical history and problem list     Review of Systems   Constitutional: Negative for activity change, chills and fever  HENT: Negative  Respiratory: Negative for cough, chest tightness and shortness of breath  Cardiovascular: Negative for chest pain and leg swelling  Endocrine: Negative  Genitourinary: Negative  Musculoskeletal: Positive for gait problem (Ambulates with walker)  Skin:        Dystrophic toenails   Neurological:        B/L LE tingling/burning   Psychiatric/Behavioral: Negative  Negative for agitation and behavioral problems  Objective:      Ht 4' 10" (1 473 m)   Wt 67 1 kg (148 lb)   BMI 30 93 kg/m²          Physical Exam  Constitutional:       General: She is not in acute distress  Appearance: Normal appearance  She is not ill-appearing  Cardiovascular:      Comments: Bilateral DP pulses are palpable 2/4  Bilateral PT pulses are nonpalpable or diminished 1/4  Pedal hair is absent  Legs to toes warm to cool  Varicosities with mild LE edema noted  Pulmonary:      Effort: No respiratory distress  Musculoskeletal:         General: No tenderness or deformity  Normal range of motion  Skin:     Capillary Refill: Capillary refill takes less than 2 seconds  Findings: Lesion (Right plantar-medial hallux IPJ, granular base, no deep probe  No acute SOI  ) present  No erythema  Comments: B/L LE skin is atrophic - thin, dry and shiny in appearance  Toenails x10 are elongated, dystrophic, discolored  There is nail thickening and subungual debris noted to toenails to left hallux, R 2nd, B/L 5th toes  Neurological:      General: No focal deficit present  Mental Status: She is alert and oriented to person, place, and time  Comments: Gross sensation to feet intact  Patient endorses numbness, tingling and burning to B/L feet  + paresthesias      Psychiatric:         Mood and Affect: Mood normal          Behavior: Behavior normal

## 2023-03-20 ENCOUNTER — TELEPHONE (OUTPATIENT)
Dept: OTHER | Facility: OTHER | Age: 83
End: 2023-03-20

## 2023-03-21 NOTE — TELEPHONE ENCOUNTER
Pt will not be coming to appointment tomorrow 03/21 @11:15a please give her a call back to reschedule

## 2023-03-28 ENCOUNTER — OFFICE VISIT (OUTPATIENT)
Dept: PODIATRY | Facility: CLINIC | Age: 83
End: 2023-03-28

## 2023-03-28 ENCOUNTER — APPOINTMENT (OUTPATIENT)
Dept: RADIOLOGY | Facility: MEDICAL CENTER | Age: 83
End: 2023-03-28

## 2023-03-28 VITALS — HEIGHT: 58 IN | BODY MASS INDEX: 31.07 KG/M2 | WEIGHT: 148 LBS

## 2023-03-28 DIAGNOSIS — I73.9 PERIPHERAL VASCULAR DISEASE, UNSPECIFIED (HCC): ICD-10-CM

## 2023-03-28 DIAGNOSIS — L97.511 SKIN ULCER OF RIGHT GREAT TOE, LIMITED TO BREAKDOWN OF SKIN (HCC): Primary | ICD-10-CM

## 2023-03-28 DIAGNOSIS — L97.511 SKIN ULCER OF RIGHT GREAT TOE, LIMITED TO BREAKDOWN OF SKIN (HCC): ICD-10-CM

## 2023-03-28 NOTE — PATIENT INSTRUCTIONS
Wound care: change right foot dressing every day with Maxorg Ag and bandaid  Wear surgical shoe at all times       Look in to getting soft topped shoes that do not rub feet

## 2023-03-28 NOTE — PROGRESS NOTES
Assessment/Plan:     Diagnoses and all orders for this visit:    Skin ulcer of right great toe, limited to breakdown of skin (HCC)  -     XR toe right great min 2 view; Future  -     Debridement  -     Post Op Shoe  -     MRI foot/forefoot toes right wo contrast; Future    Peripheral vascular disease, unspecified (HCC)  -     Debridement  -     MRI foot/forefoot toes right wo contrast; Future           IMPRESSION:  · Peripheral neuropathy, paresthesias B/L LE to toes  Hx sciatica per patient   · Right hallux plantar-medial IPJ ulceration (Pressure/PVD component)  · PVD (Q9 findings) w/ Onychodystrophy     PLAN:  Right hallux plantar-medial IPJ with ulceration due to tight shoes  There are no SOI today  See below for debridement - there was some clear, jelly like material (almost ganglion -like)from ulcer today that had very thin layer of callusing  Rec daily silver alginate and dsd dressing changes every 2-3 days  Do not shower  XR right 1st toe 2v 3/28/23: soft tissue defect to medial hallux IPJ  There is radiolucency to medial asepct of proximal phalanx and what appears to be some osseous erosion to this area  I ordered a MRI to further investigate (concern for OM vs bone lesion)  I dispensed surgical shoe for her to hear with weight bearing  · F/u 1 weeks for recheck right 1st toe ulcer and MRI review; call sooner if SOI arise or present to ED       Debridement   Universal Protocol:  Consent: Verbal consent obtained    Risks and benefits: risks, benefits and alternatives were discussed  Consent given by: patient  Patient understanding: patient states understanding of the procedure being performed  Patient consent: the patient's understanding of the procedure matches consent given  Patient identity confirmed: verbally with patient      Performed by: physician  Debridement type: selective    Pre-debridement measurements  Length (cm): 0 1  Width (cm): 0 1  Depth (cm): 0 1  Surface Area (cm^2): 0 01  Volume (cm^3): "0    Post-debridement measurements  Length (cm): 0 2  Width (cm): 0 2  Depth (cm): 0 1  Percent debrided: 100%  Surface Area (cm^2): 0 04  Area debrided (cm^2): 0 04  Volume (cm^3): 0  Devitalized tissue debrided: biofilm and callus  Instrument(s) utilized: curette  Bleeding: small  Hemostasis obtained with: pressure  Procedural pain (0-10): 0  Post-procedural pain: 0   Response to treatment: procedure was tolerated well          Subjective:      Patient ID: Gaby Mendez is a 78 y o  female  Harjit Ricks presents to clinic today concerning Right 1st toe ulcer  She did not f/u for 2 weeks as instructed (presents 4 weeks)  Notes some tenderness       The following portions of the patient's history were reviewed and updated as appropriate: allergies, current medications, past family history, past medical history, past social history, past surgical history and problem list     Review of Systems   Constitutional: Negative for activity change, chills and fever  HENT: Negative  Respiratory: Negative for cough, chest tightness and shortness of breath  Cardiovascular: Negative for chest pain and leg swelling  Endocrine: Negative  Genitourinary: Negative  Musculoskeletal: Positive for gait problem (Ambulates with walker)  Skin: Positive for wound (Right 1st toe)  Dystrophic toenails   Neurological:        B/L LE tingling/burning   Psychiatric/Behavioral: Negative  Negative for agitation and behavioral problems  Objective:      Ht 4' 10\" (1 473 m)   Wt 67 1 kg (148 lb)   BMI 30 93 kg/m²          Physical Exam  Constitutional:       General: She is not in acute distress  Appearance: Normal appearance  She is not ill-appearing  Cardiovascular:      Comments: Bilateral DP pulses are palpable 2/4  Bilateral PT pulses are nonpalpable or diminished 1/4  Pedal hair is absent  Legs to toes warm to cool  Varicosities with mild LE edema noted  Pulmonary:      Effort: No respiratory distress   " Musculoskeletal:         General: No tenderness or deformity  Normal range of motion  Skin:     Capillary Refill: Capillary refill takes less than 2 seconds  Findings: Lesion (Right plantar-medial hallux IPJ, granular base, no deep probe  No acute SOI  ) present  No erythema  Comments: B/L LE skin is atrophic - thin, dry and shiny in appearance  Toenails x10 are dystrophic, discolored  There is nail thickening and subungual debris noted to toenails to left hallux, R 2nd, B/L 5th toes  Neurological:      General: No focal deficit present  Mental Status: She is alert and oriented to person, place, and time  Comments: Gross sensation to feet intact  Patient endorses numbness, tingling and burning to B/L feet  + paresthesias      Psychiatric:         Mood and Affect: Mood normal          Behavior: Behavior normal

## 2023-04-03 ENCOUNTER — APPOINTMENT (OUTPATIENT)
Dept: RADIOLOGY | Facility: MEDICAL CENTER | Age: 83
End: 2023-04-03

## 2023-04-03 ENCOUNTER — CONSULT (OUTPATIENT)
Dept: PAIN MEDICINE | Facility: CLINIC | Age: 83
End: 2023-04-03

## 2023-04-03 VITALS — BODY MASS INDEX: 30.98 KG/M2 | HEIGHT: 58 IN | WEIGHT: 147.6 LBS | RESPIRATION RATE: 16 BRPM

## 2023-04-03 DIAGNOSIS — M54.2 NECK PAIN: ICD-10-CM

## 2023-04-03 DIAGNOSIS — M54.9 MID BACK PAIN: ICD-10-CM

## 2023-04-03 DIAGNOSIS — M48.062 SPINAL STENOSIS OF LUMBAR REGION WITH NEUROGENIC CLAUDICATION: ICD-10-CM

## 2023-04-03 DIAGNOSIS — G62.9 PERIPHERAL POLYNEUROPATHY: ICD-10-CM

## 2023-04-03 DIAGNOSIS — M47.816 LUMBAR SPONDYLOSIS: ICD-10-CM

## 2023-04-03 DIAGNOSIS — M54.16 LUMBAR RADICULOPATHY: Primary | ICD-10-CM

## 2023-04-03 DIAGNOSIS — M54.16 LUMBAR RADICULOPATHY: ICD-10-CM

## 2023-04-03 RX ORDER — PREGABALIN 50 MG/1
50 CAPSULE ORAL 3 TIMES DAILY
Qty: 90 CAPSULE | Refills: 1 | Status: SHIPPED | OUTPATIENT
Start: 2023-04-03 | End: 2023-05-03

## 2023-04-03 NOTE — PROGRESS NOTES
Assessment:  1  Lumbar radiculopathy    2  Peripheral polyneuropathy    3  Spinal stenosis of lumbar region with neurogenic claudication    4  Lumbar spondylosis    5  Neck pain    6  Mid back pain        Plan:  Patient is a 78-year-old female with complaints of bilateral hand and bilateral feet pain with chronic patient secondary to peripheral neuropathy complicated by Parkinson's disease, dementia with behavioral disturbance, coronary artery disease, congestive heart failure on anticoagulation therapy presents to office for initial consultation  At this time I do not feel that patient is a candidate for physical therapy and that we need to emergently get a MRI of the lumbar spine to better assess the neurogenic anatomical pathology to  plan for interventional   Patient has significant weakness bilateral lower extremities requiring a walker for gait assistance  Patient reports inability to stand or perform ADLs secondary to weakness in the legs  Patient is also experiencing peripheral neuropathy in bilateral hands that she describes as burning, tingling, throbbing pain  1   We will try Lyrica 50 mg p o  3 times daily for peripheral neuropathy  2  We will order an x-ray of the cervical, thoracic and lumbar spine to better assess the degenerative change currently patient current presentation  3   We will order an MRI of the lumbar spine secondary to patient requiring a walker for gait assistance and significant weakness in bilateral lower extremities which leads towards a severe spinal canal stenosis with neurogenic claudication picture  History of Present Illness: The patient is a 78 y o  female who presents for consultation in regards to Toe Pain  Symptoms have been present for 3 years  Symptoms began without any precipitating injury or trauma  Pain is reported to be 10 on the numeric rating scale  Symptoms are felt intermittently and worst in the nighttime    Symptoms are characterized as burning, cramping, sharp, cutting, tingling and pressure-like  Symptoms are associated with bilateral arm weakness and bilateral leg weakness  Aggravating factors include standing, sitting, walking and exercise  Relieving factors include nothing  No change in symptoms with kneeling, lying down, bending, leaning forward, leaning bckward, relaxation, coughing/sneezing and bowel movements  Treatments that have been helpful include physical therapy and heat/ice  prior injections including knee injection and home exercise have provided no relief  Medications to relieve symptoms include none  Review of Systems:    Review of Systems   Endocrine: Positive for polydipsia and polyuria  Genitourinary: Positive for frequency  Musculoskeletal: Positive for back pain, gait problem, joint swelling and myalgias  Joint pain   Neurological: Positive for dizziness, numbness and headaches  Psychiatric/Behavioral: Positive for decreased concentration  The patient is nervous/anxious  All other systems reviewed and are negative            Past Medical History:   Diagnosis Date   • Alzheimer's dementia ([facility] )    • Aphasia    • CHF (congestive heart failure) ([facility])    • Coronary artery disease    • Hypercholesteremia    • Hypertension    • IBS (irritable bowel syndrome)    • Renal disorder    • Rheumatic fever    • Skin lesion        Past Surgical History:   Procedure Laterality Date   • ANKLE FRACTURE SURGERY      LAST ASSESSED [id number]   • HYSTERECTOMY     • KIDNEY SURGERY     • NEPHRECTOMY     • TONSILLECTOMY         Family History   Problem Relation Age of Onset   • Hypertension Mother    • Asthma Father    • Cancer Sister        Social History     Occupational History   • Not on file   Tobacco Use   • Smoking status: Never   • Smokeless tobacco: Never   Vaping Use   • Vaping Use: Never used   Substance and Sexual Activity   • Alcohol use: No   • Drug use: No   • Sexual activity: Not Currently         Current Outpatient Medications:   •  acetaminophen (TYLENOL) 650 mg CR tablet, Take 1 tablet (650 mg total) by mouth every 8 (eight) hours as needed for mild pain or moderate pain, Disp: 90 tablet, Rfl: 2  •  aspirin (ECOTRIN LOW STRENGTH) 81 mg EC tablet, Take 81 mg by mouth daily, Disp: , Rfl:   •  cholecalciferol (VITAMIN D3) 1,000 units tablet, take 1 tablet by mouth once daily, Disp: 90 tablet, Rfl: 1  •  Eliquis 5 MG, Take 1 tablet (5 mg total) by mouth 2 (two) times a day, Disp: 180 tablet, Rfl: 3  •  ezetimibe (ZETIA) 10 mg tablet, Take 1 tablet (10 mg total) by mouth daily, Disp: 90 tablet, Rfl: 1  •  furosemide (LASIX) 20 mg tablet, take 1 tablet by mouth twice a day, Disp: 180 tablet, Rfl: 0  •  isosorbide mononitrate (IMDUR) 60 mg 24 hr tablet, Take 1 tablet (60 mg total) by mouth daily, Disp: 90 tablet, Rfl: 3  •  lansoprazole (PREVACID) 30 mg capsule, Take 1 capsule (30 mg total) by mouth in the morning , Disp: 90 capsule, Rfl: 3  •  losartan (COZAAR) 25 mg tablet, take 1 tablet by mouth once daily, Disp: 90 tablet, Rfl: 3  •  memantine (NAMENDA) 5 mg tablet, Take 1 tablet (5 mg total) by mouth daily, Disp: 90 tablet, Rfl: 0  •  zinc oxide (DESITIN) 40 % PSTE, Apply topically daily, Disp: 57 g, Rfl: 1  •  benzonatate (TESSALON PERLES) 100 mg capsule, Take 1 capsule (100 mg total) by mouth 3 (three) times a day as needed for cough (Patient not taking: Reported on 1/16/2023), Disp: 20 capsule, Rfl: 0    Current Facility-Administered Medications:   •  cyanocobalamin injection 1,000 mcg, 1,000 mcg, Intramuscular, Q30 Days, Peter Hull MD, 1,000 mcg at 02/19/18 1008    Allergies   Allergen Reactions   • Amoxicillin Hives   • Ciprofloxacin Hives   • Erythromycin Hives   • Statins Other (See Comments)     unknown   • Vancomycin Itching   • Amoxicillin-Pot Clavulanate Other (See Comments)     pt does not remember   • Clindamycin Other (See Comments)     PT DOES NOT RECALL REACTION         Physical Exam:    Resp 16   Ht 4' "10\" (1 473 m)   Wt 67 kg (147 lb 9 6 oz)   BMI 30 85 kg/m²     Constitutional: normal, well developed, well nourished, alert, in no distress and non-toxic and no overt pain behavior  Eyes: anicteric  HEENT: grossly intact  Neck: supple, symmetric, trachea midline and no masses   Pulmonary:even and unlabored  Cardiovascular:No edema or pitting edema present  Skin:Normal without rashes or lesions and well hydrated  Psychiatric:Mood and affect appropriate  Neurologic:Cranial Nerves II-XII grossly intact  Musculoskeletal:antalgic     Cervical Spine examination demonstrates  Decreased ROM secondary to pain with lateral rotation to the left/right and bending to the left/right, in addition to neck flexion  5/5 upper extremity strength in all muscle groups bilaterally  Negative Spurling's maneuver to the b/l Ue, sensitivity to light touch intact b/l Ue  Lumbar/Sacral Spine examination demonstrates  Decreased range of motion lumbar spine with pain upon: flexion, lateral rotation to the left/right, and bending to the left/right  Bilateral lumbar paraspinals tender to palpation  Muscle spasms noted in the lumbar area bilaterally  3/5 lower extremity strength in all muscle groups bilaterally  Positive seated straight leg raise for bilateral lower extremities  Sensitivity to light touch intact bilateral lower extremities  1+ reflexes in the patella and Achilles  No ankle clonus    Imaging    Anatomical Region Laterality Modality   L-spine -- Magnetic Resonance   MRSPINE -- --     Impression    IMPRESSION:   1   Multilevel degenerative changes resulting in central canal, lateral recess,   and foraminal stenosis as discussed above  2   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    3   STIR hyperintensities in T12 and L5, possibly osseous hemangiomas  4   Severe degenerative changes in bilateral sacroiliac joints  STIR   hyperintensities in bilateral aspects of the sacrum, possibly degenerative   change  5   Follow-up is recommended   " No orders to display       No orders of the defined types were placed in this encounter

## 2023-04-06 ENCOUNTER — TELEPHONE (OUTPATIENT)
Dept: PAIN MEDICINE | Facility: CLINIC | Age: 83
End: 2023-04-06

## 2023-04-06 NOTE — TELEPHONE ENCOUNTER
Caller: Gideon Schaefer PT    Doctor: Dr To Gabriel     Reason for call: Pt called in for the status of the MRI script   Pt was advised that it was sent over to [facility]    Call back#: N/A

## 2023-04-06 NOTE — TELEPHONE ENCOUNTER
Caller: [facility]    Call back#: [phone]    Doctor: Meg Medina     Reason for call: please be advised that pt will be going out of network to get her MRI done  Pt may need a prior auth   Pt will be going to [address] [place] [address]      NPI: [id number]

## 2023-04-06 NOTE — TELEPHONE ENCOUNTER
James Newman    Doctor: Baljit     Reason for call: please fax MRI script over to [facility] imaging    Call back#: [phone]  Fax: [phone]

## 2023-04-14 NOTE — TELEPHONE ENCOUNTER
[facility] called for the MRI auth     They would like to know if they should schedule the pt for the MRI but are not sure how long it would take to get auth       Please advise [phone]\    Ask for Veronica Munson

## 2023-04-24 DIAGNOSIS — G20 PARKINSON'S DISEASE (HCC): ICD-10-CM

## 2023-04-24 RX ORDER — MEMANTINE HYDROCHLORIDE 5 MG/1
5 TABLET ORAL DAILY
Qty: 90 TABLET | Refills: 0 | Status: SHIPPED | OUTPATIENT
Start: 2023-04-24

## 2023-04-25 ENCOUNTER — HOSPITAL ENCOUNTER (OUTPATIENT)
Dept: MRI IMAGING | Facility: HOSPITAL | Age: 83
Discharge: HOME | End: 2023-04-25

## 2023-04-25 DIAGNOSIS — M47.816 LUMBAR SPONDYLOSIS: ICD-10-CM

## 2023-04-25 DIAGNOSIS — M54.16 LUMBAR RADICULOPATHY: ICD-10-CM

## 2023-04-25 DIAGNOSIS — M48.062 SPINAL STENOSIS OF LUMBAR REGION WITH NEUROGENIC CLAUDICATION: ICD-10-CM

## 2023-04-30 ENCOUNTER — TELEPHONE (OUTPATIENT)
Dept: OTHER | Facility: OTHER | Age: 83
End: 2023-04-30

## 2023-04-30 NOTE — TELEPHONE ENCOUNTER
Patient is calling regarding cancelling an appointment      Date/Time: 05/01/2023 @ 11:30 am w/ Harman Canada MD    Patient was rescheduled: YES [] NO [x]    Patient requesting call back to reschedule: YES [x] NO []

## 2023-05-02 ENCOUNTER — OFFICE VISIT (OUTPATIENT)
Dept: PODIATRY | Facility: CLINIC | Age: 83
End: 2023-05-02

## 2023-05-02 VITALS
BODY MASS INDEX: 30.86 KG/M2 | HEART RATE: 66 BPM | HEIGHT: 58 IN | WEIGHT: 147 LBS | DIASTOLIC BLOOD PRESSURE: 88 MMHG | SYSTOLIC BLOOD PRESSURE: 132 MMHG

## 2023-05-02 DIAGNOSIS — I73.9 PERIPHERAL VASCULAR DISEASE, UNSPECIFIED (HCC): ICD-10-CM

## 2023-05-02 DIAGNOSIS — L97.511 SKIN ULCER OF RIGHT GREAT TOE, LIMITED TO BREAKDOWN OF SKIN (HCC): Primary | ICD-10-CM

## 2023-05-02 NOTE — PROGRESS NOTES
Assessment/Plan:     Diagnoses and all orders for this visit:    Skin ulcer of right great toe, limited to breakdown of skin ([facility] )    Peripheral vascular disease, unspecified ([facility] )           IMAGES REVIEWED AT THIS VISIT (I reviewed images and report via PACS)  · MRI right foot 4/17/23: no evidence of OM  There is evidence of multifocal gouty arthritis    IMPRESSION:  · Peripheral neuropathy, paresthesias B/L LE to toes  Hx sciatica per patient   · Right hallux plantar-medial IPJ ulceration (Pressure/PVD component)  · PVD (Q9 findings) w/ Onychodystrophy     PLAN:  Right hallux plantar-medial IPJ with ulceration due to tight shoes  Appears more superficial today without drainage and almost healed  There are no acute SOI today  MRI right forefoot w/o evidence of OM  C/w daily dermagran and dsd dressing changes every 2-3 days  I recommended soft and side diabetic shoes for purchase on [employer]  · F/u 2 weeks for recheck and nail care      Subjective:      Patient ID: Graciela Contreras is a 78 y o  female  [place] Mutter presents to clinic today concerning Right 1st toe ulcer  Did get MRI  Notes less pain to toe  The following portions of the patient's history were reviewed and updated as appropriate: allergies, current medications, past family history, past medical history, past social history, past surgical history and problem list     Review of Systems   Constitutional: Negative for activity change, chills and fever  HENT: Negative  Respiratory: Negative for cough, chest tightness and shortness of breath  Cardiovascular: Negative for chest pain and leg swelling  Endocrine: Negative  Genitourinary: Negative  Musculoskeletal: Positive for gait problem (Ambulates with walker)  Skin: Positive for wound (Right 1st toe)  Dystrophic toenails   Neurological:        B/L LE tingling/burning   Psychiatric/Behavioral: Negative  Negative for agitation and behavioral problems           Objective:      BP "132/88   Pulse 66   Ht 4' 10\" (1 473 m)   Wt 66 7 kg (147 lb)   BMI 30 72 kg/m²          Physical Exam  Constitutional:       General: She is not in acute distress  Appearance: Normal appearance  She is not ill-appearing  Cardiovascular:      Comments: Bilateral DP pulses are palpable 2/4  Bilateral PT pulses are nonpalpable or diminished 1/4  Pedal hair is absent  Legs to toes warm to cool  Varicosities with mild LE edema noted  Pulmonary:      Effort: No respiratory distress  Musculoskeletal:         General: No tenderness or deformity  Normal range of motion  Skin:     Capillary Refill: Capillary refill takes less than 2 seconds  Findings: Lesion (Right plantar-medial hallux IPJ, now superficial partial thickness, no deep probe  No acute SOI  ) present  No erythema  Comments: B/L LE skin is atrophic - thin, dry and shiny in appearance  Toenails x10 are dystrophic, discolored  There is nail thickening and subungual debris noted to toenails to left hallux, R 2nd, B/L 5th toes  Neurological:      General: No focal deficit present  Mental Status: She is alert and oriented to person, place, and time  Comments: Gross sensation to feet intact  Patient endorses numbness, tingling and burning to B/L feet  + paresthesias      Psychiatric:         Mood and Affect: Mood normal          Behavior: Behavior normal          "

## 2023-05-02 NOTE — PATIENT INSTRUCTIONS
[address] [facility] [address]  com: Dr Rosa Jorgensen Diabetic Shoes for Women-Machine-Washabl Double Depth Therapeutic Shoes w/Gel Inserts, Black 11 X-Wide (XW/4E) : Health & Household     **Diabetic shoes on [address] [facility] [address]  com (soft and wide)

## 2023-05-23 ENCOUNTER — OFFICE VISIT (OUTPATIENT)
Dept: PODIATRY | Facility: CLINIC | Age: 83
End: 2023-05-23

## 2023-05-23 VITALS — BODY MASS INDEX: 30.86 KG/M2 | WEIGHT: 147 LBS | HEIGHT: 58 IN

## 2023-05-23 DIAGNOSIS — B35.1 ONYCHOMYCOSIS: ICD-10-CM

## 2023-05-23 DIAGNOSIS — L97.511 SKIN ULCER OF RIGHT GREAT TOE, LIMITED TO BREAKDOWN OF SKIN (HCC): Primary | ICD-10-CM

## 2023-05-23 DIAGNOSIS — I73.9 PERIPHERAL VASCULAR DISEASE, UNSPECIFIED (HCC): ICD-10-CM

## 2023-05-23 NOTE — PROGRESS NOTES
Assessment/Plan:     Diagnoses and all orders for this visit:    Skin ulcer of right great toe, limited to breakdown of skin (HCC)  -     silver sulfadiazine (Silvadene) 1 % cream; Apply topically daily    Peripheral vascular disease, unspecified (HCC)    Onychomycosis             IMPRESSION:  · Peripheral neuropathy, paresthesias B/L LE to toes  Hx sciatica per patient   · Right hallux plantar-medial IPJ ulceration (Pressure/PVD component)  · PVD (Q9 findings) w/ Onychodystrophy     PLAN:  Right hallux plantar-medial IPJ with ulceration due to tight shoes  Appears more superficial today without drainage and almost healed  There are no acute SOI today  I rx'd silvadene cream for daily application with DSD  I again recommended soft and side diabetic shoes (unfortunately patient did not buy)  I discussed that she needs to wear better shoes that do not rub the area or bony prominences to feet  Toenails x10 debrided in length and thickness with a nail nipper and jamilah  · F/u 4 weeks for wound recheck      Subjective:      Patient ID: Jorge Luis Mcrae is a 78 y o  female  Alo Hermosillo presents to clinic today concerning Right 1st toe ulcer and nail care  Did not get new shoes as instructed  The following portions of the patient's history were reviewed and updated as appropriate: allergies, current medications, past family history, past medical history, past social history, past surgical history and problem list     Review of Systems   Constitutional: Negative for activity change, chills and fever  HENT: Negative  Respiratory: Negative for cough, chest tightness and shortness of breath  Cardiovascular: Negative for chest pain and leg swelling  Endocrine: Negative  Genitourinary: Negative  Musculoskeletal: Positive for gait problem (Ambulates with walker)  Skin: Positive for wound (Right 1st toe)          Dystrophic toenails   Neurological:        B/L LE tingling/burning   Psychiatric/Behavioral: "Negative  Negative for agitation and behavioral problems  Objective:      Ht 4' 10\" (1 473 m)   Wt 66 7 kg (147 lb)   BMI 30 72 kg/m²          Physical Exam  Constitutional:       General: She is not in acute distress  Appearance: Normal appearance  She is not ill-appearing  Cardiovascular:      Comments: Bilateral DP pulses are palpable 2/4  Bilateral PT pulses are nonpalpable or diminished 1/4  Pedal hair is absent  Legs to toes warm to cool  Varicosities with mild LE edema noted  Pulmonary:      Effort: No respiratory distress  Musculoskeletal:         General: No tenderness or deformity  Normal range of motion  Skin:     Capillary Refill: Capillary refill takes less than 2 seconds  Findings: Lesion (Right plantar-medial hallux IPJ, now superficial partial thickness, no deep probe  No acute SOI  ) present  No erythema  Comments: B/L LE skin is atrophic - thin, dry and shiny in appearance  Toenails x10 are dystrophic, discolored  There is nail thickening and subungual debris noted to toenails to left hallux, R 2nd, B/L 5th toes  Neurological:      General: No focal deficit present  Mental Status: She is alert and oriented to person, place, and time  Comments: Gross sensation to feet intact  Patient endorses numbness, tingling and burning to B/L feet  + paresthesias      Psychiatric:         Mood and Affect: Mood normal          Behavior: Behavior normal          "

## 2023-05-23 NOTE — PATIENT INSTRUCTIONS
[address] [facility] [address]  com  Git[employer] Women Soft wide Slippers Memory Foam Closed Toed Diabetic Arthritis Edema House Slippers    Slippers

## 2023-06-05 ENCOUNTER — OFFICE VISIT (OUTPATIENT)
Dept: FAMILY MEDICINE CLINIC | Facility: CLINIC | Age: 83
End: 2023-06-05
Payer: COMMERCIAL

## 2023-06-05 VITALS
RESPIRATION RATE: 18 BRPM | DIASTOLIC BLOOD PRESSURE: 67 MMHG | WEIGHT: 149 LBS | SYSTOLIC BLOOD PRESSURE: 166 MMHG | HEART RATE: 88 BPM | TEMPERATURE: 99.1 F | OXYGEN SATURATION: 98 % | BODY MASS INDEX: 31.14 KG/M2

## 2023-06-05 DIAGNOSIS — F03.918 DEMENTIA WITH BEHAVIORAL DISTURBANCE (HCC): ICD-10-CM

## 2023-06-05 DIAGNOSIS — K21.9 GERD WITHOUT ESOPHAGITIS: ICD-10-CM

## 2023-06-05 DIAGNOSIS — I25.10 CORONARY ARTERY DISEASE INVOLVING NATIVE HEART, UNSPECIFIED VESSEL OR LESION TYPE, UNSPECIFIED WHETHER ANGINA PRESENT: ICD-10-CM

## 2023-06-05 DIAGNOSIS — I48.0 PAROXYSMAL ATRIAL FIBRILLATION (HCC): ICD-10-CM

## 2023-06-05 DIAGNOSIS — I10 ESSENTIAL HYPERTENSION: ICD-10-CM

## 2023-06-05 DIAGNOSIS — Z23 NEED FOR SHINGLES VACCINE: ICD-10-CM

## 2023-06-05 DIAGNOSIS — M79.10 MYALGIA: Primary | ICD-10-CM

## 2023-06-05 DIAGNOSIS — N17.9 AKI (ACUTE KIDNEY INJURY) (HCC): ICD-10-CM

## 2023-06-05 DIAGNOSIS — N18.31 STAGE 3A CHRONIC KIDNEY DISEASE (HCC): ICD-10-CM

## 2023-06-05 DIAGNOSIS — I50.32 CHRONIC DIASTOLIC CHF (CONGESTIVE HEART FAILURE) (HCC): ICD-10-CM

## 2023-06-05 DIAGNOSIS — E53.8 LOW SERUM VITAMIN B12: ICD-10-CM

## 2023-06-05 DIAGNOSIS — G20 PARKINSON'S DISEASE (HCC): ICD-10-CM

## 2023-06-05 PROCEDURE — 99215 OFFICE O/P EST HI 40 MIN: CPT | Performed by: FAMILY MEDICINE

## 2023-06-05 RX ORDER — CYANOCOBALAMIN 1000 UG/ML
1000 INJECTION, SOLUTION INTRAMUSCULAR; SUBCUTANEOUS
Qty: 10 ML | Refills: 3 | Status: SHIPPED | OUTPATIENT
Start: 2023-06-05

## 2023-06-05 RX ORDER — SYRINGE W-NEEDLE,DISPOSAB,3 ML 25GX5/8"
SYRINGE, EMPTY DISPOSABLE MISCELLANEOUS WEEKLY
Qty: 50 EACH | Refills: 5 | Status: SHIPPED | OUTPATIENT
Start: 2023-06-05

## 2023-06-05 RX ORDER — ZOSTER VACCINE RECOMBINANT, ADJUVANTED 50 MCG/0.5
0.5 KIT INTRAMUSCULAR ONCE
Qty: 1 EACH | Refills: 1 | Status: SHIPPED | OUTPATIENT
Start: 2023-06-05 | End: 2023-06-05

## 2023-06-05 NOTE — ASSESSMENT & PLAN NOTE
Wt Readings from Last 3 Encounters:   06/05/23 67 6 kg (149 lb)   05/23/23 66 7 kg (147 lb)   05/02/23 66 7 kg (147 lb)         Weight is stable  She denies edema

## 2023-06-05 NOTE — ASSESSMENT & PLAN NOTE
Lab Results   Component Value Date    CREATININE 1 33 (H) 01/16/2023    CREATININE 1 06 09/12/2022    CREATININE 0 99 06/17/2022    EGFR 36 01/16/2023    EGFR 48 09/12/2022    EGFR 52 06/17/2022   Stable  Continue current

## 2023-06-05 NOTE — PROGRESS NOTES
"Name: Gisel Darden      : 1940      MRN: [id number]  Encounter Provider: Kristan Cano MD  Encounter Date: 2023   Encounter department: [address] [place] [address]  Myalgia  -     Sedimentation rate, automated; Future  -     C-reactive protein; Future  -     CBC and differential; Future  -     TSH, 3rd generation; Future  -     cyanocobalamin 1,000 mcg/mL; Inject 1 mL (1,000 mcg total) into a muscle every 14 (fourteen) days  -     Syringe/Needle, Disp, (SYRINGE 3CC/22GX1\") 22G X 1\" 3 ML MISC; Use once a week  -     B-Type Natriuretic Peptide(BNP); Future    2  GERD without esophagitis  Assessment & Plan:  No alarm signs  Continue current  Orders:  -     cyanocobalamin 1,000 mcg/mL; Inject 1 mL (1,000 mcg total) into a muscle every 14 (fourteen) days  -     Syringe/Needle, Disp, (SYRINGE 3CC/22GX1\") 22G X 1\" 3 ML MISC; Use once a week  -     B-Type Natriuretic Peptide(BNP); Future    3  Essential hypertension  Assessment & Plan:  Stable  Continue current  Orders:  -     cyanocobalamin 1,000 mcg/mL; Inject 1 mL (1,000 mcg total) into a muscle every 14 (fourteen) days  -     Syringe/Needle, Disp, (SYRINGE 3CC/22GX1\") 22G X 1\" 3 ML MISC; Use once a week  -     B-Type Natriuretic Peptide(BNP); Future    4  Chronic diastolic CHF (congestive heart failure) (HCC)  Assessment & Plan:  Wt Readings from Last 3 Encounters:   23 67 6 kg (149 lb)   23 66 7 kg (147 lb)   23 66 7 kg (147 lb)         Weight is stable  She denies edema  Orders:  -     cyanocobalamin 1,000 mcg/mL; Inject 1 mL (1,000 mcg total) into a muscle every 14 (fourteen) days  -     Syringe/Needle, Disp, (SYRINGE 3CC/22GX1\") 22G X 1\" 3 ML MISC; Use once a week  -     B-Type Natriuretic Peptide(BNP); Future    5  Paroxysmal atrial fibrillation [facility])  Assessment & Plan:  Plan per cardiology  Orders:  -     cyanocobalamin 1,000 mcg/mL;  Inject 1 mL (1,000 mcg total) into a muscle " "every 14 (fourteen) days  -     Syringe/Needle, Disp, (SYRINGE 3CC/22GX1\") 22G X 1\" 3 ML MISC; Use once a week  -     B-Type Natriuretic Peptide(BNP); Future    6  Coronary artery disease involving native heart, unspecified vessel or lesion type, unspecified whether angina present  Assessment & Plan:  No anginal symptoms  Orders:  -     cyanocobalamin 1,000 mcg/mL; Inject 1 mL (1,000 mcg total) into a muscle every 14 (fourteen) days  -     Syringe/Needle, Disp, (SYRINGE 3CC/22GX1\") 22G X 1\" 3 ML MISC; Use once a week  -     B-Type Natriuretic Peptide(BNP); Future    7  Parkinson's disease ([facility] )  -     cyanocobalamin 1,000 mcg/mL; Inject 1 mL (1,000 mcg total) into a muscle every 14 (fourteen) days  -     Syringe/Needle, Disp, (SYRINGE 3CC/22GX1\") 22G X 1\" 3 ML MISC; Use once a week  -     B-Type Natriuretic Peptide(BNP); Future    8  Dementia with behavioral disturbance (HCC)  -     cyanocobalamin 1,000 mcg/mL; Inject 1 mL (1,000 mcg total) into a muscle every 14 (fourteen) days  -     Syringe/Needle, Disp, (SYRINGE 3CC/22GX1\") 22G X 1\" 3 ML MISC; Use once a week  -     B-Type Natriuretic Peptide(BNP); Future    9  Stage 3a chronic kidney disease [facility])  Assessment & Plan:  Lab Results   Component Value Date    CREATININE 1 33 (H) 01/16/2023    CREATININE 1 06 09/12/2022    CREATININE 0 99 06/17/2022    EGFR 36 01/16/2023    EGFR 48 09/12/2022    EGFR 52 06/17/2022   Stable  Continue current  Orders:  -     cyanocobalamin 1,000 mcg/mL; Inject 1 mL (1,000 mcg total) into a muscle every 14 (fourteen) days  -     Syringe/Needle, Disp, (SYRINGE 3CC/22GX1\") 22G X 1\" 3 ML MISC; Use once a week  -     B-Type Natriuretic Peptide(BNP); Future    10  LUPE (acute kidney injury) ([facility] )  -     cyanocobalamin 1,000 mcg/mL;  Inject 1 mL (1,000 mcg total) into a muscle every 14 (fourteen) days  -     Syringe/Needle, Disp, (SYRINGE 3CC/22GX1\") 22G X 1\" 3 ML MISC; Use once a week  -     B-Type Natriuretic Peptide(BNP); " "Future    11  Low serum vitamin B12  -     cyanocobalamin 1,000 mcg/mL; Inject 1 mL (1,000 mcg total) into a muscle every 14 (fourteen) days  -     Syringe/Needle, Disp, (SYRINGE 3CC/22GX1\") 22G X 1\" 3 ML MISC; Use once a week  -     B-Type Natriuretic Peptide(BNP); Future    12  Need for shingles vaccine  -     Zoster Vac Recomb Adjuvanted (Shingrix) 50 MCG/0 5ML SUSR; Inject 0 5 mL into a muscle once for 1 dose Repeat dose in 2 to 6 months         Subjective      She has chronic neck pain  It radiates up the left side of her head and into the scalp  There is no rash  She has no CP or SOB  She is taking Tylenol with some relief  She has chronic CHF  She has no PND or orthopnea  Her weight is stable  Her GERD is well controlled on her current regimen  She has questionable AF  I reviewed cardiology's note and they have recommended against anticoagulation at this time  Review of Systems   All other systems reviewed and are negative  Current Outpatient Medications on File Prior to Visit   Medication Sig   • acetaminophen (TYLENOL) 650 mg CR tablet Take 1 tablet (650 mg total) by mouth every 8 (eight) hours as needed for mild pain or moderate pain   • aspirin (ECOTRIN LOW STRENGTH) 81 mg EC tablet Take 81 mg by mouth daily   • cholecalciferol (VITAMIN D3) 1,000 units tablet take 1 tablet by mouth once daily   • Eliquis 5 MG Take 1 tablet (5 mg total) by mouth 2 (two) times a day   • ezetimibe (ZETIA) 10 mg tablet Take 1 tablet (10 mg total) by mouth daily   • furosemide (LASIX) 20 mg tablet take 1 tablet by mouth twice a day   • isosorbide mononitrate (IMDUR) 60 mg 24 hr tablet Take 1 tablet (60 mg total) by mouth daily   • lansoprazole (PREVACID) 30 mg capsule Take 1 capsule (30 mg total) by mouth in the morning     • losartan (COZAAR) 25 mg tablet take 1 tablet by mouth once daily   • memantine (NAMENDA) 5 mg tablet Take 1 tablet (5 mg total) by mouth daily   • pregabalin (LYRICA) 50 mg capsule " Take 1 capsule (50 mg total) by mouth 3 (three) times a day   • silver sulfadiazine (Silvadene) 1 % cream Apply topically daily   • zinc oxide (DESITIN) 40 % PSTE Apply topically daily   • benzonatate (TESSALON PERLES) 100 mg capsule Take 1 capsule (100 mg total) by mouth 3 (three) times a day as needed for cough (Patient not taking: Reported on 1/16/2023)       Objective     /67 (BP Location: Left arm, Patient Position: Sitting, Cuff Size: Large)   Pulse 88   Temp 99 1 °F (37 3 °C) (Temporal)   Resp 18   Wt 67 6 kg (149 lb)   SpO2 98%   BMI 31 14 kg/m²     Physical Exam  Vitals and nursing note reviewed  Constitutional:       Appearance: Normal appearance  Cardiovascular:      Rate and Rhythm: Normal rate and regular rhythm  Pulses: Normal pulses  Heart sounds: Normal heart sounds  Pulmonary:      Effort: Pulmonary effort is normal       Breath sounds: Normal breath sounds  Abdominal:      General: Abdomen is flat  Bowel sounds are normal       Palpations: Abdomen is soft  Musculoskeletal:         General: Normal range of motion  Cervical back: Normal range of motion and neck supple  Skin:     General: Skin is warm and dry  Capillary Refill: Capillary refill takes less than 2 seconds  Neurological:      General: No focal deficit present  Mental Status: She is alert and oriented to person, place, and time  Mental status is at baseline  Psychiatric:         Mood and Affect: Mood normal          Behavior: Behavior normal          Thought Content:  Thought content normal          Judgment: Judgment normal        Lucy Queen MD

## 2023-06-07 NOTE — TELEPHONE ENCOUNTER
Discharge Instructions: Eye Surgery    Jeffrey Camargo MD    1. Begin post operative drop regimen today and wear shield over operated eye at bedtime for 3 days    2. Take two Tylenol tablets every 4-6 hours for discomfort if needed. If the pain is severe or not relieved by Tylenol, call the office to reach Dr. Camargo at [phone].  If before 8:00 AM , after 5:00 PM or on the weekend please call [phone] to have the on-call doctor paged.    3. If at any time you develop severe pain, increased eye redness, eye swelling with discharge, fever or sudden loss of vision, call the [facility] in [place] office immediately.     4. You may use your eyes for reading, TV and hobbies.    5. Resume your regular activities as soon as you feel comfortable to do so. Bending over is allowed. No lifting over 25 pounds and No swimming for the next week.    6. You may shower and wash your hair once the eye patch is removed. Take care not to bump your eye or get soap in your eye. If your hair is styled by a beautician, ask her to avoid bumping the eye.    7. You may wear sunglasses in the bright light, especially outdoors, for the first week. If the eye is sensitive to light or wind, you may continue to wear sunglasses at your discretion.    8. You may resume driving when you feel your vision is good enough.    9. Remember to bring your eye medications with you to all your post-operative appointments. If your eye is patched after surgery, do not attempt to use the eyedrops until the patch is removed. If your eye is NOT patched, you may give yourself the eyedrops as directed.    10.  In most cases, you will be measured for new glasses 4-6 weeks after surgery. Although some people may not need glasses after surgery, most patients need glasses of some sort to obtain their best vision.      PT REQ SEVERAL SCRIPTS TO [facility]

## 2023-06-20 ENCOUNTER — LAB (OUTPATIENT)
Dept: LAB | Facility: MEDICAL CENTER | Age: 83
End: 2023-06-20
Payer: COMMERCIAL

## 2023-06-20 ENCOUNTER — OFFICE VISIT (OUTPATIENT)
Dept: PODIATRY | Facility: CLINIC | Age: 83
End: 2023-06-20
Payer: COMMERCIAL

## 2023-06-20 VITALS — HEIGHT: 58 IN | WEIGHT: 149 LBS | BODY MASS INDEX: 31.28 KG/M2

## 2023-06-20 DIAGNOSIS — L97.511 SKIN ULCER OF RIGHT GREAT TOE, LIMITED TO BREAKDOWN OF SKIN (HCC): Primary | ICD-10-CM

## 2023-06-20 DIAGNOSIS — G62.9 PERIPHERAL POLYNEUROPATHY: ICD-10-CM

## 2023-06-20 DIAGNOSIS — F03.918 DEMENTIA WITH BEHAVIORAL DISTURBANCE (HCC): ICD-10-CM

## 2023-06-20 DIAGNOSIS — I25.10 CORONARY ARTERY DISEASE INVOLVING NATIVE HEART, UNSPECIFIED VESSEL OR LESION TYPE, UNSPECIFIED WHETHER ANGINA PRESENT: ICD-10-CM

## 2023-06-20 DIAGNOSIS — M79.10 MYALGIA: ICD-10-CM

## 2023-06-20 DIAGNOSIS — I50.32 CHRONIC DIASTOLIC CHF (CONGESTIVE HEART FAILURE) (HCC): ICD-10-CM

## 2023-06-20 DIAGNOSIS — I48.0 PAROXYSMAL ATRIAL FIBRILLATION (HCC): ICD-10-CM

## 2023-06-20 DIAGNOSIS — I73.9 PERIPHERAL VASCULAR DISEASE, UNSPECIFIED (HCC): ICD-10-CM

## 2023-06-20 DIAGNOSIS — I10 ESSENTIAL HYPERTENSION: ICD-10-CM

## 2023-06-20 DIAGNOSIS — N17.9 AKI (ACUTE KIDNEY INJURY) (HCC): ICD-10-CM

## 2023-06-20 DIAGNOSIS — G20 PARKINSON'S DISEASE (HCC): ICD-10-CM

## 2023-06-20 DIAGNOSIS — K21.9 GERD WITHOUT ESOPHAGITIS: ICD-10-CM

## 2023-06-20 DIAGNOSIS — N18.31 STAGE 3A CHRONIC KIDNEY DISEASE (HCC): ICD-10-CM

## 2023-06-20 DIAGNOSIS — E53.8 LOW SERUM VITAMIN B12: ICD-10-CM

## 2023-06-20 LAB
BASOPHILS # BLD AUTO: 0.04 THOUSANDS/ÂΜL (ref 0–0.1)
BASOPHILS NFR BLD AUTO: 1 % (ref 0–1)
BNP SERPL-MCNC: 98 PG/ML (ref 0–100)
CRP SERPL QL: <3 MG/L
EOSINOPHIL # BLD AUTO: 0.06 THOUSAND/ÂΜL (ref 0–0.61)
EOSINOPHIL NFR BLD AUTO: 1 % (ref 0–6)
ERYTHROCYTE [DISTWIDTH] IN BLOOD BY AUTOMATED COUNT: 14.8 % (ref 11.6–15.1)
ERYTHROCYTE [SEDIMENTATION RATE] IN BLOOD: 27 MM/HOUR (ref 0–29)
HCT VFR BLD AUTO: 36.9 % (ref 34.8–46.1)
HGB BLD-MCNC: 11.9 G/DL (ref 11.5–15.4)
IMM GRANULOCYTES # BLD AUTO: 0.03 THOUSAND/UL (ref 0–0.2)
IMM GRANULOCYTES NFR BLD AUTO: 0 % (ref 0–2)
LYMPHOCYTES # BLD AUTO: 1.38 THOUSANDS/ÂΜL (ref 0.6–4.47)
LYMPHOCYTES NFR BLD AUTO: 18 % (ref 14–44)
MCH RBC QN AUTO: 28.1 PG (ref 26.8–34.3)
MCHC RBC AUTO-ENTMCNC: 32.2 G/DL (ref 31.4–37.4)
MCV RBC AUTO: 87 FL (ref 82–98)
MONOCYTES # BLD AUTO: 0.58 THOUSAND/ÂΜL (ref 0.17–1.22)
MONOCYTES NFR BLD AUTO: 8 % (ref 4–12)
NEUTROPHILS # BLD AUTO: 5.46 THOUSANDS/ÂΜL (ref 1.85–7.62)
NEUTS SEG NFR BLD AUTO: 72 % (ref 43–75)
NRBC BLD AUTO-RTO: 0 /100 WBCS
PLATELET # BLD AUTO: 314 THOUSANDS/UL (ref 149–390)
PMV BLD AUTO: 13 FL (ref 8.9–12.7)
RBC # BLD AUTO: 4.24 MILLION/UL (ref 3.81–5.12)
TSH SERPL DL<=0.05 MIU/L-ACNC: 1.55 UIU/ML (ref 0.45–4.5)
WBC # BLD AUTO: 7.55 THOUSAND/UL (ref 4.31–10.16)

## 2023-06-20 PROCEDURE — 84443 ASSAY THYROID STIM HORMONE: CPT

## 2023-06-20 PROCEDURE — 85025 COMPLETE CBC W/AUTO DIFF WBC: CPT

## 2023-06-20 PROCEDURE — 86140 C-REACTIVE PROTEIN: CPT

## 2023-06-20 PROCEDURE — 85652 RBC SED RATE AUTOMATED: CPT

## 2023-06-20 PROCEDURE — 36415 COLL VENOUS BLD VENIPUNCTURE: CPT

## 2023-06-20 PROCEDURE — 83880 ASSAY OF NATRIURETIC PEPTIDE: CPT

## 2023-06-20 PROCEDURE — 99212 OFFICE O/P EST SF 10 MIN: CPT | Performed by: STUDENT IN AN ORGANIZED HEALTH CARE EDUCATION/TRAINING PROGRAM

## 2023-06-20 NOTE — PROGRESS NOTES
"Assessment/Plan:     Diagnoses and all orders for this visit:    Skin ulcer of right great toe, limited to breakdown of skin (HCC)    Peripheral vascular disease, unspecified ([facility] [address] )    Peripheral polyneuropathy             IMPRESSION:  · Peripheral neuropathy, paresthesias B/L LE to toes  Hx sciatica per patient   · Right hallux plantar-medial IPJ ulceration (Pressure/PVD component) with gouty tophi today  · PVD (Q9 findings) w/ Onychodystrophy     PLAN:  Right hallux plantar-medial IPJ with ulceration due to tight shoes  Appears with mild gouty tophi today  There are no acute SOI today  C/w silvadene cream for daily application with DSD  I again recommended soft and side diabetic shoes (unfortunately patient did not buy)  I discussed that she needs to wear better shoes that do not rub the area or bony prominences to feet  · F/u 4 weeks for wound recheck; call sooner if SOI arise      Subjective:      Patient ID: Pilar Abdi is a 78 y o  female  Dorian Vu presents to clinic today concerning Right 1st toe ulcer  Did not get new shoes as instructed  The following portions of the patient's history were reviewed and updated as appropriate: allergies, current medications, past family history, past medical history, past social history, past surgical history and problem list     Review of Systems   Constitutional: Negative for activity change, chills and fever  HENT: Negative  Respiratory: Negative for cough, chest tightness and shortness of breath  Cardiovascular: Negative for chest pain and leg swelling  Endocrine: Negative  Genitourinary: Negative  Musculoskeletal: Positive for gait problem (Ambulates with walker)  Skin: Positive for wound (Right 1st toe)  Dystrophic toenails   Neurological:        B/L LE tingling/burning   Psychiatric/Behavioral: Negative  Negative for agitation and behavioral problems           Objective:      Ht 4' 10\" (1 473 m)   Wt 67 6 kg (149 lb)   BMI " 31 14 kg/m²          Physical Exam  Constitutional:       General: She is not in acute distress  Appearance: Normal appearance  She is not ill-appearing  Cardiovascular:      Comments: Bilateral DP pulses are palpable 2/4  Bilateral PT pulses are nonpalpable or diminished 1/4  Pedal hair is absent  Legs to toes warm to cool  Varicosities with mild LE edema noted  Pulmonary:      Effort: No respiratory distress  Musculoskeletal:         General: No tenderness or deformity  Normal range of motion  Skin:     Capillary Refill: Capillary refill takes less than 2 seconds  Findings: Lesion (Right plantar-medial hallux IPJ, now full thickness with mild gouty tophi, no deep probe  No acute SOI  ) present  No erythema  Comments: B/L LE skin is atrophic - thin, dry and shiny in appearance  Toenails x10 are dystrophic, discolored  There is nail thickening and subungual debris noted to toenails to left hallux, R 2nd, B/L 5th toes  Neurological:      General: No focal deficit present  Mental Status: She is alert and oriented to person, place, and time  Comments: Gross sensation to feet intact  Patient endorses numbness, tingling and burning to B/L feet  + paresthesias      Psychiatric:         Mood and Affect: Mood normal          Behavior: Behavior normal

## 2023-06-24 DIAGNOSIS — I50.32 CHRONIC DIASTOLIC CHF (CONGESTIVE HEART FAILURE) (HCC): ICD-10-CM

## 2023-06-26 RX ORDER — EZETIMIBE 10 MG/1
TABLET ORAL
Qty: 90 TABLET | Refills: 1 | Status: SHIPPED | OUTPATIENT
Start: 2023-06-26 | End: 2023-07-03

## 2023-07-03 DIAGNOSIS — I50.32 CHRONIC DIASTOLIC CHF (CONGESTIVE HEART FAILURE) (HCC): ICD-10-CM

## 2023-07-03 RX ORDER — EZETIMIBE 10 MG/1
TABLET ORAL
Qty: 90 TABLET | Refills: 1 | Status: SHIPPED | OUTPATIENT
Start: 2023-07-03

## 2023-07-07 ENCOUNTER — TELEPHONE (OUTPATIENT)
Dept: FAMILY MEDICINE CLINIC | Facility: CLINIC | Age: 83
End: 2023-07-07

## 2023-07-07 DIAGNOSIS — R39.9 UTI SYMPTOMS: Primary | ICD-10-CM

## 2023-07-07 RX ORDER — SULFAMETHOXAZOLE AND TRIMETHOPRIM 800; 160 MG/1; MG/1
1 TABLET ORAL EVERY 12 HOURS SCHEDULED
Qty: 14 TABLET | Refills: 0 | Status: SHIPPED | OUTPATIENT
Start: 2023-07-07 | End: 2023-07-14

## 2023-07-07 NOTE — TELEPHONE ENCOUNTER
Pt asking for a rx for a UTI. States she has a lot of pelvic pain and pressure. Denies any increase in frequency or burning with urination.  States she cannot get in for an appt as she has no ride

## 2023-07-12 NOTE — ASSESSMENT & PLAN NOTE
Continue home PT
Stable  Continue current 
Wt Readings from Last 3 Encounters:   05/23/22 72 1 kg (159 lb)   04/12/22 72 1 kg (159 lb)   03/08/22 71 7 kg (158 lb)       Stable  Continue current 
0 (no pain/absence of nonverbal indicators of pain)

## 2023-07-15 ENCOUNTER — TELEPHONE (OUTPATIENT)
Dept: OTHER | Facility: OTHER | Age: 83
End: 2023-07-15

## 2023-07-15 NOTE — TELEPHONE ENCOUNTER
Patient would like an urgent call back from the office to reschedule her July 18,2023 at [facility]. She would like same pino back on the schedule. She cxld it due to not having a ride but she does have a ride.   Please re add July 18 at [facility]

## 2023-07-18 ENCOUNTER — OFFICE VISIT (OUTPATIENT)
Dept: PODIATRY | Facility: CLINIC | Age: 83
End: 2023-07-18
Payer: COMMERCIAL

## 2023-07-18 VITALS
SYSTOLIC BLOOD PRESSURE: 143 MMHG | HEART RATE: 61 BPM | DIASTOLIC BLOOD PRESSURE: 67 MMHG | HEIGHT: 58 IN | BODY MASS INDEX: 31.28 KG/M2 | WEIGHT: 149 LBS

## 2023-07-18 DIAGNOSIS — I73.9 PERIPHERAL VASCULAR DISEASE, UNSPECIFIED (HCC): Primary | ICD-10-CM

## 2023-07-18 DIAGNOSIS — B35.1 ONYCHOMYCOSIS: ICD-10-CM

## 2023-07-18 DIAGNOSIS — I25.10 CORONARY ARTERY DISEASE INVOLVING NATIVE HEART, UNSPECIFIED VESSEL OR LESION TYPE, UNSPECIFIED WHETHER ANGINA PRESENT: ICD-10-CM

## 2023-07-18 DIAGNOSIS — L97.511 SKIN ULCER OF RIGHT GREAT TOE, LIMITED TO BREAKDOWN OF SKIN (HCC): ICD-10-CM

## 2023-07-18 DIAGNOSIS — G62.9 PERIPHERAL POLYNEUROPATHY: ICD-10-CM

## 2023-07-18 DIAGNOSIS — G20 PARKINSON'S DISEASE (HCC): ICD-10-CM

## 2023-07-18 PROCEDURE — 11721 DEBRIDE NAIL 6 OR MORE: CPT | Performed by: STUDENT IN AN ORGANIZED HEALTH CARE EDUCATION/TRAINING PROGRAM

## 2023-07-18 PROCEDURE — 99212 OFFICE O/P EST SF 10 MIN: CPT | Performed by: STUDENT IN AN ORGANIZED HEALTH CARE EDUCATION/TRAINING PROGRAM

## 2023-07-18 NOTE — PROGRESS NOTES
Assessment/Plan:     Diagnoses and all orders for this visit:    Peripheral vascular disease, unspecified ([address])    Onychomycosis    Skin ulcer of right great toe, limited to breakdown of skin (HCC)    Peripheral polyneuropathy             IMPRESSION:  · Peripheral neuropathy, paresthesias B/L LE to toes. Hx sciatica per patient   · Right hallux plantar-medial IPJ ulceration (Pressure/PVD component) with gouty tophi today  · PVD (Q9 findings) w/ Onychodystrophy     PLAN:  Right hallux plantar-medial IPJ with ulceration due to tight shoes. Appears superficial without SOI today. C/w silvadene cream for daily application with DSD. I again recommended soft and side diabetic shoes (unfortunately patient did not buy). I discussed that she needs to wear better shoes that do not rub the area or bony prominences to feet. • Toenails x10 debrided in length and thickness with a nail nipper and jamilah (Q9). · F/u 9 weeks for wound recheck; call sooner if SOI arise      Subjective:      Patient ID: Michelle Newell is a 78 y.o. female. Cari Payan presents to clinic today concerning Right 1st toe ulcer and nail care. Did not get new shoes as instructed. The following portions of the patient's history were reviewed and updated as appropriate: allergies, current medications, past family history, past medical history, past social history, past surgical history and problem list.    Review of Systems   Constitutional: Negative for activity change, chills and fever. HENT: Negative. Respiratory: Negative for cough, chest tightness and shortness of breath. Cardiovascular: Negative for chest pain and leg swelling. Endocrine: Negative. Genitourinary: Negative. Musculoskeletal: Positive for gait problem (Ambulates with walker). Skin: Positive for wound (Right 1st toe). Dystrophic toenails   Neurological:        B/L LE tingling/burning   Psychiatric/Behavioral: Negative.   Negative for agitation and behavioral problems. Objective:      /67   Pulse 61   Ht 4' 10" (1.473 m)   Wt 67.6 kg (149 lb)   BMI 31.14 kg/m²          Physical Exam  Constitutional:       General: She is not in acute distress. Appearance: Normal appearance. She is not ill-appearing. Cardiovascular:      Comments: Bilateral DP pulses are palpable 2/4. Bilateral PT pulses are nonpalpable or diminished 1/4. Pedal hair is absent. Legs to toes warm to cool. Varicosities with mild LE edema noted. Pulmonary:      Effort: No respiratory distress. Musculoskeletal:         General: No tenderness or deformity. Normal range of motion. Skin:     Capillary Refill: Capillary refill takes less than 2 seconds. Findings: Lesion (Right plantar-medial hallux IPJ, now full thickness with mild gouty tophi, no deep probe. No acute SOI. ) present. No erythema. Comments: B/L LE skin is atrophic - thin, dry and shiny in appearance. Toenails x10 are elongated, dystrophic, discolored. There is nail thickening and subungual debris noted to toenails to left hallux, R 2nd, B/L 5th toes. Neurological:      General: No focal deficit present. Mental Status: She is alert and oriented to person, place, and time. Comments: Gross sensation to feet intact. Patient endorses numbness, tingling and burning to B/L feet. + paresthesias.     Psychiatric:         Mood and Affect: Mood normal.         Behavior: Behavior normal.

## 2023-07-19 RX ORDER — MEMANTINE HYDROCHLORIDE 5 MG/1
TABLET ORAL
Qty: 90 TABLET | Refills: 0 | Status: SHIPPED | OUTPATIENT
Start: 2023-07-19

## 2023-07-19 RX ORDER — FUROSEMIDE 20 MG/1
TABLET ORAL
Qty: 180 TABLET | Refills: 0 | Status: SHIPPED | OUTPATIENT
Start: 2023-07-19

## 2023-07-28 NOTE — TELEPHONE ENCOUNTER
Requested medication(s) are due for refill today: Yes  Patient has already received a courtesy refill: No  Other reason request has been forwarded to provider: She has labs ordered

## 2023-08-02 DIAGNOSIS — G20 PARKINSON'S DISEASE (HCC): ICD-10-CM

## 2023-08-02 RX ORDER — MEMANTINE HYDROCHLORIDE 5 MG/1
TABLET ORAL
Qty: 90 TABLET | Refills: 0 | Status: SHIPPED | OUTPATIENT
Start: 2023-08-02

## 2023-08-18 ENCOUNTER — TELEPHONE (OUTPATIENT)
Dept: FAMILY MEDICINE CLINIC | Facility: CLINIC | Age: 83
End: 2023-08-18

## 2023-08-18 ENCOUNTER — OFFICE VISIT (OUTPATIENT)
Dept: URGENT CARE | Facility: CLINIC | Age: 83
End: 2023-08-18
Payer: COMMERCIAL

## 2023-08-18 VITALS
SYSTOLIC BLOOD PRESSURE: 125 MMHG | OXYGEN SATURATION: 99 % | RESPIRATION RATE: 20 BRPM | HEART RATE: 64 BPM | DIASTOLIC BLOOD PRESSURE: 58 MMHG | TEMPERATURE: 98.2 F

## 2023-08-18 DIAGNOSIS — M54.32 SCIATICA OF LEFT SIDE: Primary | ICD-10-CM

## 2023-08-18 PROCEDURE — 99213 OFFICE O/P EST LOW 20 MIN: CPT | Performed by: PHYSICIAN ASSISTANT

## 2023-08-18 NOTE — TELEPHONE ENCOUNTER
Pt experiencing Left flank pain. Anytime she moves it hurts. No bowel movements for 2 days but passing gas.

## 2023-08-18 NOTE — PATIENT INSTRUCTIONS
Alternate ice/heat  Tylenol as needed for pain  Rest on 8/18 and 8/19  Lidocaine patches   Follow up with PCP in 3-5 days. Proceed to  ER if symptoms worsen.

## 2023-08-18 NOTE — TELEPHONE ENCOUNTER
Patient returned call to determine where she should go to be evaluated. I informed her that the most recent message from the doctor stated to go to urgent care or the ER. Patient preferred to go to Urgent Care in [facility].   Confirmed they are open until 8pm M-F.

## 2023-08-18 NOTE — PROGRESS NOTES
[facility]        NAME: Lucille Hannon is a 78 y.o. female  : 1940    MRN: [id number]  DATE: 2023  TIME: 5:45 PM    Assessment and Plan   Sciatica of left side [M54.32]  1. Sciatica of left side            23: AST/ALT WNL; eGFR 36    Patient Instructions     Alternate ice/heat  Tylenol as needed for pain  Rest on  and   Lidocaine patches   Follow up with PCP in 3-5 days. Proceed to  ER if symptoms worsen. Chief Complaint     Chief Complaint   Patient presents with   • Hip Pain     Happens every so often. Hurts with movement. Has for over a year. Using Tylenol. History of Present Illness       Patient states she took an unkn abx last week for UTI symptoms. States those symptoms have resolved. Additionally states she had diarrhea 2 days ago that she suspects was from eating a hot dog from [facility]. Symptoms have since resolved. Hx/o sciatica. Back Pain  This is a recurrent problem. Episode onset: 2 days. The pain is present in the lumbar spine (L). Quality: "jabbing" Radiates to: L leg. The pain is moderate. Exacerbated by: walking; leaning forward. Pertinent negatives include no abdominal pain, bladder incontinence, bowel incontinence, chest pain, dysuria, fever, headaches or tingling. Treatments tried: tylenol. The treatment provided moderate relief. Review of Systems   Review of Systems   Constitutional: Negative for chills and fever. Respiratory: Negative for cough and shortness of breath. Cardiovascular: Negative for chest pain and palpitations. Gastrointestinal: Positive for diarrhea (resolved) and nausea (this morning). Negative for abdominal distention, abdominal pain, anal bleeding, blood in stool, bowel incontinence, constipation and vomiting. Genitourinary: Negative for bladder incontinence, dysuria, flank pain, frequency, hematuria and urgency. Musculoskeletal: Positive for back pain. Negative for myalgias.    Skin: Negative for rash. Neurological: Negative for dizziness, tingling, light-headedness and headaches.          Current Medications       Current Outpatient Medications:   •  acetaminophen (TYLENOL) 650 mg CR tablet, Take 1 tablet (650 mg total) by mouth every 8 (eight) hours as needed for mild pain or moderate pain, Disp: 90 tablet, Rfl: 2  •  aspirin (ECOTRIN LOW STRENGTH) 81 mg EC tablet, Take 81 mg by mouth daily, Disp: , Rfl:   •  cholecalciferol (VITAMIN D3) 1,000 units tablet, take 1 tablet by mouth once daily, Disp: 90 tablet, Rfl: 1  •  cyanocobalamin 1,000 mcg/mL, Inject 1 mL (1,000 mcg total) into a muscle every 14 (fourteen) days, Disp: 10 mL, Rfl: 3  •  Eliquis 5 MG, Take 1 tablet (5 mg total) by mouth 2 (two) times a day, Disp: 180 tablet, Rfl: 3  •  ezetimibe (ZETIA) 10 mg tablet, take 1 tablet by mouth once daily, Disp: 90 tablet, Rfl: 1  •  furosemide (LASIX) 20 mg tablet, take 1 tablet by mouth twice a day, Disp: 180 tablet, Rfl: 0  •  isosorbide mononitrate (IMDUR) 60 mg 24 hr tablet, Take 1 tablet (60 mg total) by mouth daily, Disp: 90 tablet, Rfl: 3  •  lansoprazole (PREVACID) 30 mg capsule, Take 1 capsule (30 mg total) by mouth in the morning., Disp: 90 capsule, Rfl: 3  •  losartan (COZAAR) 25 mg tablet, take 1 tablet by mouth once daily, Disp: 90 tablet, Rfl: 3  •  memantine (NAMENDA) 5 mg tablet, take 1 tablet by mouth once daily, Disp: 90 tablet, Rfl: 0  •  silver sulfadiazine (Silvadene) 1 % cream, Apply topically daily, Disp: 50 g, Rfl: 0  •  Syringe/Needle, Disp, (SYRINGE 3CC/22GX1") 22G X 1" 3 ML MISC, Use once a week, Disp: 50 each, Rfl: 5  •  zinc oxide (DESITIN) 40 % PSTE, Apply topically daily, Disp: 57 g, Rfl: 1  •  benzonatate (TESSALON PERLES) 100 mg capsule, Take 1 capsule (100 mg total) by mouth 3 (three) times a day as needed for cough (Patient not taking: Reported on 1/16/2023), Disp: 20 capsule, Rfl: 0  •  pregabalin (LYRICA) 50 mg capsule, Take 1 capsule (50 mg total) by mouth 3 (three) times a day, Disp: 90 capsule, Rfl: 1    Current Facility-Administered Medications:   •  cyanocobalamin injection 1,000 mcg, 1,000 mcg, Intramuscular, Q30 Days, Lupe Levy MD, 1,000 mcg at 02/19/18 1008    Current Allergies     Allergies as of 08/18/2023 - Reviewed 08/18/2023   Allergen Reaction Noted   • Amoxicillin Hives 03/14/2016   • Ciprofloxacin Hives 02/13/2018   • Erythromycin Hives 08/06/2018   • Statins Other (See Comments) 03/14/2016   • Vancomycin Itching 06/15/2022   • Amoxicillin-pot clavulanate Other (See Comments) 04/08/2020   • Clindamycin Other (See Comments) 03/10/2020            The following portions of the patient's history were reviewed and updated as appropriate: allergies, current medications, past family history, past medical history, past social history, past surgical history and problem list.     Past Medical History:   Diagnosis Date   • Alzheimer's dementia ([address])    • Aphasia    • CHF (congestive heart failure) ([address])    • Coronary artery disease    • Hypercholesteremia    • Hypertension    • IBS (irritable bowel syndrome)    • Renal disorder    • Rheumatic fever    • Skin lesion        Past Surgical History:   Procedure Laterality Date   • ANKLE FRACTURE SURGERY      LAST ASSESSED [id number]   • HYSTERECTOMY     • KIDNEY SURGERY     • NEPHRECTOMY     • TONSILLECTOMY         Family History   Problem Relation Age of Onset   • Hypertension Mother    • Asthma Father    • Cancer Sister          Medications have been verified. Objective   /58   Pulse 64   Temp 98.2 °F (36.8 °C)   Resp 20   SpO2 99%   No LMP recorded. Patient has had a hysterectomy. Physical Exam     Physical Exam  Vitals reviewed. Constitutional:       General: She is not in acute distress. Appearance: She is well-developed. Cardiovascular:      Rate and Rhythm: Normal rate and regular rhythm. Heart sounds: Normal heart sounds. No murmur heard. No friction rub. No gallop. Pulmonary:      Effort: Pulmonary effort is normal. No respiratory distress. Breath sounds: Normal breath sounds. No wheezing, rhonchi or rales. Abdominal:      Palpations: Abdomen is soft. Tenderness: There is no abdominal tenderness. Musculoskeletal:         General: No tenderness or deformity. Skin:     General: Skin is warm. Findings: No rash. Neurological:      Mental Status: She is alert and oriented to person, place, and time. Sensory: No sensory deficit. Deep Tendon Reflexes: Reflexes are normal and symmetric. Comments: Negative L sided SLR   Psychiatric:         Behavior: Behavior normal.         Thought Content:  Thought content normal.         Judgment: Judgment normal.

## 2023-09-05 ENCOUNTER — RA CDI HCC (OUTPATIENT)
Dept: OTHER | Facility: HOSPITAL | Age: 83
End: 2023-09-05

## 2023-09-05 NOTE — PROGRESS NOTES
[address] [facility] coding opportunities       Chart reviewed, no opportunity found: CHART REVIEWED, NO OPPORTUNITY FOUND        Patients Insurance        Commercial Insurance: Randal Almonte

## 2023-09-11 ENCOUNTER — OFFICE VISIT (OUTPATIENT)
Dept: FAMILY MEDICINE CLINIC | Facility: CLINIC | Age: 83
End: 2023-09-11
Payer: COMMERCIAL

## 2023-09-11 VITALS
BODY MASS INDEX: 31.14 KG/M2 | OXYGEN SATURATION: 97 % | SYSTOLIC BLOOD PRESSURE: 126 MMHG | HEART RATE: 64 BPM | RESPIRATION RATE: 16 BRPM | DIASTOLIC BLOOD PRESSURE: 68 MMHG | TEMPERATURE: 98.4 F | WEIGHT: 149 LBS

## 2023-09-11 DIAGNOSIS — N18.31 STAGE 3A CHRONIC KIDNEY DISEASE (HCC): ICD-10-CM

## 2023-09-11 DIAGNOSIS — R26.2 AMBULATORY DYSFUNCTION: ICD-10-CM

## 2023-09-11 DIAGNOSIS — M54.32 SCIATICA OF LEFT SIDE: ICD-10-CM

## 2023-09-11 DIAGNOSIS — R60.9 PERIPHERAL EDEMA: ICD-10-CM

## 2023-09-11 DIAGNOSIS — R44.1 VISUAL HALLUCINATIONS: ICD-10-CM

## 2023-09-11 DIAGNOSIS — I50.32 CHRONIC DIASTOLIC CHF (CONGESTIVE HEART FAILURE) (HCC): ICD-10-CM

## 2023-09-11 DIAGNOSIS — R73.03 PREDIABETES: ICD-10-CM

## 2023-09-11 DIAGNOSIS — K59.00 CONSTIPATION, UNSPECIFIED CONSTIPATION TYPE: ICD-10-CM

## 2023-09-11 DIAGNOSIS — G20 PARKINSON'S DISEASE: Primary | ICD-10-CM

## 2023-09-11 PROCEDURE — 99214 OFFICE O/P EST MOD 30 MIN: CPT | Performed by: FAMILY MEDICINE

## 2023-09-11 RX ORDER — SENNOSIDES 8.6 MG
8.6 TABLET ORAL
Qty: 30 TABLET | Refills: 2 | Status: SHIPPED | OUTPATIENT
Start: 2023-09-11 | End: 2023-12-10

## 2023-09-11 RX ORDER — MEMANTINE HYDROCHLORIDE 10 MG/1
10 TABLET ORAL 2 TIMES DAILY
Qty: 180 TABLET | Refills: 3 | Status: SHIPPED | OUTPATIENT
Start: 2023-09-11

## 2023-09-13 NOTE — ASSESSMENT & PLAN NOTE
Patient endorses visual hallucinations of children in room with her. Patient states hallucinations are frequent, nonthreatening, and exist as shadowy forms at the edges of her vision. Patient states she has attempted to interact with them, but they do not interact back. Patient states she does not feel threatened by them and understands that they are not actually present.   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

## 2023-09-13 NOTE — ASSESSMENT & PLAN NOTE
Lab Results   Component Value Date    HGBA1C 5.7 (H) 01/16/2023   Patient endorses no significant changes in lifestyle or diet since last HbA1c as listed above.     Plan:  • Fasting CMP ordered

## 2023-09-13 NOTE — ASSESSMENT & PLAN NOTE
Patient continues to endorse issues with constipation, endorsing infrequent, hard bowel movements roughly every 2 to 3 days which require straining. Uncertain of her current diet as aide cooks for her, unlikely to have adequate fiber.   Given age and significant deconditioning, likely slow transit constipation    Plan:  • Trial of Senokot at bedtime  • Encourage patient to have adequate fluid intake and adequate exercise to aid in motility  • Return in 3 months for reassessment

## 2023-09-13 NOTE — ASSESSMENT & PLAN NOTE
Patient endorses dependence on rollator for ambulation, states she has difficulty maneuvering it through the tight spaces of her living situation.     Plan:  • Advised patient to continue utilizing rollator  Advised patient caution during transfers, encouraged her to rely on her aide and on rollator for support

## 2023-09-13 NOTE — ASSESSMENT & PLAN NOTE
Patient continues to endorse pain from sciatica, bilateral, left greater than right.     Plan:  • Continue to encourage exercise and supervised ambulation  • Continue with Tylenol 650 as needed every 8 hours

## 2023-09-13 NOTE — ASSESSMENT & PLAN NOTE
Lab Results   Component Value Date    EGFR 36 01/16/2023    EGFR 48 09/12/2022    EGFR 52 06/17/2022    CREATININE 1.33 (H) 01/16/2023    CREATININE 1.06 09/12/2022    CREATININE 0.99 06/17/2022   At baseline for this patient.  Repeat CMP ordered to assess for progressions

## 2023-09-13 NOTE — ASSESSMENT & PLAN NOTE
Patient endorses continued struggles with parkinsonian symptoms, states she has difficulty utilizing her walker for ambulation. Patient continues to endorse deficits in short-term and working memory, and additionally states that she occasionally sees people or objects in the room with her which she attempts to interact with, but do not interact with her.   Patient states these visual hallucinations are not impactful for her life and are not causing her suffering or duress    Plan:  • Initiate trial of Namenda  • Continue to stress importance of ambulation using rollator  • Return in 3 months for reassessment

## 2023-09-13 NOTE — ASSESSMENT & PLAN NOTE
Wt Readings from Last 3 Encounters:   09/11/23 67.6 kg (149 lb)   07/18/23 67.6 kg (149 lb)   06/20/23 67.6 kg (149 lb)   Weights stable at last 3 visits, patient endorses no orthopnea, dyspnea, weight gain on home scale, or other similar signs. Plan:  • Continue Lasix 20 mg twice daily  • Continue to monitor weight at follow-up appointments.

## 2023-09-19 ENCOUNTER — LAB (OUTPATIENT)
Dept: LAB | Facility: MEDICAL CENTER | Age: 83
End: 2023-09-19
Payer: COMMERCIAL

## 2023-09-19 ENCOUNTER — OFFICE VISIT (OUTPATIENT)
Dept: PODIATRY | Facility: CLINIC | Age: 83
End: 2023-09-19
Payer: COMMERCIAL

## 2023-09-19 VITALS
HEIGHT: 59 IN | BODY MASS INDEX: 30.04 KG/M2 | WEIGHT: 149 LBS | DIASTOLIC BLOOD PRESSURE: 60 MMHG | HEART RATE: 66 BPM | SYSTOLIC BLOOD PRESSURE: 139 MMHG

## 2023-09-19 DIAGNOSIS — B35.1 ONYCHOMYCOSIS: ICD-10-CM

## 2023-09-19 DIAGNOSIS — R73.03 PREDIABETES: ICD-10-CM

## 2023-09-19 DIAGNOSIS — L97.511 SKIN ULCER OF RIGHT GREAT TOE, LIMITED TO BREAKDOWN OF SKIN (HCC): ICD-10-CM

## 2023-09-19 DIAGNOSIS — N18.31 STAGE 3A CHRONIC KIDNEY DISEASE (HCC): ICD-10-CM

## 2023-09-19 DIAGNOSIS — I50.32 CHRONIC DIASTOLIC CHF (CONGESTIVE HEART FAILURE) (HCC): ICD-10-CM

## 2023-09-19 DIAGNOSIS — I73.9 PERIPHERAL VASCULAR DISEASE, UNSPECIFIED (HCC): Primary | ICD-10-CM

## 2023-09-19 DIAGNOSIS — R60.9 PERIPHERAL EDEMA: ICD-10-CM

## 2023-09-19 LAB
ALBUMIN SERPL BCP-MCNC: 4.3 G/DL (ref 3.5–5)
ALP SERPL-CCNC: 66 U/L (ref 34–104)
ALT SERPL W P-5'-P-CCNC: 11 U/L (ref 7–52)
ANION GAP SERPL CALCULATED.3IONS-SCNC: 11 MMOL/L
AST SERPL W P-5'-P-CCNC: 15 U/L (ref 13–39)
BILIRUB SERPL-MCNC: 0.4 MG/DL (ref 0.2–1)
BUN SERPL-MCNC: 36 MG/DL (ref 5–25)
CALCIUM SERPL-MCNC: 9.4 MG/DL (ref 8.4–10.2)
CHLORIDE SERPL-SCNC: 109 MMOL/L (ref 96–108)
CHOLEST SERPL-MCNC: 199 MG/DL
CO2 SERPL-SCNC: 26 MMOL/L (ref 21–32)
CREAT SERPL-MCNC: 1.04 MG/DL (ref 0.6–1.3)
GFR SERPL CREATININE-BSD FRML MDRD: 49 ML/MIN/1.73SQ M
GLUCOSE P FAST SERPL-MCNC: 93 MG/DL (ref 65–99)
HDLC SERPL-MCNC: 52 MG/DL
LDLC SERPL CALC-MCNC: 92 MG/DL (ref 0–100)
POTASSIUM SERPL-SCNC: 4 MMOL/L (ref 3.5–5.3)
PROT SERPL-MCNC: 6.8 G/DL (ref 6.4–8.4)
SODIUM SERPL-SCNC: 146 MMOL/L (ref 135–147)
TRIGL SERPL-MCNC: 275 MG/DL

## 2023-09-19 PROCEDURE — 80061 LIPID PANEL: CPT

## 2023-09-19 PROCEDURE — 99212 OFFICE O/P EST SF 10 MIN: CPT | Performed by: STUDENT IN AN ORGANIZED HEALTH CARE EDUCATION/TRAINING PROGRAM

## 2023-09-19 PROCEDURE — 80053 COMPREHEN METABOLIC PANEL: CPT

## 2023-09-19 PROCEDURE — 36415 COLL VENOUS BLD VENIPUNCTURE: CPT

## 2023-09-19 PROCEDURE — 11721 DEBRIDE NAIL 6 OR MORE: CPT | Performed by: STUDENT IN AN ORGANIZED HEALTH CARE EDUCATION/TRAINING PROGRAM

## 2023-09-19 NOTE — PROGRESS NOTES
Assessment/Plan:     Diagnoses and all orders for this visit:    Peripheral vascular disease, unspecified ([address])    Onychomycosis    Skin ulcer of right great toe, limited to breakdown of skin (HCC)             IMPRESSION:  · Peripheral neuropathy, paresthesias B/L LE to toes. Hx sciatica per patient   · Right hallux plantar-medial IPJ ulceration (Pressure/PVD component)  · PVD (Q9 findings) w/ Onychodystrophy     PLAN:  Right hallux plantar-medial IPJ with ulceration due to tight shoes. Appears superficial without SOI today. C/w silvadene cream for daily application with DSD. F/u one month for recheck. I modified surgical shoe with cutout so hopefully this will better aid in pressure reduction and healing. May need ABIs. I again recommended soft and side diabetic shoes (unfortunately patient did not buy). I discussed that she needs to wear better shoes that do not rub the area or bony prominences to feet. • Toenails x10 debrided in length and thickness with a nail nipper and jamilah (Q9). • F/u 1 month, repeat XR R 1st toe NWB      Subjective:      Patient ID: Lauri Russell is a 78 y.o. female. Laverne Garcia presents to clinic today concerning Right 1st toe ulcer and nail care. Did not get new shoes as instructed. The following portions of the patient's history were reviewed and updated as appropriate: allergies, current medications, past family history, past medical history, past social history, past surgical history and problem list.    Review of Systems   Constitutional: Negative for activity change, chills and fever. HENT: Negative. Respiratory: Negative for cough, chest tightness and shortness of breath. Cardiovascular: Negative for chest pain and leg swelling. Endocrine: Negative. Genitourinary: Negative. Musculoskeletal: Positive for gait problem (Ambulates with walker). Skin: Positive for wound (Right 1st toe).         Dystrophic toenails   Neurological:        B/L LE tingling/burning   Psychiatric/Behavioral: Negative. Negative for agitation and behavioral problems. Objective:      /60   Pulse 66   Ht 4' 11" (1.499 m)   Wt 67.6 kg (149 lb) Comment: las weight  7/18/23  BMI 30.09 kg/m²          Physical Exam  Constitutional:       General: She is not in acute distress. Appearance: Normal appearance. She is not ill-appearing. Cardiovascular:      Comments: Bilateral DP pulses are palpable 2/4. Bilateral PT pulses are nonpalpable or diminished 1/4. Pedal hair is absent. Legs to toes warm to cool. Varicosities with mild LE edema noted. Pulmonary:      Effort: No respiratory distress. Musculoskeletal:         General: No tenderness or deformity. Normal range of motion. Skin:     Capillary Refill: Capillary refill takes less than 2 seconds. Findings: Lesion (Right plantar-medial hallux IPJ, now full thickness, no deep probe. No acute SOI. ) present. No erythema. Comments: B/L LE skin is atrophic - thin, dry and shiny in appearance. Toenails x10 are elongated, dystrophic, discolored. There is nail thickening and subungual debris noted to toenails to left hallux, R 2nd, B/L 5th toes. Neurological:      General: No focal deficit present. Mental Status: She is alert and oriented to person, place, and time. Comments: Gross sensation to feet intact. Patient endorses numbness, tingling and burning to B/L feet. + paresthesias.     Psychiatric:         Mood and Affect: Mood normal.         Behavior: Behavior normal.

## 2023-09-27 DIAGNOSIS — E55.9 VITAMIN D DEFICIENCY: ICD-10-CM

## 2023-09-28 RX ORDER — MELATONIN
Qty: 90 TABLET | Refills: 1 | Status: SHIPPED | OUTPATIENT
Start: 2023-09-28

## 2023-09-29 ENCOUNTER — TELEPHONE (OUTPATIENT)
Dept: FAMILY MEDICINE CLINIC | Facility: CLINIC | Age: 83
End: 2023-09-29

## 2023-09-29 DIAGNOSIS — R39.9 UTI SYMPTOMS: Primary | ICD-10-CM

## 2023-09-29 RX ORDER — SULFAMETHOXAZOLE AND TRIMETHOPRIM 800; 160 MG/1; MG/1
1 TABLET ORAL EVERY 12 HOURS SCHEDULED
Qty: 14 TABLET | Refills: 0 | Status: SHIPPED | OUTPATIENT
Start: 2023-09-29 | End: 2023-10-06

## 2023-10-11 DIAGNOSIS — I48.91 ATRIAL FIBRILLATION, UNSPECIFIED TYPE (HCC): ICD-10-CM

## 2023-10-11 RX ORDER — ISOSORBIDE MONONITRATE 60 MG/1
60 TABLET, EXTENDED RELEASE ORAL DAILY
Qty: 90 TABLET | Refills: 3 | Status: SHIPPED | OUTPATIENT
Start: 2023-10-11

## 2023-11-01 DIAGNOSIS — I25.10 CORONARY ARTERY DISEASE INVOLVING NATIVE HEART, UNSPECIFIED VESSEL OR LESION TYPE, UNSPECIFIED WHETHER ANGINA PRESENT: ICD-10-CM

## 2023-11-02 RX ORDER — FUROSEMIDE 20 MG/1
TABLET ORAL
Qty: 180 TABLET | Refills: 0 | Status: SHIPPED | OUTPATIENT
Start: 2023-11-02

## 2023-11-20 ENCOUNTER — TELEPHONE (OUTPATIENT)
Dept: OTHER | Facility: OTHER | Age: 83
End: 2023-11-20

## 2023-11-20 NOTE — TELEPHONE ENCOUNTER
Patient is calling regarding cancelling an appointment.     Date/Time: 8:45 AM 11/21/2023    Patient was rescheduled: YES [] NO [x]    Patient requesting call back to reschedule: YES [x] NO []

## 2023-11-21 ENCOUNTER — TELEPHONE (OUTPATIENT)
Age: 83
End: 2023-11-21

## 2023-11-21 NOTE — TELEPHONE ENCOUNTER
Spoke with patient and explained to her that if she thinks its infected to go to the ED. She stated that it is red and warm and there is some discharge. Denies fever, chills, nausea. I offered the appointment on 12/5/23 and she said she'll take it. I told the patient that is 2 weeks away and if we think its infected we need to go to the ed and not wait for this appointment and she claimed that it was not infected. Thinks that it could possible be gout and  that its spreading to her other foot. Again explained to patient she should go to the ed if we think its spreading but she stated that she should be fine to wait for this appointment and again told her if she experiences any infection she needs to go to the ed. Dr. Micheline Avelar is already aware.

## 2023-11-21 NOTE — TELEPHONE ENCOUNTER
Caller: Favian Garcia     Doctor: Sundar Cottrell    Reason for call: Patient stated she needs to be seen that she is having a problem with her foot hurting she said it is gout.  She can only come on  a Monday or Tuesday there is nothing available until Feb. Please advise Thank you     Call back#: [phone]

## 2023-11-30 RX ORDER — AMLODIPINE BESYLATE 5 MG/1
5 TABLET ORAL DAILY
COMMUNITY
Start: 2023-09-26

## 2023-12-05 ENCOUNTER — APPOINTMENT (OUTPATIENT)
Dept: RADIOLOGY | Facility: MEDICAL CENTER | Age: 83
End: 2023-12-05
Payer: COMMERCIAL

## 2023-12-05 ENCOUNTER — OFFICE VISIT (OUTPATIENT)
Dept: PODIATRY | Facility: CLINIC | Age: 83
End: 2023-12-05
Payer: COMMERCIAL

## 2023-12-05 VITALS — OXYGEN SATURATION: 96 % | WEIGHT: 150.6 LBS | HEIGHT: 59 IN | BODY MASS INDEX: 30.36 KG/M2 | HEART RATE: 68 BPM

## 2023-12-05 DIAGNOSIS — L97.511 SKIN ULCER OF RIGHT GREAT TOE, LIMITED TO BREAKDOWN OF SKIN (HCC): ICD-10-CM

## 2023-12-05 DIAGNOSIS — G62.9 PERIPHERAL POLYNEUROPATHY: ICD-10-CM

## 2023-12-05 DIAGNOSIS — I73.9 PERIPHERAL VASCULAR DISEASE, UNSPECIFIED (HCC): Primary | ICD-10-CM

## 2023-12-05 DIAGNOSIS — B35.1 ONYCHOMYCOSIS: ICD-10-CM

## 2023-12-05 PROCEDURE — 73660 X-RAY EXAM OF TOE(S): CPT

## 2023-12-05 PROCEDURE — 11721 DEBRIDE NAIL 6 OR MORE: CPT | Performed by: STUDENT IN AN ORGANIZED HEALTH CARE EDUCATION/TRAINING PROGRAM

## 2023-12-05 PROCEDURE — 99213 OFFICE O/P EST LOW 20 MIN: CPT | Performed by: STUDENT IN AN ORGANIZED HEALTH CARE EDUCATION/TRAINING PROGRAM

## 2023-12-05 NOTE — PROGRESS NOTES
Assessment/Plan:     Diagnoses and all orders for this visit:    Peripheral vascular disease, unspecified (HCC)  -     VAS IRIS & waveform analysis, multiple levels; Future    Onychomycosis  -     XR toe right great min 2 view; Future    Skin ulcer of right great toe, limited to breakdown of skin (HCC)  -     VAS IRIS & waveform analysis, multiple levels; Future    Peripheral polyneuropathy  -     VAS IRIS & waveform analysis, multiple levels; Future    Other orders  -     amLODIPine (NORVASC) 5 mg tablet; Take 5 mg by mouth daily (Patient not taking: Reported on 12/5/2023)           IMAGING REVIEWED TODAY (I reviewed and independently interpreted in PACs)  XR right 1st toe 2v 12/5/23: baseline bony erosive change to medial cortex of 1st proximal phalanx    Prior Imaging  MRI right foot 4/17/23:  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 This is most prominent at the IPJ of great toe. No evidence of OM. XR right great toe 3/28/23: periarticular erosion to IPJ hallux     IMPRESSION:  Peripheral neuropathy, paresthesias B/L LE to toes. Hx sciatica per patient   Right hallux plantar-medial IPJ ulceration (Pressure/PVD component)  PVD (Q9 findings) w/ Onychodystrophy     PLAN:  Right hallux plantar-medial IPJ with ulceration due to tight shoes. Appears superficial without SOI today. C/w silvadene cream for daily application with DSD. I discussed that patient should report to wound care however she does not want this because getting transportation is hard. XR as above with stable erosive changes, MRI April negative for OM and no acute SOI today. F/u 2 months for recheck (as patient cannot come back sooner). This is essentially a palliative care wound. C/w surgical shoe with cutout so hopefully this will better aid in pressure reduction and healing.    ABIs ordered to assess for healing potential  I again recommended soft and side diabetic shoes (unfortunately patient did not buy). I discussed that she needs to wear better shoes that do not rub the area or bony prominences to feet. Toenails x10 debrided in length and thickness with a nail nipper and jamilah (Q9).   I informed PCP (via [employer]) about patient frequently falling at home and recommended she go to urgent care for assessment due to LLE pain (patient does not feel like she will go)      Subjective:      Patient ID: Mayela Parra is a 78 y.o. female. Dior Bautista presents to clinic today concerning Right 1st toe ulcer and nail care. Did not get new shoes as instructed. Notes more burning to toe. Did not f/u as instructed after her last podiatric visit. Patient notes she has been falling more at home and has not told her PCP as she does not want to go to a nursing home. Notes LLE pain and swelling. The following portions of the patient's history were reviewed and updated as appropriate: allergies, current medications, past family history, past medical history, past social history, past surgical history and problem list.    Review of Systems   Constitutional:  Negative for activity change, chills and fever. HENT: Negative. Respiratory:  Negative for cough, chest tightness and shortness of breath. Cardiovascular:  Negative for chest pain and leg swelling. Endocrine: Negative. Genitourinary: Negative. Musculoskeletal:  Positive for gait problem (Ambulates with walker). Skin:  Positive for wound (Right 1st toe). Dystrophic toenails   Neurological:         B/L LE tingling/burning   Psychiatric/Behavioral: Negative. Negative for agitation and behavioral problems. Objective:      Pulse 68   Ht 4' 11" (1.499 m)   Wt 68.3 kg (150 lb 9.6 oz)   SpO2 96%   BMI 30.42 kg/m²          Physical Exam  Constitutional:       General: She is not in acute distress. Appearance: Normal appearance. She is not ill-appearing. Cardiovascular:      Comments: Bilateral DP pulses are palpable 2/4. Bilateral PT pulses are nonpalpable or diminished 1/4. Pedal hair is absent. Legs to toes warm to cool. Varicosities with mild LE edema noted. Pulmonary:      Effort: No respiratory distress. Musculoskeletal:         General: No tenderness or deformity. Normal range of motion. Skin:     Capillary Refill: Capillary refill takes less than 2 seconds. Findings: Lesion (Right plantar-medial hallux IPJ, now full thickness, no deep probe. No acute SOI. ) present. No erythema. Comments: B/L LE skin is atrophic - thin, dry and shiny in appearance. Toenails x10 are elongated, dystrophic, discolored. There is nail thickening and subungual debris noted to toenails to left hallux, R 2nd, B/L 5th toes. Neurological:      General: No focal deficit present. Mental Status: She is alert and oriented to person, place, and time. Comments: Gross sensation to feet intact. Patient endorses numbness, tingling and burning to B/L feet. + paresthesias.     Psychiatric:         Mood and Affect: Mood normal.         Behavior: Behavior normal.

## 2023-12-07 ENCOUNTER — TELEPHONE (OUTPATIENT)
Dept: FAMILY MEDICINE CLINIC | Facility: CLINIC | Age: 83
End: 2023-12-07

## 2023-12-07 NOTE — TELEPHONE ENCOUNTER
Patient called stating she has not had a bowel movement in 8 days. She has been taking generic Metamucil and also drinking prune juice, but it is not helping. She wanted to know if there is something else she can take that would help.

## 2023-12-18 ENCOUNTER — OFFICE VISIT (OUTPATIENT)
Dept: FAMILY MEDICINE CLINIC | Facility: CLINIC | Age: 83
End: 2023-12-18
Payer: COMMERCIAL

## 2023-12-18 VITALS
WEIGHT: 147.4 LBS | DIASTOLIC BLOOD PRESSURE: 78 MMHG | BODY MASS INDEX: 29.77 KG/M2 | HEART RATE: 70 BPM | SYSTOLIC BLOOD PRESSURE: 140 MMHG | TEMPERATURE: 97.4 F | OXYGEN SATURATION: 97 %

## 2023-12-18 DIAGNOSIS — M46.87 OTHER SPECIFIED INFLAMMATORY SPONDYLOPATHIES, LUMBOSACRAL REGION (HCC): Primary | ICD-10-CM

## 2023-12-18 DIAGNOSIS — I50.32 CHRONIC DIASTOLIC CHF (CONGESTIVE HEART FAILURE) (HCC): ICD-10-CM

## 2023-12-18 DIAGNOSIS — N17.9 AKI (ACUTE KIDNEY INJURY) (HCC): ICD-10-CM

## 2023-12-18 DIAGNOSIS — I25.10 CORONARY ARTERY DISEASE INVOLVING NATIVE HEART, UNSPECIFIED VESSEL OR LESION TYPE, UNSPECIFIED WHETHER ANGINA PRESENT: ICD-10-CM

## 2023-12-18 DIAGNOSIS — K59.00 CONSTIPATION, UNSPECIFIED CONSTIPATION TYPE: ICD-10-CM

## 2023-12-18 DIAGNOSIS — M79.10 MYALGIA: ICD-10-CM

## 2023-12-18 DIAGNOSIS — R29.6 FREQUENT FALLS: ICD-10-CM

## 2023-12-18 DIAGNOSIS — I48.0 PAROXYSMAL ATRIAL FIBRILLATION (HCC): ICD-10-CM

## 2023-12-18 DIAGNOSIS — K21.9 GERD WITHOUT ESOPHAGITIS: ICD-10-CM

## 2023-12-18 DIAGNOSIS — N18.31 STAGE 3A CHRONIC KIDNEY DISEASE (HCC): ICD-10-CM

## 2023-12-18 DIAGNOSIS — E53.8 LOW SERUM VITAMIN B12: ICD-10-CM

## 2023-12-18 DIAGNOSIS — I10 ESSENTIAL HYPERTENSION: ICD-10-CM

## 2023-12-18 DIAGNOSIS — F03.918 DEMENTIA WITH BEHAVIORAL DISTURBANCE (HCC): ICD-10-CM

## 2023-12-18 DIAGNOSIS — Z23 ENCOUNTER FOR IMMUNIZATION: ICD-10-CM

## 2023-12-18 DIAGNOSIS — E66.01 OBESITY, MORBID (HCC): ICD-10-CM

## 2023-12-18 DIAGNOSIS — G20.A1 PARKINSON'S DISEASE: ICD-10-CM

## 2023-12-18 DIAGNOSIS — E53.8 B12 DEFICIENCY: ICD-10-CM

## 2023-12-18 PROCEDURE — 99214 OFFICE O/P EST MOD 30 MIN: CPT | Performed by: FAMILY MEDICINE

## 2023-12-18 RX ORDER — SYRINGE W-NEEDLE,DISPOSAB,3 ML 25GX5/8"
SYRINGE, EMPTY DISPOSABLE MISCELLANEOUS WEEKLY
Qty: 50 EACH | Refills: 5 | Status: SHIPPED | OUTPATIENT
Start: 2023-12-18

## 2023-12-18 RX ORDER — CYANOCOBALAMIN 1000 UG/ML
1000 INJECTION, SOLUTION INTRAMUSCULAR; SUBCUTANEOUS
Qty: 10 ML | Refills: 5 | Status: SHIPPED | OUTPATIENT
Start: 2023-12-18

## 2023-12-18 RX ORDER — BISACODYL 5 MG/1
5 TABLET, DELAYED RELEASE ORAL DAILY PRN
Qty: 30 TABLET | Refills: 0 | Status: SHIPPED | OUTPATIENT
Start: 2023-12-18

## 2023-12-18 RX ORDER — POLYETHYLENE GLYCOL 3350 17 G/17G
17 POWDER, FOR SOLUTION ORAL DAILY
Qty: 850 G | Refills: 5 | Status: SHIPPED | OUTPATIENT
Start: 2023-12-18

## 2023-12-18 NOTE — PROGRESS NOTES
"Assessment/Plan:    GERD without esophagitis  No alarm signs.  Conitnue current.    Essential hypertension  BP at goal.  Continue current.    Chronic diastolic CHF (congestive heart failure) ([facility])  Wt Readings from Last 3 Encounters:   12/18/23 66.9 kg (147 lb 6.4 oz)   12/05/23 68.3 kg (150 lb 9.6 oz)   09/19/23 67.6 kg (149 lb)   Weight stable.  Continue current.  I reviewed her most recent TTE.  EF>50%            A-fib ([facility])  I reviewed cardiology's recommendations.  Clinically in SR today.    CAD (coronary artery disease)  Stable.  Continue current.       Diagnoses and all orders for this visit:    Other specified inflammatory spondylopathies, lumbosacral region ([facility])    Encounter for immunization  -     Cancel: influenza vaccine, high-dose, PF 0.7 mL (FLUZONE HIGH-DOSE)    Constipation, unspecified constipation type  -     polyethylene glycol (GLYCOLAX) 17 GM/SCOOP powder; Take 17 g by mouth daily  -     bisacodyl (DULCOLAX) 5 mg EC tablet; Take 1 tablet (5 mg total) by mouth daily as needed for constipation    Obesity, morbid ([facility])    Frequent falls  -     Ambulatory Referral to Physical Therapy; Future  -     EXTERNAL Referral to Home Health; Future    B12 deficiency  -     cyanocobalamin 1,000 mcg/mL; Inject 1 mL (1,000 mcg total) into a muscle every 7 days  -     EXTERNAL Referral to Home Health; Future    Myalgia  -     Syringe/Needle, Disp, (SYRINGE 3CC/22GX1\") 22G X 1\" 3 ML MISC; Use once a week    GERD without esophagitis  -     Syringe/Needle, Disp, (SYRINGE 3CC/22GX1\") 22G X 1\" 3 ML MISC; Use once a week    Essential hypertension  -     Syringe/Needle, Disp, (SYRINGE 3CC/22GX1\") 22G X 1\" 3 ML MISC; Use once a week    Chronic diastolic CHF (congestive heart failure) ([facility])  -     Syringe/Needle, Disp, (SYRINGE 3CC/22GX1\") 22G X 1\" 3 ML MISC; Use once a week    Paroxysmal atrial fibrillation ([facility])  -     Syringe/Needle, Disp, (SYRINGE 3CC/22GX1\") 22G X 1\" 3 ML MISC; Use once a week    Coronary artery " "disease involving native heart, unspecified vessel or lesion type, unspecified whether angina present  -     Syringe/Needle, Disp, (SYRINGE 3CC/22GX1\") 22G X 1\" 3 ML MISC; Use once a week    Parkinson's disease  -     Syringe/Needle, Disp, (SYRINGE 3CC/22GX1\") 22G X 1\" 3 ML MISC; Use once a week    Dementia with behavioral disturbance ([facility]CC)  -     Syringe/Needle, Disp, (SYRINGE 3CC/22GX1\") 22G X 1\" 3 ML MISC; Use once a week    Stage 3a chronic kidney disease (HCC)  -     Syringe/Needle, Disp, (SYRINGE 3CC/22GX1\") 22G X 1\" 3 ML MISC; Use once a week    LUPE (acute kidney injury) (HCC)  -     Syringe/Needle, Disp, (SYRINGE 3CC/22GX1\") 22G X 1\" 3 ML MISC; Use once a week    Low serum vitamin B12  -     Syringe/Needle, Disp, (SYRINGE 3CC/22GX1\") 22G X 1\" 3 ML MISC; Use once a week          Subjective:      Patient ID: Demi Donnelly is a 83 y.o. female.    She c/o constipation. She has not had a BM in 6 days.  She has no pain.  She denies F/C.    She c/o burning pain in both hands and feet.      Constipation    Fall        The following portions of the patient's history were reviewed and updated as appropriate: She  has a past medical history of Alzheimer's dementia ([facility]), Aphasia, CHF (congestive heart failure) ([facility]), Coronary artery disease, Hypercholesteremia, Hypertension, IBS (irritable bowel syndrome), Renal disorder, Rheumatic fever, and Skin lesion.  She   Patient Active Problem List    Diagnosis Date Noted    Visual hallucinations 09/11/2023    Spinal stenosis of lumbar region with neurogenic claudication 04/03/2023    Lumbar spondylosis 04/03/2023    Neck pain 04/03/2023    Skin ulcer of right great toe, limited to breakdown of skin ([facility]) 03/28/2023    Dementia with behavioral disturbance ([facility]) 06/16/2022    Physical deconditioning 06/16/2022    Anemia 03/08/2022    Ground glass opacity present on imaging of lung 07/09/2021    Prediabetes 07/09/2021    NSTEMI (non-ST elevated myocardial infarction) (HCC) " 06/17/2021    Fecal incontinence 03/23/2021    Parkinson's disease 12/01/2020    LUPE (acute kidney injury) ([facility]) 12/01/2020    Other specified inflammatory spondylopathies, lumbosacral region ([facility]) 12/01/2020    Obesity, morbid ([facility]) 12/01/2020    Constipation 12/01/2020    Renal cyst 10/23/2020    Mid back pain 03/11/2020    CAD (coronary artery disease) 12/17/2019    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    Single kidney 09/03/2019    Vitamin D deficiency 09/03/2019    Anxiety 08/20/2019    Abnormal stress test 08/20/2019    Ambulatory dysfunction 07/26/2019    A-fib ([facility]) 07/23/2019    Stage 3a chronic kidney disease ([facility]) 07/23/2019    Junctional bradycardia 07/17/2019    BMI 36.0-36.9,adult 04/30/2019    Chronic left shoulder pain 10/09/2018    H/O: rheumatic fever 07/12/2018    Sciatica 07/12/2018    Peripheral neuropathy 02/13/2018    B12 deficiency 02/13/2018    Osteoporosis 02/13/2018    Closed head injury 12/20/2017    Lumbar radiculopathy 09/25/2017    Headache, worsening 08/14/2017    Chronic diastolic CHF (congestive heart failure) ([facility]) 05/15/2017    Low serum vitamin B12 02/13/2017    Peripheral edema 02/13/2017    Other chest pain 08/01/2016    Biceps tendinitis of right shoulder 04/11/2016    Diastolic dysfunction 04/11/2016    Lateral epicondylitis of right elbow 04/11/2016    Essential hypertension 03/14/2016    Dyspnea on exertion 03/14/2016    GERD without esophagitis 03/14/2016    Osteoarthritis 09/17/2012    Peripheral venous insufficiency 09/17/2012    Prolapse of vaginal walls 12/30/2003     She  has a past surgical history that includes Hysterectomy; Kidney surgery; Tonsillectomy; Ankle fracture surgery; and Nephrectomy.  Her family history includes Asthma in her father; Cancer in her sister; Hypertension in her mother.  She  reports that she has never smoked. She has never used smokeless tobacco. She reports that she does not drink alcohol and does not use drugs.  Current Outpatient Medications  "  Medication Sig Dispense Refill    acetaminophen (TYLENOL) 650 mg CR tablet Take 1 tablet (650 mg total) by mouth every 8 (eight) hours as needed for mild pain or moderate pain 90 tablet 2    amLODIPine (NORVASC) 5 mg tablet Take 5 mg by mouth daily      aspirin (ECOTRIN LOW STRENGTH) 81 mg EC tablet Take 81 mg by mouth daily      bisacodyl (DULCOLAX) 5 mg EC tablet Take 1 tablet (5 mg total) by mouth daily as needed for constipation 30 tablet 0    cholecalciferol (VITAMIN D3) 1,000 units tablet take 1 tablet by mouth once daily 90 tablet 1    cyanocobalamin 1,000 mcg/mL Inject 1 mL (1,000 mcg total) into a muscle every 7 days 10 mL 5    ezetimibe (ZETIA) 10 mg tablet take 1 tablet by mouth once daily 90 tablet 1    furosemide (LASIX) 20 mg tablet take 1 tablet by mouth twice a day 180 tablet 0    isosorbide mononitrate (IMDUR) 60 mg 24 hr tablet take 1 tablet by mouth once daily 90 tablet 3    lansoprazole (PREVACID) 30 mg capsule Take 1 capsule (30 mg total) by mouth in the morning. 90 capsule 3    losartan (COZAAR) 25 mg tablet take 1 tablet by mouth once daily 90 tablet 3    memantine (Namenda) 10 mg tablet Take 1 tablet (10 mg total) by mouth 2 (two) times a day 180 tablet 3    polyethylene glycol (GLYCOLAX) 17 GM/SCOOP powder Take 17 g by mouth daily 850 g 5    silver sulfadiazine (Silvadene) 1 % cream Apply topically daily 50 g 0    Syringe/Needle, Disp, (SYRINGE 3CC/22GX1\") 22G X 1\" 3 ML MISC Use once a week 50 each 5    Eliquis 5 MG Take 1 tablet (5 mg total) by mouth 2 (two) times a day 180 tablet 3    senna (SENOKOT) 8.6 mg Take 1 tablet (8.6 mg total) by mouth daily at bedtime 30 tablet 2     Current Facility-Administered Medications   Medication Dose Route Frequency Provider Last Rate Last Admin    cyanocobalamin injection 1,000 mcg  1,000 mcg Intramuscular Q30 Days Kenroy Jerome MD   1,000 mcg at 02/19/18 1008     Current Outpatient Medications on File Prior to Visit   Medication Sig    " acetaminophen (TYLENOL) 650 mg CR tablet Take 1 tablet (650 mg total) by mouth every 8 (eight) hours as needed for mild pain or moderate pain    amLODIPine (NORVASC) 5 mg tablet Take 5 mg by mouth daily    aspirin (ECOTRIN LOW STRENGTH) 81 mg EC tablet Take 81 mg by mouth daily    cholecalciferol (VITAMIN D3) 1,000 units tablet take 1 tablet by mouth once daily    ezetimibe (ZETIA) 10 mg tablet take 1 tablet by mouth once daily    furosemide (LASIX) 20 mg tablet take 1 tablet by mouth twice a day    isosorbide mononitrate (IMDUR) 60 mg 24 hr tablet take 1 tablet by mouth once daily    lansoprazole (PREVACID) 30 mg capsule Take 1 capsule (30 mg total) by mouth in the morning.    losartan (COZAAR) 25 mg tablet take 1 tablet by mouth once daily    memantine (Namenda) 10 mg tablet Take 1 tablet (10 mg total) by mouth 2 (two) times a day    silver sulfadiazine (Silvadene) 1 % cream Apply topically daily    Eliquis 5 MG Take 1 tablet (5 mg total) by mouth 2 (two) times a day    senna (SENOKOT) 8.6 mg Take 1 tablet (8.6 mg total) by mouth daily at bedtime     Current Facility-Administered Medications on File Prior to Visit   Medication    cyanocobalamin injection 1,000 mcg     She is allergic to amoxicillin, ciprofloxacin, erythromycin, statins, vancomycin, amoxicillin-pot clavulanate, and clindamycin..    Review of Systems   Gastrointestinal:  Positive for constipation.   All other systems reviewed and are negative.        Objective:      /78 (BP Location: Left arm, Patient Position: Sitting)   Pulse 70   Temp (!) 97.4 °F (36.3 °C) (Tympanic)   Wt 66.9 kg (147 lb 6.4 oz)   SpO2 97%   BMI 29.77 kg/m²          Physical Exam  Vitals and nursing note reviewed.   Constitutional:       Appearance: Normal appearance. She is obese.   Cardiovascular:      Rate and Rhythm: Normal rate and regular rhythm.      Pulses: Normal pulses.      Heart sounds: Normal heart sounds.   Pulmonary:      Effort: Pulmonary effort is  normal.      Breath sounds: Normal breath sounds.   Abdominal:      General: Abdomen is flat. Bowel sounds are normal.      Palpations: Abdomen is soft.   Musculoskeletal:         General: Normal range of motion.      Cervical back: Normal range of motion and neck supple.   Skin:     General: Skin is warm and dry.      Capillary Refill: Capillary refill takes less than 2 seconds.   Neurological:      General: No focal deficit present.      Mental Status: She is alert and oriented to person, place, and time. Mental status is at baseline.   Psychiatric:         Mood and Affect: Mood normal.         Behavior: Behavior normal.         Thought Content: Thought content normal.         Judgment: Judgment normal.

## 2023-12-19 NOTE — ASSESSMENT & PLAN NOTE
Wt Readings from Last 3 Encounters:   12/18/23 66.9 kg (147 lb 6.4 oz)   12/05/23 68.3 kg (150 lb 9.6 oz)   09/19/23 67.6 kg (149 lb)   Weight stable.  Continue current.  I reviewed her most recent TTE.  EF>50%

## 2023-12-22 ENCOUNTER — TELEPHONE (OUTPATIENT)
Dept: FAMILY MEDICINE CLINIC | Facility: CLINIC | Age: 83
End: 2023-12-22

## 2023-12-22 NOTE — TELEPHONE ENCOUNTER
Patient called stating she has been taking the Glycolax and the Dulcolax but it is not working.  She said that she has not gone to the bathroom in about 10 days.

## 2023-12-23 ENCOUNTER — NURSE TRIAGE (OUTPATIENT)
Dept: OTHER | Facility: OTHER | Age: 83
End: 2023-12-23

## 2023-12-24 NOTE — TELEPHONE ENCOUNTER
"Regarding: Consipation  ----- Message from Luis Brown sent at 12/23/2023  3:22 PM EST -----  \" I havent had a bowel movement for 12 days and am constipated.\"    "

## 2023-12-24 NOTE — TELEPHONE ENCOUNTER
Reason for Disposition  • Unable to complete triage due to phone connection issues    Protocols used: No Contact or Duplicate Contact Call-ADULT-AH

## 2023-12-27 NOTE — TELEPHONE ENCOUNTER
Spoke to patient and she said she has been able to go to the bathroom.  I told her if she has that problem again she will need to go to the ER

## 2024-01-04 ENCOUNTER — TELEPHONE (OUTPATIENT)
Dept: FAMILY MEDICINE CLINIC | Facility: CLINIC | Age: 84
End: 2024-01-04

## 2024-01-04 NOTE — TELEPHONE ENCOUNTER
Angelina from [facility] called to make us aware that they do not accept [employer] insurance  so the pt is out of network and will have to be referred elsewhere

## 2024-01-17 ENCOUNTER — TELEPHONE (OUTPATIENT)
Age: 84
End: 2024-01-17

## 2024-01-17 NOTE — TELEPHONE ENCOUNTER
Home health referral faxed to [facility] on 1/4/24. Refaxed. Will reach out to them later today if I do not hear anything.     Spoke w/ pt and made her aware. Advised if she doesn't hear from me by Wednesday to call in.

## 2024-01-17 NOTE — TELEPHONE ENCOUNTER
Patient called into office stated Dr. Jerome told her he will have a nurse go to her home and give her, her B-12 injections.    Clarified with patient is not a nurse visit, in which she will have to come to the office and a nurse here will give her B-12 injection. Stated no Dr. Jerome told her someone will go to the home.    Patient stated she has an aid that comes to the home ([employer]) if she can give the B-12 injection. Patient was told NO.    Please review and advise.  Please call patient .    Thank you

## 2024-01-18 ENCOUNTER — TELEPHONE (OUTPATIENT)
Age: 84
End: 2024-01-18

## 2024-01-18 NOTE — TELEPHONE ENCOUNTER
Spoke w/ pt to advise that she needed to check with her insurance to find out what home health agency she could use because [facility], [facility], [facility] do not accept her insurance. She stated that she didn't know what to tell me and to forget about it

## 2024-01-18 NOTE — TELEPHONE ENCOUNTER
Unique from [facility] is not able to provide services due to [employer] insurance (Not par with patient's plan). They do have availability for any other referrals that you may have. For further details you may contact [facility] at [phone].

## 2024-01-19 DIAGNOSIS — I48.91 ATRIAL FIBRILLATION, UNSPECIFIED TYPE (HCC): ICD-10-CM

## 2024-01-19 RX ORDER — APIXABAN 5 MG/1
5 TABLET, FILM COATED ORAL 2 TIMES DAILY
Qty: 180 TABLET | Refills: 3 | Status: SHIPPED | OUTPATIENT
Start: 2024-01-19

## 2024-02-02 DIAGNOSIS — K59.00 CONSTIPATION, UNSPECIFIED CONSTIPATION TYPE: ICD-10-CM

## 2024-02-02 DIAGNOSIS — I25.10 CORONARY ARTERY DISEASE INVOLVING NATIVE HEART, UNSPECIFIED VESSEL OR LESION TYPE, UNSPECIFIED WHETHER ANGINA PRESENT: ICD-10-CM

## 2024-02-02 RX ORDER — BISACODYL 5 MG
TABLET, DELAYED RELEASE (ENTERIC COATED) ORAL
Qty: 30 TABLET | Refills: 5 | Status: SHIPPED | OUTPATIENT
Start: 2024-02-02

## 2024-02-02 RX ORDER — FUROSEMIDE 20 MG/1
TABLET ORAL
Qty: 180 TABLET | Refills: 1 | Status: SHIPPED | OUTPATIENT
Start: 2024-02-02

## 2024-02-06 ENCOUNTER — HOSPITAL ENCOUNTER (OUTPATIENT)
Dept: NON INVASIVE DIAGNOSTICS | Facility: HOSPITAL | Age: 84
Discharge: HOME | End: 2024-02-06
Attending: STUDENT IN AN ORGANIZED HEALTH CARE EDUCATION/TRAINING PROGRAM
Payer: COMMERCIAL

## 2024-02-06 DIAGNOSIS — G62.9 PERIPHERAL POLYNEUROPATHY: ICD-10-CM

## 2024-02-06 DIAGNOSIS — I73.9 PERIPHERAL VASCULAR DISEASE, UNSPECIFIED (HCC): ICD-10-CM

## 2024-02-06 DIAGNOSIS — L97.511 SKIN ULCER OF RIGHT GREAT TOE, LIMITED TO BREAKDOWN OF SKIN (HCC): ICD-10-CM

## 2024-02-06 PROCEDURE — 93923 UPR/LXTR ART STDY 3+ LVLS: CPT | Performed by: SURGERY

## 2024-02-06 PROCEDURE — 93923 UPR/LXTR ART STDY 3+ LVLS: CPT

## 2024-02-29 DIAGNOSIS — I50.32 CHRONIC DIASTOLIC (CONGESTIVE) HEART FAILURE (HCC): ICD-10-CM

## 2024-02-29 RX ORDER — LOSARTAN POTASSIUM 25 MG/1
TABLET ORAL
Qty: 90 TABLET | Refills: 1 | Status: SHIPPED | OUTPATIENT
Start: 2024-02-29

## 2024-03-02 DIAGNOSIS — E55.9 VITAMIN D DEFICIENCY: ICD-10-CM

## 2024-03-03 RX ORDER — MELATONIN
Qty: 90 TABLET | Refills: 1 | Status: SHIPPED | OUTPATIENT
Start: 2024-03-03

## 2024-03-10 ENCOUNTER — TELEPHONE (OUTPATIENT)
Dept: OTHER | Facility: OTHER | Age: 84
End: 2024-03-10

## 2024-03-11 ENCOUNTER — TELEPHONE (OUTPATIENT)
Age: 84
End: 2024-03-11

## 2024-03-11 NOTE — TELEPHONE ENCOUNTER
Patient is calling regarding cancelling an appointment.    Date/Time: 03/11 @845     Patient was rescheduled: YES [] NO [x]    Patient requesting call back to reschedule: YES [x] NO []

## 2024-03-19 ENCOUNTER — TELEPHONE (OUTPATIENT)
Dept: PODIATRY | Facility: CLINIC | Age: 84
End: 2024-03-19

## 2024-03-19 NOTE — TELEPHONE ENCOUNTER
Spoke to Demi she would like if we can make the appointment for her at wound care. She is available Monday and Tuesday. Called wound care and was on hold, unable to make appointment. Will call Demi back to give her the number for wound care.

## 2024-03-19 NOTE — TELEPHONE ENCOUNTER
Called wound center for appt. Spoke to patient, she is aware of her appt. with the wound care center on 3/26/24 at 8:15 am.

## 2024-03-19 NOTE — TELEPHONE ENCOUNTER
----- Message from Kitty Sapp DPM sent at 3/19/2024 10:13 AM EDT -----  Please call the patient regarding her results which were within normal limits for healing. Please ask her if her toe is  and if there is still an open wound. I had recommended wound care referral however she did not want this in the past (I would still recommend if the wound is open)

## 2024-03-26 ENCOUNTER — OFFICE VISIT (OUTPATIENT)
Dept: WOUND CARE | Facility: CLINIC | Age: 84
End: 2024-03-26
Payer: COMMERCIAL

## 2024-03-26 VITALS
HEART RATE: 70 BPM | SYSTOLIC BLOOD PRESSURE: 102 MMHG | HEIGHT: 55 IN | RESPIRATION RATE: 16 BRPM | TEMPERATURE: 97.3 F | WEIGHT: 147 LBS | BODY MASS INDEX: 34.02 KG/M2 | DIASTOLIC BLOOD PRESSURE: 60 MMHG

## 2024-03-26 DIAGNOSIS — M1A.9XX1 CHRONIC GOUT INVOLVING TOE OF RIGHT FOOT WITH TOPHUS, UNSPECIFIED CAUSE: ICD-10-CM

## 2024-03-26 DIAGNOSIS — L97.512 SKIN ULCER OF TOE OF RIGHT FOOT WITH FAT LAYER EXPOSED (HCC): ICD-10-CM

## 2024-03-26 PROCEDURE — 99214 OFFICE O/P EST MOD 30 MIN: CPT | Performed by: PODIATRIST

## 2024-03-26 PROCEDURE — 99213 OFFICE O/P EST LOW 20 MIN: CPT | Performed by: PODIATRIST

## 2024-03-26 PROCEDURE — 11042 DBRDMT SUBQ TIS 1ST 20SQCM/<: CPT | Performed by: PODIATRIST

## 2024-03-26 RX ORDER — LIDOCAINE 40 MG/G
CREAM TOPICAL ONCE
Status: COMPLETED | OUTPATIENT
Start: 2024-03-26 | End: 2024-03-26

## 2024-03-26 RX ADMIN — LIDOCAINE: 40 CREAM TOPICAL at 09:16

## 2024-03-26 NOTE — PROGRESS NOTES
Patient ID: Demi Donnelly is a 83 y.o. female Date of Birth 1940       Chief Complaint   Patient presents with    New Patient Visit     Patient states she had gout about a year ago and she now has an open area on right foot great toe       Allergies:  Amoxicillin, Ciprofloxacin, Erythromycin, Statins, Vancomycin, Amoxicillin-pot clavulanate, and Clindamycin    Diagnosis:  1. 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 Skin ulcer of toe of right foot with fat layer exposed (HCC)       Diagnosis ICD-10-CM Associated Orders   1. 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 Skin ulcer of toe of right foot with fat layer exposed (HCC)  L97.512            Assessment & Plan:  See wound orders.   -Her gout seems to be an acute on chronic problem, I think that her main contributing factor to recurrent ulceration is there is lack of proper shoe gear and compliance with offloading  - She states that she cannot afford wide shoes, but she does have a surgical shoe at home that she is not using  - I encouraged her to appropriately offload to allow this area to heal  - Excisional debridement as below  - Also discussed likely slow healing due to possible tophi in the wound and we discussed possible change in medication for gout if necessary if there are any acute events  -Dr. Sapp's notes were reviewed    Subjective:   Patient presents evaluation management of her right foot, she has been previously seeing Dr. Sapp for similar issue.  Recurrent ulceration right hallux.  Presents today ambulating very narrow shoes with a leather overlay, she does have a surgical shoe at home which was she was recommended to wear but is not wearing it.  She has an aide at home and is considered homebound        The following portions of the patient's history were reviewed and updated as appropriate:   Patient Active Problem List   Diagnosis    Other chest  pain    Essential hypertension    Biceps tendinitis of right shoulder    Chronic diastolic CHF (congestive heart failure) ([facility])    Closed head injury    Diastolic dysfunction    Dyspnea on exertion    GERD without esophagitis    Headache, worsening    Lateral epicondylitis of right elbow    Low serum vitamin B12    Lumbar radiculopathy    Peripheral edema    Peripheral neuropathy    B12 deficiency    Osteoporosis    Chronic left shoulder pain    BMI 36.0-36.9,adult    A-fib ([facility])    Ambulatory dysfunction    Anxiety    Junctional bradycardia    Stage 3a chronic kidney disease ([facility])    Single kidney    Vitamin D deficiency    CAD (coronary artery disease)    H/O left nephrectomy    Mid back pain    H/O: rheumatic fever    Osteoarthritis    Peripheral venous insufficiency    Sciatica    Renal cyst    Parkinson's disease    LUPE (acute kidney injury) ([facility])    Other specified inflammatory spondylopathies, lumbosacral region ([facility])    Obesity, morbid ([facility])    Constipation    Abnormal stress test    Fecal incontinence    Ground glass opacity present on imaging of lung    NSTEMI (non-ST elevated myocardial infarction) ([facility])    Prediabetes    Prolapse of vaginal walls    Anemia    Dementia with behavioral disturbance ([facility])    Physical deconditioning    Skin ulcer of right great toe, limited to breakdown of skin ([facility])    Spinal stenosis of lumbar region with neurogenic claudication    Lumbar spondylosis    Neck pain    Visual hallucinations     Past Medical History:   Diagnosis Date    Alzheimer's dementia ([facility])     Aphasia     CHF (congestive heart failure) ([facility])     Coronary artery disease     Hypercholesteremia     Hypertension     IBS (irritable bowel syndrome)     Renal disorder     Rheumatic fever     Skin lesion      Past Surgical History:   Procedure Laterality Date    ANKLE FRACTURE SURGERY      LAST ASSESSED [id number]    HYSTERECTOMY      KIDNEY SURGERY      NEPHRECTOMY      TONSILLECTOMY       Social History      Socioeconomic History    Marital status:      Spouse name: Not on file    Number of children: Not on file    Years of education: Not on file    Highest education level: Not on file   Occupational History    Not on file   Tobacco Use    Smoking status: Never    Smokeless tobacco: Never   Vaping Use    Vaping status: Never Used   Substance and Sexual Activity    Alcohol use: No    Drug use: No    Sexual activity: Not Currently   Other Topics Concern    Not on file   Social History Narrative    Not on file     Social Determinants of Health     Financial Resource Strain: Not on file   Food Insecurity: No Food Insecurity (6/16/2022)    Hunger Vital Sign     Worried About Running Out of Food in the Last Year: Never true     Ran Out of Food in the Last Year: Never true   Transportation Needs: No Transportation Needs (6/16/2022)    PRAPARE - Transportation     Lack of Transportation (Medical): No     Lack of Transportation (Non-Medical): No   Physical Activity: Not on file   Stress: Not on file   Social Connections: Not on file   Intimate Partner Violence: Not on file   Housing Stability: Low Risk  (6/16/2022)    Housing Stability Vital Sign     Unable to Pay for Housing in the Last Year: No     Number of Places Lived in the Last Year: 2     Unstable Housing in the Last Year: No        Current Outpatient Medications:     acetaminophen (TYLENOL) 650 mg CR tablet, Take 1 tablet (650 mg total) by mouth every 8 (eight) hours as needed for mild pain or moderate pain, Disp: 90 tablet, Rfl: 2    amLODIPine (NORVASC) 5 mg tablet, Take 5 mg by mouth daily, Disp: , Rfl:     aspirin (ECOTRIN LOW STRENGTH) 81 mg EC tablet, Take 81 mg by mouth daily, Disp: , Rfl:     bisacodyl 5 mg EC tablet, take 1 tablet by mouth once daily if needed for constipation, Disp: 30 tablet, Rfl: 5    cholecalciferol (VITAMIN D3) 1,000 units tablet, take 1 tablet by mouth once daily, Disp: 90 tablet, Rfl: 1    cyanocobalamin 1,000 mcg/mL, Inject 1  mL (1,000 mcg total) into a muscle every 7 days, Disp: 10 mL, Rfl: 5    Eliquis 5 MG, take 1 tablet by mouth twice a day, Disp: 180 tablet, Rfl: 3    ezetimibe (ZETIA) 10 mg tablet, take 1 tablet by mouth once daily, Disp: 90 tablet, Rfl: 1    furosemide (LASIX) 20 mg tablet, take 1 tablet by mouth twice a day, Disp: 180 tablet, Rfl: 1    isosorbide mononitrate (IMDUR) 60 mg 24 hr tablet, take 1 tablet by mouth once daily, Disp: 90 tablet, Rfl: 3    lansoprazole (PREVACID) 30 mg capsule, Take 1 capsule (30 mg total) by mouth in the morning., Disp: 90 capsule, Rfl: 3    losartan (COZAAR) 25 mg tablet, take 1 tablet by mouth once daily, Disp: 90 tablet, Rfl: 1    memantine (Namenda) 10 mg tablet, Take 1 tablet (10 mg total) by mouth 2 (two) times a day, Disp: 180 tablet, Rfl: 3    polyethylene glycol (GLYCOLAX) 17 GM/SCOOP powder, Take 17 g by mouth daily, Disp: 850 g, Rfl: 5    senna (SENOKOT) 8.6 mg, Take 1 tablet (8.6 mg total) by mouth daily at bedtime, Disp: 30 tablet, Rfl: 2    Current Facility-Administered Medications:     cyanocobalamin injection 1,000 mcg, 1,000 mcg, Intramuscular, Q30 Days, Kenroy Jerome MD, 1,000 mcg at 02/19/18 1008  Family History   Problem Relation Age of Onset    Hypertension Mother     Asthma Father     Cancer Sister       Review of Systems   Constitutional:  Negative for chills and fever.   HENT:  Negative for ear pain and sore throat.    Eyes:  Negative for pain and visual disturbance.   Respiratory:  Negative for cough and shortness of breath.    Cardiovascular:  Negative for chest pain and palpitations.   Gastrointestinal:  Negative for abdominal pain and vomiting.   Genitourinary:  Negative for dysuria and hematuria.   Musculoskeletal:  Negative for arthralgias and back pain.   Skin:  Negative for color change and rash.   Neurological:  Negative for seizures and syncope.   All other systems reviewed and are negative.        Objective:  /60   Pulse 70   Temp (!) 97.3 °F  "(36.3 °C) (Temporal)   Resp 16   Ht 4' 7\" (1.397 m)   Wt 66.7 kg (147 lb)   BMI 34.17 kg/m²     Physical Exam  Skin:     Comments: Skin is shiny atrophic, she has a very wide foot type with overload of the H IPJ bilaterally and also with metatarsal head bilaterally.  There is an ulceration that ability is very limited to breakdown of skin upon first examination but after debridement it is fat layer exposed and there is questionable tophi within the wound, there is tophi that is visible through skin surrounding wound, tophi visible on her digits and also other toes             Wound 03/26/24 Toe D1, great Right (Active)   Wound Image   03/26/24 0851   Wound Description Pink 03/26/24 0850   Alyson-wound Assessment Scaly;Dry 03/26/24 0850   Wound Length (cm) 0.3 cm 03/26/24 0850   Wound Width (cm) 0.3 cm 03/26/24 0850   Wound Depth (cm) 0.1 cm 03/26/24 0850   Wound Surface Area (cm^2) 0.09 cm^2 03/26/24 0850   Wound Volume (cm^3) 0.009 cm^3 03/26/24 0850   Calculated Wound Volume (cm^3) 0.01 cm^3 03/26/24 0850   Drainage Amount Scant 03/26/24 0850   Drainage Description Serous 03/26/24 0850                         Debridement    Universal Protocol:  Risks and benefits: risks, benefits and alternatives were discussed  Consent given by: patient  Patient understanding: patient states understanding of the procedure being performed  Patient consent: the patient's understanding of the procedure matches consent given  Patient identity confirmed: verbally with patient    Debridement Details  Performed by: physician  Debridement type: surgical  Level of debridement: subcutaneous tissue      Post-debridement measurements  Length (cm): 0.3  Width (cm): 0.3  Depth (cm): 0.2  Percent debrided: 100%  Surface Area (cm^2): 0.09  Area Debrided (cm^2): 0.09  Volume (cm^3): 0.02    Tissue and other material debrided: subcutaneous tissue  Devitalized tissue debrided: callus, necrotic debris and slough  Instrument(s) utilized: " "curette  Bleeding: medium  Hemostasis obtained with: pressure  Procedural pain (0-10): 1  Post-procedural pain: 1   Response to treatment: procedure was tolerated well                 Wound Instructions:  No orders of the defined types were placed in this encounter.        Bhupinder Gomez DPM      Portions of the record may have been created with voice recognition software. Occasional wrong word or \"sound a like\" substitutions may have occurred due to the inherent limitations of voice recognition software. Read the chart carefully and recognize, using context, where substitutions have occurred.      "

## 2024-03-26 NOTE — PATIENT INSTRUCTIONS
Orders Placed This Encounter   Procedures    Wound cleansing and dressings Right Toe D1, great     Wash your hands with soap and water.  Remove old dressing, discard into plastic bag and place in trash.  Cleanse the wound with NSS or mild soap prior to applying a clean dressing. Do not use tissue or cotton balls. Do not scrub the wound. Pat dry using gauze.  Shower yes on days home health aide is there and nursing can complete wound care following shower (place dry dressing to toe following shower until nursing can complete wound care with in the same day of showering) other wise sponge bath      Apply Dermagram  to the right great toe wound.  Cover with gauze   Secure with madan   Change dressing every 3 days   Wear surgical shoe at all times when walking   Off-loading Instructions:    Keep weight and pressure off wound at all times. and Wear off-loading device as directed by your physician. Put on immediately when rising in the morning and remove when going to bed.    Home care ordered for wound care     Follow up in 2 weeks     Standing Status:   Future     Standing Expiration Date:   3/26/2025

## 2024-03-29 ENCOUNTER — TELEPHONE (OUTPATIENT)
Age: 84
End: 2024-03-29

## 2024-03-29 NOTE — TELEPHONE ENCOUNTER
Caller: Demi    Doctor/Office: Not [facility]     Call regarding :  Wound care      Call was transferred to: Wound care

## 2024-04-02 ENCOUNTER — OFFICE VISIT (OUTPATIENT)
Dept: WOUND CARE | Facility: CLINIC | Age: 84
End: 2024-04-02
Payer: COMMERCIAL

## 2024-04-02 VITALS
SYSTOLIC BLOOD PRESSURE: 104 MMHG | DIASTOLIC BLOOD PRESSURE: 62 MMHG | TEMPERATURE: 96.9 F | HEART RATE: 64 BPM | RESPIRATION RATE: 18 BRPM

## 2024-04-02 DIAGNOSIS — L97.512 SKIN ULCER OF TOE OF RIGHT FOOT WITH FAT LAYER EXPOSED (HCC): Primary | ICD-10-CM

## 2024-04-02 PROCEDURE — 11042 DBRDMT SUBQ TIS 1ST 20SQCM/<: CPT | Performed by: PODIATRIST

## 2024-04-02 RX ORDER — LIDOCAINE 40 MG/G
CREAM TOPICAL ONCE
Status: COMPLETED | OUTPATIENT
Start: 2024-04-02 | End: 2024-04-02

## 2024-04-02 RX ADMIN — LIDOCAINE 1 APPLICATION: 40 CREAM TOPICAL at 11:29

## 2024-04-02 NOTE — PROGRESS NOTES
Patient ID: Demi Donnelly is a 83 y.o. female Date of Birth 1940       Chief Complaint   Patient presents with    Follow Up Wound Care Visit     Right great toe wound       Allergies:  Amoxicillin, Ciprofloxacin, Erythromycin, Statins, Vancomycin, Amoxicillin-pot clavulanate, and Clindamycin    Diagnosis:  1. Skin ulcer of toe of right foot with fat layer exposed ([facility])  -     Wound cleansing and dressings Right Toe D1, great; Future  -     lidocaine (LMX) 4 % cream       Diagnosis ICD-10-CM Associated Orders   1. Skin ulcer of toe of right foot with fat layer exposed ([facility])  L97.512 Wound cleansing and dressings Right Toe D1, great     lidocaine (LMX) 4 % cream           Assessment & Plan:  See wound orders.   -Excisional debridement as below  - Continue offloading surgical shoe  - Continue current local wound care regimen  - Return in 2 weeks for repeat assessment of this area, if not improving in 2 weeks we will consider monitoring without debridement    Subjective:   Patient was for evaluation management of right foot ambulating surgical shoe no new pedal complaints        The following portions of the patient's history were reviewed and updated as appropriate:   Patient Active Problem List   Diagnosis    Other chest pain    Essential hypertension    Biceps tendinitis of right shoulder    Chronic diastolic CHF (congestive heart failure) ([facility])    Closed head injury    Diastolic dysfunction    Dyspnea on exertion    GERD without esophagitis    Headache, worsening    Lateral epicondylitis of right elbow    Low serum vitamin B12    Lumbar radiculopathy    Peripheral edema    Peripheral neuropathy    B12 deficiency    Osteoporosis    Chronic left shoulder pain    BMI 36.0-36.9,adult    A-fib ([facility])    Ambulatory dysfunction    Anxiety    Junctional bradycardia    Stage 3a chronic kidney disease ([facility])    Single kidney    Vitamin D deficiency    CAD (coronary artery disease)    H/O left nephrectomy    Mid back pain     H/O: rheumatic fever    Osteoarthritis    Peripheral venous insufficiency    Sciatica    Renal cyst    Parkinson's disease    LUPE (acute kidney injury) ([facility])    Other specified inflammatory spondylopathies, lumbosacral region ([facility])    Obesity, morbid ([facility])    Constipation    Abnormal stress test    Fecal incontinence    Ground glass opacity present on imaging of lung    NSTEMI (non-ST elevated myocardial infarction) ([facility])    Prediabetes    Prolapse of vaginal walls    Anemia    Dementia with behavioral disturbance ([facility])    Physical deconditioning    Skin ulcer of right great toe, limited to breakdown of skin ([facility])    Spinal stenosis of lumbar region with neurogenic claudication    Lumbar spondylosis    Neck pain    Visual hallucinations     Past Medical History:   Diagnosis Date    Alzheimer's dementia ([facility])     Aphasia     CHF (congestive heart failure) ([facility])     Coronary artery disease     Hypercholesteremia     Hypertension     IBS (irritable bowel syndrome)     Renal disorder     Rheumatic fever     Skin lesion      Past Surgical History:   Procedure Laterality Date    ANKLE FRACTURE SURGERY      LAST ASSESSED [id number]    HYSTERECTOMY      KIDNEY SURGERY      NEPHRECTOMY      TONSILLECTOMY       Social History     Socioeconomic History    Marital status:      Spouse name: None    Number of children: None    Years of education: None    Highest education level: None   Occupational History    None   Tobacco Use    Smoking status: Never    Smokeless tobacco: Never   Vaping Use    Vaping status: Never Used   Substance and Sexual Activity    Alcohol use: No    Drug use: No    Sexual activity: Not Currently   Other Topics Concern    None   Social History Narrative    None     Social Determinants of Health     Financial Resource Strain: Not on file   Food Insecurity: No Food Insecurity (6/16/2022)    Hunger Vital Sign     Worried About Running Out of Food in the Last Year: Never true     Ran Out of Food in  the Last Year: Never true   Transportation Needs: No Transportation Needs (6/16/2022)    PRAPARE - Transportation     Lack of Transportation (Medical): No     Lack of Transportation (Non-Medical): No   Physical Activity: Not on file   Stress: Not on file   Social Connections: Not on file   Intimate Partner Violence: Not on file   Housing Stability: Low Risk  (6/16/2022)    Housing Stability Vital Sign     Unable to Pay for Housing in the Last Year: No     Number of Places Lived in the Last Year: 2     Unstable Housing in the Last Year: No        Current Outpatient Medications:     acetaminophen (TYLENOL) 650 mg CR tablet, Take 1 tablet (650 mg total) by mouth every 8 (eight) hours as needed for mild pain or moderate pain, Disp: 90 tablet, Rfl: 2    amLODIPine (NORVASC) 5 mg tablet, Take 5 mg by mouth daily, Disp: , Rfl:     aspirin (ECOTRIN LOW STRENGTH) 81 mg EC tablet, Take 81 mg by mouth daily, Disp: , Rfl:     bisacodyl 5 mg EC tablet, take 1 tablet by mouth once daily if needed for constipation, Disp: 30 tablet, Rfl: 5    cholecalciferol (VITAMIN D3) 1,000 units tablet, take 1 tablet by mouth once daily, Disp: 90 tablet, Rfl: 1    cyanocobalamin 1,000 mcg/mL, Inject 1 mL (1,000 mcg total) into a muscle every 7 days, Disp: 10 mL, Rfl: 5    Eliquis 5 MG, take 1 tablet by mouth twice a day, Disp: 180 tablet, Rfl: 3    ezetimibe (ZETIA) 10 mg tablet, take 1 tablet by mouth once daily, Disp: 90 tablet, Rfl: 1    furosemide (LASIX) 20 mg tablet, take 1 tablet by mouth twice a day, Disp: 180 tablet, Rfl: 1    isosorbide mononitrate (IMDUR) 60 mg 24 hr tablet, take 1 tablet by mouth once daily, Disp: 90 tablet, Rfl: 3    lansoprazole (PREVACID) 30 mg capsule, Take 1 capsule (30 mg total) by mouth in the morning., Disp: 90 capsule, Rfl: 3    losartan (COZAAR) 25 mg tablet, take 1 tablet by mouth once daily, Disp: 90 tablet, Rfl: 1    memantine (Namenda) 10 mg tablet, Take 1 tablet (10 mg total) by mouth 2 (two) times a  day, Disp: 180 tablet, Rfl: 3    polyethylene glycol (GLYCOLAX) 17 GM/SCOOP powder, Take 17 g by mouth daily, Disp: 850 g, Rfl: 5    senna (SENOKOT) 8.6 mg, Take 1 tablet (8.6 mg total) by mouth daily at bedtime, Disp: 30 tablet, Rfl: 2    Current Facility-Administered Medications:     cyanocobalamin injection 1,000 mcg, 1,000 mcg, Intramuscular, Q30 Days, Kenroy Jerome MD, 1,000 mcg at 02/19/18 1008  Family History   Problem Relation Age of Onset    Hypertension Mother     Asthma Father     Cancer Sister       Review of Systems   Constitutional:  Negative for chills and fever.   HENT:  Negative for ear pain and sore throat.    Eyes:  Negative for pain and visual disturbance.   Respiratory:  Negative for cough and shortness of breath.    Cardiovascular:  Negative for chest pain and palpitations.   Gastrointestinal:  Negative for abdominal pain and vomiting.   Genitourinary:  Negative for dysuria and hematuria.   Musculoskeletal:  Negative for arthralgias and back pain.   Skin:  Negative for color change and rash.   Neurological:  Negative for seizures and syncope.   All other systems reviewed and are negative.        Objective:  /62   Pulse 64   Temp (!) 96.9 °F (36.1 °C)   Resp 18     Physical Exam  Skin:     Comments: Ulceration with subcutaneous tissue exposed, granular base following debridement, no probe to bone overlying H IPJ             Wound 03/26/24 Toe D1, great Right (Active)   Wound Image   04/02/24 1145   Wound Description Pink 04/02/24 1125   Alyson-wound Assessment Intact 04/02/24 1125   Wound Length (cm) 0.1 cm 04/02/24 1125   Wound Width (cm) 0.1 cm 04/02/24 1125   Wound Depth (cm) 0.1 cm 04/02/24 1125   Wound Surface Area (cm^2) 0.01 cm^2 04/02/24 1125   Wound Volume (cm^3) 0.001 cm^3 04/02/24 1125   Calculated Wound Volume (cm^3) 0 cm^3 04/02/24 1125   Change in Wound Size % 100 04/02/24 1125   Drainage Amount Scant 04/02/24 1125   Drainage Description Serous 04/02/24 1125                          Debridement    Universal Protocol:  Consent given by: patient  Patient understanding: patient states understanding of the procedure being performed  Patient consent: the patient's understanding of the procedure matches consent given    Debridement Details  Performed by: physician  Debridement type: surgical  Level of debridement: subcutaneous tissue  Pain control: lidocaine 4%      Post-debridement measurements  Length (cm): 0.2  Width (cm): 0.3  Depth (cm): 0.2  Percent debrided: 100%  Surface Area (cm^2): 0.06  Area Debrided (cm^2): 0.06  Volume (cm^3): 0.01    Tissue and other material debrided: subcutaneous tissue  Devitalized tissue debrided: slough  Instrument(s) utilized: curette  Bleeding: medium  Hemostasis obtained with: pressure  Procedural pain (0-10): 2  Post-procedural pain: 2   Response to treatment: procedure was tolerated well                 Wound Instructions:  Orders Placed This Encounter   Procedures    Wound cleansing and dressings Right Toe D1, great     Wash your hands with soap and water.  Remove old dressing, discard into plastic bag and place in trash.  Cleanse the wound with NSS or mild soap prior to applying a clean dressing. Do not use tissue or cotton balls. Do not scrub the wound. Pat dry using gauze.  Shower yes on days home health aide is there and nursing can complete wound care following shower (place dry dressing to toe following shower until nursing can complete wound care with in the same day of showering) other wise sponge bath       Apply Dermagran  to the right great toe wound.  Cover with gauze   Secure with madan   Change dressing every 3 days   Wear surgical shoe at all times when walking   Off-loading Instructions:     Keep weight and pressure off wound at all times. and Wear off-loading device as directed by your physician. Put on immediately when rising in the morning and remove when going to bed.     Home care ordered for wound care      Follow  "up in 2 weeks     Standing Status:   Future     Standing Expiration Date:   4/2/2025         Bhupinder Gomez DPM      Portions of the record may have been created with voice recognition software. Occasional wrong word or \"sound a like\" substitutions may have occurred due to the inherent limitations of voice recognition software. Read the chart carefully and recognize, using context, where substitutions have occurred.      "

## 2024-04-02 NOTE — PATIENT INSTRUCTIONS
Orders Placed This Encounter   Procedures    Wound cleansing and dressings Right Toe D1, great     Wash your hands with soap and water.  Remove old dressing, discard into plastic bag and place in trash.  Cleanse the wound with NSS or mild soap prior to applying a clean dressing. Do not use tissue or cotton balls. Do not scrub the wound. Pat dry using gauze.  Shower yes on days home health aide is there and nursing can complete wound care following shower (place dry dressing to toe following shower until nursing can complete wound care with in the same day of showering) other wise sponge bath       Apply Dermagran  to the right great toe wound.  Cover with gauze   Secure with madan   Change dressing every 3 days   Wear surgical shoe at all times when walking   Off-loading Instructions:     Keep weight and pressure off wound at all times. and Wear off-loading device as directed by your physician. Put on immediately when rising in the morning and remove when going to bed.     Home care ordered for wound care      Follow up in 2 weeks     Standing Status:   Future     Standing Expiration Date:   4/2/2025

## 2024-04-23 ENCOUNTER — OFFICE VISIT (OUTPATIENT)
Dept: WOUND CARE | Facility: CLINIC | Age: 84
End: 2024-04-23
Payer: COMMERCIAL

## 2024-04-23 VITALS
DIASTOLIC BLOOD PRESSURE: 62 MMHG | TEMPERATURE: 96.9 F | HEART RATE: 68 BPM | SYSTOLIC BLOOD PRESSURE: 98 MMHG | RESPIRATION RATE: 18 BRPM

## 2024-04-23 DIAGNOSIS — L97.512 SKIN ULCER OF TOE OF RIGHT FOOT WITH FAT LAYER EXPOSED (HCC): Primary | ICD-10-CM

## 2024-04-23 PROCEDURE — 99213 OFFICE O/P EST LOW 20 MIN: CPT | Performed by: PODIATRIST

## 2024-04-23 PROCEDURE — 99212 OFFICE O/P EST SF 10 MIN: CPT | Performed by: PODIATRIST

## 2024-04-23 NOTE — PATIENT INSTRUCTIONS
Orders Placed This Encounter   Procedures    Wound cleansing and dressings Right Toe D1, great     Wash your hands with soap and water.  Remove old dressing, discard into plastic bag and place in trash.  Cleanse the wound with NSS or mild soap prior to applying a clean dressing. Do not use tissue or cotton balls. Do not scrub the wound. Pat dry using gauze.  Shower yes on days home health aide is there and nursing can complete wound care following shower (place dry dressing to toe following shower until nursing can complete wound care with in the same day of showering) other wise sponge bath       Apply Dermagran  to the right great toe wound.  Cover with gauze   Secure with madan   Change dressing every 3 days   Wear surgical shoe at all times when walking     Off-loading Instructions:     Keep weight and pressure off wound at all times. and Wear off-loading device as directed by your physician. Put on immediately when rising in the morning and remove when going to bed.     Home care ordered for wound care      Follow up in 3 weeks     Standing Status:   Future     Standing Expiration Date:   4/30/2024    Wound Procedure Treatment Right Toe D1, great     This order was created via procedure documentation

## 2024-04-23 NOTE — PROGRESS NOTES
Patient ID: Demi Donnelly is a 83 y.o. female Date of Birth 1940       Chief Complaint   Patient presents with    Follow Up Wound Care Visit     Right foot wound       Allergies:  Amoxicillin, Ciprofloxacin, Erythromycin, Statins, Vancomycin, Amoxicillin-pot clavulanate, and Clindamycin    Diagnosis:  1. Skin ulcer of toe of right foot with fat layer exposed ([facility])  -     Wound cleansing and dressings Right Toe D1, great; Future  -     Wound Procedure Treatment Right Toe D1, great       Diagnosis ICD-10-CM Associated Orders   1. Skin ulcer of toe of right foot with fat layer exposed ([facility])  L97.512 Wound cleansing and dressings Right Toe D1, great     Wound Procedure Treatment Right Toe D1, great           Assessment & Plan:  See wound orders.   -At this point debridement did not work, his wound has been persistent apparently for about a year, we will attempt to control moisture balance with Dermagran and offloading surgical shoe she is to return in 3 weeks for repeat assessment, if no improvement, may need to consider biopsy in the future due to chronicity of wound    Subjective:   Patient went to evaluation management of her right hallux, having continual ulceration, some discomfort with this being compliant with ambulation surgical shoe        The following portions of the patient's history were reviewed and updated as appropriate:   Patient Active Problem List   Diagnosis    Other chest pain    Essential hypertension    Biceps tendinitis of right shoulder    Chronic diastolic CHF (congestive heart failure) ([facility])    Closed head injury    Diastolic dysfunction    Dyspnea on exertion    GERD without esophagitis    Headache, worsening    Lateral epicondylitis of right elbow    Low serum vitamin B12    Lumbar radiculopathy    Peripheral edema    Peripheral neuropathy    B12 deficiency    Osteoporosis    Chronic left shoulder pain    BMI 36.0-36.9,adult    A-fib ([facility])    Ambulatory dysfunction    Anxiety     Junctional bradycardia    Stage 3a chronic kidney disease (HCC)    Single kidney    Vitamin D deficiency    CAD (coronary artery disease)    H/O left nephrectomy    Mid back pain    H/O: rheumatic fever    Osteoarthritis    Peripheral venous insufficiency    Sciatica    Renal cyst    Parkinson's disease    LUPE (acute kidney injury) ([facility])    Other specified inflammatory spondylopathies, lumbosacral region ([facility])    Obesity, morbid ([facility])    Constipation    Abnormal stress test    Fecal incontinence    Ground glass opacity present on imaging of lung    NSTEMI (non-ST elevated myocardial infarction) ([facility])    Prediabetes    Prolapse of vaginal walls    Anemia    Dementia with behavioral disturbance ([facility])    Physical deconditioning    Skin ulcer of right great toe, limited to breakdown of skin ([facility])    Spinal stenosis of lumbar region with neurogenic claudication    Lumbar spondylosis    Neck pain    Visual hallucinations     Past Medical History:   Diagnosis Date    Alzheimer's dementia ([facility])     Aphasia     CHF (congestive heart failure) ([facility])     Coronary artery disease     Hypercholesteremia     Hypertension     IBS (irritable bowel syndrome)     Renal disorder     Rheumatic fever     Skin lesion      Past Surgical History:   Procedure Laterality Date    ANKLE FRACTURE SURGERY      LAST ASSESSED [id number]    HYSTERECTOMY      KIDNEY SURGERY      NEPHRECTOMY      TONSILLECTOMY       Social History     Socioeconomic History    Marital status:      Spouse name: None    Number of children: None    Years of education: None    Highest education level: None   Occupational History    None   Tobacco Use    Smoking status: Never    Smokeless tobacco: Never   Vaping Use    Vaping status: Never Used   Substance and Sexual Activity    Alcohol use: No    Drug use: No    Sexual activity: Not Currently   Other Topics Concern    None   Social History Narrative    None     Social Determinants of Health     Financial Resource  Strain: Not on file   Food Insecurity: No Food Insecurity (6/16/2022)    Hunger Vital Sign     Worried About Running Out of Food in the Last Year: Never true     Ran Out of Food in the Last Year: Never true   Transportation Needs: No Transportation Needs (6/16/2022)    PRAPARE - Transportation     Lack of Transportation (Medical): No     Lack of Transportation (Non-Medical): No   Physical Activity: Not on file   Stress: Not on file   Social Connections: Not on file   Intimate Partner Violence: Not on file   Housing Stability: Low Risk  (6/16/2022)    Housing Stability Vital Sign     Unable to Pay for Housing in the Last Year: No     Number of Places Lived in the Last Year: 2     Unstable Housing in the Last Year: No        Current Outpatient Medications:     acetaminophen (TYLENOL) 650 mg CR tablet, Take 1 tablet (650 mg total) by mouth every 8 (eight) hours as needed for mild pain or moderate pain, Disp: 90 tablet, Rfl: 2    amLODIPine (NORVASC) 5 mg tablet, Take 5 mg by mouth daily, Disp: , Rfl:     aspirin (ECOTRIN LOW STRENGTH) 81 mg EC tablet, Take 81 mg by mouth daily, Disp: , Rfl:     bisacodyl 5 mg EC tablet, take 1 tablet by mouth once daily if needed for constipation, Disp: 30 tablet, Rfl: 5    cholecalciferol (VITAMIN D3) 1,000 units tablet, take 1 tablet by mouth once daily, Disp: 90 tablet, Rfl: 1    cyanocobalamin 1,000 mcg/mL, Inject 1 mL (1,000 mcg total) into a muscle every 7 days, Disp: 10 mL, Rfl: 5    Eliquis 5 MG, take 1 tablet by mouth twice a day, Disp: 180 tablet, Rfl: 3    ezetimibe (ZETIA) 10 mg tablet, take 1 tablet by mouth once daily, Disp: 90 tablet, Rfl: 1    furosemide (LASIX) 20 mg tablet, take 1 tablet by mouth twice a day, Disp: 180 tablet, Rfl: 1    isosorbide mononitrate (IMDUR) 60 mg 24 hr tablet, take 1 tablet by mouth once daily, Disp: 90 tablet, Rfl: 3    lansoprazole (PREVACID) 30 mg capsule, Take 1 capsule (30 mg total) by mouth in the morning., Disp: 90 capsule, Rfl: 3     losartan (COZAAR) 25 mg tablet, take 1 tablet by mouth once daily, Disp: 90 tablet, Rfl: 1    memantine (Namenda) 10 mg tablet, Take 1 tablet (10 mg total) by mouth 2 (two) times a day, Disp: 180 tablet, Rfl: 3    polyethylene glycol (GLYCOLAX) 17 GM/SCOOP powder, Take 17 g by mouth daily, Disp: 850 g, Rfl: 5    senna (SENOKOT) 8.6 mg, Take 1 tablet (8.6 mg total) by mouth daily at bedtime, Disp: 30 tablet, Rfl: 2    Current Facility-Administered Medications:     cyanocobalamin injection 1,000 mcg, 1,000 mcg, Intramuscular, Q30 Days, Kenroy Jerome MD, 1,000 mcg at 02/19/18 1008  Family History   Problem Relation Age of Onset    Hypertension Mother     Asthma Father     Cancer Sister       Review of Systems   Constitutional:  Negative for chills and fever.   HENT:  Negative for ear pain and sore throat.    Eyes:  Negative for pain and visual disturbance.   Respiratory:  Negative for cough and shortness of breath.    Cardiovascular:  Negative for chest pain and palpitations.   Gastrointestinal:  Negative for abdominal pain and vomiting.   Genitourinary:  Negative for dysuria and hematuria.   Musculoskeletal:  Negative for arthralgias and back pain.   Skin:  Negative for color change and rash.   Neurological:  Negative for seizures and syncope.   All other systems reviewed and are negative.        Objective:  BP 98/62   Pulse 68   Temp (!) 96.9 °F (36.1 °C)   Resp 18     Physical Exam  Skin:     Comments: The wound remained unchanged, continued drainage, no deep probing areas, fibrotic base             Wound 03/26/24 Toe D1, great Right (Active)   Wound Image   04/23/24 0836   Wound Description Pink 04/23/24 0840   Alyson-wound Assessment Maceration 04/23/24 0840   Wound Length (cm) 0.1 cm 04/23/24 0840   Wound Width (cm) 0.1 cm 04/23/24 0840   Wound Depth (cm) 0.1 cm 04/23/24 0840   Wound Surface Area (cm^2) 0.01 cm^2 04/23/24 0840   Wound Volume (cm^3) 0.001 cm^3 04/23/24 0840   Calculated Wound Volume (cm^3) 0  "cm^3 04/23/24 0840   Change in Wound Size % 100 04/23/24 0840   Drainage Amount Small 04/23/24 0840   Drainage Description Serosanguineous 04/23/24 0840   Non-staged Wound Description Full thickness 04/23/24 0840                         Procedures             Wound Instructions:  Orders Placed This Encounter   Procedures    Wound cleansing and dressings Right Toe D1, great     Wash your hands with soap and water.  Remove old dressing, discard into plastic bag and place in trash.  Cleanse the wound with NSS or mild soap prior to applying a clean dressing. Do not use tissue or cotton balls. Do not scrub the wound. Pat dry using gauze.  Shower yes on days home health aide is there and nursing can complete wound care following shower (place dry dressing to toe following shower until nursing can complete wound care with in the same day of showering) other wise sponge bath       Apply Dermagran  to the right great toe wound.  Cover with gauze   Secure with madan   Change dressing every 3 days   Wear surgical shoe at all times when walking     Off-loading Instructions:     Keep weight and pressure off wound at all times. and Wear off-loading device as directed by your physician. Put on immediately when rising in the morning and remove when going to bed.     Home care ordered for wound care      Follow up in 3 weeks     Standing Status:   Future     Standing Expiration Date:   4/30/2024    Wound Procedure Treatment Right Toe D1, great     This order was created via procedure documentation         Bhupinder Gomez DPM      Portions of the record may have been created with voice recognition software. Occasional wrong word or \"sound a like\" substitutions may have occurred due to the inherent limitations of voice recognition software. Read the chart carefully and recognize, using context, where substitutions have occurred.      "

## 2024-04-23 NOTE — PROGRESS NOTES
Wound Procedure Treatment Right Toe D1, great    Performed by: Court Kwan RN  Authorized by: Bhupinder Gomez DPM  Associated wounds:   Wound 03/26/24 Toe D1, great Right  Wound cleansed with:  NSS  Applied primary dressing:  Dermagran  Applied secondary dressing:  Gauze  Wound secured with:  Tape

## 2024-04-26 ENCOUNTER — TELEPHONE (OUTPATIENT)
Age: 84
End: 2024-04-26

## 2024-04-26 DIAGNOSIS — R69 SICK: Primary | ICD-10-CM

## 2024-04-26 RX ORDER — SULFAMETHOXAZOLE AND TRIMETHOPRIM 800; 160 MG/1; MG/1
1 TABLET ORAL EVERY 12 HOURS SCHEDULED
Qty: 14 TABLET | Refills: 0 | Status: SHIPPED | OUTPATIENT
Start: 2024-04-26 | End: 2024-05-03

## 2024-04-26 NOTE — TELEPHONE ENCOUNTER
Patient stated she believes she again has UTI; burning sensation, odor. She would like Dr Jerome to send medication to help with her UTI over to [facility] pharmacy in [place].

## 2024-05-12 ENCOUNTER — TELEPHONE (OUTPATIENT)
Dept: OTHER | Facility: OTHER | Age: 84
End: 2024-05-12

## 2024-05-12 NOTE — TELEPHONE ENCOUNTER
Patient is calling regarding cancelling an appointment.    Date/Time: 5/13/2024 @ 8:45am    Patient was rescheduled: YES [] NO [x]    Patient requesting call back to reschedule: YES [] NO [x]

## 2024-05-12 NOTE — TELEPHONE ENCOUNTER
Patient is calling regarding cancelling an appointment.    Date/Time: 5- @ 08:45 am    Patient was rescheduled: YES [] NO [x]    Patient requesting call back to reschedule: YES [x] NO []

## 2024-05-12 NOTE — TELEPHONE ENCOUNTER
Patient is calling regarding cancelling an appointment.    Date/Time: 5- @ 08:30 am    Patient was rescheduled: YES [] NO [x]    Patient requesting call back to reschedule: YES [x] NO []

## 2024-05-12 NOTE — TELEPHONE ENCOUNTER
Patient is calling regarding cancelling an appointment.    Date/Time:5/14/2024 @ 8:30am    Patient was rescheduled: YES [] NO [x]    Patient requesting call back to reschedule: YES [] NO [x]

## 2024-05-14 ENCOUNTER — NURSE TRIAGE (OUTPATIENT)
Age: 84
End: 2024-05-14

## 2024-05-14 DIAGNOSIS — R39.9 UTI SYMPTOMS: Primary | ICD-10-CM

## 2024-05-14 RX ORDER — SULFAMETHOXAZOLE AND TRIMETHOPRIM 800; 160 MG/1; MG/1
1 TABLET ORAL EVERY 12 HOURS SCHEDULED
Qty: 14 TABLET | Refills: 0 | Status: SHIPPED | OUTPATIENT
Start: 2024-05-14 | End: 2024-05-21

## 2024-05-14 NOTE — TELEPHONE ENCOUNTER
"Patient contacted the office this morning stating that she has been experiencing trouble with urinating since yesterday. Feels that she has to push to get all the urine out. These were the same symptoms that she had the other week, was prescribed sulfamethoxazole-trimethoprim (BACTRIM DS) 800-160 mg per tablet, which she finished over a week ago. She is concerned as she only has one kidney. She does not have transportation into the office, asking if another abx can be prescribed at this time, or provider recommendation. I did instruct if symptoms would worsen to report to an UC to be further evaluated when she would have transportation, verbalized understanding.      Reason for Disposition  • Urination is difficult to start (i.e., hesitancy) or straining    Answer Assessment - Initial Assessment Questions  1. SYMPTOM: \"What's the main symptom you're concerned about?\" (e.g., frequency, incontinence)      Urinary difficulty - feels the need to push to get all the urine out  2. ONSET: \"When did the  difficulty urinating  start?\"      Yesterday  3. PAIN: \"Is there any pain?\" If Yes, ask: \"How bad is it?\" (Scale: 1-10; mild, moderate, severe)      denies  4. CAUSE: \"What do you think is causing the symptoms?\"      Unsure - was treated with abx a couple of weeks ago for same symptoms  5. OTHER SYMPTOMS: \"Do you have any other symptoms?\" (e.g., fever, flank pain, blood in urine, pain with urination)      denies  6. PREGNANCY: \"Is there any chance you are pregnant?\" \"When was your last menstrual period?\"      N/A    Protocols used: Urinary Symptoms-ADULT-[place]    "

## 2024-05-14 NOTE — ASSESSMENT & PLAN NOTE
Stable hemoglobin 10 6 [Consultation] : a consultation visit [[employer] ] : provided by [employer]   [Interpreters_IDNumber] : [id number] [Interpreters_FullName] : pieter [TWNoteComboBox1] : [place]

## 2024-05-16 ENCOUNTER — OFFICE VISIT (OUTPATIENT)
Dept: WOUND CARE | Facility: CLINIC | Age: 84
End: 2024-05-16
Payer: COMMERCIAL

## 2024-05-16 VITALS
SYSTOLIC BLOOD PRESSURE: 138 MMHG | TEMPERATURE: 45.9 F | RESPIRATION RATE: 18 BRPM | DIASTOLIC BLOOD PRESSURE: 70 MMHG | HEART RATE: 78 BPM

## 2024-05-16 DIAGNOSIS — M1A.9XX1 CHRONIC GOUT INVOLVING TOE OF RIGHT FOOT WITH TOPHUS, UNSPECIFIED CAUSE: Primary | ICD-10-CM

## 2024-05-16 DIAGNOSIS — L97.512 SKIN ULCER OF TOE OF RIGHT FOOT WITH FAT LAYER EXPOSED (HCC): ICD-10-CM

## 2024-05-16 PROCEDURE — 11042 DBRDMT SUBQ TIS 1ST 20SQCM/<: CPT | Performed by: STUDENT IN AN ORGANIZED HEALTH CARE EDUCATION/TRAINING PROGRAM

## 2024-05-16 PROCEDURE — 99213 OFFICE O/P EST LOW 20 MIN: CPT | Performed by: STUDENT IN AN ORGANIZED HEALTH CARE EDUCATION/TRAINING PROGRAM

## 2024-05-16 RX ORDER — LIDOCAINE 40 MG/G
CREAM TOPICAL ONCE
Status: COMPLETED | OUTPATIENT
Start: 2024-05-16 | End: 2024-05-16

## 2024-05-16 RX ADMIN — LIDOCAINE: 40 CREAM TOPICAL at 14:03

## 2024-05-16 NOTE — PATIENT INSTRUCTIONS
Orders Placed This Encounter   Procedures    Debridement Right Toe D1, great     This order was created via procedure documentation    Wound Procedure Treatment Right Toe D1, great     This order was created via procedure documentation    Wound cleansing and dressings Right Toe D1, great     Wound cleansing and dressings Right Toe D1, great       Wash your hands with soap and water.  Remove old dressing, discard into plastic bag and place in trash.  Cleanse the wound with NSS or mild soap prior to applying a clean dressing. Do not use tissue or cotton balls. Do not scrub the wound. Pat dry using gauze.  Shower yes on days home health aide is there and nursing can complete wound care following shower (place dry dressing to toe following shower until nursing can complete wound care with in the same day of showering) other wise sponge bath       Apply endofoam to the right great toe wound.  Cover with gauze   Secure with madan   Change dressing every 2 days   Wear surgical shoe at all times when walking      Off-loading Instructions:     Keep weight and pressure off wound at all times. and Wear off-loading device as directed by your physician. Put on immediately when rising in the morning and remove when going to bed.     Home care ordered for wound care    Please purchase open toe sandals or sliders and wear if unable to get off loading/surgical shoe on  Please increase protein in diet as well as drinking Ensure   Obtain blood work as ordered   Follow up in 1 week     Standing Status:   Future     Standing Expiration Date:   5/23/2024    XR toe right great min 2 view     Standing Status:   Future     Standing Expiration Date:   5/16/2028     Scheduling Instructions:      Bring along any outside films relating to this procedure.          Prealbumin     Standing Status:   Future     Standing Expiration Date:   5/16/2025

## 2024-05-16 NOTE — PROGRESS NOTES
Wound Procedure Treatment Right Toe D1, great    Performed by: Samra York RN  Authorized by: Kitty Sapp DPM    Associated wounds:   Wound 03/26/24 Toe D1, great Right  Wound cleansed with:  NSS  Applied primary dressing:  Collagen dressing  Applied secondary dressing:  Gauze  Wound secured with:  Tape

## 2024-05-16 NOTE — PROGRESS NOTES
Patient ID: Demi Donnelly is a 83 y.o. female Date of Birth 1940       Chief Complaint   Patient presents with    Follow Up Wound Care Visit     Wound right great toe       Allergies:  Amoxicillin, Ciprofloxacin, Erythromycin, Statins, Vancomycin, Amoxicillin-pot clavulanate, and Clindamycin    Diagnosis:  1. 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 Skin ulcer of toe of right foot with fat layer exposed (HCC)  -     Prealbumin; Future  -     XR toe right great min 2 view; Future; Expected date: 05/16/2024     Diagnosis ICD-10-CM Associated Orders   1. 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 Skin ulcer of toe of right foot with fat layer exposed (HCC)  L97.512 Prealbumin     XR toe right great min 2 view           Assessment & Plan:  See wound orders. (Endoform dsd)  - Right hallux wound is unfortunately still open. Patient has not been offloading. Could consider biopsy however this may cause further trauma. F/u prealbumin and XR. Consider repeat MRI.   - pre-albumin and XR right 1st toe ordered. High protein diet recommended.  - ABIs 2/6/24: RLE 1/87/41. LLE 1.03/92/90  - Patient needs to be much better about using surgical shoe to offload or sandal that does to have any pressure to this area. She states the surgical shoe cannot be worn as she does not apply it well. I instructed her to purchase a slide-one sandal that has absolutely no pressure to that area.   - F/u 1-2wks     Subjective:   Patient went to evaluation management of her right hallux wound. Has been seeing Dr. Gomez. Has not been wearing the surgical shoe.         The following portions of the patient's history were reviewed and updated as appropriate:   Patient Active Problem List   Diagnosis    Other chest pain     Essential hypertension    Biceps tendinitis of right shoulder    Chronic diastolic CHF (congestive heart failure) (formerly [facility])    Closed head injury    Diastolic dysfunction    Dyspnea on exertion    GERD without esophagitis    Headache, worsening    Lateral epicondylitis of right elbow    Low serum vitamin B12    Lumbar radiculopathy    Peripheral edema    Peripheral neuropathy    B12 deficiency    Osteoporosis    Chronic left shoulder pain    BMI 36.0-36.9,adult    A-fib (formerly [facility])    Ambulatory dysfunction    Anxiety    Junctional bradycardia    Stage 3a chronic kidney disease (formerly [facility])    Single kidney    Vitamin D deficiency    CAD (coronary artery disease)    H/O left nephrectomy    Mid back pain    H/O: rheumatic fever    Osteoarthritis    Peripheral venous insufficiency    Sciatica    Renal cyst    Parkinson's disease    LUPE (acute kidney injury) (formerly [facility])    Other specified inflammatory spondylopathies, lumbosacral region (formerly [facility])    Obesity, morbid (formerly [facility])    Constipation    Abnormal stress test    Fecal incontinence    Ground glass opacity present on imaging of lung    NSTEMI (non-ST elevated myocardial infarction) (formerly [facility])    Prediabetes    Prolapse of vaginal walls    Anemia    Dementia with behavioral disturbance (formerly [facility])    Physical deconditioning    Skin ulcer of right great toe, limited to breakdown of skin (formerly [facility])    Spinal stenosis of lumbar region with neurogenic claudication    Lumbar spondylosis    Neck pain    Visual hallucinations     Past Medical History:   Diagnosis Date    Alzheimer's dementia (formerly [facility])     Aphasia     CHF (congestive heart failure) (formerly [facility])     Coronary artery disease     Hypercholesteremia     Hypertension     IBS (irritable bowel syndrome)     Renal disorder     Rheumatic fever     Skin lesion      Past Surgical History:   Procedure Laterality Date    ANKLE FRACTURE SURGERY      LAST ASSESSED [id number]    HYSTERECTOMY      KIDNEY SURGERY      NEPHRECTOMY      TONSILLECTOMY       Social History      Socioeconomic History    Marital status:      Spouse name: Not on file    Number of children: Not on file    Years of education: Not on file    Highest education level: Not on file   Occupational History    Not on file   Tobacco Use    Smoking status: Never    Smokeless tobacco: Never   Vaping Use    Vaping status: Never Used   Substance and Sexual Activity    Alcohol use: No    Drug use: No    Sexual activity: Not Currently   Other Topics Concern    Not on file   Social History Narrative    Not on file     Social Determinants of Health     Financial Resource Strain: Not on file   Food Insecurity: No Food Insecurity (6/16/2022)    Hunger Vital Sign     Worried About Running Out of Food in the Last Year: Never true     Ran Out of Food in the Last Year: Never true   Transportation Needs: No Transportation Needs (6/16/2022)    PRAPARE - Transportation     Lack of Transportation (Medical): No     Lack of Transportation (Non-Medical): No   Physical Activity: Not on file   Stress: Not on file   Social Connections: Not on file   Intimate Partner Violence: Not on file   Housing Stability: Low Risk  (6/16/2022)    Housing Stability Vital Sign     Unable to Pay for Housing in the Last Year: No     Number of Places Lived in the Last Year: 2     Unstable Housing in the Last Year: No        Current Outpatient Medications:     acetaminophen (TYLENOL) 650 mg CR tablet, Take 1 tablet (650 mg total) by mouth every 8 (eight) hours as needed for mild pain or moderate pain, Disp: 90 tablet, Rfl: 2    amLODIPine (NORVASC) 5 mg tablet, Take 5 mg by mouth daily, Disp: , Rfl:     aspirin (ECOTRIN LOW STRENGTH) 81 mg EC tablet, Take 81 mg by mouth daily, Disp: , Rfl:     bisacodyl 5 mg EC tablet, take 1 tablet by mouth once daily if needed for constipation, Disp: 30 tablet, Rfl: 5    cholecalciferol (VITAMIN D3) 1,000 units tablet, take 1 tablet by mouth once daily, Disp: 90 tablet, Rfl: 1    cyanocobalamin 1,000 mcg/mL, Inject 1  mL (1,000 mcg total) into a muscle every 7 days, Disp: 10 mL, Rfl: 5    Eliquis 5 MG, take 1 tablet by mouth twice a day, Disp: 180 tablet, Rfl: 3    ezetimibe (ZETIA) 10 mg tablet, take 1 tablet by mouth once daily, Disp: 90 tablet, Rfl: 1    furosemide (LASIX) 20 mg tablet, take 1 tablet by mouth twice a day, Disp: 180 tablet, Rfl: 1    isosorbide mononitrate (IMDUR) 60 mg 24 hr tablet, take 1 tablet by mouth once daily, Disp: 90 tablet, Rfl: 3    lansoprazole (PREVACID) 30 mg capsule, Take 1 capsule (30 mg total) by mouth in the morning., Disp: 90 capsule, Rfl: 3    losartan (COZAAR) 25 mg tablet, take 1 tablet by mouth once daily, Disp: 90 tablet, Rfl: 1    memantine (Namenda) 10 mg tablet, Take 1 tablet (10 mg total) by mouth 2 (two) times a day, Disp: 180 tablet, Rfl: 3    polyethylene glycol (GLYCOLAX) 17 GM/SCOOP powder, Take 17 g by mouth daily, Disp: 850 g, Rfl: 5    senna (SENOKOT) 8.6 mg, Take 1 tablet (8.6 mg total) by mouth daily at bedtime, Disp: 30 tablet, Rfl: 2    sulfamethoxazole-trimethoprim (BACTRIM DS) 800-160 mg per tablet, Take 1 tablet by mouth every 12 (twelve) hours for 7 days, Disp: 14 tablet, Rfl: 0    Current Facility-Administered Medications:     cyanocobalamin injection 1,000 mcg, 1,000 mcg, Intramuscular, Q30 Days, Kenroy Jerome MD, 1,000 mcg at 02/19/18 1008  Family History   Problem Relation Age of Onset    Hypertension Mother     Asthma Father     Cancer Sister       Review of Systems   Constitutional:  Negative for activity change, chills and fever.   HENT: Negative.  Negative for ear pain and sore throat.    Eyes:  Negative for pain and visual disturbance.   Respiratory:  Negative for cough, chest tightness and shortness of breath.    Cardiovascular:  Negative for chest pain, palpitations and leg swelling.   Gastrointestinal:  Negative for abdominal pain and vomiting.   Endocrine: Negative.    Genitourinary: Negative.  Negative for dysuria and hematuria.   Musculoskeletal:   Positive for gait problem (Ambulates with walker). Negative for arthralgias and back pain.   Skin:  Positive for wound (Right 1st toe). Negative for color change and rash.        Dystrophic toenails   Neurological:  Negative for seizures and syncope.        B/L LE tingling/burning   Psychiatric/Behavioral: Negative.  Negative for agitation and behavioral problems.    All other systems reviewed and are negative.        Objective:  /70   Pulse 78   Temp (!) 45.9 °F (7.7 °C)   Resp 18     Physical Exam  Constitutional:       General: She is not in acute distress.     Appearance: Normal appearance. She is not ill-appearing.   Cardiovascular:      Comments: Bilateral DP pulses are palpable 2/4. Bilateral PT pulses are nonpalpable or diminished 1/4. Pedal hair is absent. Legs to toes warm to cool. Varicosities with mild LE edema noted.  Pulmonary:      Effort: No respiratory distress.   Musculoskeletal:         General: No tenderness or deformity. Normal range of motion.   Skin:     Capillary Refill: Capillary refill takes less than 2 seconds.      Findings: Lesion (Right plantar-medial hallux IPJ, now full thickness, granular base, sanguinous drainage, small tophi in wound base,no deep probe. No acute SOI.) present. No erythema.      Comments: B/L LE skin is atrophic - thin, dry and shiny in appearance.   Toenails x10 are elongated, dystrophic, discolored. There is nail thickening and subungual debris noted to toenails to left hallux, R 2nd, B/L 5th toes.   Neurological:      General: No focal deficit present.      Mental Status: She is alert and oriented to person, place, and time.      Comments: Gross sensation to feet intact. Patient endorses numbness, tingling and burning to B/L feet. + paresthesias.    Psychiatric:         Mood and Affect: Mood normal.         Behavior: Behavior normal.             Wound 03/26/24 Toe D1, great Right (Active)   Wound Image   05/16/24 1321   Wound Description Pink 05/16/24  "1326   Alyson-wound Assessment Callus;White 05/16/24 1326   Wound Length (cm) 0.1 cm 05/16/24 1326   Wound Width (cm) 0.1 cm 05/16/24 1326   Wound Depth (cm) 0.1 cm 05/16/24 1326   Wound Surface Area (cm^2) 0.01 cm^2 05/16/24 1326   Wound Volume (cm^3) 0.001 cm^3 05/16/24 1326   Calculated Wound Volume (cm^3) 0 cm^3 05/16/24 1326   Change in Wound Size % 100 05/16/24 1326   Drainage Amount Scant 05/16/24 1326   Drainage Description Serous 05/16/24 1326   Non-staged Wound Description Partial thickness 05/16/24 1326           Debridement   Wound 03/26/24 Toe D1, great Right    Universal Protocol:  Consent: Verbal consent obtained.  Risks and benefits: risks, benefits and alternatives were discussed  Consent given by: patient  Time out: Immediately prior to procedure a \"time out\" was called to verify the correct patient, procedure, equipment, support staff and site/side marked as required.  Patient understanding: patient states understanding of the procedure being performed  Patient consent: the patient's understanding of the procedure matches consent given  Patient identity confirmed: verbally with patient    Debridement Details  Performed by: physician  Debridement type: surgical  Level of debridement: subcutaneous tissue      Post-debridement measurements  Length (cm): 0.1  Width (cm): 0.1  Depth (cm): 0.15  Percent debrided: 100%  Surface Area (cm^2): 0.01  Area Debrided (cm^2): 0.01  Volume (cm^3): 0    Tissue and other material debrided: adipose and subcutaneous tissue  Devitalized tissue debrided: biofilm and exudate  Instrument(s) utilized: curette  Bleeding: small  Hemostasis obtained with: pressure  Procedural pain (0-10): 0  Post-procedural pain: 0   Response to treatment: procedure was tolerated well                 Wound Instructions:  Orders Placed This Encounter   Procedures    Debridement     This order was created via procedure documentation    Wound Procedure Treatment Right Toe D1, great     This order " "was created via procedure documentation    XR toe right great min 2 view     Standing Status:   Future     Standing Expiration Date:   5/16/2028     Scheduling Instructions:      Bring along any outside films relating to this procedure.          Prealbumin     Standing Status:   Future     Standing Expiration Date:   5/16/2025         Kitty Sapp DPM      Portions of the record may have been created with voice recognition software. Occasional wrong word or \"sound a like\" substitutions may have occurred due to the inherent limitations of voice recognition software. Read the chart carefully and recognize, using context, where substitutions have occurred.    "

## 2024-05-22 ENCOUNTER — TELEPHONE (OUTPATIENT)
Age: 84
End: 2024-05-22

## 2024-05-22 NOTE — TELEPHONE ENCOUNTER
[facility] RN calling on behalf of patient, states they never received an order for the wound care of the great toe. Verified order was placed on 5/16/24, [facility] RN accepted this as a verbal, please send to [facility].

## 2024-05-24 ENCOUNTER — TELEPHONE (OUTPATIENT)
Age: 84
End: 2024-05-24

## 2024-05-30 ENCOUNTER — OFFICE VISIT (OUTPATIENT)
Dept: WOUND CARE | Facility: CLINIC | Age: 84
End: 2024-05-30
Payer: COMMERCIAL

## 2024-05-30 VITALS
RESPIRATION RATE: 18 BRPM | HEART RATE: 60 BPM | SYSTOLIC BLOOD PRESSURE: 102 MMHG | TEMPERATURE: 96.8 F | DIASTOLIC BLOOD PRESSURE: 52 MMHG

## 2024-05-30 DIAGNOSIS — F03.918 DEMENTIA WITH BEHAVIORAL DISTURBANCE (HCC): ICD-10-CM

## 2024-05-30 DIAGNOSIS — M1A.9XX1 CHRONIC GOUT INVOLVING TOE OF RIGHT FOOT WITH TOPHUS, UNSPECIFIED CAUSE: Primary | ICD-10-CM

## 2024-05-30 DIAGNOSIS — L97.512 SKIN ULCER OF TOE OF RIGHT FOOT WITH FAT LAYER EXPOSED (HCC): ICD-10-CM

## 2024-05-30 PROCEDURE — 11042 DBRDMT SUBQ TIS 1ST 20SQCM/<: CPT | Performed by: STUDENT IN AN ORGANIZED HEALTH CARE EDUCATION/TRAINING PROGRAM

## 2024-05-30 RX ORDER — LIDOCAINE 40 MG/G
CREAM TOPICAL ONCE
Status: COMPLETED | OUTPATIENT
Start: 2024-05-30 | End: 2024-05-30

## 2024-05-30 RX ADMIN — LIDOCAINE: 40 CREAM TOPICAL at 13:19

## 2024-05-30 NOTE — PATIENT INSTRUCTIONS
Orders Placed This Encounter   Procedures    Wound cleansing and dressings Right Toe D1, great     Wound cleansing and dressings Right Toe D1, great          Wash your hands with soap and water.  Remove old dressing, discard into plastic bag and place in trash.  Cleanse the wound with NSS or mild soap prior to applying a clean dressing. Do not use tissue or cotton balls. Do not scrub the wound. Pat dry using gauze.  Shower yes on days home health aide is there and nursing can complete wound care following shower (place dry dressing to toe following shower until nursing can complete wound care with in the same day of showering) other wise sponge bath       Apply endofoam to the right great toe wound.  Cover with gauze   Secure with madan   Change dressing every 2 days   Wear surgical shoe at all times when walking      Off-loading Instructions:     Keep weight and pressure off wound at all times. and Wear off-loading device as directed by your physician. Put on immediately when rising in the morning and remove when going to bed.     Home care ordered for wound care   Please purchase open toe sandals or sliders and wear if unable to get off loading/surgical shoe on  Please increase protein in diet as well as drinking Ensure   Obtain blood work and x-rays as ordered     For nail care, please call the podiatry office.    Follow up in 2 weeks.     Standing Status:   Future     Standing Expiration Date:   6/6/2024    Debridement Right Toe D1, great     This order was created via procedure documentation

## 2024-05-30 NOTE — PROGRESS NOTES
Wound Procedure Treatment Right Toe D1, great    Performed by: Kadi Ragland RN  Authorized by: Kitty Sapp DPM    Associated wounds:   Wound 03/26/24 Toe D1, great Right  Wound cleansed with:  NSS  Applied primary dressing:  Collagen dressing  Applied secondary dressing:  Gauze  Dressing secured with:  Tape  Comments:  Endoform

## 2024-06-10 ENCOUNTER — TELEPHONE (OUTPATIENT)
Dept: WOUND CARE | Facility: CLINIC | Age: 84
End: 2024-06-10

## 2024-06-10 ENCOUNTER — TELEPHONE (OUTPATIENT)
Age: 84
End: 2024-06-10

## 2024-06-10 NOTE — TELEPHONE ENCOUNTER
Spoke with [facility]. They are scheduled for a home care visit with Demi on Wednesday. Demi called and made aware.   No

## 2024-06-10 NOTE — TELEPHONE ENCOUNTER
Caller: Demi Donnelly    Doctor: Dr. Sapp    Reason for call: is under the impression we are send ALICIA to her home for care-no one has called or come to see her. Please call her back and advise. Said she called WC and no answer and LM but no return call about this issue. Thanks    Call back#: [phone]

## 2024-06-12 ENCOUNTER — TELEPHONE (OUTPATIENT)
Age: 84
End: 2024-06-12

## 2024-06-12 DIAGNOSIS — R27.0 ATAXIA: Primary | ICD-10-CM

## 2024-06-12 NOTE — TELEPHONE ENCOUNTER
"Wound Care RN requesting if PCP could order a PT evaluation, patient is getting more \"tuckered\" in general, like going outside and going back and forth to the bathroom.  "

## 2024-06-13 ENCOUNTER — TELEPHONE (OUTPATIENT)
Age: 84
End: 2024-06-13

## 2024-06-13 ENCOUNTER — OFFICE VISIT (OUTPATIENT)
Dept: WOUND CARE | Facility: CLINIC | Age: 84
End: 2024-06-13
Payer: COMMERCIAL

## 2024-06-13 VITALS
TEMPERATURE: 96.8 F | RESPIRATION RATE: 18 BRPM | SYSTOLIC BLOOD PRESSURE: 108 MMHG | DIASTOLIC BLOOD PRESSURE: 62 MMHG | HEART RATE: 70 BPM

## 2024-06-13 DIAGNOSIS — B35.1 ONYCHOMYCOSIS: ICD-10-CM

## 2024-06-13 DIAGNOSIS — L97.512 NEUROPATHIC ULCER OF TOE OF RIGHT FOOT WITH FAT LAYER EXPOSED (HCC): ICD-10-CM

## 2024-06-13 DIAGNOSIS — F03.918 DEMENTIA WITH BEHAVIORAL DISTURBANCE (HCC): ICD-10-CM

## 2024-06-13 DIAGNOSIS — I87.2 PERIPHERAL VENOUS INSUFFICIENCY: ICD-10-CM

## 2024-06-13 DIAGNOSIS — M1A.9XX1 CHRONIC GOUT INVOLVING TOE OF RIGHT FOOT WITH TOPHUS, UNSPECIFIED CAUSE: Primary | ICD-10-CM

## 2024-06-13 DIAGNOSIS — G62.89 OTHER POLYNEUROPATHY: ICD-10-CM

## 2024-06-13 PROCEDURE — 11042 DBRDMT SUBQ TIS 1ST 20SQCM/<: CPT | Performed by: STUDENT IN AN ORGANIZED HEALTH CARE EDUCATION/TRAINING PROGRAM

## 2024-06-13 PROCEDURE — 99213 OFFICE O/P EST LOW 20 MIN: CPT | Performed by: STUDENT IN AN ORGANIZED HEALTH CARE EDUCATION/TRAINING PROGRAM

## 2024-06-13 PROCEDURE — 11721 DEBRIDE NAIL 6 OR MORE: CPT | Performed by: STUDENT IN AN ORGANIZED HEALTH CARE EDUCATION/TRAINING PROGRAM

## 2024-06-13 RX ORDER — LIDOCAINE 40 MG/G
CREAM TOPICAL ONCE
Status: COMPLETED | OUTPATIENT
Start: 2024-06-13 | End: 2024-06-13

## 2024-06-13 RX ADMIN — LIDOCAINE: 40 CREAM TOPICAL at 13:11

## 2024-06-13 NOTE — PATIENT INSTRUCTIONS
Orders Placed This Encounter   Procedures    Wound cleansing and dressings Right Toe D1, great     Wound cleansing and dressings Right Toe D1, great          Wash your hands with soap and water.  Remove old dressing, discard into plastic bag and place in trash.  Cleanse the wound with NSS or mild soap prior to applying a clean dressing. Do not use tissue or cotton balls. Do not scrub the wound. Pat dry using gauze.  Shower yes on days home health aide is there and nursing can complete wound care following shower (place dry dressing to toe following shower until nursing can complete wound care with in the same day of showering) other wise sponge bath       Pack endofoam in to the right great toe wound.  Cover with gauze   Secure with madan   Change dressing every 2 days   Wear surgical shoe at all times when walking      Off-loading Instructions:     Keep weight and pressure off wound at all times. and Wear off-loading device as directed by your physician. Put on immediately when rising in the morning and remove when going to bed.     Home care ordered for wound care   Please purchase open toe sandals or sliders and wear if unable to get off loading/surgical shoe on  Please increase protein in diet as well as drinking Ensure     Home Health Aide - please file down Left big toe     Obtain lab work and x-rays as ordered      For nail care, please call the podiatry office.     Follow up in 2 weeks.     Standing Status:   Future     Standing Expiration Date:   6/20/2024    Debridement Right Toe D1, great     This order was created via procedure documentation    Uric acid     Standing Status:   Future     Standing Expiration Date:   6/13/2025

## 2024-06-13 NOTE — PROGRESS NOTES
Patient ID: Demi Donnelly is a 83 y.o. female Date of Birth 1940       Chief Complaint   Patient presents with    Follow Up Wound Care Visit     R great toe        Allergies:  Amoxicillin, Ciprofloxacin, Erythromycin, Statins, Vancomycin, Amoxicillin-pot clavulanate, and Clindamycin    Diagnosis:  1. 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 Dementia with behavioral disturbance (HCC)  4. Other polyneuropathy  5. Peripheral venous insufficiency  6. Onychomycosis     Diagnosis ICD-10-CM Associated Orders   1. 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 Dementia with behavioral disturbance ([facility])  F03.918       4. Other polyneuropathy  G62.89       5. Peripheral venous insufficiency  I87.2       6. Onychomycosis  B35.1            Assessment & Plan:  See wound orders.  (Endoform dsd)  - Right hallux wound is unfortunately still open. She has been offloading better recently. Could consider biopsy however this may cause further trauma. She has not gotten the prealbumin and XR which I instructed her to get. Consider repeat MRI. Uric acid also ordered. Consider gout medication per PCP. The acute on chronic gout with chronic tophi may be a reason as to why her wound has not healed  - pre-albumin and XR  right 1st toe ordered 2 appts ago, she should get these done.   - High protein diet recommended.  - ABIs 2/6/24: RLE 1/87/41. LLE 1.03/92/90  - MRI right foot 4/17/23: 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  This is most prominent at the IPJ of great toe. No MRI findings to suggest osteomyelitis.  - Patient needs to be much better about using surgical shoe to offload or sandal that does to have any pressure to this area.   - toenails x10 debrided in length and thickness with nail nipper and jamilah today (Q9). F/u in outpt office for nail care 9-12wks  - F/u 1-2wks     Subjective:   Patient went to evaluation management of her right hallux wound. Has not been wearing the surgical shoe all the time but using it more. Did not get new shoes. Did not get Xrs or lab.         The following portions of the patient's history were reviewed and updated as appropriate:   Patient Active Problem List   Diagnosis    Other chest pain    Essential hypertension    Biceps tendinitis of right shoulder    Chronic diastolic CHF (congestive heart failure) ([facility])    Closed head injury    Diastolic dysfunction    Dyspnea on exertion    GERD without esophagitis    Headache, worsening    Lateral epicondylitis of right elbow    Low serum vitamin B12    Lumbar radiculopathy    Peripheral edema    Peripheral neuropathy    B12 deficiency    Osteoporosis    Chronic left shoulder pain    BMI 36.0-36.9,adult    A-fib ([facility])    Ambulatory dysfunction    Anxiety    Junctional bradycardia    Stage 3a chronic kidney disease ([facility])    Single kidney    Vitamin D deficiency    CAD (coronary artery disease)    H/O left nephrectomy    Mid back pain    H/O: rheumatic fever    Osteoarthritis    Peripheral venous insufficiency    Sciatica    Renal cyst    Parkinson's disease    LUPE (acute kidney injury) ([facility])    Other specified inflammatory spondylopathies,  lumbosacral region ([facility])    Obesity, morbid ([facility])    Constipation    Abnormal stress test    Fecal incontinence    Ground glass opacity present on imaging of lung    NSTEMI (non-ST elevated myocardial infarction) ([facility])    Prediabetes    Prolapse of vaginal walls    Anemia    Dementia with behavioral disturbance ([facility])    Physical deconditioning    Skin ulcer of right great toe, limited to breakdown of skin ([facility])    Spinal stenosis of lumbar region with neurogenic claudication    Lumbar spondylosis    Neck pain    Visual hallucinations     Past Medical History:   Diagnosis Date    Alzheimer's dementia ([facility])     Aphasia     CHF (congestive heart failure) ([facility])     Coronary artery disease     Hypercholesteremia     Hypertension     IBS (irritable bowel syndrome)     Renal disorder     Rheumatic fever     Skin lesion      Past Surgical History:   Procedure Laterality Date    ANKLE FRACTURE SURGERY      LAST ASSESSED [id number]    HYSTERECTOMY      KIDNEY SURGERY      NEPHRECTOMY      TONSILLECTOMY       Social History     Socioeconomic History    Marital status:      Spouse name: Not on file    Number of children: Not on file    Years of education: Not on file    Highest education level: Not on file   Occupational History    Not on file   Tobacco Use    Smoking status: Never    Smokeless tobacco: Never   Vaping Use    Vaping status: Never Used   Substance and Sexual Activity    Alcohol use: No    Drug use: No    Sexual activity: Not Currently   Other Topics Concern    Not on file   Social History Narrative    Not on file     Social Determinants of Health     Financial Resource Strain: Not on file   Food Insecurity: No Food Insecurity (6/16/2022)    Hunger Vital Sign     Worried About Running Out of Food in the Last Year: Never true     Ran Out of Food in the Last Year: Never true   Transportation Needs: No Transportation Needs (6/16/2022)    PRAPARE - Transportation     Lack of Transportation (Medical): No     Lack  of Transportation (Non-Medical): No   Physical Activity: Not on file   Stress: Not on file   Social Connections: Not on file   Intimate Partner Violence: Not on file   Housing Stability: Low Risk  (6/16/2022)    Housing Stability Vital Sign     Unable to Pay for Housing in the Last Year: No     Number of Places Lived in the Last Year: 2     Unstable Housing in the Last Year: No        Current Outpatient Medications:     acetaminophen (TYLENOL) 650 mg CR tablet, Take 1 tablet (650 mg total) by mouth every 8 (eight) hours as needed for mild pain or moderate pain, Disp: 90 tablet, Rfl: 2    amLODIPine (NORVASC) 5 mg tablet, Take 5 mg by mouth daily, Disp: , Rfl:     aspirin (ECOTRIN LOW STRENGTH) 81 mg EC tablet, Take 81 mg by mouth daily, Disp: , Rfl:     bisacodyl 5 mg EC tablet, take 1 tablet by mouth once daily if needed for constipation, Disp: 30 tablet, Rfl: 5    cholecalciferol (VITAMIN D3) 1,000 units tablet, take 1 tablet by mouth once daily, Disp: 90 tablet, Rfl: 1    cyanocobalamin 1,000 mcg/mL, Inject 1 mL (1,000 mcg total) into a muscle every 7 days, Disp: 10 mL, Rfl: 5    Eliquis 5 MG, take 1 tablet by mouth twice a day, Disp: 180 tablet, Rfl: 3    ezetimibe (ZETIA) 10 mg tablet, take 1 tablet by mouth once daily, Disp: 90 tablet, Rfl: 1    furosemide (LASIX) 20 mg tablet, take 1 tablet by mouth twice a day, Disp: 180 tablet, Rfl: 1    isosorbide mononitrate (IMDUR) 60 mg 24 hr tablet, take 1 tablet by mouth once daily, Disp: 90 tablet, Rfl: 3    lansoprazole (PREVACID) 30 mg capsule, Take 1 capsule (30 mg total) by mouth in the morning., Disp: 90 capsule, Rfl: 3    losartan (COZAAR) 25 mg tablet, take 1 tablet by mouth once daily, Disp: 90 tablet, Rfl: 1    memantine (Namenda) 10 mg tablet, Take 1 tablet (10 mg total) by mouth 2 (two) times a day, Disp: 180 tablet, Rfl: 3    polyethylene glycol (GLYCOLAX) 17 GM/SCOOP powder, Take 17 g by mouth daily, Disp: 850 g, Rfl: 5    senna (SENOKOT) 8.6 mg, Take 1  tablet (8.6 mg total) by mouth daily at bedtime, Disp: 30 tablet, Rfl: 2    Current Facility-Administered Medications:     cyanocobalamin injection 1,000 mcg, 1,000 mcg, Intramuscular, Q30 Days, Kenroy Jerome MD, 1,000 mcg at 02/19/18 1008  Family History   Problem Relation Age of Onset    Hypertension Mother     Asthma Father     Cancer Sister       Review of Systems   Constitutional:  Negative for activity change, chills and fever.   HENT: Negative.  Negative for ear pain and sore throat.    Eyes:  Negative for pain and visual disturbance.   Respiratory:  Negative for cough, chest tightness and shortness of breath.    Cardiovascular:  Negative for chest pain, palpitations and leg swelling.   Gastrointestinal:  Negative for abdominal pain and vomiting.   Endocrine: Negative.    Genitourinary: Negative.  Negative for dysuria and hematuria.   Musculoskeletal:  Positive for gait problem (Ambulates with walker). Negative for arthralgias and back pain.   Skin:  Positive for wound (Right 1st toe). Negative for color change and rash.        Dystrophic toenails   Neurological:  Negative for seizures and syncope.        B/L LE tingling/burning   Psychiatric/Behavioral: Negative.  Negative for agitation and behavioral problems.    All other systems reviewed and are negative.        Objective:  /62   Pulse 70   Temp (!) 96.8 °F (36 °C) (Tympanic)   Resp 18     Physical Exam  Constitutional:       General: She is not in acute distress.     Appearance: Normal appearance. She is not ill-appearing.   Cardiovascular:      Comments: Bilateral DP pulses are palpable 2/4. Bilateral PT pulses are nonpalpable or diminished 1/4. Pedal hair is absent. Legs to toes warm to cool. Varicosities with mild LE edema noted.  Pulmonary:      Effort: No respiratory distress.   Musculoskeletal:         General: No tenderness or deformity. Normal range of motion.   Skin:     Capillary Refill: Capillary refill takes less than 2 seconds.     "  Findings: Lesion (Right plantar-medial hallux IPJ, now full thickness, granular base, sanguinous drainage, small tophi in wound base,no deep probe. No acute SOI.) present. No erythema.      Comments: B/L LE skin is atrophic - thin, dry and shiny in appearance.   Toenails x10 are elongated, dystrophic, discolored. There is nail thickening and subungual debris noted to toenails to left hallux, R 2nd, B/L 5th toes.   Neurological:      General: No focal deficit present.      Mental Status: She is alert and oriented to person, place, and time.      Comments: Gross sensation to feet intact. Patient endorses numbness, tingling and burning to B/L feet. + paresthesias.    Psychiatric:         Mood and Affect: Mood normal.         Behavior: Behavior normal.             Wound 03/26/24 Toe D1, great Right (Active)   Wound Image   05/30/24 1337   Wound Description Pink;Pale;White 06/13/24 1308   Alyson-wound Assessment White;Pale 06/13/24 1308   Wound Length (cm) 0.1 cm 06/13/24 1308   Wound Width (cm) 0.1 cm 06/13/24 1308   Wound Depth (cm) 0.3 cm 06/13/24 1308   Wound Surface Area (cm^2) 0.01 cm^2 06/13/24 1308   Wound Volume (cm^3) 0.003 cm^3 06/13/24 1308   Calculated Wound Volume (cm^3) 0 cm^3 06/13/24 1308   Change in Wound Size % 100 06/13/24 1308   Drainage Amount Small 06/13/24 1308   Drainage Description Serous 06/13/24 1308   Non-staged Wound Description Partial thickness 06/13/24 1308             Debridement   Wound 03/26/24 Toe D1, great Right    Universal Protocol:  Consent: Verbal consent obtained.  Risks and benefits: risks, benefits and alternatives were discussed  Consent given by: patient  Time out: Immediately prior to procedure a \"time out\" was called to verify the correct patient, procedure, equipment, support staff and site/side marked as required.  Patient understanding: patient states understanding of the procedure being performed  Patient consent: the patient's understanding of the procedure matches " consent given  Patient identity confirmed: verbally with patient    Debridement Details  Performed by: physician  Debridement type: surgical  Level of debridement: subcutaneous tissue      Post-debridement measurements  Length (cm): 0.1  Width (cm): 0.15  Depth (cm): 0.3  Percent debrided: 100%  Surface Area (cm^2): 0.02  Area Debrided (cm^2): 0.02  Volume (cm^3): 0    Tissue and other material debrided: adipose and subcutaneous tissue  Devitalized tissue debrided: biofilm and exudate  Instrument(s) utilized: blade  Technique utilized: excisionalBleeding: small  Hemostasis obtained with: pressure  Procedural pain (0-10): 0  Post-procedural pain: 0   Response to treatment: procedure was tolerated well  Debridement Comments: Tophaceous crystals in wound bed               Wound Instructions:  Orders Placed This Encounter   Procedures    Wound cleansing and dressings Right Toe D1, great     Wound cleansing and dressings Right Toe D1, great          Wash your hands with soap and water.  Remove old dressing, discard into plastic bag and place in trash.  Cleanse the wound with NSS or mild soap prior to applying a clean dressing. Do not use tissue or cotton balls. Do not scrub the wound. Pat dry using gauze.  Shower yes on days home health aide is there and nursing can complete wound care following shower (place dry dressing to toe following shower until nursing can complete wound care with in the same day of showering) other wise sponge bath       Pack endofoam in to the right great toe wound.  Cover with gauze   Secure with madan   Change dressing every 2 days   Wear surgical shoe at all times when walking      Off-loading Instructions:     Keep weight and pressure off wound at all times. and Wear off-loading device as directed by your physician. Put on immediately when rising in the morning and remove when going to bed.     Home care ordered for wound care   Please purchase open toe sandals or sliders and wear if unable  "to get off loading/surgical shoe on  Please increase protein in diet as well as drinking Ensure     Home Health Aide - please file down Left big toe     Obtain lab work and x-rays as ordered      For nail care, please call the podiatry office.     Follow up in 2 weeks.     Standing Status:   Future     Standing Expiration Date:   6/20/2024    Debridement Right Toe D1, great     This order was created via procedure documentation    Wound Procedure Treatment Right Toe D1, great     This order was created via procedure documentation    Uric acid     Standing Status:   Future     Standing Expiration Date:   6/13/2025         Kitty Sapp DPM      Portions of the record may have been created with voice recognition software. Occasional wrong word or \"sound a like\" substitutions may have occurred due to the inherent limitations of voice recognition software. Read the chart carefully and recognize, using context, where substitutions have occurred.    "

## 2024-06-13 NOTE — PROGRESS NOTES
Wound Procedure Treatment Right Toe D1, great    Performed by: Kadi Ragland RN  Authorized by: Kitty Sapp DPM    Associated wounds:   Wound 03/26/24 Toe D1, great Right  Wound cleansed with:  NSS  Applied primary dressing:  Collagen dressing  Applied secondary dressing:  Gauze  Dressing secured with:  Teodoro and Tape

## 2024-06-14 ENCOUNTER — TELEPHONE (OUTPATIENT)
Age: 84
End: 2024-06-14

## 2024-06-14 NOTE — TELEPHONE ENCOUNTER
Jt from [facility] called stating after checking on referral he received, patient is being cared for by [facility] .

## 2024-06-18 ENCOUNTER — TELEPHONE (OUTPATIENT)
Age: 84
End: 2024-06-18

## 2024-06-18 NOTE — TELEPHONE ENCOUNTER
[facility] RN did evaluation for patient today. Plan of Care, gate training with Lele shoe, therapeutic exercise and activity, balance interventions, and development home exercise program. Please call back to verbally review and sig off on care plan, call back number left [phone]

## 2024-06-21 ENCOUNTER — TELEPHONE (OUTPATIENT)
Age: 84
End: 2024-06-21

## 2024-06-21 NOTE — TELEPHONE ENCOUNTER
Received info for [facility] but pt needs to sign waiver first. Called and spoke with pt and advised about rescheduling with [place] but but didn't want to do that either. She is going to see if she can find a ride

## 2024-06-21 NOTE — TELEPHONE ENCOUNTER
Pt called today and asked and wanted to reschedule because her ride was not able to get her that early. Pt only wants appt with Dr. Jerome and that's the only one he has available. Can you please see if we are able to schedule her for a lift. If not is there anyway we can get her in with Dr. Jerome sooner? Please advise [phone] thank you.

## 2024-06-24 ENCOUNTER — TELEPHONE (OUTPATIENT)
Age: 84
End: 2024-06-24

## 2024-06-24 DIAGNOSIS — R39.9 UTI SYMPTOMS: Primary | ICD-10-CM

## 2024-06-24 NOTE — TELEPHONE ENCOUNTER
Pt had to cancel her apt today because of transportation issues. She was seen today by [facility] health physical therapy and complained of possible uti symptoms, she described it as it feels like theres a cut down there when she urinates. Loulou the physical therapy assistant was informed from her supervisor that they can send a nurse out to visit the home and collect/test her urine if they have an order. So [facility] is asking if we can place an order for that and fax to [phone]. Please let them know.

## 2024-07-02 ENCOUNTER — TELEPHONE (OUTPATIENT)
Age: 84
End: 2024-07-02

## 2024-07-02 DIAGNOSIS — R39.9 UTI SYMPTOMS: Primary | ICD-10-CM

## 2024-07-02 RX ORDER — SULFAMETHOXAZOLE AND TRIMETHOPRIM 800; 160 MG/1; MG/1
1 TABLET ORAL 2 TIMES DAILY
Qty: 6 TABLET | Refills: 0 | Status: SHIPPED | OUTPATIENT
Start: 2024-07-02 | End: 2024-07-05

## 2024-07-02 NOTE — TELEPHONE ENCOUNTER
Over past year patient seems to have difficulty managing medications.  Has misplaced medications multiple times, unsure what specific medications are for. He reports confusion given frequent medication changes.  Has met with pharmacy previously for medication review.  Briefly discussed with pharmacist in clinic today - she reports patient could be started on bubble packing in 2 weeks.  All rx's re-sent to [facility], we will fill x2 weeks with plan to start bubble packing at that time. Patient agreeable.  Suspect difficulty managing medications related to uncontrolled depression/anxiety and significant stress; will consider further workup as needed.    Renée from [facility] reports she sent a UA for patient to [facility] and called to see if the office received the results. She would like a call back to the patient to advise.

## 2024-07-08 ENCOUNTER — TELEPHONE (OUTPATIENT)
Age: 84
End: 2024-07-08

## 2024-07-08 DIAGNOSIS — I50.32 CHRONIC DIASTOLIC (CONGESTIVE) HEART FAILURE (HCC): Primary | ICD-10-CM

## 2024-07-08 NOTE — TELEPHONE ENCOUNTER
Josep at [facility] is requesting an order for OT. They had pt today and the patient was complaining/struggling with her fingers, couldn't even open the microwave and is having trouble with meal completion. Counts include 234 beds at the [facility] would accept a verbal order or  Fax to [phone]

## 2024-07-10 ENCOUNTER — TELEPHONE (OUTPATIENT)
Age: 84
End: 2024-07-10

## 2024-07-10 DIAGNOSIS — R39.9 UTI SYMPTOMS: Primary | ICD-10-CM

## 2024-07-10 NOTE — TELEPHONE ENCOUNTER
Patient was seen for eval for OT services by Laron from [facility].. Patient will be 1x week x 4 weeks. Orders will be sent to the office.     Of note, patient told the OT that she has burning with urination. A UA was obtained on 7/2/24 with Bactrim DS ordered 1 tab 2x day x 3 days. No culture was sent. Patient continues to have symptoms. Patient is being cared for by [facility].

## 2024-07-18 ENCOUNTER — OFFICE VISIT (OUTPATIENT)
Dept: WOUND CARE | Facility: CLINIC | Age: 84
End: 2024-07-18
Payer: COMMERCIAL

## 2024-07-18 VITALS
SYSTOLIC BLOOD PRESSURE: 128 MMHG | RESPIRATION RATE: 18 BRPM | TEMPERATURE: 96 F | HEART RATE: 70 BPM | DIASTOLIC BLOOD PRESSURE: 82 MMHG

## 2024-07-18 DIAGNOSIS — I87.2 PERIPHERAL VENOUS INSUFFICIENCY: ICD-10-CM

## 2024-07-18 DIAGNOSIS — M1A.9XX1 CHRONIC GOUT INVOLVING TOE OF RIGHT FOOT WITH TOPHUS, UNSPECIFIED CAUSE: Primary | ICD-10-CM

## 2024-07-18 DIAGNOSIS — L97.512 NEUROPATHIC ULCER OF TOE OF RIGHT FOOT WITH FAT LAYER EXPOSED (HCC): ICD-10-CM

## 2024-07-18 DIAGNOSIS — F03.918 DEMENTIA WITH BEHAVIORAL DISTURBANCE (HCC): ICD-10-CM

## 2024-07-18 PROCEDURE — 11042 DBRDMT SUBQ TIS 1ST 20SQCM/<: CPT | Performed by: STUDENT IN AN ORGANIZED HEALTH CARE EDUCATION/TRAINING PROGRAM

## 2024-07-18 RX ORDER — LIDOCAINE 40 MG/G
CREAM TOPICAL ONCE
Status: COMPLETED | OUTPATIENT
Start: 2024-07-18 | End: 2024-07-18

## 2024-07-18 RX ADMIN — LIDOCAINE: 40 CREAM TOPICAL at 14:12

## 2024-07-18 NOTE — PROGRESS NOTES
Wound Procedure Treatment Right Toe D1, great    Performed by: Kadi Ragland RN  Authorized by: Kitty Sapp DPM    Associated wounds:   Wound 03/26/24 Toe D1, great Right  Wound cleansed with:  NSS  Applied primary dressing:  Collagen dressing and Silver  Applied secondary dressing:  Gauze  Dressing secured with:  Tape

## 2024-07-18 NOTE — LETTER
[facility]  [address] [facility] [address]  Phone#  [phone]  Fax#  [phone]    Patient:  Demi Donnelly  YOB: 1940  Phone:  [phone]  Date of Visit:  7/18/2024    Orders Placed This Encounter   Procedures   • Wound cleansing and dressings Right Toe D1, great     Wound cleansing and dressings Right Toe D1, great          Wash your hands with soap and water.  Remove old dressing, discard into plastic bag and place in trash.  Cleanse the wound with NSS or mild soap prior to applying a clean dressing. Do not use tissue or cotton balls. Do not scrub the wound. Pat dry using gauze.  Shower yes on days home health aide is there and nursing can complete wound care following shower (place dry dressing to toe following shower until nursing can complete wound care with in the same day of showering) other wise sponge bath       Pack puracol silver in to the right great toe wound.  Cover with gauze   Secure with madan   Change dressing daily   Wear surgical shoe at all times when walking      Off-loading Instructions:     Keep weight and pressure off wound at all times. and Wear off-loading device as directed by your physician. Put on immediately when rising in the morning and remove when going to bed.       Please purchase open toe sandals or sliders and wear if unable to get off loading/surgical shoe on  Please increase protein in diet as well as drinking Ensure     Please try to consume 3-4 servings of protein (30g) each day.   - Each serving of protein should contain about 30 mg protein.   - Good sources of protein include, lean meats (fish, chicken, etc), eggs, dairy products (yogurt, cheese), tofu, legumes (chickpeas, lentils, peas, black beans), nuts (cashew, walnut, peanuts, etc), & quinoa.        Home Health Aide - please file down Left big toe      Obtain lab work and x-rays as ordered      For nail care, please call the podiatry office.     Follow up  in 2 weeks.     Standing Status:   Future     Standing Expiration Date:   7/25/2024   • Wound Procedure Treatment Right Toe D1, great     This order was created via procedure documentation   • Debridement Right Toe D1, great     This order was created via procedure documentation         Electronically signed by Kitty Sapp DPM

## 2024-07-18 NOTE — PROGRESS NOTES
Patient ID: Demi Donnelly is a 84 y.o. female Date of Birth 1940       Chief Complaint   Patient presents with    New Patient Visit     R great toe        Allergies  Amoxicillin, Ciprofloxacin, Erythromycin, Statins, Vancomycin, Amoxicillin-pot clavulanate, and Clindamycin    Diagnosis:  1. 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 Dementia with behavioral disturbance (HCC)  -     Wound cleansing and dressings Right Toe D1, great; Future; Expected date: 07/25/2024  -     lidocaine (LMX) 4 % cream  -     Wound Procedure Treatment Right Toe D1, great  4. Peripheral venous insufficiency  -     Wound cleansing and dressings Right Toe D1, great; Future; Expected date: 07/25/2024  -     lidocaine (LMX) 4 % cream  -     Wound Procedure Treatment Right Toe D1, great      Diagnosis ICD-10-CM Associated Orders   1. 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 Dementia with behavioral disturbance ([facility])  F03.918 Wound cleansing and dressings Right Toe D1, great     lidocaine (LMX) 4 % cream     Wound Procedure Treatment Right Toe D1, great      4. Peripheral venous insufficiency  I87.2  Wound cleansing and dressings Right Toe D1, great     lidocaine (LMX) 4 % cream     Wound Procedure Treatment Right Toe D1, great             Assessment & Plan:  See wound orders.  (puracol silver, dsd daily chg)  - Right hallux wound is unfortunately still open. She has been offloading better recently. Could consider biopsy however this may cause further trauma. She has not gotten the uric acid, prealbumin and XR which I instructed her to get. Consider repeat MRI PRN. Consider gout medication per PCP (I did reach out to PCP today regarding this). The acute on chronic gout with chronic tophi may be a reason as to why her wound has not healed  - pre-albumin, uric acid and XR right 1st toe ordered at past appts, she should get these done.   - High protein diet recommended.  - ABIs 2/6/24: RLE 1/87/41. LLE 1.03/92/90  - MRI right foot 4/17/23: 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  This is most prominent at the IPJ of great toe. No MRI findings to suggest osteomyelitis.  - Patient needs to be much better about using surgical shoe to offload or sandal that does to have any pressure to this area.   - F/u 1-2wks; call if issues arise          Subjective:   Patient went to evaluation management of her right hallux wound. Has not been wearing the surgical shoe all the time but using it more. Wearing soft slippers today. Did not get Xrs or labs. Has missed her last few wound care appts.          The following portions of the patient's history were reviewed and updated as appropriate:   Patient Active Problem List   Diagnosis    Other chest pain    Essential hypertension    Biceps tendinitis of right shoulder    Chronic diastolic CHF (congestive heart failure) ([facility])    Closed head injury    Diastolic dysfunction    Dyspnea on exertion    GERD without esophagitis    Headache, worsening    Lateral epicondylitis of right  elbow    Low serum vitamin B12    Lumbar radiculopathy    Peripheral edema    Peripheral neuropathy    B12 deficiency    Osteoporosis    Chronic left shoulder pain    BMI 36.0-36.9,adult    A-fib ([facility])    Ambulatory dysfunction    Anxiety    Junctional bradycardia    Stage 3a chronic kidney disease ([facility])    Single kidney    Vitamin D deficiency    CAD (coronary artery disease)    H/O left nephrectomy    Mid back pain    H/O: rheumatic fever    Osteoarthritis    Peripheral venous insufficiency    Sciatica    Renal cyst    Parkinson's disease    LUPE (acute kidney injury) ([facility])    Other specified inflammatory spondylopathies, lumbosacral region ([facility])    Obesity, morbid ([facility])    Constipation    Abnormal stress test    Fecal incontinence    Ground glass opacity present on imaging of lung    NSTEMI (non-ST elevated myocardial infarction) ([facility])    Prediabetes    Prolapse of vaginal walls    Anemia    Dementia with behavioral disturbance ([facility])    Physical deconditioning    Skin ulcer of right great toe, limited to breakdown of skin ([facility])    Spinal stenosis of lumbar region with neurogenic claudication    Lumbar spondylosis    Neck pain    Visual hallucinations     Past Medical History:   Diagnosis Date    Alzheimer's dementia ([facility])     Aphasia     CHF (congestive heart failure) ([facility])     Coronary artery disease     Hypercholesteremia     Hypertension     IBS (irritable bowel syndrome)     Renal disorder     Rheumatic fever     Skin lesion      Past Surgical History:   Procedure Laterality Date    ANKLE FRACTURE SURGERY      LAST ASSESSED [id number]    HYSTERECTOMY      KIDNEY SURGERY      NEPHRECTOMY      TONSILLECTOMY       Social History     Socioeconomic History    Marital status:      Spouse name: None    Number of children: None    Years of education: None    Highest education level: None   Occupational History    None   Tobacco Use    Smoking status: Never    Smokeless tobacco: Never   Vaping Use    Vaping  status: Never Used   Substance and Sexual Activity    Alcohol use: No    Drug use: No    Sexual activity: Not Currently   Other Topics Concern    None   Social History Narrative    None     Social Determinants of Health     Financial Resource Strain: Not on file   Food Insecurity: No Food Insecurity (6/16/2022)    Hunger Vital Sign     Worried About Running Out of Food in the Last Year: Never true     Ran Out of Food in the Last Year: Never true   Transportation Needs: No Transportation Needs (6/16/2022)    PRAPARE - Transportation     Lack of Transportation (Medical): No     Lack of Transportation (Non-Medical): No   Physical Activity: Not on file   Stress: Not on file   Social Connections: Unknown (6/18/2024)    Received from [facility]     How often do you feel lonely or isolated from those around you? (Adult - for ages 18 years and over): Not on file   Intimate Partner Violence: Not on file   Housing Stability: Low Risk  (6/16/2022)    Housing Stability Vital Sign     Unable to Pay for Housing in the Last Year: No     Number of Places Lived in the Last Year: 2     Unstable Housing in the Last Year: No        Current Outpatient Medications:     acetaminophen (TYLENOL) 650 mg CR tablet, Take 1 tablet (650 mg total) by mouth every 8 (eight) hours as needed for mild pain or moderate pain, Disp: 90 tablet, Rfl: 2    amLODIPine (NORVASC) 5 mg tablet, Take 5 mg by mouth daily, Disp: , Rfl:     aspirin (ECOTRIN LOW STRENGTH) 81 mg EC tablet, Take 81 mg by mouth daily, Disp: , Rfl:     bisacodyl 5 mg EC tablet, take 1 tablet by mouth once daily if needed for constipation, Disp: 30 tablet, Rfl: 5    cholecalciferol (VITAMIN D3) 1,000 units tablet, take 1 tablet by mouth once daily, Disp: 90 tablet, Rfl: 1    cyanocobalamin 1,000 mcg/mL, Inject 1 mL (1,000 mcg total) into a muscle every 7 days, Disp: 10 mL, Rfl: 5    Eliquis 5 MG, take 1 tablet by mouth twice a day, Disp: 180 tablet, Rfl: 3     ezetimibe (ZETIA) 10 mg tablet, take 1 tablet by mouth once daily, Disp: 90 tablet, Rfl: 1    furosemide (LASIX) 20 mg tablet, take 1 tablet by mouth twice a day, Disp: 180 tablet, Rfl: 1    isosorbide mononitrate (IMDUR) 60 mg 24 hr tablet, take 1 tablet by mouth once daily, Disp: 90 tablet, Rfl: 3    lansoprazole (PREVACID) 30 mg capsule, Take 1 capsule (30 mg total) by mouth in the morning., Disp: 90 capsule, Rfl: 3    losartan (COZAAR) 25 mg tablet, take 1 tablet by mouth once daily, Disp: 90 tablet, Rfl: 1    memantine (Namenda) 10 mg tablet, Take 1 tablet (10 mg total) by mouth 2 (two) times a day, Disp: 180 tablet, Rfl: 3    polyethylene glycol (GLYCOLAX) 17 GM/SCOOP powder, Take 17 g by mouth daily, Disp: 850 g, Rfl: 5    senna (SENOKOT) 8.6 mg, Take 1 tablet (8.6 mg total) by mouth daily at bedtime, Disp: 30 tablet, Rfl: 2    Current Facility-Administered Medications:     cyanocobalamin injection 1,000 mcg, 1,000 mcg, Intramuscular, Q30 Days, Kenroy Jerome MD, 1,000 mcg at 02/19/18 1008  Family History   Problem Relation Age of Onset    Hypertension Mother     Asthma Father     Cancer Sister       Review of Systems   Constitutional:  Negative for activity change, chills and fever.   HENT: Negative.  Negative for ear pain and sore throat.    Eyes:  Negative for pain and visual disturbance.   Respiratory:  Negative for cough, chest tightness and shortness of breath.    Cardiovascular:  Negative for chest pain, palpitations and leg swelling.   Gastrointestinal:  Negative for abdominal pain and vomiting.   Endocrine: Negative.    Genitourinary: Negative.  Negative for dysuria and hematuria.   Musculoskeletal:  Positive for gait problem (Ambulates with walker). Negative for arthralgias and back pain.   Skin:  Positive for wound (Right 1st toe). Negative for color change and rash.        Dystrophic toenails   Neurological:  Negative for seizures and syncope.        B/L LE tingling/burning    Psychiatric/Behavioral: Negative.  Negative for agitation and behavioral problems.    All other systems reviewed and are negative.      Objective:  /82   Pulse 70   Temp (!) 96 °F (35.6 °C)   Resp 18     Physical Exam  Constitutional:       General: She is not in acute distress.     Appearance: Normal appearance. She is not ill-appearing.   Cardiovascular:      Comments: Bilateral DP pulses are palpable 2/4. Bilateral PT pulses are nonpalpable or diminished 1/4. Pedal hair is absent. Legs to toes warm to cool. Varicosities with mild LE edema noted.  Pulmonary:      Effort: No respiratory distress.   Musculoskeletal:         General: No tenderness or deformity. Normal range of motion.   Skin:     Capillary Refill: Capillary refill takes less than 2 seconds.      Findings: Lesion (Right plantar-medial hallux IPJ, now full thickness, fibrous base, sanguinous drainage, small tophi in wound base,no deep probe. No acute SOI. granular base after debridement) present. No erythema.      Comments: B/L LE skin is atrophic - thin, dry and shiny in appearance.   Toenails x10 are elongated, dystrophic, discolored. There is nail thickening and subungual debris noted to toenails to left hallux, R 2nd, B/L 5th toes.   Neurological:      General: No focal deficit present.      Mental Status: She is alert and oriented to person, place, and time.      Comments: Gross sensation to feet intact. Patient endorses numbness, tingling and burning to B/L feet. + paresthesias.    Psychiatric:         Mood and Affect: Mood normal.         Behavior: Behavior normal.             Wound 03/26/24 Toe D1, great Right (Active)   Wound Image   07/18/24 1350   Wound Description White;Slough 07/18/24 1404   Alyson-wound Assessment Pink 07/18/24 1404   Wound Length (cm) 0.2 cm 07/18/24 1404   Wound Width (cm) 0.1 cm 07/18/24 1404   Wound Depth (cm) 0.1 cm 07/18/24 1404   Wound Surface Area (cm^2) 0.02 cm^2 07/18/24 1404   Wound Volume (cm^3) 0.002  "cm^3 07/18/24 1404   Calculated Wound Volume (cm^3) 0 cm^3 07/18/24 1404   Change in Wound Size % 100 07/18/24 1404   Drainage Amount Small 07/18/24 1404   Drainage Description Serous 07/18/24 1404   Non-staged Wound Description Partial thickness 07/18/24 1404             Debridement   Wound 03/26/24 Toe D1, great Right    Universal Protocol:  Consent: Verbal consent obtained.  Risks and benefits: risks, benefits and alternatives were discussed  Consent given by: patient  Time out: Immediately prior to procedure a \"time out\" was called to verify the correct patient, procedure, equipment, support staff and site/side marked as required.  Patient understanding: patient states understanding of the procedure being performed  Patient consent: the patient's understanding of the procedure matches consent given  Patient identity confirmed: verbally with patient    Debridement Details  Performed by: physician  Debridement type: surgical  Level of debridement: subcutaneous tissue      Post-debridement measurements  Length (cm): 0.2  Width (cm): 0.1  Depth (cm): 0.4  Percent debrided: 100%  Surface Area (cm^2): 0.02  Area Debrided (cm^2): 0.02  Volume (cm^3): 0.01    Tissue and other material debrided: subcutaneous tissue  Devitalized tissue debrided: biofilm, fibrin and slough  Instrument(s) utilized: blade  Technique utilized: excisionalBleeding: small  Hemostasis obtained with: pressure  Procedural pain (0-10): 0  Post-procedural pain: 0   Response to treatment: procedure was tolerated well               Wound Instructions:  Orders Placed This Encounter   Procedures    Wound cleansing and dressings Right Toe D1, great     Wound cleansing and dressings Right Toe D1, great          Wash your hands with soap and water.  Remove old dressing, discard into plastic bag and place in trash.  Cleanse the wound with NSS or mild soap prior to applying a clean dressing. Do not use tissue or cotton balls. Do not scrub the wound. Pat dry " "using gauze.  Shower yes on days home health aide is there and nursing can complete wound care following shower (place dry dressing to toe following shower until nursing can complete wound care with in the same day of showering) other wise sponge bath       Pack puracol silver in to the right great toe wound.  Cover with gauze   Secure with madan   Change dressing daily   Wear surgical shoe at all times when walking      Off-loading Instructions:     Keep weight and pressure off wound at all times. and Wear off-loading device as directed by your physician. Put on immediately when rising in the morning and remove when going to bed.       Please purchase open toe sandals or sliders and wear if unable to get off loading/surgical shoe on  Please increase protein in diet as well as drinking Ensure     Please try to consume 3-4 servings of protein (30g) each day.   - Each serving of protein should contain about 30 mg protein.   - Good sources of protein include, lean meats (fish, chicken, etc), eggs, dairy products (yogurt, cheese), tofu, legumes (chickpeas, lentils, peas, black beans), nuts (cashew, walnut, peanuts, etc), & quinoa.        Home Health Aide - please file down Left big toe      Obtain lab work and x-rays as ordered      For nail care, please call the podiatry office.     Follow up in 2 weeks.     Standing Status:   Future     Standing Expiration Date:   7/25/2024    Wound Procedure Treatment Right Toe D1, great     This order was created via procedure documentation    Debridement     This order was created via procedure documentation         Kitty Sapp DPM    Portions of the record may have been created with voice recognition software. Occasional wrong word or \"sound a like\" substitutions may have occurred due to the inherent limitations of voice recognition software. Read the chart carefully and recognize, using context, where substitutions have occurred.    "

## 2024-07-22 ENCOUNTER — TELEPHONE (OUTPATIENT)
Age: 84
End: 2024-07-22

## 2024-07-22 NOTE — TELEPHONE ENCOUNTER
Joanne a physical therapist from [facility] called in regards to her providing another 60 days of PHYSICAL THERAPY and needs a verbal approval. Joanne's call back number is [phone].

## 2024-07-23 ENCOUNTER — TELEPHONE (OUTPATIENT)
Dept: OTHER | Facility: OTHER | Age: 84
End: 2024-07-23

## 2024-07-23 NOTE — TELEPHONE ENCOUNTER
Patient called questioning if she has an appt tomorrow as she forgot and does not have a ride. She is going to check with her son when he gets home from work.

## 2024-07-24 NOTE — TELEPHONE ENCOUNTER
Called and left voice message for patient about appointment that is scheduled for today at 9:15AM with Dr. Sapp. Gave callback for them to callback to confirm if they were keeping appointment or wanting to reschedule.     [phone]

## 2024-07-25 ENCOUNTER — TELEPHONE (OUTPATIENT)
Age: 84
End: 2024-07-25

## 2024-07-25 ENCOUNTER — OFFICE VISIT (OUTPATIENT)
Dept: FAMILY MEDICINE CLINIC | Facility: CLINIC | Age: 84
End: 2024-07-25
Payer: COMMERCIAL

## 2024-07-25 VITALS
WEIGHT: 140 LBS | TEMPERATURE: 98.1 F | BODY MASS INDEX: 32.54 KG/M2 | DIASTOLIC BLOOD PRESSURE: 84 MMHG | HEART RATE: 63 BPM | OXYGEN SATURATION: 97 % | SYSTOLIC BLOOD PRESSURE: 132 MMHG

## 2024-07-25 DIAGNOSIS — M46.87 OTHER SPECIFIED INFLAMMATORY SPONDYLOPATHIES, LUMBOSACRAL REGION (HCC): ICD-10-CM

## 2024-07-25 DIAGNOSIS — Z00.00 MEDICARE ANNUAL WELLNESS VISIT, SUBSEQUENT: Primary | ICD-10-CM

## 2024-07-25 DIAGNOSIS — I50.32 CHRONIC DIASTOLIC CHF (CONGESTIVE HEART FAILURE) (HCC): ICD-10-CM

## 2024-07-25 DIAGNOSIS — R26.2 AMBULATORY DYSFUNCTION: ICD-10-CM

## 2024-07-25 DIAGNOSIS — I25.10 CORONARY ARTERY DISEASE INVOLVING NATIVE HEART, UNSPECIFIED VESSEL OR LESION TYPE, UNSPECIFIED WHETHER ANGINA PRESENT: ICD-10-CM

## 2024-07-25 DIAGNOSIS — D64.9 ANEMIA, UNSPECIFIED TYPE: ICD-10-CM

## 2024-07-25 DIAGNOSIS — N18.31 STAGE 3A CHRONIC KIDNEY DISEASE (HCC): ICD-10-CM

## 2024-07-25 DIAGNOSIS — I21.4 NSTEMI (NON-ST ELEVATED MYOCARDIAL INFARCTION) (HCC): ICD-10-CM

## 2024-07-25 DIAGNOSIS — Z90.5 H/O LEFT NEPHRECTOMY: ICD-10-CM

## 2024-07-25 DIAGNOSIS — L97.511 SKIN ULCER OF RIGHT GREAT TOE, LIMITED TO BREAKDOWN OF SKIN (HCC): ICD-10-CM

## 2024-07-25 DIAGNOSIS — K21.9 GERD WITHOUT ESOPHAGITIS: ICD-10-CM

## 2024-07-25 DIAGNOSIS — G20.A1 PARKINSON'S DISEASE, UNSPECIFIED WHETHER DYSKINESIA PRESENT, UNSPECIFIED WHETHER MANIFESTATIONS FLUCTUATE: ICD-10-CM

## 2024-07-25 DIAGNOSIS — I10 ESSENTIAL HYPERTENSION: ICD-10-CM

## 2024-07-25 DIAGNOSIS — N17.9 AKI (ACUTE KIDNEY INJURY) (HCC): ICD-10-CM

## 2024-07-25 DIAGNOSIS — R29.6 FREQUENT FALLS: ICD-10-CM

## 2024-07-25 DIAGNOSIS — N18.32 STAGE 3B CHRONIC KIDNEY DISEASE (HCC): ICD-10-CM

## 2024-07-25 DIAGNOSIS — M54.16 LUMBAR RADICULOPATHY: ICD-10-CM

## 2024-07-25 DIAGNOSIS — E53.8 LOW SERUM VITAMIN B12: ICD-10-CM

## 2024-07-25 DIAGNOSIS — E55.9 VITAMIN D DEFICIENCY: ICD-10-CM

## 2024-07-25 DIAGNOSIS — F03.918 DEMENTIA WITH BEHAVIORAL DISTURBANCE (HCC): ICD-10-CM

## 2024-07-25 DIAGNOSIS — I48.0 PAROXYSMAL ATRIAL FIBRILLATION (HCC): ICD-10-CM

## 2024-07-25 DIAGNOSIS — E66.01 OBESITY, MORBID (HCC): ICD-10-CM

## 2024-07-25 PROCEDURE — 99213 OFFICE O/P EST LOW 20 MIN: CPT

## 2024-07-25 PROCEDURE — G0439 PPPS, SUBSEQ VISIT: HCPCS

## 2024-07-25 NOTE — ASSESSMENT & PLAN NOTE
Stable on Eliquis and amlodipine.  Denies symptoms of this today.  Go to ER if symptoms present.  Follows with cardiology.

## 2024-07-25 NOTE — ASSESSMENT & PLAN NOTE
Weight is stable.  Most recent echocardiogram shows EF greater than 50%.  Continue current management, and go to the ER if any signs or symptoms of decompensating present.  Wt Readings from Last 3 Encounters:   07/25/24 63.5 kg (140 lb)   03/26/24 66.7 kg (147 lb)   12/18/23 66.9 kg (147 lb 6.4 oz)

## 2024-07-25 NOTE — ASSESSMENT & PLAN NOTE
Patient is not currently following up with neurology.  Would greatly benefit from occasional follow-up with them.  Referred there today.

## 2024-07-25 NOTE — TELEPHONE ENCOUNTER
Received call from KATIE Durham with [facility] stating that she saw patient today for initial evaluation and will be seeing her 1 time per week for 4 weeks for OT.    She also reports that patient had lower back pain 8/10 today.

## 2024-07-25 NOTE — ASSESSMENT & PLAN NOTE
Referred to nephrology today due to solitary kidney.  Lab Results   Component Value Date    EGFR 49 09/19/2023    EGFR 36 01/16/2023    EGFR 48 09/12/2022    CREATININE 1.04 09/19/2023    CREATININE 1.33 (H) 01/16/2023    CREATININE 1.06 09/12/2022

## 2024-07-25 NOTE — ASSESSMENT & PLAN NOTE
Established with home health and is having OT coming to the house once a week for 4 weeks.  Educated on minimizing fall risk.

## 2024-07-25 NOTE — ASSESSMENT & PLAN NOTE
Patient is not currently following up with neurology.  Would greatly benefit from occasional follow-up with them.  Referred there today.  Continue memantine 10 mg 2 times daily.

## 2024-07-25 NOTE — ASSESSMENT & PLAN NOTE
Currently stable on losartan, ezetimibe, Eliquis, amlodipine.  Denies symptoms of CAD today.  Follows with cardiology.  Go to ER if symptoms present.

## 2024-07-25 NOTE — ASSESSMENT & PLAN NOTE
No signs or symptoms of this today.  Kidney function stable.  Is referred to nephrology today due to solitary kidney.

## 2024-07-25 NOTE — ASSESSMENT & PLAN NOTE
Currently following with wound care and podiatry.  Continue follow-up with them.  Educated on signs and symptoms of worsening, and is to go to ER if these present.

## 2024-07-25 NOTE — PATIENT INSTRUCTIONS
Medicare Preventive Visit Patient Instructions  Thank you for completing your Welcome to Medicare Visit or Medicare Annual Wellness Visit today. Your next wellness visit will be due in one year (7/26/2025).  The screening/preventive services that you may require over the next 5-10 years are detailed below. Some tests may not apply to you based off risk factors and/or age. Screening tests ordered at today's visit but not completed yet may show as past due. Also, please note that scanned in results may not display below.  Preventive Screenings:  Service Recommendations Previous Testing/Comments   Colorectal Cancer Screening  * Colonoscopy    * Fecal Occult Blood Test (FOBT)/Fecal Immunochemical Test (FIT)  * Fecal DNA/Cologuard Test  * Flexible Sigmoidoscopy Age: 45-75 years old   Colonoscopy: every 10 years (may be performed more frequently if at higher risk)  OR  FOBT/FIT: every 1 year  OR  Cologuard: every 3 years  OR  Sigmoidoscopy: every 5 years  Screening may be recommended earlier than age 45 if at higher risk for colorectal cancer. Also, an individualized decision between you and your healthcare provider will decide whether screening between the ages of 76-85 would be appropriate. Colonoscopy: 05/28/2019  FOBT/FIT: Not on file  Cologuard: Not on file  Sigmoidoscopy: Not on file          Breast Cancer Screening Age: 40+ years old  Frequency: every 1-2 years  Not required if history of left and right mastectomy Mammogram: 11/07/2023        Cervical Cancer Screening Between the ages of 21-29, pap smear recommended once every 3 years.   Between the ages of 30-65, can perform pap smear with HPV co-testing every 5 years.   Recommendations may differ for women with a history of total hysterectomy, cervical cancer, or abnormal pap smears in past. Pap Smear: Not on file        Hepatitis C Screening Once for adults born between 1945 and 1965  More frequently in patients at high risk for Hepatitis C Hep C Antibody: Not  on file        Diabetes Screening 1-2 times per year if you're at risk for diabetes or have pre-diabetes Fasting glucose: 93 mg/dL (9/19/2023)  A1C: 5.7 % (1/16/2023)      Cholesterol Screening Once every 5 years if you don't have a lipid disorder. May order more often based on risk factors. Lipid panel: 09/19/2023          Other Preventive Screenings Covered by Medicare:  Abdominal Aortic Aneurysm (AAA) Screening: covered once if your at risk. You're considered to be at risk if you have a family history of AAA.  Lung Cancer Screening: covers low dose CT scan once per year if you meet all of the following conditions: (1) Age 55-77; (2) No signs or symptoms of lung cancer; (3) Current smoker or have quit smoking within the last 15 years; (4) You have a tobacco smoking history of at least 20 pack years (packs per day multiplied by number of years you smoked); (5) You get a written order from a healthcare provider.  Glaucoma Screening: covered annually if you're considered high risk: (1) You have diabetes OR (2) Family history of glaucoma OR (3)  aged 50 and older OR (4)  American aged 65 and older  Osteoporosis Screening: covered every 2 years if you meet one of the following conditions: (1) You're estrogen deficient and at risk for osteoporosis based off medical history and other findings; (2) Have a vertebral abnormality; (3) On glucocorticoid therapy for more than 3 months; (4) Have primary hyperparathyroidism; (5) On osteoporosis medications and need to assess response to drug therapy.   Last bone density test (DXA Scan): 04/27/2018.  HIV Screening: covered annually if you're between the age of 15-65. Also covered annually if you are younger than 15 and older than 65 with risk factors for HIV infection. For pregnant patients, it is covered up to 3 times per pregnancy.    Immunizations:  Immunization Recommendations   Influenza Vaccine Annual influenza vaccination during flu season is  recommended for all persons aged >= 6 months who do not have contraindications   Pneumococcal Vaccine   * Pneumococcal conjugate vaccine = PCV13 (Prevnar 13), PCV15 (Vaxneuvance), PCV20 (Prevnar 20)  * Pneumococcal polysaccharide vaccine = PPSV23 (Pneumovax) Adults 19-63 yo with certain risk factors or if 65+ yo  If never received any pneumonia vaccine: recommend Prevnar 20 (PCV20)  Give PCV20 if previously received 1 dose of PCV13 or PPSV23   Hepatitis B Vaccine 3 dose series if at intermediate or high risk (ex: diabetes, end stage renal disease, liver disease)   Respiratory syncytial virus (RSV) Vaccine - COVERED BY MEDICARE PART D  * RSVPreF3 (Arexvy) CDC recommends that adults 60 years of age and older may receive a single dose of RSV vaccine using shared clinical decision-making (SCDM)   Tetanus (Td) Vaccine - COST NOT COVERED BY MEDICARE PART B Following completion of primary series, a booster dose should be given every 10 years to maintain immunity against tetanus. Td may also be given as tetanus wound prophylaxis.   Tdap Vaccine - COST NOT COVERED BY MEDICARE PART B Recommended at least once for all adults. For pregnant patients, recommended with each pregnancy.   Shingles Vaccine (Shingrix) - COST NOT COVERED BY MEDICARE PART B  2 shot series recommended in those 19 years and older who have or will have weakened immune systems or those 50 years and older     Health Maintenance Due:      Topic Date Due   • Colorectal Cancer Screening  05/28/2022     Immunizations Due:      Topic Date Due   • COVID-19 Vaccine (4 - 2023-24 season) 09/01/2023   • Influenza Vaccine (1) 09/01/2024     Advance Directives   What are advance directives?  Advance directives are legal documents that state your wishes and plans for medical care. These plans are made ahead of time in case you lose your ability to make decisions for yourself. Advance directives can apply to any medical decision, such as the treatments you want, and if  you want to donate organs.   What are the types of advance directives?  There are many types of advance directives, and each state has rules about how to use them. You may choose a combination of any of the following:  Living will:  This is a written record of the treatment you want. You can also choose which treatments you do not want, which to limit, and which to stop at a certain time. This includes surgery, medicine, IV fluid, and tube feedings.   Durable power of  for healthcare (DPAHC):  This is a written record that states who you want to make healthcare choices for you when you are unable to make them for yourself. This person, called a proxy, is usually a family member or a friend. You may choose more than 1 proxy.  Do not resuscitate (DNR) order:  A DNR order is used in case your heart stops beating or you stop breathing. It is a request not to have certain forms of treatment, such as CPR. A DNR order may be included in other types of advance directives.  Medical directive:  This covers the care that you want if you are in a coma, near death, or unable to make decisions for yourself. You can list the treatments you want for each condition. Treatment may include pain medicine, surgery, blood transfusions, dialysis, IV or tube feedings, and a ventilator (breathing machine).  Values history:  This document has questions about your views, beliefs, and how you feel and think about life. This information can help others choose the care that you would choose.  Why are advance directives important?  An advance directive helps you control your care. Although spoken wishes may be used, it is better to have your wishes written down. Spoken wishes can be misunderstood, or not followed. Treatments may be given even if you do not want them. An advance directive may make it easier for your family to make difficult choices about your care.   Urinary Incontinence   Urinary incontinence (UI)  is when you lose  control of your bladder. UI develops because your bladder cannot store or empty urine properly. The 3 most common types of UI are stress incontinence, urge incontinence, or both.  Medicines:   May be given to help strengthen your bladder control. Report any side effects of medication to your healthcare provider.  Do pelvic muscle exercises often:  Your pelvic muscles help you stop urinating. Squeeze these muscles tight for 5 seconds, then relax for 5 seconds. Gradually work up to squeezing for 10 seconds. Do 3 sets of 15 repetitions a day, or as directed. This will help strengthen your pelvic muscles and improve bladder control.  Train your bladder:  Go to the bathroom at set times, such as every 2 hours, even if you do not feel the urge to go. You can also try to hold your urine when you feel the urge to go. For example, hold your urine for 5 minutes when you feel the urge to go. As that becomes easier, hold your urine for 10 minutes.   Self-care:   Keep a UI record.  Write down how often you leak urine and how much you leak. Make a note of what you were doing when you leaked urine.  Drink liquids as directed. You may need to limit the amount of liquid you drink to help control your urine leakage. Do not drink any liquid right before you go to bed. Limit or do not have drinks that contain caffeine or alcohol.   Prevent constipation.  Eat a variety of high-fiber foods. Good examples are high-fiber cereals, beans, vegetables, and whole-grain breads. Walking is the best way to trigger your intestines to have a bowel movement.  Exercise regularly and maintain a healthy weight.  Weight loss and exercise will decrease pressure on your bladder and help you control your leakage.   Use a catheter as directed  to help empty your bladder. A catheter is a tiny, plastic tube that is put into your bladder to drain your urine.   Go to behavior therapy as directed.  Behavior therapy may be used to help you learn to control your  urge to urinate.    Weight Management   Why it is important to manage your weight:  Being overweight increases your risk of health conditions such as heart disease, high blood pressure, type 2 diabetes, and certain types of cancer. It can also increase your risk for osteoarthritis, sleep apnea, and other respiratory problems. Aim for a slow, steady weight loss. Even a small amount of weight loss can lower your risk of health problems.  How to lose weight safely:  A safe and healthy way to lose weight is to eat fewer calories and get regular exercise. You can lose up about 1 pound a week by decreasing the number of calories you eat by 500 calories each day.   Healthy meal plan for weight management:  A healthy meal plan includes a variety of foods, contains fewer calories, and helps you stay healthy. A healthy meal plan includes the following:  Eat whole-grain foods more often.  A healthy meal plan should contain fiber. Fiber is the part of grains, fruits, and vegetables that is not broken down by your body. Whole-grain foods are healthy and provide extra fiber in your diet. Some examples of whole-grain foods are whole-wheat breads and pastas, oatmeal, brown rice, and bulgur.  Eat a variety of vegetables every day.  Include dark, leafy greens such as spinach, kale, remi greens, and mustard greens. Eat yellow and orange vegetables such as carrots, sweet potatoes, and winter squash.   Eat a variety of fruits every day.  Choose fresh or canned fruit (canned in its own juice or light syrup) instead of juice. Fruit juice has very little or no fiber.  Eat low-fat dairy foods.  Drink fat-free (skim) milk or 1% milk. Eat fat-free yogurt and low-fat cottage cheese. Try low-fat cheeses such as mozzarella and other reduced-fat cheeses.  Choose meat and other protein foods that are low in fat.  Choose beans or other legumes such as split peas or lentils. Choose fish, skinless poultry (chicken or turkey), or lean cuts of red  meat (beef or pork). Before you cook meat or poultry, cut off any visible fat.   Use less fat and oil.  Try baking foods instead of frying them. Add less fat, such as margarine, sour cream, regular salad dressing and mayonnaise to foods. Eat fewer high-fat foods. Some examples of high-fat foods include french fries, doughnuts, ice cream, and cakes.  Eat fewer sweets.  Limit foods and drinks that are high in sugar. This includes candy, cookies, regular soda, and sweetened drinks.  Exercise:  Exercise at least 30 minutes per day on most days of the week. Some examples of exercise include walking, biking, dancing, and swimming. You can also fit in more physical activity by taking the stairs instead of the elevator or parking farther away from stores. Ask your healthcare provider about the best exercise plan for you.      © Copyright [employer] 2018 Information is for End User's use only and may not be sold, redistributed or otherwise used for commercial purposes. All illustrations and images included in CareNotes® are the copyrighted property of [employer] or [employer]

## 2024-07-25 NOTE — PROGRESS NOTES
Ambulatory Visit  Name: Demi Donnelly      : 1940      MRN: [id number]  Encounter Provider: Chapincito Mckeon PA-C  Encounter Date: 2024   Encounter department: [facility]    Assessment & Plan   1. Medicare annual wellness visit, subsequent  2. Paroxysmal atrial fibrillation (HCC)  Assessment & Plan:  Stable on Eliquis and amlodipine.  Denies symptoms of this today.  Go to ER if symptoms present.  Follows with cardiology.  3. Coronary artery disease involving native heart, unspecified vessel or lesion type, unspecified whether angina present  Assessment & Plan:  Currently stable on losartan, ezetimibe, Eliquis, amlodipine.  Denies symptoms of CAD today.  Follows with cardiology.  Go to ER if symptoms present.  Orders:  -     Lipid panel; Future  4. Chronic diastolic CHF (congestive heart failure) (HCC)  Assessment & Plan:  Weight is stable.  Most recent echocardiogram shows EF greater than 50%.  Continue current management, and go to the ER if any signs or symptoms of decompensating present.  Wt Readings from Last 3 Encounters:   24 63.5 kg (140 lb)   24 66.7 kg (147 lb)   23 66.9 kg (147 lb 6.4 oz)             5. Essential hypertension  Assessment & Plan:  At goal today.  Continue amlodipine and losartan.  Contact office if persistently elevated on at home readings.  6. NSTEMI (non-ST elevated myocardial infarction) (HCC)  Assessment & Plan:  No symptoms of CAD today.  Go to ER if symptoms present.  7. GERD without esophagitis  Assessment & Plan:  Currently stable on lansoprazole 30 mg daily.  Contact office if any worsening.  8. Dementia with behavioral disturbance (HCC)  Assessment & Plan:  Patient is not currently following up with neurology.  Would greatly benefit from occasional follow-up with them.  Referred there today.  Continue memantine 10 mg 2 times daily.  Orders:  -     Ambulatory Referral to Neurology; Future  9. Lumbar radiculopathy  Assessment  & Plan:  Currently stable.  Contact office if any worsening.  10. Parkinson's disease, unspecified whether dyskinesia present, unspecified whether manifestations fluctuate  Assessment & Plan:  Patient is not currently following up with neurology.  Would greatly benefit from occasional follow-up with them.  Referred there today.  Orders:  -     Ambulatory Referral to Neurology; Future  11. Other specified inflammatory spondylopathies, lumbosacral region ([facility])  Assessment & Plan:  Currently stable.  Contact office if any worsening.  Continue home PT/OT.  12. Skin ulcer of right great toe, limited to breakdown of skin ([facility])  Assessment & Plan:  Currently following with wound care and podiatry.  Continue follow-up with them.  Educated on signs and symptoms of worsening, and is to go to ER if these present.  13. LUPE (acute kidney injury) ([facility])  Assessment & Plan:  No signs or symptoms of this today.  Kidney function stable.  Is referred to nephrology today due to solitary kidney.  14. Stage 3a chronic kidney disease ([facility])  Assessment & Plan:  Referred to nephrology today due to solitary kidney.  Lab Results   Component Value Date    EGFR 49 09/19/2023    EGFR 36 01/16/2023    EGFR 48 09/12/2022    CREATININE 1.04 09/19/2023    CREATININE 1.33 (H) 01/16/2023    CREATININE 1.06 09/12/2022     Orders:  -     Comprehensive metabolic panel; Future  15. Anemia, unspecified type  Assessment & Plan:  Will follow-up pending results of CBC.  Orders:  -     CBC and differential; Future  16. Low serum vitamin B12  Assessment & Plan:  Will follow-up pending results of lab work.  Not currently on a supplement.  Orders:  -     Vitamin B12; Future  17. Vitamin D deficiency  Assessment & Plan:  Continue supplement.  Will continue to monitor lab work.  Orders:  -     Vitamin D 25 hydroxy; Future  18. Frequent falls  Assessment & Plan:  Established with home health and is having OT coming to the house once a week for 4 weeks.  Educated on  minimizing fall risk.  19. Ambulatory dysfunction  Assessment & Plan:  Established with home health and is having OT coming to the house once a week for 4 weeks.  Educated on minimizing fall risk.  20. Stage 3b chronic kidney disease (HCC)  Assessment & Plan:  Referred to nephrology today due to solitary kidney.  Lab Results   Component Value Date    EGFR 49 09/19/2023    EGFR 36 01/16/2023    EGFR 48 09/12/2022    CREATININE 1.04 09/19/2023    CREATININE 1.33 (H) 01/16/2023    CREATININE 1.06 09/12/2022     21. Obesity, morbid (HCC)  Assessment & Plan:  Educated on healthy diet and exercise.  22. H/O left nephrectomy  Assessment & Plan:  Referred to nephrology today due to solitary kidney.  Kidney function is stable.  Orders:  -     Ambulatory Referral to Nephrology; Future     Preventive health issues were discussed with patient, and age appropriate screening tests were ordered as noted in patient's After Visit Summary. Personalized health advice and appropriate referrals for health education or preventive services given if needed, as noted in patient's After Visit Summary.    History of Present Illness     Patient is an 84-year-old female presenting for Medicare annual wellness visit.  Patient has no concerns today.       Patient Care Team:  Kenroy Jerome MD as PCP - General  Azeem Juárez MD (Cardiology)  Laci Arcos MD (Nephrology)  All Osuna MD (Gastroenterology)  BETH Felix (Obstetrics and Gynecology)  Ortega Larios MD (Pain Medicine)  Kitty Sapp DPM (Podiatry)    Review of Systems   Constitutional:  Negative for appetite change, chills, diaphoresis, fatigue and fever.   HENT:  Negative for congestion, ear discharge, ear pain, postnasal drip, rhinorrhea, sinus pressure, sinus pain, sneezing and sore throat.    Eyes:  Negative for pain, discharge, redness, itching and visual disturbance.   Respiratory:  Negative for apnea, cough, chest tightness, shortness of breath and wheezing.     Cardiovascular:  Negative for chest pain, palpitations and leg swelling.   Gastrointestinal:  Negative for abdominal pain, blood in stool, constipation, diarrhea, nausea and vomiting.   Endocrine: Negative for cold intolerance, heat intolerance, polydipsia and polyuria.   Genitourinary:  Negative for dysuria, flank pain, frequency, hematuria and urgency.   Musculoskeletal:  Positive for back pain. Negative for arthralgias, myalgias, neck pain and neck stiffness.   Skin:  Negative for color change and rash.   Allergic/Immunologic: Negative for environmental allergies and food allergies.   Neurological:  Negative for dizziness, tremors, seizures, syncope, speech difficulty, weakness, light-headedness, numbness and headaches.   Hematological:  Negative for adenopathy. Does not bruise/bleed easily.   Psychiatric/Behavioral:  Positive for confusion and decreased concentration. Negative for agitation, dysphoric mood, hallucinations, self-injury, sleep disturbance and suicidal ideas. The patient is not nervous/anxious and is not hyperactive.    All other systems reviewed and are negative.    Medical History Reviewed by provider this encounter:  Tobacco  Allergies  Meds  Problems  Med Hx  Surg Hx  Fam Hx       Annual Wellness Visit Questionnaire   Demi is here for her Subsequent Wellness visit.     Health Risk Assessment:   Patient rates overall health as good. Patient feels that their physical health rating is much worse. Patient is dissatisfied with their life. Eyesight was rated as much worse. Hearing was rated as much worse. Patient feels that their emotional and mental health rating is much worse. Patients states they are sometimes angry. Patient states they are often unusually tired/fatigued. Pain experienced in the last 7 days has been a lot. Patient's pain rating has been 10/10. Patient states that she has experienced no weight loss or gain in last 6 months.     Depression Screening:   PHQ-2 Score:  2      Fall Risk Screening:   In the past year, patient has experienced: history of falling in past year    Number of falls: 2 or more  Injured during fall?: No    Feels unsteady when standing or walking?: No    Worried about falling?: No      Urinary Incontinence Screening:   Patient has not leaked urine accidently in the last six months.     Home Safety:  Patient does not have trouble with stairs inside or outside of their home. Patient has working smoke alarms and has working carbon monoxide detector. Home safety hazards include: none.     Nutrition:   Current diet is Regular and Limited junk food.     Medications:   Patient is not currently taking any over-the-counter supplements. Patient is able to manage medications.     Activities of Daily Living (ADLs)/Instrumental Activities of Daily Living (IADLs):   Walk and transfer into and out of bed and chair?: Yes  Dress and groom yourself?: Yes    Bathe or shower yourself?: Yes    Feed yourself? Yes  Do your laundry/housekeeping?: No  Manage your money, pay your bills and track your expenses?: Yes  Make your own meals?: Yes    Do your own shopping?: No    Previous Hospitalizations:   Any hospitalizations or ED visits within the last 12 months?: No      Advance Care Planning:   Living will: Yes    Durable POA for healthcare: Yes    Advanced directive: Yes    Advanced directive counseling given: Yes    Five wishes given: No    Patient declined ACP directive: No    End of Life Decisions reviewed with patient: No    Provider agrees with end of life decisions: No      Cognitive Screening:   Provider or family/friend/caregiver concerned regarding cognition?: No    PREVENTIVE SCREENINGS      Cardiovascular Screening:    General: Screening Current      Diabetes Screening:     General: Screening Current      Colorectal Cancer Screening:     General: Screening Not Indicated      Breast Cancer Screening:     General: Screening Current      Cervical Cancer Screening:     General: Screening Not Indicated      Osteoporosis Screening:    General: Screening Not Indicated and History Osteoporosis      Lung Cancer Screening:     General: Screening Not Indicated    Screening, Brief Intervention, and Referral to Treatment (SBIRT)    Screening  Typical number of drinks in a day: 0  Typical number of drinks in a week: 0  Interpretation: Low risk drinking behavior.    Single Item Drug Screening:  How often have you used an illegal drug (including marijuana) or a prescription medication for non-medical reasons in the past year? never    Single Item Drug Screen Score: 0  Interpretation: Negative screen for possible drug use disorder    Brief Intervention  Alcohol & drug use screenings were reviewed. No concerns regarding substance use disorder identified.     Other Counseling Topics:   Car/seat belt/driving safety, skin self-exam, sunscreen and calcium and vitamin D intake and regular weightbearing exercise.     Social Determinants of Health     Food Insecurity: No Food Insecurity (7/25/2024)    Hunger Vital Sign     Worried About Running Out of Food in the Last Year: Never true     Ran Out of Food in the Last Year: Never true   Transportation Needs: No Transportation Needs (7/25/2024)    PRAPARE - Transportation     Lack of Transportation (Medical): No     Lack of Transportation (Non-Medical): No   Housing Stability: Low Risk  (7/25/2024)    Housing Stability Vital Sign     Unable to Pay for Housing in the Last Year: No     Number of Times Moved in the Last Year: 1     Homeless in the Last Year: No   Utilities: Not At Risk (7/25/2024)    [facility] Utilities     Threatened with loss of utilities: No     No results found.    Objective     /84 (BP Location: Left arm, Patient Position: Sitting)   Pulse 63   Temp 98.1 °F (36.7 °C) (Tympanic)   Wt 63.5 kg (140 lb)   SpO2 97%   BMI 32.54 kg/m²     Physical Exam  Vitals and nursing note reviewed.   Constitutional:       General: She is not in  acute distress.     Appearance: Normal appearance. She is well-developed and normal weight. She is not ill-appearing, toxic-appearing or diaphoretic.   HENT:      Head: Normocephalic and atraumatic.      Right Ear: Tympanic membrane normal.      Left Ear: Tympanic membrane normal.      Nose: Nose normal.      Mouth/Throat:      Mouth: Mucous membranes are moist.      Pharynx: Oropharynx is clear.   Eyes:      Conjunctiva/sclera: Conjunctivae normal.      Pupils: Pupils are equal, round, and reactive to light.   Cardiovascular:      Rate and Rhythm: Normal rate and regular rhythm.      Pulses: Normal pulses.      Heart sounds: Normal heart sounds. No murmur heard.  Pulmonary:      Effort: Pulmonary effort is normal. No respiratory distress.      Breath sounds: Normal breath sounds. No wheezing.   Chest:      Chest wall: No tenderness.   Abdominal:      General: Bowel sounds are normal.      Palpations: Abdomen is soft. There is no mass.      Tenderness: There is no abdominal tenderness.   Musculoskeletal:         General: No swelling or tenderness. Normal range of motion.      Cervical back: Normal range of motion and neck supple. No tenderness.      Right lower leg: No edema.      Left lower leg: No edema.   Lymphadenopathy:      Cervical: No cervical adenopathy.   Skin:     General: Skin is warm and dry.      Capillary Refill: Capillary refill takes less than 2 seconds.      Findings: No erythema, lesion or rash.   Neurological:      General: No focal deficit present.      Mental Status: She is alert and oriented to person, place, and time. Mental status is at baseline.      Motor: No weakness.      Coordination: Coordination normal.      Gait: Gait normal.   Psychiatric:         Mood and Affect: Mood normal.         Behavior: Behavior normal.         Thought Content: Thought content normal.         Judgment: Judgment normal.      Comments: Patient is very forgetful on exam and throughout the visit.

## 2024-07-25 NOTE — ASSESSMENT & PLAN NOTE
At goal today.  Continue amlodipine and losartan.  Contact office if persistently elevated on at home readings.

## 2024-07-27 ENCOUNTER — TELEPHONE (OUTPATIENT)
Dept: OTHER | Facility: OTHER | Age: 84
End: 2024-07-27

## 2024-07-27 NOTE — TELEPHONE ENCOUNTER
"Pt stated, \"I am returning a missed call from the office. I couldn't understand what the voicemail said.\"    Please call pt when office reopens   "

## 2024-07-29 ENCOUNTER — TELEPHONE (OUTPATIENT)
Age: 84
End: 2024-07-29

## 2024-07-29 NOTE — TELEPHONE ENCOUNTER
Patient called back, following up.  She stated the Neurology office needs us to call them.  She was upset and confused.  Please advise.

## 2024-07-29 NOTE — TELEPHONE ENCOUNTER
Called pt and gave her the phone number for [facility].  According to her chart, that is the last place she was seen for her kidneys.  I then gave her the number for Dr. Law, the neurologist in [place], but we are not sure if they are taking new pts.  I had to repeat this information quite a few times and I don't believe she understood.  I also asked her to repeat the numbers to me and she did. Will f/u with Chapincito because he wrote the referrals.

## 2024-07-29 NOTE — TELEPHONE ENCOUNTER
Spoke to pt again and she said she called the numbers that I gave her & that we need to call to make the appts for her.  I called [facility] regarding her appt.  I talked to Claudia and she said they need all of her records first before they will make an appt. Their office will then call her to make the appt. She asked me to fax the information to her office for the drs to look at.  She said they are booked out quite far.  Will fax records as requested.

## 2024-07-29 NOTE — TELEPHONE ENCOUNTER
Patient called back inquiring about the doctor appointments. I warm transferred her to Afia in the office clinical department.

## 2024-07-29 NOTE — TELEPHONE ENCOUNTER
Patient called, extremely confused.  She was asking for assistance with phone numbers for the referrals placed for kidney doctor and neurology.  The conversation went back and forth for quite a while, she would forget what I told her almost immediately.  I am not sure if there is someone assisting her with appointments.    I did confirm the number for the kidney doctor that she used to see in [place], she wrote it down.  She has no number for Neurology and when I searched, the closest was [place] - too far she stated.    Could someone please assist/advise her with this?

## 2024-07-31 ENCOUNTER — TELEPHONE (OUTPATIENT)
Age: 84
End: 2024-07-31

## 2024-07-31 NOTE — TELEPHONE ENCOUNTER
Shantell called from [facility] to give Dr. Jerome an update. She saw patient for a Social Work evaluation today 7/31/2024 and set her up with transportation.   The patient needs a new mattress for her hospital bed. The hospital bed is old and uncomfortable. She also needs a seated rolling walker.

## 2024-08-01 LAB
DME PARACHUTE DELIVERY DATE REQUESTED: NORMAL
DME PARACHUTE DELIVERY DATE REQUESTED: NORMAL
DME PARACHUTE ITEM DESCRIPTION: NORMAL
DME PARACHUTE ORDER STATUS: NORMAL
DME PARACHUTE ORDER STATUS: NORMAL
DME PARACHUTE SUPPLIER NAME: NORMAL
DME PARACHUTE SUPPLIER NAME: NORMAL
DME PARACHUTE SUPPLIER PHONE: NORMAL
DME PARACHUTE SUPPLIER PHONE: NORMAL

## 2024-08-05 ENCOUNTER — TELEPHONE (OUTPATIENT)
Age: 84
End: 2024-08-05

## 2024-08-05 DIAGNOSIS — I50.32 CHRONIC DIASTOLIC CHF (CONGESTIVE HEART FAILURE) (HCC): ICD-10-CM

## 2024-08-05 DIAGNOSIS — G30.9 ALZHEIMER'S DEMENTIA, UNSPECIFIED DEMENTIA SEVERITY, UNSPECIFIED TIMING OF DEMENTIA ONSET, UNSPECIFIED WHETHER BEHAVIORAL, PSYCHOTIC, OR MOOD DISTURBANCE OR ANXIETY (HCC): Primary | ICD-10-CM

## 2024-08-05 DIAGNOSIS — F02.80 ALZHEIMER'S DEMENTIA, UNSPECIFIED DEMENTIA SEVERITY, UNSPECIFIED TIMING OF DEMENTIA ONSET, UNSPECIFIED WHETHER BEHAVIORAL, PSYCHOTIC, OR MOOD DISTURBANCE OR ANXIETY (HCC): Primary | ICD-10-CM

## 2024-08-05 NOTE — TELEPHONE ENCOUNTER
Annette from [facility] called in stating patient is lately very confused and missing her medications too. They have informed the same to her son and would want her PCP to know the same.    And they have tried to schedule her appt with referred neurologist but the doctor is not practice in [place].    So therefore she requested for new neurologist doctor reference with name and contact number so she can schedule the patient.    And also she was told by patient that needs to be seen by kidney doctor too? But she wants to check with the PCP if it is right then do give her the reference for the same.    Thanks

## 2024-08-06 PROBLEM — R51.9 HEADACHE, WORSENING: Status: RESOLVED | Noted: 2017-08-14 | Resolved: 2024-08-06

## 2024-08-06 PROBLEM — M54.2 NECK PAIN: Status: RESOLVED | Noted: 2023-04-03 | Resolved: 2024-08-06

## 2024-08-06 PROBLEM — G20.A1 DEMENTIA DUE TO PARKINSON'S DISEASE WITH BEHAVIORAL DISTURBANCE (HCC): Status: ACTIVE | Noted: 2022-06-16

## 2024-08-06 PROBLEM — M54.9 MID BACK PAIN: Status: RESOLVED | Noted: 2020-03-11 | Resolved: 2024-08-06

## 2024-08-06 PROBLEM — N18.32 STAGE 3B CHRONIC KIDNEY DISEASE (HCC): Status: ACTIVE | Noted: 2019-07-23

## 2024-08-06 PROBLEM — G20.A1 DEMENTIA DUE TO PARKINSON'S DISEASE WITH BEHAVIORAL DISTURBANCE (HCC): Status: ACTIVE | Noted: 2024-08-06

## 2024-08-06 PROBLEM — F02.818 DEMENTIA DUE TO PARKINSON'S DISEASE WITH BEHAVIORAL DISTURBANCE (HCC): Status: ACTIVE | Noted: 2022-06-16

## 2024-08-06 PROBLEM — G20.A1 DEMENTIA DUE TO PARKINSON'S DISEASE WITH BEHAVIORAL DISTURBANCE (HCC): Status: ACTIVE | Noted: 2020-12-01

## 2024-08-06 PROBLEM — I25.2 OLD MYOCARDIAL INFARCTION: Status: ACTIVE | Noted: 2024-08-06

## 2024-08-06 PROBLEM — F02.818 DEMENTIA DUE TO PARKINSON'S DISEASE WITH BEHAVIORAL DISTURBANCE (HCC): Status: ACTIVE | Noted: 2024-08-06

## 2024-08-06 PROBLEM — F02.818 DEMENTIA DUE TO PARKINSON'S DISEASE WITH BEHAVIORAL DISTURBANCE (HCC): Status: ACTIVE | Noted: 2020-12-01

## 2024-08-06 PROBLEM — I13.0 HYPERTENSIVE HEART AND RENAL DISEASE WITH CONGESTIVE HEART FAILURE (HCC): Status: ACTIVE | Noted: 2024-08-06

## 2024-08-06 PROBLEM — I25.2 OLD MYOCARDIAL INFARCTION: Status: ACTIVE | Noted: 2021-06-17

## 2024-08-06 RX ORDER — EZETIMIBE 10 MG/1
TABLET ORAL
Qty: 90 TABLET | Refills: 1 | Status: SHIPPED | OUTPATIENT
Start: 2024-08-06

## 2024-08-07 ENCOUNTER — TELEPHONE (OUTPATIENT)
Age: 84
End: 2024-08-07

## 2024-08-07 DIAGNOSIS — F02.80 ALZHEIMER'S DEMENTIA, UNSPECIFIED DEMENTIA SEVERITY, UNSPECIFIED TIMING OF DEMENTIA ONSET, UNSPECIFIED WHETHER BEHAVIORAL, PSYCHOTIC, OR MOOD DISTURBANCE OR ANXIETY (HCC): Primary | ICD-10-CM

## 2024-08-07 DIAGNOSIS — G30.9 ALZHEIMER'S DEMENTIA, UNSPECIFIED DEMENTIA SEVERITY, UNSPECIFIED TIMING OF DEMENTIA ONSET, UNSPECIFIED WHETHER BEHAVIORAL, PSYCHOTIC, OR MOOD DISTURBANCE OR ANXIETY (HCC): Primary | ICD-10-CM

## 2024-08-07 NOTE — TELEPHONE ENCOUNTER
Joanne a physical therapist with [facility] called in regards to being concerned about the patients well being. Joanne stated that the patient has not been eating and forgetting to take her medications. Joanne stated that when OT had went out to see the patient on Monday patient had mold on her food sitting in the microwave and when joanne went to see her today same thing. Joanne stated that when she went to see the patient today and called up patient stated that she was not home and that she was out buying a bed, but one of the facility staff let joanne in and patient was sitting on her floor with her bags packed. Joanne also stated that patient has a womb on her foot that will not heal due to patient not changing the dressing and not eating and just living off of nutty butters. Joanne stated that patient has not seen a neurologist yet and she believes that patient needs to see a neurologist. If provider has any questions or concerns for Joanne her call back number is [phone].

## 2024-08-08 ENCOUNTER — OFFICE VISIT (OUTPATIENT)
Dept: WOUND CARE | Facility: CLINIC | Age: 84
End: 2024-08-08
Payer: COMMERCIAL

## 2024-08-08 ENCOUNTER — PATIENT OUTREACH (OUTPATIENT)
Dept: CASE MANAGEMENT | Facility: OTHER | Age: 84
End: 2024-08-08

## 2024-08-08 ENCOUNTER — TELEPHONE (OUTPATIENT)
Dept: WOUND CARE | Facility: CLINIC | Age: 84
End: 2024-08-08

## 2024-08-08 VITALS
HEART RATE: 60 BPM | SYSTOLIC BLOOD PRESSURE: 114 MMHG | DIASTOLIC BLOOD PRESSURE: 72 MMHG | RESPIRATION RATE: 18 BRPM | TEMPERATURE: 97.2 F

## 2024-08-08 DIAGNOSIS — I87.2 PERIPHERAL VENOUS INSUFFICIENCY: ICD-10-CM

## 2024-08-08 DIAGNOSIS — M1A.9XX1 CHRONIC GOUT INVOLVING TOE OF RIGHT FOOT WITH TOPHUS, UNSPECIFIED CAUSE: Primary | ICD-10-CM

## 2024-08-08 DIAGNOSIS — G62.89 OTHER POLYNEUROPATHY: ICD-10-CM

## 2024-08-08 DIAGNOSIS — L97.512 NEUROPATHIC ULCER OF TOE OF RIGHT FOOT WITH FAT LAYER EXPOSED (HCC): ICD-10-CM

## 2024-08-08 DIAGNOSIS — F03.918 DEMENTIA WITH BEHAVIORAL DISTURBANCE (HCC): ICD-10-CM

## 2024-08-08 PROCEDURE — 97597 DBRDMT OPN WND 1ST 20 CM/<: CPT | Performed by: STUDENT IN AN ORGANIZED HEALTH CARE EDUCATION/TRAINING PROGRAM

## 2024-08-08 NOTE — LETTER
[facility]  [address] [facility] [address]  Phone#  [phone]  Fax#  [phone]    Patient:  Demi Donnelly  YOB: 1940  Phone:  [phone]  Date of Visit:  8/8/2024    Orders Placed This Encounter   Procedures   • Wound cleansing and dressings Right Toe D1, great     Wound cleansing and dressings Right Toe D1, great          Wash your hands with soap and water.  Remove old dressing, discard into plastic bag and place in trash.  Cleanse the wound with NSS or mild soap prior to applying a clean dressing. Do not use tissue or cotton balls. Do not scrub the wound. Pat dry using gauze.  Shower yes on days home health aide is there and nursing can complete wound care following shower (place dry dressing to toe following shower until nursing can complete wound care with in the same day of showering) other wise sponge bath       Pack PURACOL SILVER in to the right great toe wound.  Cover with gauze   Secure with madan   Change dressing daily     Wear surgical shoe at all times when walking      Off-loading Instructions:     Keep weight and pressure off wound at all times. and Wear off-loading device as directed by your physician. Put on immediately when rising in the morning and remove when going to bed.        Please purchase open toe sandals or sliders and wear if unable to get off loading/surgical shoe on  Please increase protein in diet as well as drinking Ensure      Please try to consume 3-4 servings of protein (30g) each day.   - Each serving of protein should contain about 30 mg protein.   - Good sources of protein include, lean meats (fish, chicken, etc), eggs, dairy products (yogurt, cheese), tofu, legumes (chickpeas, lentils, peas, black beans), nuts (cashew, walnut, peanuts, etc), & quinoa.     Home health nursing for wound care    Obtain MRI as ordered     Obtain lab work and x-rays as ordered      For nail care, please call the podiatry office.      Follow up in 2 weeks.     Standing Status:   Future     Standing Expiration Date:   8/15/2024   • MRI foot/forefoot toest right wo contrast     Standing Status:   Future     Standing Expiration Date:   8/8/2028     Scheduling Instructions:      There is no preparation for this test. Please leave your jewelry and valuables at home, wedding rings are the exception. All patients will be required to change into a hospital gown and pants.  Street clothes are not permitted in the MRI.  Magnetic nail polish must be removed prior to arrival for your test. Please bring your insurance cards, a form of photo ID and a list of your medications with you. Arrive 15 minutes prior to your appointment time in order to register. Please bring any prior CT or MRI studies of this area that were not performed at a [facility].            To schedule this appointment, please contact Central Scheduling at [phone].            Prior to your appointment, please make sure you complete the MRI Screening Form when you e-Check in for your appointment. This will be available starting 7 days before your appointment in [facility]. You may receive an e-mail with an activation code if you do not have a [employer] account. If you do not have access to a device, we will complete your screening at your appointment.     Order Specific Question:   Reason for Exam     Answer:   ulcer right 1st toe, assess for OM     Order Specific Question:   What is the patient's sedation requirement?     Answer:   No Sedation     Order Specific Question:   Does the patient need medication for Claustrophobia? If yes, order medication at this point.     Answer:   No     Order Specific Question:   Does the patient wear a life vest, have an implanted cardiac device, a stimulation device, a sleep apnea stimulator, or a breast tissue expansion device?     Answer:   No     Order Specific Question:   Release to patient through [employer]     Answer:   Immediate          Electronically signed by Kitty Sapp DPM

## 2024-08-08 NOTE — PATIENT INSTRUCTIONS
Orders Placed This Encounter   Procedures    Debridement Right Toe D1, great     This order was created via procedure documentation    Wound cleansing and dressings Right Toe D1, great     Wound cleansing and dressings Right Toe D1, great          Wash your hands with soap and water.  Remove old dressing, discard into plastic bag and place in trash.  Cleanse the wound with NSS or mild soap prior to applying a clean dressing. Do not use tissue or cotton balls. Do not scrub the wound. Pat dry using gauze.  Shower yes on days home health aide is there and nursing can complete wound care following shower (place dry dressing to toe following shower until nursing can complete wound care with in the same day of showering) other wise sponge bath       Pack PURACOL SILVER in to the right great toe wound.  Cover with gauze   Secure with madan   Change dressing daily     Wear surgical shoe at all times when walking      Off-loading Instructions:     Keep weight and pressure off wound at all times. and Wear off-loading device as directed by your physician. Put on immediately when rising in the morning and remove when going to bed.        Please purchase open toe sandals or sliders and wear if unable to get off loading/surgical shoe on  Please increase protein in diet as well as drinking Ensure      Please try to consume 3-4 servings of protein (30g) each day.   - Each serving of protein should contain about 30 mg protein.   - Good sources of protein include, lean meats (fish, chicken, etc), eggs, dairy products (yogurt, cheese), tofu, legumes (chickpeas, lentils, peas, black beans), nuts (cashew, walnut, peanuts, etc), & quinoa.     Home health nursing for wound care    Obtain MRI as ordered. Remove puracol AG dressing prior to MRI. Place gauze over wound and secure with paper tape.     Obtain lab work and x-rays as ordered      For nail care, please call the podiatry office.     Follow up in 2 weeks.     Standing Status:    Future     Standing Expiration Date:   8/15/2024    MRI foot/forefoot toest right wo contrast     Standing Status:   Future     Standing Expiration Date:   8/8/2028     Scheduling Instructions:      There is no preparation for this test. Please leave your jewelry and valuables at home, wedding rings are the exception. All patients will be required to change into a hospital gown and pants.  Street clothes are not permitted in the MRI.  Magnetic nail polish must be removed prior to arrival for your test. Please bring your insurance cards, a form of photo ID and a list of your medications with you. Arrive 15 minutes prior to your appointment time in order to register. Please bring any prior CT or MRI studies of this area that were not performed at a [facility].            To schedule this appointment, please contact Central Scheduling at [phone].            Prior to your appointment, please make sure you complete the MRI Screening Form when you e-Check in for your appointment. This will be available starting 7 days before your appointment in [employer]. You may receive an e-mail with an activation code if you do not have a [employer] account. If you do not have access to a device, we will complete your screening at your appointment.     Order Specific Question:   Reason for Exam     Answer:   ulcer right 1st toe, assess for OM     Order Specific Question:   What is the patient's sedation requirement?     Answer:   No Sedation     Order Specific Question:   Does the patient need medication for Claustrophobia? If yes, order medication at this point.     Answer:   No     Order Specific Question:   Does the patient wear a life vest, have an implanted cardiac device, a stimulation device, a sleep apnea stimulator, or a breast tissue expansion device?     Answer:   No     Order Specific Question:   Release to patient through [employer]     Answer:   Immediate

## 2024-08-08 NOTE — PROGRESS NOTES
RAHAT DOMINGUEZ received referral for patient from Chapincito Mckeon PA-C, for Alzheimer's dementia. Per chart review, referral was placed due to patient's [facility] PT calling into the PCP office to report concern for patient's well-being. Provider then placed referral to RAHAT, Neurology and Neuropsychology. Patient has previous OP RAHAT DOMINGUEZ outreach regarding assistance with PAP for Eliquis.    Due to patient's diagnosis of dementia, RAHAT DOMINGUEZ reviewed Medical Communication Consent form and placed outreach call to patient's son who is identified on the form. RAHAT DOMINGUEZ left patient's son a voicemail. RAHAT CM to place second outreach call within one week if return call is not received prior.

## 2024-08-08 NOTE — TELEPHONE ENCOUNTER
Call placed to [facility] to ensure fax received regarding new orders placed prior to MRI. MRI scheduled for 8/14/24. Puracol AG dressing to be removed on Monday 8/12/24 home care visit and replaced with gauze and paper tape. [facility] verbalized receipt of new orders.

## 2024-08-08 NOTE — PROGRESS NOTES
Patient ID: Demi Donnelly is a 84 y.o. female Date of Birth 1940       Chief Complaint   Patient presents with    Follow Up Wound Care Visit     Right great toe wound       Allergies:  Amoxicillin, Ciprofloxacin, Erythromycin, Statins, Vancomycin, Amoxicillin-pot clavulanate, and Clindamycin    Diagnosis:  1. 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 Dementia with behavioral disturbance (HCC)  4. Peripheral venous insufficiency  5. Other polyneuropathy  -     MRI foot/forefoot toest right wo contrast; Future; Expected date: 08/08/2024     Diagnosis ICD-10-CM Associated Orders   1. 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 Dementia with behavioral disturbance (HCC)  F03.918       4. Peripheral venous insufficiency  I87.2       5. Other polyneuropathy  G62.89 MRI foot/forefoot toest right wo contrast           Assessment & Plan:  See wound orders.  (puracol silver, dsd daily chg)  - Right hallux neuropathic ulceration has gotten larger and now probes deep. I ordered an MRI right forefoot given this. There is no acute SOI today fortunately.   - Patient has bad dementia and has been noted to not care well for herself at home per VNA/PT. She has not been change the dressing nor offloading nor eating well.   - Per PCP, neuro/neuropsych/ all consulted on the case.   - pre-albumin, uric acid and XR right  1st toe ordered at past appts. Patient still had not gotten these done.   - High protein diet recommended.  - ABIs 2/6/24: RLE 1/87/41. LLE 1.03/92/90  - MRI right foot 4/17/23: 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  This is most prominent at the IPJ of great toe. No MRI findings to suggest osteomyelitis.  - Patient needs to be much better about using surgical shoe to offload or sandal that does to have any pressure to this area.   - F/u 2wks    Subjective:   Patient went to evaluation management of her right hallux wound. Has not been wearing the surgical shoe all the time but using it more. Wearing soft slippers today. Did not get Xrs or labs. Has missed her last few wound care appts. Has VNA who state she cannot take care of herself at home.         The following portions of the patient's history were reviewed and updated as appropriate:   Patient Active Problem List   Diagnosis    Essential hypertension    Biceps tendinitis of right shoulder    Chronic diastolic CHF (congestive heart failure) ([facility])    Closed head injury    Diastolic dysfunction    Dyspnea on exertion    GERD without esophagitis    Lateral epicondylitis of right elbow    Low serum vitamin B12    Lumbar radiculopathy    Peripheral edema    Peripheral neuropathy    B12 deficiency    Osteoporosis    Chronic left shoulder pain    BMI 36.0-36.9,adult    A-fib ([facility])    Ambulatory dysfunction    Anxiety    Junctional bradycardia    Single kidney    Vitamin D deficiency    CAD (coronary artery disease)    H/O left nephrectomy    H/O: rheumatic fever    Osteoarthritis    Peripheral venous insufficiency    Sciatica    Renal cyst    LUPE (acute kidney injury) ([facility])    Other specified inflammatory spondylopathies, lumbosacral region ([facility])    Obesity, morbid ([facility])    Constipation    Abnormal stress test    Fecal incontinence    Ground glass opacity present  on imaging of lung    Prediabetes    Prolapse of vaginal walls    Anemia    Physical deconditioning    Skin ulcer of right great toe, limited to breakdown of skin ([facility])    Spinal stenosis of lumbar region with neurogenic claudication    Lumbar spondylosis    Visual hallucinations    Frequent falls    Stage 3b chronic kidney disease (HCC)    Dementia due to Parkinson's disease with behavioral disturbance ([facility])    Old myocardial infarction    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 ([facility])     Past Medical History:   Diagnosis Date    Alzheimer's dementia ([facility])     Aphasia     CHF (congestive heart failure) ([facility])     Coronary artery disease     Headache, worsening 08/14/2017    Hypercholesteremia     Hypertension     IBS (irritable bowel syndrome)     Mid back pain 03/11/2020    Neck pain 04/03/2023    Other chest pain 08/01/2016    Renal disorder     Rheumatic fever     Skin lesion      Past Surgical History:   Procedure Laterality Date    ANKLE FRACTURE SURGERY      LAST ASSESSED [id number]    HYSTERECTOMY      KIDNEY SURGERY      NEPHRECTOMY      TONSILLECTOMY       Social History     Socioeconomic History    Marital status:      Spouse name: None    Number of children: None    Years of education: None    Highest education level: None   Occupational History    None   Tobacco Use    Smoking status: Never    Smokeless tobacco: Never   Vaping Use    Vaping status: Never Used   Substance and Sexual Activity    Alcohol use: No    Drug use: No    Sexual activity: Not Currently   Other Topics Concern    None   Social History Narrative    None     Social Determinants of Health     Financial Resource Strain: Not on file   Food Insecurity: No Food Insecurity (7/25/2024)    Hunger Vital Sign     Worried About Running Out of Food in the Last Year: Never true     Ran Out of Food in the Last Year: Never true   Transportation Needs: No Transportation Needs (7/25/2024)    PRAPARE - Transportation      Lack of Transportation (Medical): No     Lack of Transportation (Non-Medical): No   Physical Activity: Not on file   Stress: Not on file   Social Connections: Unknown (6/18/2024)    Received from [employer]    Social [facility] [place]     How often do you feel lonely or isolated from those around you? (Adult - for ages 18 years and over): Not on file   Intimate Partner Violence: Not on file   Housing Stability: Low Risk  (7/25/2024)    Housing Stability Vital Sign     Unable to Pay for Housing in the Last Year: No     Number of Times Moved in the Last Year: 1     Homeless in the Last Year: No        Current Outpatient Medications:     acetaminophen (TYLENOL) 650 mg CR tablet, Take 1 tablet (650 mg total) by mouth every 8 (eight) hours as needed for mild pain or moderate pain, Disp: 90 tablet, Rfl: 2    amLODIPine (NORVASC) 5 mg tablet, Take 5 mg by mouth daily, Disp: , Rfl:     bisacodyl 5 mg EC tablet, take 1 tablet by mouth once daily if needed for constipation, Disp: 30 tablet, Rfl: 5    cholecalciferol (VITAMIN D3) 1,000 units tablet, take 1 tablet by mouth once daily, Disp: 90 tablet, Rfl: 1    Eliquis 5 MG, take 1 tablet by mouth twice a day, Disp: 180 tablet, Rfl: 3    ezetimibe (ZETIA) 10 mg tablet, take 1 tablet by mouth once daily, Disp: 90 tablet, Rfl: 1    furosemide (LASIX) 20 mg tablet, take 1 tablet by mouth twice a day, Disp: 180 tablet, Rfl: 1    isosorbide mononitrate (IMDUR) 60 mg 24 hr tablet, take 1 tablet by mouth once daily, Disp: 90 tablet, Rfl: 3    lansoprazole (PREVACID) 30 mg capsule, Take 1 capsule (30 mg total) by mouth in the morning., Disp: 90 capsule, Rfl: 3    losartan (COZAAR) 25 mg tablet, take 1 tablet by mouth once daily, Disp: 90 tablet, Rfl: 1    memantine (Namenda) 10 mg tablet, Take 1 tablet (10 mg total) by mouth 2 (two) times a day, Disp: 180 tablet, Rfl: 3    polyethylene glycol (GLYCOLAX) 17 GM/SCOOP powder, Take 17 g by mouth daily, Disp: 850 g, Rfl: 5    senna (SENOKOT) 8.6  mg, Take 1 tablet (8.6 mg total) by mouth daily at bedtime, Disp: 30 tablet, Rfl: 2    Current Facility-Administered Medications:     cyanocobalamin injection 1,000 mcg, 1,000 mcg, Intramuscular, Q30 Days, Kenroy Jerome MD, 1,000 mcg at 02/19/18 1008  Family History   Problem Relation Age of Onset    Hypertension Mother     Asthma Father     Cancer Sister       Review of Systems   Constitutional:  Negative for activity change, chills and fever.   HENT: Negative.  Negative for ear pain and sore throat.    Eyes:  Negative for pain and visual disturbance.   Respiratory:  Negative for cough, chest tightness and shortness of breath.    Cardiovascular:  Negative for chest pain, palpitations and leg swelling.   Gastrointestinal:  Negative for abdominal pain and vomiting.   Endocrine: Negative.    Genitourinary: Negative.  Negative for dysuria and hematuria.   Musculoskeletal:  Positive for gait problem (Ambulates with walker). Negative for arthralgias and back pain.   Skin:  Positive for wound (Right 1st toe). Negative for color change and rash.        Dystrophic toenails   Neurological:  Negative for seizures and syncope.        B/L LE tingling/burning   Psychiatric/Behavioral: Negative.  Negative for agitation and behavioral problems.    All other systems reviewed and are negative.        Objective:  /72   Pulse 60   Temp (!) 97.2 °F (36.2 °C)   Resp 18     Physical Exam  Constitutional:       General: She is not in acute distress.     Appearance: Normal appearance. She is not ill-appearing.   Cardiovascular:      Comments: Bilateral DP pulses are palpable 2/4. Bilateral PT pulses are nonpalpable or diminished 1/4. Pedal hair is absent. Legs to toes warm to cool. Varicosities with mild LE edema noted.  Pulmonary:      Effort: No respiratory distress.   Musculoskeletal:         General: No tenderness or deformity. Normal range of motion.   Skin:     Capillary Refill: Capillary refill takes less than 2 seconds.  "     Findings: Lesion (Right plantar-medial hallux IPJ, now full thickness, fibrous base, sanguinous drainage, small tophi in wound base this does probe deep to bone now. There is no purulence nor erythema nor acute SOI.) present. No erythema.      Comments: B/L LE skin is atrophic - thin, dry and shiny in appearance.   Toenails x10 are elongated, dystrophic, discolored. There is nail thickening and subungual debris noted to toenails to left hallux, R 2nd, B/L 5th toes.   Neurological:      General: No focal deficit present.      Mental Status: She is alert and oriented to person, place, and time.      Comments: Gross sensation to feet intact. Patient endorses numbness, tingling and burning to B/L feet. + paresthesias.    Psychiatric:         Mood and Affect: Mood normal.         Behavior: Behavior normal.             Wound 03/26/24 Toe D1, great Right (Active)   Wound Image   08/08/24 1358   Wound Description White;Pink;Probes to bone 08/08/24 1357   [place]-wound Assessment [place] 08/08/24 1357   Wound Length (cm) 0.6 cm 08/08/24 1357   Wound Width (cm) 0.5 cm 08/08/24 1357   Wound Depth (cm) 0.7 cm 08/08/24 1357   Wound Surface Area (cm^2) 0.3 cm^2 08/08/24 1357   Wound Volume (cm^3) 0.21 cm^3 08/08/24 1357   Calculated Wound Volume (cm^3) 0.21 cm^3 08/08/24 1357   Change in Wound Size % -2000 08/08/24 1357   Drainage Amount Small 08/08/24 1357   Drainage Description Serosanguineous 08/08/24 1357   Non-staged Wound Description Full thickness 08/08/24 1357           Debridement   Wound 03/26/24 Toe D1, great Right    Universal Protocol:  Consent: Verbal consent obtained.  Risks and benefits: risks, benefits and alternatives were discussed  Consent given by: patient  Time out: Immediately prior to procedure a \"time out\" was called to verify the correct patient, procedure, equipment, support staff and site/side marked as required.  Patient understanding: patient states understanding of the procedure being " performed  Patient consent: the patient's understanding of the procedure matches consent given  Patient identity confirmed: verbally with patient    Debridement Details  Performed by: physician  Debridement type: selective      Post-debridement measurements  Length (cm): 0.6  Width (cm): 0.5  Depth (cm): 0.7  Percent debrided: 100%  Surface Area (cm^2): 0.3  Area Debrided (cm^2): 0.3  Volume (cm^3): 0.21    Devitalized tissue debrided: biofilm and exudate  Instrument(s) utilized: blade  Technique utilized: nonexcisionalBleeding: small  Hemostasis obtained with: pressure  Procedural pain (0-10): 0  Post-procedural pain: 0   Response to treatment: procedure was tolerated well                 Wound Instructions:  Orders Placed This Encounter   Procedures    Wound cleansing and dressings Right Toe D1, great     Wound cleansing and dressings Right Toe D1, great          Wash your hands with soap and water.  Remove old dressing, discard into plastic bag and place in trash.  Cleanse the wound with NSS or mild soap prior to applying a clean dressing. Do not use tissue or cotton balls. Do not scrub the wound. Pat dry using gauze.  Shower yes on days home health aide is there and nursing can complete wound care following shower (place dry dressing to toe following shower until nursing can complete wound care with in the same day of showering) other wise sponge bath       Pack PURACOL SILVER in to the right great toe wound.  Cover with gauze   Secure with madan   Change dressing daily     Wear surgical shoe at all times when walking      Off-loading Instructions:     Keep weight and pressure off wound at all times. and Wear off-loading device as directed by your physician. Put on immediately when rising in the morning and remove when going to bed.        Please purchase open toe sandals or sliders and wear if unable to get off loading/surgical shoe on  Please increase protein in diet as well as drinking Ensure      Please try  to consume 3-4 servings of protein (30g) each day.   - Each serving of protein should contain about 30 mg protein.   - Good sources of protein include, lean meats (fish, chicken, etc), eggs, dairy products (yogurt, cheese), tofu, legumes (chickpeas, lentils, peas, black beans), nuts (cashew, walnut, peanuts, etc), & quinoa.     Home health nursing for wound care    Obtain MRI as ordered     Obtain lab work and x-rays as ordered      For nail care, please call the podiatry office.     Follow up in 2 weeks.     Standing Status:   Future     Standing Expiration Date:   8/15/2024    Debridement     This order was created via procedure documentation    MRI foot/forefoot toest right wo contrast     Standing Status:   Future     Standing Expiration Date:   8/8/2028     Scheduling Instructions:      There is no preparation for this test. Please leave your jewelry and valuables at home, wedding rings are the exception. All patients will be required to change into a hospital gown and pants.  Street clothes are not permitted in the MRI.  Magnetic nail polish must be removed prior to arrival for your test. Please bring your insurance cards, a form of photo ID and a list of your medications with you. Arrive 15 minutes prior to your appointment time in order to register. Please bring any prior CT or MRI studies of this area that were not performed at a [facility].            To schedule this appointment, please contact Central Scheduling at [phone].            Prior to your appointment, please make sure you complete the MRI Screening Form when you e-Check in for your appointment. This will be available starting 7 days before your appointment in [employer]. You may receive an e-mail with an activation code if you do not have a [employer] account. If you do not have access to a device, we will complete your screening at your appointment.     Order Specific Question:   Reason for Exam     Answer:   ulcer right 1st toe,  "assess for OM     Order Specific Question:   What is the patient's sedation requirement?     Answer:   No Sedation     Order Specific Question:   Does the patient need medication for Claustrophobia? If yes, order medication at this point.     Answer:   No     Order Specific Question:   Does the patient wear a life vest, have an implanted cardiac device, a stimulation device, a sleep apnea stimulator, or a breast tissue expansion device?     Answer:   No     Order Specific Question:   Release to patient through Mychart     Answer:   Immediate         Kitty Sapp DPM      Portions of the record may have been created with voice recognition software. Occasional wrong word or \"sound a like\" substitutions may have occurred due to the inherent limitations of voice recognition software. Read the chart carefully and recognize, using context, where substitutions have occurred.    "

## 2024-08-13 ENCOUNTER — PATIENT OUTREACH (OUTPATIENT)
Dept: CASE MANAGEMENT | Facility: OTHER | Age: 84
End: 2024-08-13

## 2024-08-13 NOTE — LETTER
08/13/24    Dear Demi Donnelly,    I am a Care Manager with [facility].  We have made several attempts to call you by phone.  It is important that you contact us back at [phone] so that we can assist with your care needs.     Sincerely,         Gunjan Patiño  Outpatient Social Work Care Manager

## 2024-08-13 NOTE — PROGRESS NOTES
RAHAT DOMINGUEZ placed second outreach call to patient's son and left a voicemail. RAHAT DOMINGUEZ to send Unable to Reach letter to patient's address listed on chart.    RAHAT DOMINGUEZ to close referral at this time due to unable to make contact.

## 2024-08-15 ENCOUNTER — TELEPHONE (OUTPATIENT)
Age: 84
End: 2024-08-15

## 2024-08-15 NOTE — TELEPHONE ENCOUNTER
Stephany called from [facility] care specialist requesting for the last labs results we had on patient. Stephany was advised the last labs we have on patient were done on 9/2023. Stephany verbally understands no further questions

## 2024-08-19 ENCOUNTER — TELEPHONE (OUTPATIENT)
Age: 84
End: 2024-08-19

## 2024-08-19 DIAGNOSIS — K59.00 CONSTIPATION, UNSPECIFIED CONSTIPATION TYPE: ICD-10-CM

## 2024-08-19 DIAGNOSIS — I25.10 CORONARY ARTERY DISEASE INVOLVING NATIVE HEART, UNSPECIFIED VESSEL OR LESION TYPE, UNSPECIFIED WHETHER ANGINA PRESENT: ICD-10-CM

## 2024-08-20 RX ORDER — FUROSEMIDE 20 MG
TABLET ORAL
Qty: 180 TABLET | Refills: 0 | Status: SHIPPED | OUTPATIENT
Start: 2024-08-20

## 2024-08-20 RX ORDER — BISACODYL 5 MG
TABLET, DELAYED RELEASE (ENTERIC COATED) ORAL
Qty: 30 TABLET | Refills: 5 | Status: SHIPPED | OUTPATIENT
Start: 2024-08-20

## 2024-08-22 ENCOUNTER — TELEPHONE (OUTPATIENT)
Age: 84
End: 2024-08-22

## 2024-08-22 ENCOUNTER — PATIENT OUTREACH (OUTPATIENT)
Dept: CASE MANAGEMENT | Facility: OTHER | Age: 84
End: 2024-08-22

## 2024-08-22 NOTE — TELEPHONE ENCOUNTER
Pt calling to reschedule wound care appt for this afternoon. Pt transferred to wound care office to reschedule appt. No further action is needed at this time.

## 2024-08-22 NOTE — PROGRESS NOTES
RAHAT DOMINGUEZ received voicemail from patient stating Unable to Reach letter was received. RAHAT DOMINGUEZ placed call back to patient. RAHAT DOMINGUEZ introduced self, explained role and reasoning for initial outreach. Patient reported that she is doing OK, she just sometimes gets her days mixed up as she thought she had an appointment yesterday, but it is actually today. Patient reported she lives in the [facility] in [place]. She reported her son lives in the area and is a support for her and takes her to her appointments. Patient also reported she has an aide that helps her with organizing her appointments and sees her on Mondays, Wednesdays and Fridays.    No other needs identified.

## 2024-09-02 ENCOUNTER — HOSPITAL ENCOUNTER (EMERGENCY)
Facility: HOSPITAL | Age: 84
Discharge: HOME | End: 2024-09-02
Attending: EMERGENCY MEDICINE
Payer: COMMERCIAL

## 2024-09-02 ENCOUNTER — APPOINTMENT (EMERGENCY)
Dept: CT IMAGING | Facility: HOSPITAL | Age: 84
End: 2024-09-02
Payer: COMMERCIAL

## 2024-09-02 VITALS
WEIGHT: 142.86 LBS | DIASTOLIC BLOOD PRESSURE: 60 MMHG | OXYGEN SATURATION: 100 % | HEIGHT: 55 IN | TEMPERATURE: 97.3 F | HEART RATE: 65 BPM | BODY MASS INDEX: 33.06 KG/M2 | RESPIRATION RATE: 16 BRPM | SYSTOLIC BLOOD PRESSURE: 137 MMHG

## 2024-09-02 DIAGNOSIS — R10.9 RIGHT FLANK PAIN: Primary | ICD-10-CM

## 2024-09-02 DIAGNOSIS — N39.0 UTI (URINARY TRACT INFECTION): ICD-10-CM

## 2024-09-02 LAB
ALBUMIN SERPL BCG-MCNC: 4.2 G/DL (ref 3.5–5)
ALP SERPL-CCNC: 66 U/L (ref 34–104)
ALT SERPL W P-5'-P-CCNC: 9 U/L (ref 7–52)
ANION GAP SERPL CALCULATED.3IONS-SCNC: 11 MMOL/L (ref 4–13)
AST SERPL W P-5'-P-CCNC: 21 U/L (ref 13–39)
BACTERIA UR QL AUTO: ABNORMAL /HPF
BASOPHILS # BLD AUTO: 0.04 THOUSANDS/ÂΜL (ref 0–0.1)
BASOPHILS NFR BLD AUTO: 1 % (ref 0–1)
BILIRUB SERPL-MCNC: 0.35 MG/DL (ref 0.2–1)
BILIRUB UR QL STRIP: NEGATIVE
BUN SERPL-MCNC: 42 MG/DL (ref 5–25)
CALCIUM SERPL-MCNC: 9.4 MG/DL (ref 8.4–10.2)
CHLORIDE SERPL-SCNC: 107 MMOL/L (ref 96–108)
CLARITY UR: ABNORMAL
CO2 SERPL-SCNC: 23 MMOL/L (ref 21–32)
COLOR UR: YELLOW
CREAT SERPL-MCNC: 1.29 MG/DL (ref 0.6–1.3)
EOSINOPHIL # BLD AUTO: 0.06 THOUSAND/ÂΜL (ref 0–0.61)
EOSINOPHIL NFR BLD AUTO: 1 % (ref 0–6)
ERYTHROCYTE [DISTWIDTH] IN BLOOD BY AUTOMATED COUNT: 14.7 % (ref 11.6–15.1)
GFR SERPL CREATININE-BSD FRML MDRD: 37 ML/MIN/1.73SQ M
GLUCOSE SERPL-MCNC: 135 MG/DL (ref 65–140)
GLUCOSE UR STRIP-MCNC: NEGATIVE MG/DL
HCT VFR BLD AUTO: 37.4 % (ref 34.8–46.1)
HGB BLD-MCNC: 11.5 G/DL (ref 11.5–15.4)
HGB UR QL STRIP.AUTO: NEGATIVE
IMM GRANULOCYTES # BLD AUTO: 0.02 THOUSAND/UL (ref 0–0.2)
IMM GRANULOCYTES NFR BLD AUTO: 0 % (ref 0–2)
KETONES UR STRIP-MCNC: NEGATIVE MG/DL
LACTATE SERPL-SCNC: 2.1 MMOL/L (ref 0.5–2)
LEUKOCYTE ESTERASE UR QL STRIP: ABNORMAL
LYMPHOCYTES # BLD AUTO: 1.39 THOUSANDS/ÂΜL (ref 0.6–4.47)
LYMPHOCYTES NFR BLD AUTO: 24 % (ref 14–44)
MCH RBC QN AUTO: 28 PG (ref 26.8–34.3)
MCHC RBC AUTO-ENTMCNC: 30.7 G/DL (ref 31.4–37.4)
MCV RBC AUTO: 91 FL (ref 82–98)
MONOCYTES # BLD AUTO: 0.47 THOUSAND/ÂΜL (ref 0.17–1.22)
MONOCYTES NFR BLD AUTO: 8 % (ref 4–12)
MUCOUS THREADS UR QL AUTO: ABNORMAL
NEUTROPHILS # BLD AUTO: 3.87 THOUSANDS/ÂΜL (ref 1.85–7.62)
NEUTS SEG NFR BLD AUTO: 66 % (ref 43–75)
NITRITE UR QL STRIP: POSITIVE
NON-SQ EPI CELLS URNS QL MICRO: ABNORMAL /HPF
NRBC BLD AUTO-RTO: 0 /100 WBCS
OTHER STN SPEC: ABNORMAL
PH UR STRIP.AUTO: 6 [PH]
PLATELET # BLD AUTO: 332 THOUSANDS/UL (ref 149–390)
PMV BLD AUTO: 12 FL (ref 8.9–12.7)
POTASSIUM SERPL-SCNC: 4.8 MMOL/L (ref 3.5–5.3)
PROT SERPL-MCNC: 7.1 G/DL (ref 6.4–8.4)
PROT UR STRIP-MCNC: NEGATIVE MG/DL
RBC # BLD AUTO: 4.1 MILLION/UL (ref 3.81–5.12)
RBC #/AREA URNS AUTO: ABNORMAL /HPF
SODIUM SERPL-SCNC: 141 MMOL/L (ref 135–147)
SP GR UR STRIP.AUTO: 1.01 (ref 1–1.03)
UROBILINOGEN UR QL STRIP.AUTO: 0.2 E.U./DL
WBC # BLD AUTO: 5.85 THOUSAND/UL (ref 4.31–10.16)
WBC #/AREA URNS AUTO: ABNORMAL /HPF

## 2024-09-02 PROCEDURE — 87186 SC STD MICRODIL/AGAR DIL: CPT | Performed by: PHYSICIAN ASSISTANT

## 2024-09-02 PROCEDURE — 99285 EMERGENCY DEPT VISIT HI MDM: CPT

## 2024-09-02 PROCEDURE — 87086 URINE CULTURE/COLONY COUNT: CPT | Performed by: PHYSICIAN ASSISTANT

## 2024-09-02 PROCEDURE — 87077 CULTURE AEROBIC IDENTIFY: CPT | Performed by: PHYSICIAN ASSISTANT

## 2024-09-02 PROCEDURE — 83605 ASSAY OF LACTIC ACID: CPT | Performed by: PHYSICIAN ASSISTANT

## 2024-09-02 PROCEDURE — 96365 THER/PROPH/DIAG IV INF INIT: CPT

## 2024-09-02 PROCEDURE — 74176 CT ABD & PELVIS W/O CONTRAST: CPT

## 2024-09-02 PROCEDURE — 96375 TX/PRO/DX INJ NEW DRUG ADDON: CPT

## 2024-09-02 PROCEDURE — 81001 URINALYSIS AUTO W/SCOPE: CPT | Performed by: PHYSICIAN ASSISTANT

## 2024-09-02 PROCEDURE — 99284 EMERGENCY DEPT VISIT MOD MDM: CPT | Performed by: PHYSICIAN ASSISTANT

## 2024-09-02 PROCEDURE — 80053 COMPREHEN METABOLIC PANEL: CPT | Performed by: PHYSICIAN ASSISTANT

## 2024-09-02 PROCEDURE — 85025 COMPLETE CBC W/AUTO DIFF WBC: CPT | Performed by: PHYSICIAN ASSISTANT

## 2024-09-02 PROCEDURE — 36415 COLL VENOUS BLD VENIPUNCTURE: CPT | Performed by: PHYSICIAN ASSISTANT

## 2024-09-02 RX ORDER — CEFUROXIME AXETIL 500 MG/1
500 TABLET ORAL EVERY 12 HOURS SCHEDULED
Qty: 14 TABLET | Refills: 0 | Status: SHIPPED | OUTPATIENT
Start: 2024-09-02 | End: 2024-09-09

## 2024-09-02 RX ORDER — CEFTRIAXONE 1 G/50ML
1000 INJECTION, SOLUTION INTRAVENOUS ONCE
Status: COMPLETED | OUTPATIENT
Start: 2024-09-02 | End: 2024-09-02

## 2024-09-02 RX ORDER — CEFUROXIME AXETIL 500 MG/1
500 TABLET ORAL EVERY 12 HOURS SCHEDULED
Qty: 14 TABLET | Refills: 0 | Status: SHIPPED | OUTPATIENT
Start: 2024-09-02 | End: 2024-09-02 | Stop reason: CLARIF

## 2024-09-02 RX ORDER — ACETAMINOPHEN 10 MG/ML
1000 INJECTION, SOLUTION INTRAVENOUS ONCE
Status: COMPLETED | OUTPATIENT
Start: 2024-09-02 | End: 2024-09-02

## 2024-09-02 RX ADMIN — CEFTRIAXONE 1000 MG: 1 INJECTION, SOLUTION INTRAVENOUS at 19:00

## 2024-09-02 RX ADMIN — ACETAMINOPHEN 1000 MG: 1000 INJECTION, SOLUTION INTRAVENOUS at 18:39

## 2024-09-02 NOTE — ED NOTES
Patient stated she has had R side pain since she fell approximately 1 month ago. Provider aware.     Shirin Cervantes RN  09/02/24 0709

## 2024-09-02 NOTE — ED PROVIDER NOTES
History  Chief Complaint   Patient presents with    Flank Pain     Pt presented to this ED with family c/o right flank pain radiating to hip down right leg w/dysuria and weakness starting 3 days ago. Took tylenol with some relief. Hx nephrectomy(left?). Denies travel/sob/fevers/cough/n/v/d     84-year-old female presents emergency department with family for evaluation of right flank pain that has been ongoing for the last 3 days. Patient with past medical history of Alzheimer's dementia, CHF, CAD, hypercholesteremia, hypertension, IBS, back pain, neck pain, renal disorder, rheumatic fever. Patient denies any dysuria or hematuria to myself however she does admit to increased urinary frequency.  States she has a frequent history of urinary tract infections.  Patient states she has been weak in both of her legs.  Denies any upper extremity weakness or any unilateral weakness.  She reports she also has some pain in both legs.  She denies any fevers or chills.  Denies any nausea vomiting diarrhea.  States she does struggle with constipation.  Status post left-sided nephrectomy which she reports was over 40 years ago and states she is unsure why she had the kidney removed. Patient denies any recent fall trauma or injury        Prior to Admission Medications   Prescriptions Last Dose Informant Patient Reported? Taking?   Eliquis 5 MG   No No   Sig: take 1 tablet by mouth twice a day   acetaminophen (TYLENOL) 650 mg CR tablet   No No   Sig: Take 1 tablet (650 mg total) by mouth every 8 (eight) hours as needed for mild pain or moderate pain   amLODIPine (NORVASC) 5 mg tablet   Yes No   Sig: Take 5 mg by mouth daily   bisacodyl 5 MG EC tablet   No No   Sig: take 1 tablet by mouth once daily if needed for constipation   cholecalciferol (VITAMIN D3) 1,000 units tablet   No No   Sig: take 1 tablet by mouth once daily   ezetimibe (ZETIA) 10 mg tablet   No No   Sig: take 1 tablet by mouth once daily   furosemide (LASIX) 20 mg  tablet   No No   Sig: take 1 tablet by mouth twice a day   isosorbide mononitrate (IMDUR) 60 mg 24 hr tablet   No No   Sig: take 1 tablet by mouth once daily   lansoprazole (PREVACID) 30 mg capsule   No No   Sig: Take 1 capsule (30 mg total) by mouth in the morning.   losartan (COZAAR) 25 mg tablet   No No   Sig: take 1 tablet by mouth once daily   memantine (Namenda) 10 mg tablet   No No   Sig: Take 1 tablet (10 mg total) by mouth 2 (two) times a day   polyethylene glycol (GLYCOLAX) 17 GM/SCOOP powder   No No   Sig: Take 17 g by mouth daily   senna (SENOKOT) 8.6 mg   No No   Sig: Take 1 tablet (8.6 mg total) by mouth daily at bedtime      Facility-Administered Medications Last Administration Doses Remaining   cyanocobalamin injection 1,000 mcg 2/19/2018 10:08 AM           Past Medical History:   Diagnosis Date    Alzheimer's dementia (Formerly [facility])     Aphasia     CHF (congestive heart failure) (Formerly [facility])     Coronary artery disease     Headache, worsening 08/14/2017    Hypercholesteremia     Hypertension     IBS (irritable bowel syndrome)     Mid back pain 03/11/2020    Neck pain 04/03/2023    Other chest pain 08/01/2016    Renal disorder     Rheumatic fever     Skin lesion        Past Surgical History:   Procedure Laterality Date    ANKLE FRACTURE SURGERY      LAST ASSESSED [id number]    HYSTERECTOMY      KIDNEY SURGERY      NEPHRECTOMY      TONSILLECTOMY         Family History   Problem Relation Age of Onset    Hypertension Mother     Asthma Father     Cancer Sister      I have reviewed and agree with the history as documented.    E-Cigarette/Vaping    E-Cigarette Use Never User      E-Cigarette/Vaping Substances     Social History     Tobacco Use    Smoking status: Never    Smokeless tobacco: Never   Vaping Use    Vaping status: Never Used   Substance Use Topics    Alcohol use: No    Drug use: No       Review of Systems   Constitutional:  Negative for appetite change, fatigue and fever.   HENT: Negative.     Respiratory:  Negative.     Cardiovascular: Negative.    Gastrointestinal:  Positive for abdominal pain and constipation. Negative for diarrhea, nausea and vomiting.   Genitourinary:  Positive for flank pain and frequency. Negative for decreased urine volume, dysuria, pelvic pain, vaginal bleeding, vaginal discharge and vaginal pain.   Neurological: Negative.    All other systems reviewed and are negative.      Physical Exam  Physical Exam  Vitals and nursing note reviewed.   Constitutional:       General: She is not in acute distress.     Appearance: Normal appearance. She is not ill-appearing, toxic-appearing or diaphoretic.   HENT:      Head: Normocephalic.      Nose: Nose normal.   Eyes:      Conjunctiva/sclera: Conjunctivae normal.   Cardiovascular:      Rate and Rhythm: Normal rate and regular rhythm.   Pulmonary:      Effort: Pulmonary effort is normal.      Breath sounds: Normal breath sounds. No stridor. No wheezing, rhonchi or rales.   Chest:      Chest wall: No tenderness.   Abdominal:      General: Bowel sounds are normal. There is no distension.      Palpations: Abdomen is soft.      Tenderness: There is abdominal tenderness in the right upper quadrant and right lower quadrant. There is right CVA tenderness.   Musculoskeletal:         General: No swelling or tenderness. Normal range of motion.      Right lower leg: No edema.      Left lower leg: No edema.   Skin:     General: Skin is warm and dry.      Findings: No bruising, erythema or rash.   Neurological:      General: No focal deficit present.      Mental Status: She is alert. Mental status is at baseline.      Sensory: Sensation is intact.         Vital Signs  ED Triage Vitals [09/02/24 1813]   Temperature Pulse Respirations Blood Pressure SpO2   (!) 97.3 °F (36.3 °C) 77 18 153/69 97 %      Temp src Heart Rate Source Patient Position - Orthostatic VS BP Location FiO2 (%)   -- Monitor Lying Right arm --      Pain Score       9           Vitals:    09/02/24 1813  09/02/24 1845 09/02/24 1915 09/02/24 2015   BP: 153/69 135/64 (!) 189/82 168/74   Pulse: 77 68 65 72   Patient Position - Orthostatic VS: Lying Lying Lying Lying         Visual Acuity      ED Medications  Medications   acetaminophen (Ofirmev) injection 1,000 mg (0 mg Intravenous Stopped 9/2/24 1854)   cefTRIAXone (ROCEPHIN) IVPB (premix in dextrose) 1,000 mg 50 mL (0 mg Intravenous Stopped 9/2/24 1930)       Diagnostic Studies  Results Reviewed       Procedure Component Value Units Date/Time    Comprehensive metabolic panel [[id number]]  (Abnormal) Collected: 09/02/24 1828    Lab Status: Final result Specimen: Blood from Arm, Left Updated: 09/02/24 1856     Sodium 141 mmol/L      Potassium 4.8 mmol/L      Chloride 107 mmol/L      CO2 23 mmol/L      ANION GAP 11 mmol/L      BUN 42 mg/dL      Creatinine 1.29 mg/dL      Glucose 135 mg/dL      Calcium 9.4 mg/dL      AST 21 U/L      ALT 9 U/L      Alkaline Phosphatase 66 U/L      Total Protein 7.1 g/dL      Albumin 4.2 g/dL      Total Bilirubin 0.35 mg/dL      eGFR 37 ml/min/1.73sq m     Narrative:      National Kidney Disease Foundation guidelines for Chronic Kidney Disease (CKD):     Stage 1 with normal or high GFR (GFR > 90 mL/min/1.73 square meters)    Stage 2 Mild CKD (GFR = 60-89 mL/min/1.73 square meters)    Stage 3A Moderate CKD (GFR = 45-59 mL/min/1.73 square meters)    Stage 3B Moderate CKD (GFR = 30-44 mL/min/1.73 square meters)    Stage 4 Severe CKD (GFR = 15-29 mL/min/1.73 square meters)    Stage 5 End Stage CKD (GFR <15 mL/min/1.73 square meters)  Note: GFR calculation is accurate only with a steady state creatinine    Lactic acid, plasma (w/reflex if result > 2.0) [[id number]]  (Abnormal) Collected: 09/02/24 1828    Lab Status: Final result Specimen: Blood from Arm, Left Updated: 09/02/24 1856     LACTIC ACID 2.1 mmol/L     Narrative:      Result may be elevated if tourniquet was used during collection.    Urine Microscopic [[id number]]  (Abnormal) Collected:  09/02/24 1833    Lab Status: Final result Specimen: Urine, Clean Catch Updated: 09/02/24 1855     RBC, UA None Seen /hpf      WBC, UA 20-30 /hpf      Epithelial Cells Occasional /hpf      Bacteria, UA Moderate /hpf      OTHER OBSERVATIONS WBCs Clumped     MUCUS THREADS Occasional    Urine culture [[id number]] Collected: 09/02/24 1833    Lab Status: In process Specimen: Urine, Clean Catch Updated: 09/02/24 1855    UA w Reflex to Microscopic w Reflex to Culture [[id number]]  (Abnormal) Collected: 09/02/24 1833    Lab Status: Final result Specimen: Urine, Clean Catch Updated: 09/02/24 1847     Color, UA Yellow     Clarity, UA Cloudy     Specific Gravity, UA 1.015     pH, UA 6.0     Leukocytes, UA Small     Nitrite, UA Positive     Protein, UA Negative mg/dl      Glucose, UA Negative mg/dl      Ketones, UA Negative mg/dl      Urobilinogen, UA 0.2 E.U./dl      Bilirubin, UA Negative     Occult Blood, UA Negative    CBC and differential [[id number]]  (Abnormal) Collected: 09/02/24 1828    Lab Status: Final result Specimen: Blood from Arm, Left Updated: 09/02/24 1834     WBC 5.85 Thousand/uL      RBC 4.10 Million/uL      Hemoglobin 11.5 g/dL      Hematocrit 37.4 %      MCV 91 fL      MCH 28.0 pg      MCHC 30.7 g/dL      RDW 14.7 %      MPV 12.0 fL      Platelets 332 Thousands/uL      nRBC 0 /100 WBCs      Segmented % 66 %      Immature Grans % 0 %      Lymphocytes % 24 %      Monocytes % 8 %      Eosinophils Relative 1 %      Basophils Relative 1 %      Absolute Neutrophils 3.87 Thousands/µL      Absolute Immature Grans 0.02 Thousand/uL      Absolute Lymphocytes 1.39 Thousands/µL      Absolute Monocytes 0.47 Thousand/µL      Eosinophils Absolute 0.06 Thousand/µL      Basophils Absolute 0.04 Thousands/µL                    CT renal stone study abdomen pelvis wo contrast    (Results Pending)              Procedures  Procedures         ED Course  ED Course as of 09/02/24 2103   Mon Sep 02, 2024   1840 WBC: 5.85   1842 Patient  just reported to nursing staff she did fall about 1 month ago and thinks side pain has been there since    1849 Nitrite, UA(!): Positive  UA + for UTI   2047 Patient updated on all results and findings.  Resting comfortably.  CT pending   2058 Signed out to Dr. Grigsby pending CT scan               SBIRT 20yo+      Flowsheet Row Most Recent Value   Initial Alcohol Screen: US AUDIT-C     1. How often do you have a drink containing alcohol? 0 Filed at: 09/02/2024 1839   2. How many drinks containing alcohol do you have on a typical day you are drinking?  0 Filed at: 09/02/2024 1839   3b. FEMALE Any Age, or MALE 65+: How often do you have 4 or more drinks on one occassion? 0 Filed at: 09/02/2024 1839   Audit-C Score 0 Filed at: 09/02/2024 1839   DAVID: How many times in the past year have you...    Used an illegal drug or used a prescription medication for non-medical reasons? Never Filed at: 09/02/2024 1839                      Medical Decision Making  Amount and/or Complexity of Data Reviewed  Labs: ordered. Decision-making details documented in ED Course.  Radiology: ordered.    Risk  Prescription drug management.                 Disposition  Final diagnoses:   None     ED Disposition       None          Follow-up Information    None         Patient's Medications   Discharge Prescriptions    No medications on file       No discharge procedures on file.    PDMP Review         Value Time User    PDMP Reviewed  Yes 3/27/2020 12:20 PM BETH Alan            ED Provider  Electronically Signed by             Ginger Perez PA-C  09/02/24 2101       Ginger Perez PA-C  09/02/24 2103

## 2024-09-03 ENCOUNTER — VBI (OUTPATIENT)
Dept: FAMILY MEDICINE CLINIC | Facility: CLINIC | Age: 84
End: 2024-09-03

## 2024-09-03 ENCOUNTER — TELEPHONE (OUTPATIENT)
Age: 84
End: 2024-09-03

## 2024-09-03 NOTE — ED ATTENDING ATTESTATION
9/2/2024  I, Musa Grigsby DO, saw and evaluated the patient. I have discussed the patient with the resident/non-physician practitioner and agree with the resident's/non-physician practitioner's findings, Plan of Care, and MDM as documented in the resident's/non-physician practitioner's note, except where noted. All available labs and Radiology studies were reviewed.  I was present for key portions of any procedure(s) performed by the resident/non-physician practitioner and I was immediately available to provide assistance.    At this point I agree with the current assessment done in the Emergency Department.  I have conducted an independent evaluation of this patient a history and physical is as follows:    84 year old F. Patient presents with right flank pain. Denies fevers/chills. No known hx of kidney stones. UA is infectious appearing. Renal function is near baseline. CT imaging without acute findings. Incidental findings were discussed. The patient will follow up with her PCP. The patient was prescribed a course of oral abx. Recommendations/return precautions were discussed. Stable for discharge.     ED Course         Critical Care Time  Procedures

## 2024-09-03 NOTE — TELEPHONE ENCOUNTER
Patients Daughter called in and stated that she wants to take her mom off the memantine (Namenda) 10 mg tablet.    Please call Armand back at  and advise.     Thank you.

## 2024-09-03 NOTE — DISCHARGE INSTRUCTIONS
You are being prescribed a course of oral antibiotics for treatment of a urinary tract infection.  Please take as directed.    The radiologist noted a lesion arising from the upper pole of the right kidney.  An outpatient renal ultrasound was recommended.  Follow-up with your primary care provider regarding this.    If you develop worsening pain, nausea, fever/chills, return to the emergency department for reevaluation.

## 2024-09-03 NOTE — ED CARE HANDOFF
Emergency Department Sign Out Note        Sign out and transfer of care from Ginger Perez PA-C. See Separate Emergency Department note.     The patient, Demi Donnelly, was evaluated by the previous provider for flank pain, symptoms of UTI.    Workup Completed:  Labs    ED Course / Workup Pending (followup):  84 year old F. Patient presents with flank pain, symptoms of UTI. UA appears infected. CT imaging is pending. IV Rocephin administered.                                      Procedures  Medical Decision Making  Amount and/or Complexity of Data Reviewed  Labs: ordered.  Radiology: ordered.    Risk  Prescription drug management.            Disposition  Final diagnoses:   None     ED Disposition       None          Follow-up Information    None       Patient's Medications   Discharge Prescriptions    No medications on file     No discharge procedures on file.       ED Provider  Electronically Signed by

## 2024-09-03 NOTE — TELEPHONE ENCOUNTER
09/03/24 2:16 PM    Patient contacted post ED visit, outreach attempt made but message could not be left. Additional outreach attempt will be made.     Thank you.  Chayito Moore MA  [facility]

## 2024-09-04 ENCOUNTER — TELEPHONE (OUTPATIENT)
Age: 84
End: 2024-09-04

## 2024-09-04 ENCOUNTER — PATIENT OUTREACH (OUTPATIENT)
Dept: CASE MANAGEMENT | Facility: OTHER | Age: 84
End: 2024-09-04

## 2024-09-04 DIAGNOSIS — Z71.89 COMPLEX CARE COORDINATION: Primary | ICD-10-CM

## 2024-09-04 LAB
BACTERIA UR CULT: ABNORMAL
BACTERIA UR CULT: ABNORMAL

## 2024-09-04 NOTE — TELEPHONE ENCOUNTER
Joanne physical therapist with [facility] called in regards to wanting to inform provider that patient was in the ED on Monday evening and was diagnosed with a UTI. Joanne stated that patient is taking a antibiotic twice a day for 7 days. Joanne also wanted to inform provider that patient has been complaining about having pain in her left lower leg, red but not warm and patient temperature today was 98.1. Joanne stated that patient is supposed to be getting an MRI of left and still has not happened. Joanne call back number is [phone].

## 2024-09-04 NOTE — PROGRESS NOTES
Contacted patient for f/u post ED visit for R flank pain.  Patient had UTI and was discharged on 7 day course of ceftin.  She states her urine is more clear today and she is drinking more water.  She receives home health services of SN and PT.  She also has caregiver assistance three days per week.  She does weigh herself but not every day.  She denies chest pain but does endorse dyspnea on exertion and LE edema.  She does elevate LE when sitting.  Nutritional intake adequate, she receives meals on wheels.  Her medications are filled in weekly pill box by her caregiver and she takes all as prescribed.  Follow up appointments scheduled.  Her son transports to appointments.  Patient is ambulatory with walker.  She c/o pain in her LE and takes tylenol as needed.  She denies any needs at the present time.

## 2024-09-04 NOTE — TELEPHONE ENCOUNTER
Please write a note and give to patient on Friday--needs written note on what medication is to be stopped. Daughter is requesting you discuss medications with the patient at this visit.

## 2024-09-04 NOTE — TELEPHONE ENCOUNTER
Spoke w/ pts daughter and advised. Pt scheduled with Dr Jerome for Friday 9/6 at 1015. Pt's daughter will call back if pt does not have a ride.

## 2024-09-04 NOTE — TELEPHONE ENCOUNTER
09/04/24 1:42 PM    Patient contacted post ED visit, [facility] department spoke with patient/caregiver and outreach was successful.    Thank you.  Chayito Moore MA  [facility]

## 2024-09-04 NOTE — TELEPHONE ENCOUNTER
Pt daughter returned a call from the office. Provider message relayed regarding discontinuing the memantine.    Pt's daughter would like to know if this is something she would need to be weaned off of or if she can just stop the medication completely.    Pt's daughter would also like to know if pt still needs to be on the amLODIPine (NORVASC) 5 mg tablet.    Pt was seen on 9/2/24 at [facility] Emergency Department. At that time she had a CT scan which pt's daughter stated came out fine - but they did recommend that pt have an ultra-sound on her kidney due to back pain.    Please advise - [phone]

## 2024-09-04 NOTE — TELEPHONE ENCOUNTER
Dr Jerome said to stop taking the memantine but caretaker won't stop until she hears from the office. She is at the patient's home now. [phone]

## 2024-09-06 ENCOUNTER — OFFICE VISIT (OUTPATIENT)
Dept: FAMILY MEDICINE CLINIC | Facility: CLINIC | Age: 84
End: 2024-09-06
Payer: COMMERCIAL

## 2024-09-06 VITALS
DIASTOLIC BLOOD PRESSURE: 78 MMHG | SYSTOLIC BLOOD PRESSURE: 102 MMHG | WEIGHT: 136 LBS | OXYGEN SATURATION: 99 % | HEART RATE: 76 BPM | BODY MASS INDEX: 31.61 KG/M2 | TEMPERATURE: 97.5 F

## 2024-09-06 DIAGNOSIS — Z23 ENCOUNTER FOR IMMUNIZATION: ICD-10-CM

## 2024-09-06 DIAGNOSIS — E53.8 B12 DEFICIENCY: ICD-10-CM

## 2024-09-06 DIAGNOSIS — I25.10 CORONARY ARTERY DISEASE INVOLVING NATIVE HEART, UNSPECIFIED VESSEL OR LESION TYPE, UNSPECIFIED WHETHER ANGINA PRESENT: ICD-10-CM

## 2024-09-06 DIAGNOSIS — I50.32 CHRONIC DIASTOLIC CHF (CONGESTIVE HEART FAILURE) (HCC): ICD-10-CM

## 2024-09-06 DIAGNOSIS — G20.A1 DEMENTIA DUE TO PARKINSON'S DISEASE WITH BEHAVIORAL DISTURBANCE (HCC): ICD-10-CM

## 2024-09-06 DIAGNOSIS — I48.0 PAROXYSMAL ATRIAL FIBRILLATION (HCC): ICD-10-CM

## 2024-09-06 DIAGNOSIS — R10.9 FLANK PAIN: ICD-10-CM

## 2024-09-06 DIAGNOSIS — I10 ESSENTIAL HYPERTENSION: Primary | ICD-10-CM

## 2024-09-06 DIAGNOSIS — I13.0 HYPERTENSIVE HEART AND RENAL DISEASE WITH CONGESTIVE HEART FAILURE (HCC): ICD-10-CM

## 2024-09-06 DIAGNOSIS — N18.32 STAGE 3B CHRONIC KIDNEY DISEASE (HCC): ICD-10-CM

## 2024-09-06 DIAGNOSIS — F02.818 DEMENTIA DUE TO PARKINSON'S DISEASE WITH BEHAVIORAL DISTURBANCE (HCC): ICD-10-CM

## 2024-09-06 PROCEDURE — G0008 ADMIN INFLUENZA VIRUS VAC: HCPCS | Performed by: FAMILY MEDICINE

## 2024-09-06 PROCEDURE — 3078F DIAST BP <80 MM HG: CPT | Performed by: FAMILY MEDICINE

## 2024-09-06 PROCEDURE — 3074F SYST BP LT 130 MM HG: CPT | Performed by: FAMILY MEDICINE

## 2024-09-06 PROCEDURE — 1159F MED LIST DOCD IN RCRD: CPT | Performed by: FAMILY MEDICINE

## 2024-09-06 PROCEDURE — 99214 OFFICE O/P EST MOD 30 MIN: CPT | Performed by: FAMILY MEDICINE

## 2024-09-06 PROCEDURE — 90662 IIV NO PRSV INCREASED AG IM: CPT | Performed by: FAMILY MEDICINE

## 2024-09-06 PROCEDURE — 1160F RVW MEDS BY RX/DR IN RCRD: CPT | Performed by: FAMILY MEDICINE

## 2024-09-06 NOTE — PROGRESS NOTES
Ambulatory Visit  Name: Demi Donnelly      : 1940      MRN: [id number]  Encounter Provider: Kenroy Jerome MD  Encounter Date: 2024   Encounter department: [facility]    Assessment & Plan   1. Essential hypertension  Assessment & Plan:  Discontinue amlodipine.  2. Encounter for immunization  -     influenza vaccine, high-dose, PF 0.5 mL (Fluzone High Dose)  3. Flank pain  -     US kidney and bladder with pvr; Future; Expected date: 2024  4. Chronic diastolic CHF (congestive heart failure) (HCC)  Assessment & Plan:  Wt Readings from Last 3 Encounters:   24 61.7 kg (136 lb)   24 64.8 kg (142 lb 13.7 oz)   24 63.5 kg (140 lb)       Weight is stable.  Continue current.      Orders:  -     Comprehensive metabolic panel; Future; Expected date: 2024  -     TSH, 3rd generation with Free T4 reflex; Future  5. Paroxysmal atrial fibrillation (HCC)  Assessment & Plan:  Rate control and anticoagulate.  Orders:  -     Comprehensive metabolic panel; Future; Expected date: 2024  -     TSH, 3rd generation with Free T4 reflex; Future  6. Coronary artery disease involving native heart, unspecified vessel or lesion type, unspecified whether angina present  -     Lipid panel; Future  -     Comprehensive metabolic panel; Future; Expected date: 2024  -     TSH, 3rd generation with Free T4 reflex; Future  7. B12 deficiency  -     Vitamin B12; Future  8. 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 (HCC)  Assessment & Plan:  Wt Readings from Last 3 Encounters:   24 61.7 kg (136 lb)   24 64.8 kg (142 lb 13.7 oz)   24 63.5 kg (140 lb)         Stable.  Continue current.    9. 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  10. Stage 3b chronic kidney disease (HCC)  Assessment & Plan:  Lab Results   Component Value Date    EGFR 37  09/02/2024    EGFR 49 09/19/2023    EGFR 36 01/16/2023    CREATININE 1.29 09/02/2024    CREATININE 1.04 09/19/2023    CREATININE 1.33 (H) 01/16/2023   Stable.  Continue current.       History of Present Illness     She was seen in the ED on 9/2 for right flank pain.  A CT showed a non-obstructing renal calculi and an exophytic mass.  A renal u/s was recommended.    She is experiencing low blood pressure, especially after taking amlodipine.    She would like to discontinue Namenda.    She is concerned about skin loss on her fingers.        Review of Systems   All other systems reviewed and are negative.      Objective     /78 (BP Location: Left arm, Patient Position: Sitting)   Pulse 76   Temp 97.5 °F (36.4 °C) (Tympanic)   Wt 61.7 kg (136 lb)   SpO2 99%   BMI 31.61 kg/m²     Physical Exam  Vitals and nursing note reviewed.   Constitutional:       Appearance: Normal appearance. She is obese.   Cardiovascular:      Rate and Rhythm: Normal rate and regular rhythm.      Pulses: Normal pulses.      Heart sounds: Normal heart sounds.   Pulmonary:      Effort: Pulmonary effort is normal.      Breath sounds: Normal breath sounds.   Abdominal:      General: Abdomen is flat. Bowel sounds are normal.      Palpations: Abdomen is soft.   Musculoskeletal:         General: Normal range of motion.      Cervical back: Normal range of motion and neck supple.   Skin:     General: Skin is warm and dry.      Capillary Refill: Capillary refill takes less than 2 seconds.   Neurological:      General: No focal deficit present.      Mental Status: She is alert and oriented to person, place, and time. Mental status is at baseline.   Psychiatric:         Mood and Affect: Mood normal.         Behavior: Behavior normal.         Thought Content: Thought content normal.         Judgment: Judgment normal.       Administrative Statements

## 2024-09-06 NOTE — ASSESSMENT & PLAN NOTE
Lab Results   Component Value Date    EGFR 37 09/02/2024    EGFR 49 09/19/2023    EGFR 36 01/16/2023    CREATININE 1.29 09/02/2024    CREATININE 1.04 09/19/2023    CREATININE 1.33 (H) 01/16/2023   Stable.  Continue current.

## 2024-09-06 NOTE — ASSESSMENT & PLAN NOTE
Wt Readings from Last 3 Encounters:   09/06/24 61.7 kg (136 lb)   09/02/24 64.8 kg (142 lb 13.7 oz)   07/25/24 63.5 kg (140 lb)       Weight is stable.  Continue current.

## 2024-09-06 NOTE — LETTER
September 6, 2024      To Whom It May Concern,     I recommend that Demi Donnelly (1940) discontinue memantine 10 mg.  She does not need to taper this medication.    I recommend she discontinue amlodipine 5 mg.    I am leaving the [facility] practice and will be in [place] full time starting September 9, 2024.  She can follow up at either office.      Isrrael Jerome MD

## 2024-09-06 NOTE — ASSESSMENT & PLAN NOTE
Wt Readings from Last 3 Encounters:   09/06/24 61.7 kg (136 lb)   09/02/24 64.8 kg (142 lb 13.7 oz)   07/25/24 63.5 kg (140 lb)         Stable.  Continue current.

## 2024-09-10 DIAGNOSIS — E55.9 VITAMIN D DEFICIENCY: ICD-10-CM

## 2024-09-10 DIAGNOSIS — I50.32 CHRONIC DIASTOLIC (CONGESTIVE) HEART FAILURE (HCC): ICD-10-CM

## 2024-09-10 RX ORDER — CHOLECALCIFEROL (VITAMIN D3) 25 MCG
TABLET ORAL
Qty: 90 TABLET | Refills: 1 | Status: SHIPPED | OUTPATIENT
Start: 2024-09-10

## 2024-09-10 RX ORDER — LOSARTAN POTASSIUM 25 MG/1
TABLET ORAL
Qty: 90 TABLET | Refills: 1 | Status: SHIPPED | OUTPATIENT
Start: 2024-09-10

## 2024-09-12 LAB
DME PARACHUTE DELIVERY DATE ACTUAL: NORMAL
DME PARACHUTE DELIVERY DATE ACTUAL: NORMAL
DME PARACHUTE DELIVERY DATE REQUESTED: NORMAL
DME PARACHUTE DELIVERY DATE REQUESTED: NORMAL
DME PARACHUTE ITEM DESCRIPTION: NORMAL
DME PARACHUTE ORDER STATUS: NORMAL
DME PARACHUTE ORDER STATUS: NORMAL
DME PARACHUTE SUPPLIER NAME: NORMAL
DME PARACHUTE SUPPLIER NAME: NORMAL
DME PARACHUTE SUPPLIER PHONE: NORMAL
DME PARACHUTE SUPPLIER PHONE: NORMAL

## 2024-09-14 ENCOUNTER — APPOINTMENT (EMERGENCY)
Dept: RADIOLOGY | Facility: HOSPITAL | Age: 84
DRG: 565 | End: 2024-09-14
Payer: COMMERCIAL

## 2024-09-14 ENCOUNTER — APPOINTMENT (EMERGENCY)
Dept: CT IMAGING | Facility: HOSPITAL | Age: 84
DRG: 565 | End: 2024-09-14
Payer: COMMERCIAL

## 2024-09-14 ENCOUNTER — HOSPITAL ENCOUNTER (INPATIENT)
Facility: HOSPITAL | Age: 84
LOS: 3 days | Discharge: NURSING, REHAB OR LONG-TERM CARE | DRG: 565 | End: 2024-09-17
Attending: EMERGENCY MEDICINE | Admitting: INTERNAL MEDICINE
Payer: COMMERCIAL

## 2024-09-14 DIAGNOSIS — R26.2 AMBULATORY DYSFUNCTION: ICD-10-CM

## 2024-09-14 DIAGNOSIS — M51.36 LUMBAR DEGENERATIVE DISC DISEASE: Primary | ICD-10-CM

## 2024-09-14 DIAGNOSIS — S39.012A STRAIN OF LUMBAR REGION, INITIAL ENCOUNTER: ICD-10-CM

## 2024-09-14 LAB
25(OH)D3 SERPL-MCNC: 40.3 NG/ML (ref 30–100)
2HR DELTA HS TROPONIN: 30 NG/L
4HR DELTA HS TROPONIN: 38 NG/L
ABO GROUP BLD: NORMAL
ABO GROUP BLD: NORMAL
ALBUMIN SERPL BCG-MCNC: 3.9 G/DL (ref 3.5–5)
ALP SERPL-CCNC: 70 U/L (ref 34–104)
ALT SERPL W P-5'-P-CCNC: 12 U/L (ref 7–52)
ANION GAP SERPL CALCULATED.3IONS-SCNC: 8 MMOL/L (ref 4–13)
AST SERPL W P-5'-P-CCNC: 19 U/L (ref 13–39)
BACTERIA UR QL AUTO: NORMAL /HPF
BASOPHILS # BLD AUTO: 0.02 THOUSANDS/ΜL (ref 0–0.1)
BASOPHILS NFR BLD AUTO: 0 % (ref 0–1)
BILIRUB SERPL-MCNC: 0.51 MG/DL (ref 0.2–1)
BILIRUB UR QL STRIP: NEGATIVE
BLD GP AB SCN SERPL QL: NEGATIVE
BUN SERPL-MCNC: 35 MG/DL (ref 5–25)
CALCIUM SERPL-MCNC: 9.4 MG/DL (ref 8.4–10.2)
CARDIAC TROPONIN I PNL SERPL HS: 35 NG/L
CARDIAC TROPONIN I PNL SERPL HS: 65 NG/L
CARDIAC TROPONIN I PNL SERPL HS: 73 NG/L
CHLORIDE SERPL-SCNC: 108 MMOL/L (ref 96–108)
CK SERPL-CCNC: 387 U/L (ref 26–192)
CLARITY UR: CLEAR
CO2 SERPL-SCNC: 27 MMOL/L (ref 21–32)
COLOR UR: YELLOW
CREAT SERPL-MCNC: 1.17 MG/DL (ref 0.6–1.3)
EOSINOPHIL # BLD AUTO: 0.02 THOUSAND/ΜL (ref 0–0.61)
EOSINOPHIL NFR BLD AUTO: 0 % (ref 0–6)
ERYTHROCYTE [DISTWIDTH] IN BLOOD BY AUTOMATED COUNT: 14.5 % (ref 11.6–15.1)
FOLATE SERPL-MCNC: 14.5 NG/ML
GFR SERPL CREATININE-BSD FRML MDRD: 42 ML/MIN/1.73SQ M
GLUCOSE SERPL-MCNC: 101 MG/DL (ref 65–140)
GLUCOSE UR STRIP-MCNC: NEGATIVE MG/DL
HCT VFR BLD AUTO: 37.6 % (ref 34.8–46.1)
HGB BLD-MCNC: 11.6 G/DL (ref 11.5–15.4)
HGB UR QL STRIP.AUTO: ABNORMAL
IMM GRANULOCYTES # BLD AUTO: 0.03 THOUSAND/UL (ref 0–0.2)
IMM GRANULOCYTES NFR BLD AUTO: 0 % (ref 0–2)
KETONES UR STRIP-MCNC: NEGATIVE MG/DL
LEUKOCYTE ESTERASE UR QL STRIP: NEGATIVE
LYMPHOCYTES # BLD AUTO: 1.1 THOUSANDS/ΜL (ref 0.6–4.47)
LYMPHOCYTES NFR BLD AUTO: 11 % (ref 14–44)
MAGNESIUM SERPL-MCNC: 2.3 MG/DL (ref 1.9–2.7)
MCH RBC QN AUTO: 28.1 PG (ref 26.8–34.3)
MCHC RBC AUTO-ENTMCNC: 30.9 G/DL (ref 31.4–37.4)
MCV RBC AUTO: 91 FL (ref 82–98)
MONOCYTES # BLD AUTO: 0.77 THOUSAND/ΜL (ref 0.17–1.22)
MONOCYTES NFR BLD AUTO: 7 % (ref 4–12)
NEUTROPHILS # BLD AUTO: 8.52 THOUSANDS/ΜL (ref 1.85–7.62)
NEUTS SEG NFR BLD AUTO: 82 % (ref 43–75)
NITRITE UR QL STRIP: NEGATIVE
NON-SQ EPI CELLS URNS QL MICRO: NORMAL /HPF
NRBC BLD AUTO-RTO: 0 /100 WBCS
PH UR STRIP.AUTO: 6 [PH]
PLATELET # BLD AUTO: 268 THOUSANDS/UL (ref 149–390)
PMV BLD AUTO: 11.6 FL (ref 8.9–12.7)
POTASSIUM SERPL-SCNC: 4.3 MMOL/L (ref 3.5–5.3)
PROCALCITONIN SERPL-MCNC: 0.08 NG/ML
PROT SERPL-MCNC: 6.5 G/DL (ref 6.4–8.4)
PROT UR STRIP-MCNC: ABNORMAL MG/DL
RBC # BLD AUTO: 4.13 MILLION/UL (ref 3.81–5.12)
RBC #/AREA URNS AUTO: NORMAL /HPF
RH BLD: POSITIVE
RH BLD: POSITIVE
SODIUM SERPL-SCNC: 143 MMOL/L (ref 135–147)
SP GR UR STRIP.AUTO: 1.01 (ref 1–1.03)
SPECIMEN EXPIRATION DATE: NORMAL
TSH SERPL DL<=0.05 MIU/L-ACNC: 0.97 UIU/ML (ref 0.45–4.5)
UROBILINOGEN UR QL STRIP.AUTO: 0.2 E.U./DL
VIT B12 SERPL-MCNC: 2495 PG/ML (ref 180–914)
WBC # BLD AUTO: 10.46 THOUSAND/UL (ref 4.31–10.16)
WBC #/AREA URNS AUTO: NORMAL /HPF

## 2024-09-14 PROCEDURE — 74176 CT ABD & PELVIS W/O CONTRAST: CPT

## 2024-09-14 PROCEDURE — 82607 VITAMIN B-12: CPT

## 2024-09-14 PROCEDURE — 99285 EMERGENCY DEPT VISIT HI MDM: CPT

## 2024-09-14 PROCEDURE — 84443 ASSAY THYROID STIM HORMONE: CPT

## 2024-09-14 PROCEDURE — 73502 X-RAY EXAM HIP UNI 2-3 VIEWS: CPT

## 2024-09-14 PROCEDURE — 86901 BLOOD TYPING SEROLOGIC RH(D): CPT | Performed by: EMERGENCY MEDICINE

## 2024-09-14 PROCEDURE — 86900 BLOOD TYPING SEROLOGIC ABO: CPT | Performed by: EMERGENCY MEDICINE

## 2024-09-14 PROCEDURE — 93005 ELECTROCARDIOGRAM TRACING: CPT

## 2024-09-14 PROCEDURE — 82550 ASSAY OF CK (CPK): CPT | Performed by: EMERGENCY MEDICINE

## 2024-09-14 PROCEDURE — 71250 CT THORAX DX C-: CPT

## 2024-09-14 PROCEDURE — 82746 ASSAY OF FOLIC ACID SERUM: CPT

## 2024-09-14 PROCEDURE — 99223 1ST HOSP IP/OBS HIGH 75: CPT | Performed by: INTERNAL MEDICINE

## 2024-09-14 PROCEDURE — 72125 CT NECK SPINE W/O DYE: CPT

## 2024-09-14 PROCEDURE — 84484 ASSAY OF TROPONIN QUANT: CPT

## 2024-09-14 PROCEDURE — 99285 EMERGENCY DEPT VISIT HI MDM: CPT | Performed by: EMERGENCY MEDICINE

## 2024-09-14 PROCEDURE — 36415 COLL VENOUS BLD VENIPUNCTURE: CPT | Performed by: EMERGENCY MEDICINE

## 2024-09-14 PROCEDURE — 86850 RBC ANTIBODY SCREEN: CPT | Performed by: EMERGENCY MEDICINE

## 2024-09-14 PROCEDURE — 81001 URINALYSIS AUTO W/SCOPE: CPT | Performed by: EMERGENCY MEDICINE

## 2024-09-14 PROCEDURE — 84145 PROCALCITONIN (PCT): CPT

## 2024-09-14 PROCEDURE — 83735 ASSAY OF MAGNESIUM: CPT

## 2024-09-14 PROCEDURE — 84484 ASSAY OF TROPONIN QUANT: CPT | Performed by: EMERGENCY MEDICINE

## 2024-09-14 PROCEDURE — 80053 COMPREHEN METABOLIC PANEL: CPT | Performed by: EMERGENCY MEDICINE

## 2024-09-14 PROCEDURE — 70450 CT HEAD/BRAIN W/O DYE: CPT

## 2024-09-14 PROCEDURE — 82306 VITAMIN D 25 HYDROXY: CPT

## 2024-09-14 PROCEDURE — 85025 COMPLETE CBC W/AUTO DIFF WBC: CPT | Performed by: EMERGENCY MEDICINE

## 2024-09-14 RX ORDER — EZETIMIBE 10 MG/1
10 TABLET ORAL DAILY
Status: DISCONTINUED | OUTPATIENT
Start: 2024-09-14 | End: 2024-09-17 | Stop reason: HOSPADM

## 2024-09-14 RX ORDER — LOSARTAN POTASSIUM 25 MG/1
25 TABLET ORAL DAILY
Status: DISCONTINUED | OUTPATIENT
Start: 2024-09-15 | End: 2024-09-17 | Stop reason: HOSPADM

## 2024-09-14 RX ORDER — FUROSEMIDE 20 MG
20 TABLET ORAL 2 TIMES DAILY
Status: DISCONTINUED | OUTPATIENT
Start: 2024-09-14 | End: 2024-09-17 | Stop reason: HOSPADM

## 2024-09-14 RX ORDER — SENNOSIDES 8.6 MG
8.6 TABLET ORAL
Status: DISCONTINUED | OUTPATIENT
Start: 2024-09-14 | End: 2024-09-17 | Stop reason: HOSPADM

## 2024-09-14 RX ORDER — SODIUM CHLORIDE, SODIUM GLUCONATE, SODIUM ACETATE, POTASSIUM CHLORIDE, MAGNESIUM CHLORIDE, SODIUM PHOSPHATE, DIBASIC, AND POTASSIUM PHOSPHATE .53; .5; .37; .037; .03; .012; .00082 G/100ML; G/100ML; G/100ML; G/100ML; G/100ML; G/100ML; G/100ML
100 INJECTION, SOLUTION INTRAVENOUS CONTINUOUS
Status: DISCONTINUED | OUTPATIENT
Start: 2024-09-14 | End: 2024-09-16

## 2024-09-14 RX ORDER — ACETAMINOPHEN 325 MG/1
650 TABLET ORAL EVERY 6 HOURS PRN
Status: DISCONTINUED | OUTPATIENT
Start: 2024-09-14 | End: 2024-09-17 | Stop reason: HOSPADM

## 2024-09-14 RX ORDER — PANTOPRAZOLE SODIUM 40 MG/1
40 TABLET, DELAYED RELEASE ORAL
Status: DISCONTINUED | OUTPATIENT
Start: 2024-09-15 | End: 2024-09-17 | Stop reason: HOSPADM

## 2024-09-14 RX ADMIN — FUROSEMIDE 20 MG: 20 TABLET ORAL at 17:03

## 2024-09-14 RX ADMIN — EZETIMIBE 10 MG: 10 TABLET ORAL at 15:52

## 2024-09-14 RX ADMIN — ACETAMINOPHEN 325MG 650 MG: 325 TABLET ORAL at 15:52

## 2024-09-14 RX ADMIN — Medication 1000 UNITS: at 15:52

## 2024-09-14 RX ADMIN — APIXABAN 5 MG: 5 TABLET, FILM COATED ORAL at 17:03

## 2024-09-14 RX ADMIN — ACETAMINOPHEN 325MG 650 MG: 325 TABLET ORAL at 23:07

## 2024-09-14 RX ADMIN — SODIUM CHLORIDE, SODIUM GLUCONATE, SODIUM ACETATE, POTASSIUM CHLORIDE, MAGNESIUM CHLORIDE, SODIUM PHOSPHATE, DIBASIC, AND POTASSIUM PHOSPHATE 100 ML/HR: .53; .5; .37; .037; .03; .012; .00082 INJECTION, SOLUTION INTRAVENOUS at 15:53

## 2024-09-14 NOTE — H&P
H&P - Hospitalist   Name: Demi Donnelly 84 y.o. female I MRN: [id number]  Unit/Bed#: -01 I Date of Admission: 9/14/2024   Date of Service: 9/14/2024 I Hospital Day: 0     Assessment & Plan  Ambulatory dysfunction  Presented to ED with falls at home. Was found on the ground at home. Complaining of low back pain. Has history of dementia.   Currently lives alone - family went over and found patient on the floor today when she didn't answer her phone  No evidence of infection at this time  Trauma workup negative  CK mildly elevated at 387 - start on gentle ivf  Mild leukocytosis, likely reactive secondary to fall. 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 Patient lives at home alone with frequent falls. On namenda outpatient, previously stated that she wanted to stop this medication with her PCP and appears to have been discontinued with PCP. Per son, mentation waxes and wanes very frequently.   Will hold on namenda and monitor mentation - verify meds with family.  Frequent reorientation  Delirium precautions  Essential hypertension  History of htn, previously on lasix 20 mg bid, losartan 25 mg qd, norvasc 5 mg qd. Patient recently stopped norvasc 5 mg as she noticed that her BP was dropped at home because of this  BP elevated in the ED initially  Orthostatics ordered - will hold norvasc here  Avoid hypotension  GERD without esophagitis  Continue PPI  A-fib (HCC)  History of a fib currently on eliquis 5 mg bid. Does not appear to be on any rate controlling medications.   Continue to monitor HR  Stage 3b chronic kidney disease (HCC)  Lab Results   Component Value Date    EGFR 42 09/14/2024    EGFR 37 09/02/2024    EGFR 49 09/19/2023    CREATININE 1.17 09/14/2024    CREATININE 1.29 09/02/2024    CREATININE 1.04 09/19/2023     History  "of ckd, baseline creatinine around 1.1, currently around baseline  Does have history of solitary kidney - pending outpatient nephrology follow up  Avoid nephrotoxic medications, nsaids, hypotension    VTE Pharmacologic Prophylaxis: VTE Score: 5 High Risk (Score >/= 5) - Pharmacological DVT Prophylaxis Ordered: apixaban (Eliquis). Sequential Compression Devices Ordered.  Code Status: Level 1 - Full Code   Discussion with family: Updated  (son) via phone. Attempted to update patient's daughter over the phone, LVM.    Anticipated Length of Stay: Patient will be admitted on an inpatient basis with an anticipated length of stay of greater than 2 midnights secondary to ambulatory dysfunction, pt/ot eval.    History of Present Illness   Chief Complaint: matthew Donnelly is a 84 y.o. female with a PMH of dementia, recent uti, ckd, htn, chf who presents with fall that occurred at home today. Patient had a fall at home today that was unwitnessed per family. Patient lives at home alone. Family was attempting to call her and was unable to get into contact with her. When family got to patient's house, found patient on the floor. Patient not the best historian due to dementia however denies loss of consciousness and hitting her head. Does state that she has right hip and right lower back pain. No nausea, vomiting, diarrhea, constipation, abdominal pain, CP, SOB, fever, chills. States that she recently had UTI and was treated for this, completed course of abx, no problems since then.  When asking patient what happened today, she said \"my daughter in law fell, not me, I was waiting for my mammogram\".     Review of Systems   Constitutional:  Negative for activity change, appetite change, chills, diaphoresis, fatigue, fever and unexpected weight change.   HENT: Negative.     Eyes: Negative.    Respiratory: Negative.     Cardiovascular: Negative.    Gastrointestinal: Negative.    Endocrine: Negative.  " "  Genitourinary: Negative.    Musculoskeletal:  Positive for arthralgias, back pain, gait problem and myalgias. Negative for joint swelling, neck pain and neck stiffness.   Skin: Negative.    Allergic/Immunologic: Negative.    Neurological:  Positive for weakness. Negative for dizziness, syncope, facial asymmetry, light-headedness and headaches.   Psychiatric/Behavioral:  Positive for confusion (history of dementia). Negative for agitation and behavioral problems. The patient is not nervous/anxious.        I have reviewed the patient's PMH, PSH, Social History, Family History, Meds, and Allergies  Social History:  Marital Status:    Occupation:   Patient Pre-hospital Living Situation: Home  Patient Pre-hospital Level of Mobility: unable to be assessed at time of evaluation  Patient Pre-hospital Diet Restrictions:     Objective     Vitals:   Blood Pressure: 156/72 (09/14/24 1424)  Pulse: 74 (09/14/24 1424)  Temperature: 97.6 °F (36.4 °C) (09/14/24 1424)  Temp Source: Temporal (09/14/24 1424)  Respirations: 18 (09/14/24 1424)  Height: 4' 7\" (139.7 cm) (09/14/24 1424)  Weight - Scale: 64 kg (141 lb 1.5 oz) (09/14/24 1424)  SpO2: 96 % (09/14/24 1424)    Physical Exam  Vitals reviewed.   Constitutional:       General: She is not in acute distress.     Appearance: She is not ill-appearing or toxic-appearing.      Comments: Pleasantly confused   HENT:      Head: Normocephalic and atraumatic.      Mouth/Throat:      Mouth: Mucous membranes are moist.   Cardiovascular:      Rate and Rhythm: Normal rate and regular rhythm.      Heart sounds: No murmur heard.  Pulmonary:      Effort: No respiratory distress.      Breath sounds: No stridor. No wheezing.   Abdominal:      General: Bowel sounds are normal. There is no distension.      Palpations: Abdomen is soft. There is no mass.      Tenderness: There is no abdominal tenderness.   Musculoskeletal:         General: Tenderness (minimally tender to the right hip) present. "      Right lower leg: No edema.      Left lower leg: No edema.   Skin:     General: Skin is warm and dry.   Neurological:      Mental Status: She is alert and oriented to person, place, and time.      Comments: Answering all orientation questions appropriately currently, delayed to answer however when asking what happened today, patient does not know. Stating that her daughter in law fell and she didn't fall. She was waiting for her mammogram.    Psychiatric:         Mood and Affect: Mood normal.         Behavior: Behavior normal.         Lines/Drains:  Lines/Drains/Airways       Active Status       None                        Additional Data:   Lab Results: I have reviewed the following results: CBC/BMP:   .     09/14/24  1202   WBC 10.46*   HGB 11.6   HCT 37.6      SODIUM 143   K 4.3      CO2 27   BUN 35*   CREATININE 1.17   GLUC 101    , LFTs:   .     09/14/24  1202   AST 19   ALT 12   ALB 3.9   TBILI 0.51   ALKPHOS 70    , Troponin,BNP:  .     09/14/24  1202 09/14/24  1437   HSTNI0 35  --    HSTNI2  --  65*    , Urinalysis:   Lab Results   Component Value Date    COLORU Yellow 09/14/2024    CLARITYU Clear 09/14/2024    SPECGRAV 1.015 09/14/2024    PHUR 6.0 09/14/2024    LEUKOCYTESUR Negative 09/14/2024    NITRITE Negative 09/14/2024    GLUCOSEU Negative 09/14/2024    KETONESU Negative 09/14/2024    BILIRUBINUR Negative 09/14/2024    BLOODU Trace-Intact (A) 09/14/2024     Results from last 7 days   Lab Units 09/14/24  1202   WBC Thousand/uL 10.46*   HEMOGLOBIN g/dL 11.6   HEMATOCRIT % 37.6   PLATELETS Thousands/uL 268   SEGS PCT % 82*   LYMPHO PCT % 11*   MONO PCT % 7   EOS PCT % 0     Results from last 7 days   Lab Units 09/14/24  1202   SODIUM mmol/L 143   POTASSIUM mmol/L 4.3   CHLORIDE mmol/L 108   CO2 mmol/L 27   BUN mg/dL 35*   CREATININE mg/dL 1.17   ANION GAP mmol/L 8   CALCIUM mg/dL 9.4   ALBUMIN g/dL 3.9   TOTAL BILIRUBIN mg/dL 0.51   ALK PHOS U/L 70   ALT U/L 12   AST U/L 19   GLUCOSE  RANDOM mg/dL 101             Lab Results   Component Value Date    HGBA1C 5.7 (H) 01/16/2023    HGBA1C 6.5 (H) 06/01/2022    HGBA1C 6.5 06/01/2022     Results from last 7 days   Lab Units 09/14/24  1202   PROCALCITONIN ng/ml 0.08       Imaging Review: Reviewed radiology reports from this admission including: CT head, CT cervical spine, CT CAP, CT thoracolumbar.  Other Studies: EKG was reviewed.  EKG was personally reviewed and my interpretation is: Sinus rhythm with PACs..    Administrative Statements   I have spent a total time of 68 minutes in caring for this patient on the day of the visit/encounter including Patient and family education, Counseling / Coordination of care, Documenting in the medical record, Reviewing / ordering tests, medicine, procedures  , and Obtaining or reviewing history  .    ** Please Note: This note has been constructed using a voice recognition system. **

## 2024-09-14 NOTE — PROGRESS NOTES
Awaiting return call from family regarding home medication list. Message left for family to return call for same. Patient unable to state home medications.

## 2024-09-14 NOTE — ED PROVIDER NOTES
Emergency Department Trauma Note  Demi Donnelly 84 y.o. female MRN: [id number]  Unit/Bed#: ED 07/ED 07 Encounter: [id number]      Trauma Alert: Trauma Acuity: Trauma Evaluation  Model of Arrival: Mode of Arrival: BLS via    Trauma Team: Current Providers  Attending Provider: Norm Fraga MD  Attending Provider: Monica Cerna MD  Registered Nurse: Jackeline Velasco RN  Registered Nurse: Annette Ugalde RN  Advanced Practitioner: Annette Pham PA-C  Consultants:     None      History of Present Illness     Chief Complaint:   Chief Complaint   Patient presents with    Trauma     Pt has unwitnessed fall at home per family. + BT, - loc, - HS. Pt complaining fo back and R leg pain. Pt recently had UTI with burning and frequency      HPI:  Demi Donnelly is a 84 y.o. female who presents with fall.  Mechanism:Details of Incident: unwittnessed fall Injury Date: 09/14/24 Injury Time: 0900      Patient found on the ground.  From home.  Unwitnessed fall.  On Eliquis secondary atrial fibrillation.  Complaining of low back pain.  Patient not a great historian.  No complaints of chest pain shortness of breath.  Does complain of right low back pain.  This complaint of right hip pain.  No nausea or vomiting.  No neck pain.  Was recently at this ED 12 days ago for UTI and sent home on antibiotics.      History provided by:  Patient   used: No    Fall  Injury location: Right lower back.  Incident location:  Home  Time since incident: Unwitnessed fall.  Unsure what time.  Arrived directly from scene: yes    Protective equipment: none    Suspicion of alcohol use: no    Suspicion of drug use: no    Prior to arrival data:     Bystander interventions:  None    Blood loss:  None    Responsiveness at scene:  Alert    Airway interventions:  None    Breathing interventions:  None    IV access status:  None    IO access:  None    Fluids administered:  None    Cardiac interventions:  None    Medications  administered:  None    Immobilization:  None    Airway condition since incident:  Stable    Breathing condition since incident:  Stable    Circulation condition since incident:  Stable    Mental status condition since incident:  Stable    Disability condition since incident:  Stable  Associated symptoms: back pain    Associated symptoms: no abdominal pain, no chest pain, no headaches, no hearing loss, no nausea, no neck pain, no seizures and no vomiting      Review of Systems   Constitutional:  Negative for chills and fever.   HENT:  Negative for ear pain, hearing loss, sore throat, trouble swallowing and voice change.    Eyes:  Negative for pain and discharge.   Respiratory:  Negative for cough, shortness of breath and wheezing.    Cardiovascular:  Negative for chest pain and palpitations.   Gastrointestinal:  Negative for abdominal pain, blood in stool, constipation, diarrhea, nausea and vomiting.   Genitourinary:  Negative for dysuria, flank pain, frequency and hematuria.   Musculoskeletal:  Positive for arthralgias and back pain. Negative for joint swelling, neck pain and neck stiffness.   Skin:  Negative for rash and wound.   Neurological:  Negative for dizziness, seizures, syncope, facial asymmetry and headaches.   Psychiatric/Behavioral:  Negative for hallucinations, self-injury and suicidal ideas.    All other systems reviewed and are negative.      Historical Information     Immunizations:   Immunization History   Administered Date(s) Administered    COVID-19 MODERNA VACC 0.5 ML IM 03/16/2021, 04/13/2021, 03/08/2022    Influenza Split High Dose Preservative Free IM 09/06/2024    Influenza, high dose seasonal 0.7 mL 10/09/2018, 10/22/2019, 10/25/2021, 10/11/2022    Pneumococcal Conjugate 13-Valent 10/09/2018    Pneumococcal Polysaccharide PPV23 11/14/2016       Past Medical History:   Diagnosis Date    Alzheimer's dementia (HCC)     Aphasia     CHF (congestive heart failure) (HCC)     Coronary artery disease      Headache, worsening 08/14/2017    Hypercholesteremia     Hypertension     IBS (irritable bowel syndrome)     Mid back pain 03/11/2020    Neck pain 04/03/2023    Other chest pain 08/01/2016    Renal disorder     Rheumatic fever     Skin lesion        Family History   Problem Relation Age of Onset    Hypertension Mother     Asthma Father     Cancer Sister      Past Surgical History:   Procedure Laterality Date    ANKLE FRACTURE SURGERY      LAST ASSESSED 14MAR2016    HYSTERECTOMY      KIDNEY SURGERY      NEPHRECTOMY      TONSILLECTOMY       Social History     Tobacco Use    Smoking status: Never    Smokeless tobacco: Never   Vaping Use    Vaping status: Never Used   Substance Use Topics    Alcohol use: No    Drug use: No     E-Cigarette/Vaping    E-Cigarette Use Never User      E-Cigarette/Vaping Substances       Family History: non-contributory    Meds/Allergies   Prior to Admission Medications   Prescriptions Last Dose Informant Patient Reported? Taking?   Eliquis 5 MG   No No   Sig: take 1 tablet by mouth twice a day   acetaminophen (TYLENOL) 650 mg CR tablet   No No   Sig: Take 1 tablet (650 mg total) by mouth every 8 (eight) hours as needed for mild pain or moderate pain   amLODIPine (NORVASC) 5 mg tablet   Yes No   Sig: Take 5 mg by mouth daily   bisacodyl 5 MG EC tablet   No No   Sig: take 1 tablet by mouth once daily if needed for constipation   cholecalciferol (VITAMIN D3) 1,000 units tablet   No No   Sig: take 1 tablet by mouth once daily   ezetimibe (ZETIA) 10 mg tablet   No No   Sig: take 1 tablet by mouth once daily   furosemide (LASIX) 20 mg tablet   No No   Sig: take 1 tablet by mouth twice a day   isosorbide mononitrate (IMDUR) 60 mg 24 hr tablet   No No   Sig: take 1 tablet by mouth once daily   lansoprazole (PREVACID) 30 mg capsule   No No   Sig: Take 1 capsule (30 mg total) by mouth in the morning.   losartan (COZAAR) 25 mg tablet   No No   Sig: take 1 tablet by mouth once daily   memantine  (Namenda) 10 mg tablet   No No   Sig: Take 1 tablet (10 mg total) by mouth 2 (two) times a day   Patient not taking: Reported on 9/6/2024   polyethylene glycol (GLYCOLAX) 17 GM/SCOOP powder   No No   Sig: Take 17 g by mouth daily   senna (SENOKOT) 8.6 mg   No No   Sig: Take 1 tablet (8.6 mg total) by mouth daily at bedtime      Facility-Administered Medications Last Administration Doses Remaining   cyanocobalamin injection 1,000 mcg 2/19/2018 10:08 AM           Allergies   Allergen Reactions    Amoxicillin Hives    Ciprofloxacin Hives    Erythromycin Hives    Statins Other (See Comments)     unknown    Vancomycin Itching    Amoxicillin-Pot Clavulanate Other (See Comments)     pt does not remember    Clindamycin Other (See Comments)     PT DOES NOT RECALL REACTION         PHYSICAL EXAM    PE limited by: Dementia    Objective   Vitals:   First set: Temperature: 97.5 °F (36.4 °C) (09/14/24 1155)  Pulse: 74 (09/14/24 1155)  Respirations: 18 (09/14/24 1155)  Blood Pressure: (!) 178/75 (09/14/24 1155)  SpO2: 100 % (09/14/24 1155)    Primary Survey:   (A) Airway: Intact  (B) Breathing: Spontaneous  (C) Circulation: Pulses:   normal  (D) Disabliity:  GCS Total:  15  (E) Expose:  Completed    Secondary Survey: (Click on Physical Exam tab above)  Physical Exam  Vitals and nursing note reviewed.   Constitutional:       General: She is not in acute distress.     Appearance: She is well-developed.   HENT:      Head: Normocephalic and atraumatic.      Right Ear: External ear normal.      Left Ear: External ear normal.   Eyes:      General: No scleral icterus.        Right eye: No discharge.         Left eye: No discharge.      Extraocular Movements: Extraocular movements intact.      Conjunctiva/sclera: Conjunctivae normal.   Cardiovascular:      Rate and Rhythm: Normal rate and regular rhythm.      Heart sounds: Normal heart sounds. No murmur heard.  Pulmonary:      Effort: Pulmonary effort is normal.      Breath sounds: Normal  breath sounds. No wheezing or rales.   Abdominal:      General: Bowel sounds are normal. There is no distension.      Palpations: Abdomen is soft.      Tenderness: There is no abdominal tenderness. There is no guarding or rebound.   Musculoskeletal:         General: No deformity.      Cervical back: Normal range of motion and neck supple.      Comments: Tender along the right lower lumbar and right buttocks region.   Skin:     General: Skin is warm and dry.      Findings: No rash.   Neurological:      General: No focal deficit present.      Mental Status: She is alert.      Cranial Nerves: No cranial nerve deficit.      Comments: Alert and awake.   Psychiatric:         Mood and Affect: Mood normal.         Behavior: Behavior normal.         Thought Content: Thought content normal.         Judgment: Judgment normal.         Cervical spine cleared by clinical criteria? No (imaging required)      Invasive Devices       Peripheral Intravenous Line  Duration             Peripheral IV 09/14/24 Left Antecubital <1 day                    Lab Results:   Results Reviewed       Procedure Component Value Units Date/Time    UA w Reflex to Microscopic w Reflex to Culture [[id number]]  (Abnormal) Collected: 09/14/24 1304    Lab Status: Final result Specimen: Urine, Clean Catch Updated: 09/14/24 1319     Color, UA Yellow     Clarity, UA Clear     Specific Gravity, UA 1.015     pH, UA 6.0     Leukocytes, UA Negative     Nitrite, UA Negative     Protein, UA Trace mg/dl      Glucose, UA Negative mg/dl      Ketones, UA Negative mg/dl      Urobilinogen, UA 0.2 E.U./dl      Bilirubin, UA Negative     Occult Blood, UA Trace-Intact    Urine Microscopic [[id number]] Collected: 09/14/24 1304    Lab Status: In process Specimen: Urine, Clean Catch Updated: 09/14/24 1319    CK [[id number]] Collected: 09/14/24 1202    Lab Status: In process Specimen: Blood from Arm, Left Updated: 09/14/24 1318    HS Troponin I 2hr [[id number]]     Lab Status: No  result Specimen: Blood     HS Troponin I 4hr [[id number]]     Lab Status: No result Specimen: Blood     HS Troponin 0hr (reflex protocol) [[id number]]  (Normal) Collected: 09/14/24 1202    Lab Status: Final result Specimen: Blood from Arm, Left Updated: 09/14/24 1233     hs TnI 0hr 35 ng/L     Comprehensive metabolic panel [[id number]]  (Abnormal) Collected: 09/14/24 1202    Lab Status: Final result Specimen: Blood from Arm, Left Updated: 09/14/24 1228     Sodium 143 mmol/L      Potassium 4.3 mmol/L      Chloride 108 mmol/L      CO2 27 mmol/L      ANION GAP 8 mmol/L      BUN 35 mg/dL      Creatinine 1.17 mg/dL      Glucose 101 mg/dL      Calcium 9.4 mg/dL      AST 19 U/L      ALT 12 U/L      Alkaline Phosphatase 70 U/L      Total Protein 6.5 g/dL      Albumin 3.9 g/dL      Total Bilirubin 0.51 mg/dL      eGFR 42 ml/min/1.73sq m     Narrative:      National Kid[employer] guidelines for Chronic Kidney Disease (CKD):     Stage 1 with normal or high GFR (GFR > 90 mL/min/1.73 square meters)    Stage 2 Mild CKD (GFR = 60-89 mL/min/1.73 square meters)    Stage 3A Moderate CKD (GFR = 45-59 mL/min/1.73 square meters)    Stage 3B Moderate CKD (GFR = 30-44 mL/min/1.73 square meters)    Stage 4 Severe CKD (GFR = 15-29 mL/min/1.73 square meters)    Stage 5 End Stage CKD (GFR <15 mL/min/1.73 square meters)  Note: GFR calculation is accurate only with a steady state creatinine    CBC and differential [[id number]]  (Abnormal) Collected: 09/14/24 1202    Lab Status: Final result Specimen: Blood from Arm, Left Updated: 09/14/24 1207     WBC 10.46 Thousand/uL      RBC 4.13 Million/uL      Hemoglobin 11.6 g/dL      Hematocrit 37.6 %      MCV 91 fL      MCH 28.1 pg      MCHC 30.9 g/dL      RDW 14.5 %      MPV 11.6 fL      Platelets 268 Thousands/uL      nRBC 0 /100 WBCs      Segmented % 82 %      Immature Grans % 0 %      Lymphocytes % 11 %      Monocytes % 7 %      Eosinophils Relative 0 %      Basophils Relative 0 %       Absolute Neutrophils 8.52 Thousands/µL      Absolute Immature Grans 0.03 Thousand/uL      Absolute Lymphocytes 1.10 Thousands/µL      Absolute Monocytes 0.77 Thousand/µL      Eosinophils Absolute 0.02 Thousand/µL      Basophils Absolute 0.02 Thousands/µL                    Imaging Studies:   Direct to CT: Yes  XR hip/pelv 2-3 vws right if performed   ED Interpretation by Norm Fraga MD (09/14 2415)   No fracture or dislocation      TRAUMA - CT chest abdomen pelvis wo contrast   Final Result by Irma Roberts MD (09/14 0996)   No free fluid to suggest any significant visceral injury      Incidentally detected 5 mm left upper lobe lung nodule. Based on current Fleischner Society  follow-up CT at 12 months can be considered. If patient is at risk for primary metastatic disease short interval follow-up at 3 months suggested      Erosive changes sacroiliac joint correlate with sacroiliitis      The study was marked in EPIC for significant notification.         Workstation performed: [id number]         CT recon only thoracolumbar (No Charge)   Final Result by Irma Roberts MD (09/14 9434)      No acute compression collapse of the vertebra            Workstation performed: [id number]         TRAUMA - CT head wo contrast   Final Result by Irma Roberts MD (09/14 1272)      No acute intracranial hemorrhage seen   No mass effect or midline shift seen                  Workstation performed: [id number]         TRAUMA - CT spine cervical wo contrast   Final Result by Irma Roberts MD (09/14 4782)      No acute compression collapse of the vertebra.                  Workstation performed: [id number]               Procedures  ECG 12 Lead Documentation Only    Date/Time: 9/14/2024 12:27 PM    Performed by: Norm Fraga MD  Authorized by: Norm Fraga MD    ECG reviewed by me, the ED Provider: yes    Patient location:  ED  Interpretation:     Interpretation: non-specific    Rate:     ECG rate:  70  Rhythm:      Rhythm: sinus rhythm    Ectopy:     Ectopy: PVCs             ED Course  ED Course as of 09/14/24 1321   Sat Sep 14, 2024   1200 Portable chest x-ray nonacute at this time.  Patient with equal bilateral breath sounds.  Room air pulse ox is normal.  Portable pelvis x-ray nonacute at this time.  No obvious deformity of the pelvis noted.   1208 Recently treated last week for UTI.  Was placed on cefuroxime.  Based on urine culture in epic results, was treated appropriately.   1237 Cervical Collar Clearance:    The patient had a CT scan of the cervical spine demonstrating no acute injury. On exam, the patient had no midline point tenderness or paresthesias/numbness/weakness in the extremities. The patient had full range of motion (was then able to flex, extend, and rotate head laterally) without pain. There were no distracting injuries and the patient was not intoxicated.      The patient's cervical spine was cleared radiologically and clinically. Cervical collar removed at this time.     Norm Fraga MD  9/14/2024 12:37 PM       1315 Discussed with daughter.  Patient lives alone.  Was supposed to go somewhere today with the other daughter.  She did not answer the phone so someone went to the house and found her on the floor.  Patient here was unable to ambulate with her walker.   1320 Patient lives alone.  Found on the floor.  Unable to ambulate here with a steady gait.  Will need to be admitted for ambulatory dysfunction.   1321 Discussed results with the daughter, Kadi Davidson.  States they are working on getting more health aides to her house.  Has not been set up yet.           Medical Decision Making  Amount and/or Complexity of Data Reviewed  Labs: ordered.  Radiology: ordered and independent interpretation performed.    Risk  Decision regarding hospitalization.                Disposition  [facility] Transfer: No  Final diagnoses:   Lumbar degenerative disc disease   Strain of lumbar region, initial encounter    Ambulatory dysfunction     Time reflects when diagnosis was documented in both MDM as applicable and the Disposition within this note       Time User Action Codes Description Comment    9/14/2024  1:10 PM Norm Fraga [M51.36] Lumbar degenerative disc disease     9/14/2024  1:10 PM Norm Fraga [S39.012A] Strain of lumbar region, initial encounter     9/14/2024  1:20 PM Norm Fraga [R26.2] Ambulatory dysfunction           ED Disposition       ED Disposition   Admit    Condition   Stable    Date/Time   Sat Sep 14, 2024  1:21 PM    Comment   Case was discussed with Dr. Cerna and the patient's admission status was agreed to be Admission Status: inpatient status to the service of Dr. Cerna.               Follow-up Information    None       Patient's Medications   Discharge Prescriptions    No medications on file     No discharge procedures on file.    PDMP Review         Value Time User    PDMP Reviewed  Yes 3/27/2020 12:20 PM BETH Alan            ED Provider  Electronically Signed by           Norm Fraga MD  09/14/24 0410

## 2024-09-15 PROBLEM — T79.6XXA TRAUMATIC RHABDOMYOLYSIS (HCC): Status: ACTIVE | Noted: 2024-09-15

## 2024-09-15 PROBLEM — R79.89 ELEVATED TROPONIN: Status: ACTIVE | Noted: 2024-09-15

## 2024-09-15 LAB
ALBUMIN SERPL BCG-MCNC: 3.5 G/DL (ref 3.5–5)
ALP SERPL-CCNC: 65 U/L (ref 34–104)
ALT SERPL W P-5'-P-CCNC: 13 U/L (ref 7–52)
ANION GAP SERPL CALCULATED.3IONS-SCNC: 8 MMOL/L (ref 4–13)
AST SERPL W P-5'-P-CCNC: 26 U/L (ref 13–39)
BILIRUB SERPL-MCNC: 0.56 MG/DL (ref 0.2–1)
BUN SERPL-MCNC: 26 MG/DL (ref 5–25)
CALCIUM SERPL-MCNC: 8.7 MG/DL (ref 8.4–10.2)
CHLORIDE SERPL-SCNC: 108 MMOL/L (ref 96–108)
CK SERPL-CCNC: 538 U/L (ref 26–192)
CO2 SERPL-SCNC: 25 MMOL/L (ref 21–32)
CREAT SERPL-MCNC: 0.97 MG/DL (ref 0.6–1.3)
ERYTHROCYTE [DISTWIDTH] IN BLOOD BY AUTOMATED COUNT: 14.6 % (ref 11.6–15.1)
GFR SERPL CREATININE-BSD FRML MDRD: 53 ML/MIN/1.73SQ M
GLUCOSE SERPL-MCNC: 94 MG/DL (ref 65–140)
HCT VFR BLD AUTO: 34.6 % (ref 34.8–46.1)
HGB BLD-MCNC: 10.7 G/DL (ref 11.5–15.4)
MAGNESIUM SERPL-MCNC: 2.1 MG/DL (ref 1.9–2.7)
MCH RBC QN AUTO: 27.6 PG (ref 26.8–34.3)
MCHC RBC AUTO-ENTMCNC: 30.9 G/DL (ref 31.4–37.4)
MCV RBC AUTO: 89 FL (ref 82–98)
PLATELET # BLD AUTO: 243 THOUSANDS/UL (ref 149–390)
PMV BLD AUTO: 11.7 FL (ref 8.9–12.7)
POTASSIUM SERPL-SCNC: 4 MMOL/L (ref 3.5–5.3)
PROT SERPL-MCNC: 5.8 G/DL (ref 6.4–8.4)
RBC # BLD AUTO: 3.88 MILLION/UL (ref 3.81–5.12)
SODIUM SERPL-SCNC: 141 MMOL/L (ref 135–147)
WBC # BLD AUTO: 5.98 THOUSAND/UL (ref 4.31–10.16)

## 2024-09-15 PROCEDURE — 99232 SBSQ HOSP IP/OBS MODERATE 35: CPT

## 2024-09-15 PROCEDURE — 85027 COMPLETE CBC AUTOMATED: CPT

## 2024-09-15 PROCEDURE — 83735 ASSAY OF MAGNESIUM: CPT

## 2024-09-15 PROCEDURE — 82550 ASSAY OF CK (CPK): CPT

## 2024-09-15 PROCEDURE — 80053 COMPREHEN METABOLIC PANEL: CPT

## 2024-09-15 RX ADMIN — ACETAMINOPHEN 325MG 650 MG: 325 TABLET ORAL at 05:46

## 2024-09-15 RX ADMIN — APIXABAN 5 MG: 5 TABLET, FILM COATED ORAL at 08:15

## 2024-09-15 RX ADMIN — ACETAMINOPHEN 325MG 650 MG: 325 TABLET ORAL at 17:16

## 2024-09-15 RX ADMIN — EZETIMIBE 10 MG: 10 TABLET ORAL at 08:15

## 2024-09-15 RX ADMIN — APIXABAN 5 MG: 5 TABLET, FILM COATED ORAL at 17:16

## 2024-09-15 RX ADMIN — FUROSEMIDE 20 MG: 20 TABLET ORAL at 08:15

## 2024-09-15 RX ADMIN — SODIUM CHLORIDE, SODIUM GLUCONATE, SODIUM ACETATE, POTASSIUM CHLORIDE, MAGNESIUM CHLORIDE, SODIUM PHOSPHATE, DIBASIC, AND POTASSIUM PHOSPHATE 100 ML/HR: .53; .5; .37; .037; .03; .012; .00082 INJECTION, SOLUTION INTRAVENOUS at 05:46

## 2024-09-15 RX ADMIN — PANTOPRAZOLE SODIUM 40 MG: 40 TABLET, DELAYED RELEASE ORAL at 05:46

## 2024-09-15 RX ADMIN — FUROSEMIDE 20 MG: 20 TABLET ORAL at 17:16

## 2024-09-15 RX ADMIN — LOSARTAN POTASSIUM 25 MG: 25 TABLET, FILM COATED ORAL at 08:15

## 2024-09-15 RX ADMIN — Medication 1000 UNITS: at 08:15

## 2024-09-15 NOTE — PLAN OF CARE
Problem: SAFETY ADULT  Goal: Patient will remain free of falls  Description: INTERVENTIONS:  - Educate patient/family on patient safety including physical limitations  - Instruct patient to call for assistance with activity   - Consult OT/PT to assist with strengthening/mobility   - Keep Call bell within reach  - Keep bed low and locked with side rails adjusted as appropriate  - Keep care items and personal belongings within reach  - Initiate and maintain comfort rounds  - Make Fall Risk Sign visible to staff  - Offer Toileting every 2 Hours, in advance of need  - Initiate/Maintain bed/chair alarm  - Apply yellow socks and bracelet for high fall risk patients  - Consider moving patient to room near nurses station  Outcome: Progressing  Goal: Maintain or return to baseline ADL function  Description: INTERVENTIONS:  -  Assess patient's ability to carry out ADLs; assess patient's baseline for ADL function and identify physical deficits which impact ability to perform ADLs (bathing, care of mouth/teeth, toileting, grooming, dressing, etc.)  - Assess/evaluate cause of self-care deficits   - Assess range of motion  - Assess patient's mobility; develop plan if impaired  - Assess patient's need for assistive devices and provide as appropriate  - Encourage maximum independence but intervene and supervise when necessary  - Involve family in performance of ADLs  - Assess for home care needs following discharge   - Consider OT consult to assist with ADL evaluation and planning for discharge  - Provide patient education as appropriate  Outcome: Progressing  Goal: Maintains/Returns to pre admission functional level  Description: INTERVENTIONS:  - Perform AM-PAC 6 Click Basic Mobility/ Daily Activity assessment daily.  - Set and communicate daily mobility goal to care team and patient/family/caregiver.   - Collaborate with rehabilitation services on mobility goals if consulted  - Ambulate patient 3 times a day  - Out of bed to  chair 3 times a day   - Out of bed for toileting  - Record patient progress and toleration of activity level   Outcome: Progressing

## 2024-09-15 NOTE — PROGRESS NOTES
Progress Note - Hospitalist   Name: Demi Donnelly 84 y.o. female I MRN: [id number]  Unit/Bed#: -01 I Date of Admission: 9/14/2024   Date of Service: 9/15/2024 I Hospital Day: 1    Assessment & Plan  Ambulatory dysfunction  Presented to ED with falls at home. Was found on the ground at home. Complaining of low back pain. Has history of dementia.   Currently lives alone - family went over and found patient on the floor today when she didn't answer her phone  No evidence of infection at this time  Trauma workup negative  CK mildly elevated at 387 - start on gentle ivf  Mild leukocytosis, likely reactive secondary to fall. Procal ordered  UA negative  PT/OT evaluations appreciated   Case management consult to assist with discharge disposition planning  Traumatic rhabdomyolysis (HCC)  POA, due to prolonged confinement on ground following fall, evidenced by elevated CK, treated with trending CK,  mL/hr.   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 Patient lives at home alone with frequent falls. On namenda outpatient, previously stated that she wanted to stop this medication with her PCP and appears to have been discontinued with PCP. Per son, mentation waxes and wanes very frequently.   Will hold on namenda and monitor mentation - verify meds with family.  Frequent reorientation  Delirium precautions  Essential hypertension  History of htn, previously on lasix 20 mg bid, losartan 25 mg qd, norvasc 5 mg qd. Patient recently stopped norvasc 5 mg as she noticed that her BP was dropped at home because of this  BP elevated in the ED initially  Orthostatics ordered - will hold norvasc here, orthostatics negative. BP acceptable currently.   Avoid hypotension  GERD without esophagitis  Continue PPI  A-fib (HCC)  History of a fib currently on eliquis 5 mg bid. Does not appear to be on any rate controlling medications.   Continue to monitor HR  Stage 3b  chronic kidney disease (HCC)  Lab Results   Component Value Date    EGFR 53 09/15/2024    EGFR 42 2024    EGFR 37 2024    CREATININE 0.97 09/15/2024    CREATININE 1.17 2024    CREATININE 1.29 2024     History of ckd, baseline creatinine around 1.1, currently around baseline  Does have history of solitary kidney - pending outpatient nephrology follow up  Avoid nephrotoxic medications, nsaids, hypotension  Elevated troponin  Non-ischemic myocardial injury, POA, due to traumatic rhabdomyolysis, evidenced by elevated troponins 35->65->73, EKG: NSR with no ischemia, treated with trending troponins and EKG.   No current CP    VTE Pharmacologic Prophylaxis: VTE Score: 5 High Risk (Score >/= 5) - Pharmacological DVT Prophylaxis Ordered: apixaban (Eliquis). Sequential Compression Devices Ordered.    Mobility:   Basic Mobility Inpatient Raw Score: 17  JH-HLM Goal: 5: Stand one or more mins  JH-HLM Achieved: 6: Walk 10 steps or more  JH-HLM Goal achieved. Continue to encourage appropriate mobility.    Patient Centered Rounds: I performed bedside rounds with nursing staff today.   Discussions with Specialists or Other Care Team Provider: nursing, case management    Education and Discussions with Family / Patient: Attempted to update  (son) via phone. Left voicemail.     Current Length of Stay: 1 day(s)  Current Patient Status: Inpatient   Certification Statement: The patient will continue to require additional inpatient hospital stay due to pt/ot eval, rhabdomyolysis, ambulatory dysfunction  Discharge Plan: Anticipate discharge in 24-48 hrs to discharge location to be determined pending rehab evaluations.    Code Status: Level 1 - Full Code    Subjective   Seen and examined today. Patient offers no current complaints. Pleasantly confused.     Objective     Vitals:   Temp (24hrs), Av.5 °F (36.4 °C), Min:97.2 °F (36.2 °C), Max:97.9 °F (36.6 °C)    Temp:  [97.2 °F (36.2 °C)-97.9 °F (36.6  °C)] 97.5 °F (36.4 °C)  HR:  [64-80] 75  Resp:  [18-20] 20  BP: (117-162)/(53-89) 140/89  SpO2:  [96 %-98 %] 98 %  Body mass index is 32.79 kg/m².     Input and Output Summary (last 24 hours):     Intake/Output Summary (Last 24 hours) at 9/15/2024 1333  Last data filed at 9/15/2024 1259  Gross per 24 hour   Intake 1642.83 ml   Output 1425 ml   Net 217.83 ml       Physical Exam  Vitals reviewed.   Constitutional:       General: She is not in acute distress.     Appearance: She is ill-appearing. She is not toxic-appearing.      Comments: Pleasantly confused   HENT:      Head: Normocephalic and atraumatic.      Mouth/Throat:      Mouth: Mucous membranes are moist.   Cardiovascular:      Rate and Rhythm: Normal rate and regular rhythm.      Heart sounds: No murmur heard.  Pulmonary:      Effort: No respiratory distress.      Breath sounds: No stridor. No wheezing, rhonchi or rales.   Abdominal:      General: Bowel sounds are normal. There is no distension.      Palpations: Abdomen is soft. There is no mass.      Tenderness: There is no abdominal tenderness.   Musculoskeletal:      Right lower leg: No edema.      Left lower leg: No edema.   Skin:     General: Skin is warm and dry.   Neurological:      Mental Status: She is alert and oriented to person, place, and time.      Comments: Answering orientation questions appropriately. Oriented to person, place, and time. Not oriented to situation.   Still with delayed responses   Psychiatric:         Mood and Affect: Mood normal.         Behavior: Behavior normal.          Lines/Drains:  Lines/Drains/Airways       Active Status       None                            Lab Results: I have reviewed the following results: CBC/BMP:   .     09/15/24  0549   WBC 5.98   HGB 10.7*   HCT 34.6*      SODIUM 141   K 4.0      CO2 25   BUN 26*   CREATININE 0.97   GLUC 94   MG 2.1    , LFTs:   .     09/15/24  0549   AST 26   ALT 13   ALB 3.5   TBILI 0.56   ALKPHOS 65       Results  from last 7 days   Lab Units 09/15/24  0549 09/14/24  1202   WBC Thousand/uL 5.98 10.46*   HEMOGLOBIN g/dL 10.7* 11.6   HEMATOCRIT % 34.6* 37.6   PLATELETS Thousands/uL 243 268   SEGS PCT %  --  82*   LYMPHO PCT %  --  11*   MONO PCT %  --  7   EOS PCT %  --  0     Results from last 7 days   Lab Units 09/15/24  0549   SODIUM mmol/L 141   POTASSIUM mmol/L 4.0   CHLORIDE mmol/L 108   CO2 mmol/L 25   BUN mg/dL 26*   CREATININE mg/dL 0.97   ANION GAP mmol/L 8   CALCIUM mg/dL 8.7   ALBUMIN g/dL 3.5   TOTAL BILIRUBIN mg/dL 0.56   ALK PHOS U/L 65   ALT U/L 13   AST U/L 26   GLUCOSE RANDOM mg/dL 94                 Results from last 7 days   Lab Units 09/14/24  1202   PROCALCITONIN ng/ml 0.08       Recent Cultures (last 7 days):         Imaging Review: Reviewed radiology reports from this admission including: CT thoracolumbar, ct CAP, CT cervical spine, CT head.  Other Studies: No additional pertinent studies reviewed.    Last 24 Hours Medication List:     Current Facility-Administered Medications:     acetaminophen (TYLENOL) tablet 650 mg, Q6H PRN    apixaban (ELIQUIS) tablet 5 mg, BID    Cholecalciferol (VITAMIN D3) tablet 1,000 Units, Daily    ezetimibe (ZETIA) tablet 10 mg, Daily    furosemide (LASIX) tablet 20 mg, BID    losartan (COZAAR) tablet 25 mg, Daily    multi-electrolyte (PLASMALYTE-A/ISOLYTE-S PH 7.4) IV solution, Continuous, Last Rate: 100 mL/hr (09/15/24 1002)    pantoprazole (PROTONIX) EC tablet 40 mg, Early Morning    senna (SENOKOT) tablet 8.6 mg, HS    Administrative Statements   Today, Patient Was Seen By: Annette Pham PA-C    **Please Note: This note may have been constructed using a voice recognition system.**

## 2024-09-15 NOTE — PLAN OF CARE
Problem: PAIN - ADULT  Goal: Verbalizes/displays adequate comfort level or baseline comfort level  Description: Interventions:  - Encourage patient to monitor pain and request assistance  - Assess pain using appropriate pain scale  - Administer analgesics based on type and severity of pain and evaluate response  - Implement non-pharmacological measures as appropriate and evaluate response  - Consider cultural and social influences on pain and pain management  - Notify physician/advanced practitioner if interventions unsuccessful or patient reports new pain  Outcome: Progressing     Problem: INFECTION - ADULT  Goal: Absence or prevention of progression during hospitalization  Description: INTERVENTIONS:  - Assess and monitor for signs and symptoms of infection  - Monitor lab/diagnostic results  - Monitor all insertion sites, i.e. indwelling lines, tubes, and drains  - Monitor endotracheal if appropriate and nasal secretions for changes in amount and color  - [place] appropriate cooling/warming therapies per order  - Administer medications as ordered  - Instruct and encourage patient and family to use good hand hygiene technique  - Identify and instruct in appropriate isolation precautions for identified infection/condition  Outcome: Progressing     Problem: SAFETY ADULT  Goal: Patient will remain free of falls  Description: INTERVENTIONS:  - Educate patient/family on patient safety including physical limitations  - Instruct patient to call for assistance with activity   - Consult OT/PT to assist with strengthening/mobility   - Keep Call bell within reach  - Keep bed low and locked with side rails adjusted as appropriate  - Keep care items and personal belongings within reach  - Initiate and maintain comfort rounds  - Make Fall Risk Sign visible to staff  - Offer Toileting every 2 Hours, in advance of need  - Initiate/Maintain bed/chair alarm  - Apply yellow socks and bracelet for high fall risk patients  -  Consider moving patient to room near nurses station  Outcome: Progressing  Goal: Maintain or return to baseline ADL function  Description: INTERVENTIONS:  -  Assess patient's ability to carry out ADLs; assess patient's baseline for ADL function and identify physical deficits which impact ability to perform ADLs (bathing, care of mouth/teeth, toileting, grooming, dressing, etc.)  - Assess/evaluate cause of self-care deficits   - Assess range of motion  - Assess patient's mobility; develop plan if impaired  - Assess patient's need for assistive devices and provide as appropriate  - Encourage maximum independence but intervene and supervise when necessary  - Involve family in performance of ADLs  - Assess for home care needs following discharge   - Consider OT consult to assist with ADL evaluation and planning for discharge  - Provide patient education as appropriate  Outcome: Progressing  Goal: Maintains/Returns to pre admission functional level  Description: INTERVENTIONS:  - Perform AM-PAC 6 Click Basic Mobility/ Daily Activity assessment daily.  - Set and communicate daily mobility goal to care team and patient/family/caregiver.   - Collaborate with rehabilitation services on mobility goals if consulted  - Ambulate patient 3 times a day  - Out of bed to chair 3 times a day   - Out of bed for toileting  - Record patient progress and toleration of activity level   Outcome: Progressing     Problem: DISCHARGE PLANNING  Goal: Discharge to home or other facility with appropriate resources  Description: INTERVENTIONS:  - Identify barriers to discharge w/patient and caregiver  - Arrange for needed discharge resources and transportation as appropriate  - Identify discharge learning needs (meds, wound care, etc.)  - Arrange for interpretive services to assist at discharge as needed  - Refer to Case Management Department for coordinating discharge planning if the patient needs post-hospital services based on  physician/advanced practitioner order or complex needs related to functional status, cognitive ability, or social support system  Outcome: Progressing     Problem: Knowledge Deficit  Goal: Patient/family/caregiver demonstrates understanding of disease process, treatment plan, medications, and discharge instructions  Description: Complete learning assessment and assess knowledge base.  Interventions:  - Provide teaching at level of understanding  - Provide teaching via preferred learning methods  Outcome: Progressing     Problem: MUSCULOSKELETAL - ADULT  Goal: Maintain or return mobility to safest level of function  Description: INTERVENTIONS:  - Assess patient's ability to carry out ADLs; assess patient's baseline for ADL function and identify physical deficits which impact ability to perform ADLs (bathing, care of mouth/teeth, toileting, grooming, dressing, etc.)  - Assess/evaluate cause of self-care deficits   - Assess range of motion  - Assess patient's mobility  - Assess patient's need for assistive devices and provide as appropriate  - Encourage maximum independence but intervene and supervise when necessary  - Involve family in performance of ADLs  - Assess for home care needs following discharge   - Consider OT consult to assist with ADL evaluation and planning for discharge  - Provide patient education as appropriate  Outcome: Progressing     Problem: Prexisting or High Potential for Compromised Skin Integrity  Goal: Skin integrity is maintained or improved  Description: INTERVENTIONS:  - Identify patients at risk for skin breakdown  - Assess and monitor skin integrity  - Assess and monitor nutrition and hydration status  - Monitor labs   - Assess for incontinence   - Turn and reposition patient  - Assist with mobility/ambulation  - Relieve pressure over bony prominences  - Avoid friction and shearing  - Provide appropriate hygiene as needed including keeping skin clean and dry  - Evaluate need for skin  moisturizer/barrier cream  - Collaborate with interdisciplinary team   - Patient/family teaching  - Consider wound care consult   Outcome: Progressing

## 2024-09-16 LAB
ALBUMIN SERPL BCG-MCNC: 3.3 G/DL (ref 3.5–5)
ALP SERPL-CCNC: 58 U/L (ref 34–104)
ALT SERPL W P-5'-P-CCNC: 11 U/L (ref 7–52)
ANION GAP SERPL CALCULATED.3IONS-SCNC: 7 MMOL/L (ref 4–13)
AST SERPL W P-5'-P-CCNC: 26 U/L (ref 13–39)
ATRIAL RATE: 74 BPM
BILIRUB SERPL-MCNC: 0.47 MG/DL (ref 0.2–1)
BUN SERPL-MCNC: 19 MG/DL (ref 5–25)
CALCIUM ALBUM COR SERPL-MCNC: 8.8 MG/DL (ref 8.3–10.1)
CALCIUM SERPL-MCNC: 8.2 MG/DL (ref 8.4–10.2)
CHLORIDE SERPL-SCNC: 107 MMOL/L (ref 96–108)
CK SERPL-CCNC: 444 U/L (ref 26–192)
CO2 SERPL-SCNC: 25 MMOL/L (ref 21–32)
CREAT SERPL-MCNC: 0.92 MG/DL (ref 0.6–1.3)
ERYTHROCYTE [DISTWIDTH] IN BLOOD BY AUTOMATED COUNT: 14.6 % (ref 11.6–15.1)
GFR SERPL CREATININE-BSD FRML MDRD: 56 ML/MIN/1.73SQ M
GLUCOSE SERPL-MCNC: 102 MG/DL (ref 65–140)
HCT VFR BLD AUTO: 33.7 % (ref 34.8–46.1)
HGB BLD-MCNC: 10.4 G/DL (ref 11.5–15.4)
MAGNESIUM SERPL-MCNC: 2.2 MG/DL (ref 1.9–2.7)
MCH RBC QN AUTO: 28.1 PG (ref 26.8–34.3)
MCHC RBC AUTO-ENTMCNC: 30.9 G/DL (ref 31.4–37.4)
MCV RBC AUTO: 91 FL (ref 82–98)
PLATELET # BLD AUTO: 232 THOUSANDS/UL (ref 149–390)
PMV BLD AUTO: 11.9 FL (ref 8.9–12.7)
POTASSIUM SERPL-SCNC: 3.8 MMOL/L (ref 3.5–5.3)
PR INTERVAL: 282 MS
PROT SERPL-MCNC: 5.4 G/DL (ref 6.4–8.4)
QRS AXIS: 124 DEGREES
QRSD INTERVAL: 96 MS
QT INTERVAL: 376 MS
QTC INTERVAL: 417 MS
RBC # BLD AUTO: 3.7 MILLION/UL (ref 3.81–5.12)
SODIUM SERPL-SCNC: 139 MMOL/L (ref 135–147)
T WAVE AXIS: -59 DEGREES
VENTRICULAR RATE: 74 BPM
WBC # BLD AUTO: 5.04 THOUSAND/UL (ref 4.31–10.16)

## 2024-09-16 PROCEDURE — 83735 ASSAY OF MAGNESIUM: CPT

## 2024-09-16 PROCEDURE — 80053 COMPREHEN METABOLIC PANEL: CPT

## 2024-09-16 PROCEDURE — 99232 SBSQ HOSP IP/OBS MODERATE 35: CPT

## 2024-09-16 PROCEDURE — 85027 COMPLETE CBC AUTOMATED: CPT

## 2024-09-16 PROCEDURE — 97116 GAIT TRAINING THERAPY: CPT

## 2024-09-16 PROCEDURE — 97535 SELF CARE MNGMENT TRAINING: CPT

## 2024-09-16 PROCEDURE — 97163 PT EVAL HIGH COMPLEX 45 MIN: CPT

## 2024-09-16 PROCEDURE — 82550 ASSAY OF CK (CPK): CPT

## 2024-09-16 PROCEDURE — 97167 OT EVAL HIGH COMPLEX 60 MIN: CPT

## 2024-09-16 RX ADMIN — ACETAMINOPHEN 325MG 650 MG: 325 TABLET ORAL at 00:03

## 2024-09-16 RX ADMIN — SODIUM CHLORIDE, SODIUM GLUCONATE, SODIUM ACETATE, POTASSIUM CHLORIDE, MAGNESIUM CHLORIDE, SODIUM PHOSPHATE, DIBASIC, AND POTASSIUM PHOSPHATE 100 ML/HR: .53; .5; .37; .037; .03; .012; .00082 INJECTION, SOLUTION INTRAVENOUS at 00:06

## 2024-09-16 RX ADMIN — ACETAMINOPHEN 325MG 650 MG: 325 TABLET ORAL at 17:51

## 2024-09-16 RX ADMIN — SODIUM CHLORIDE, SODIUM GLUCONATE, SODIUM ACETATE, POTASSIUM CHLORIDE, MAGNESIUM CHLORIDE, SODIUM PHOSPHATE, DIBASIC, AND POTASSIUM PHOSPHATE 100 ML/HR: .53; .5; .37; .037; .03; .012; .00082 INJECTION, SOLUTION INTRAVENOUS at 09:39

## 2024-09-16 RX ADMIN — FUROSEMIDE 20 MG: 20 TABLET ORAL at 16:50

## 2024-09-16 RX ADMIN — APIXABAN 5 MG: 5 TABLET, FILM COATED ORAL at 09:39

## 2024-09-16 RX ADMIN — ACETAMINOPHEN 325MG 650 MG: 325 TABLET ORAL at 09:44

## 2024-09-16 RX ADMIN — APIXABAN 5 MG: 5 TABLET, FILM COATED ORAL at 16:51

## 2024-09-16 RX ADMIN — Medication 1000 UNITS: at 09:39

## 2024-09-16 RX ADMIN — FUROSEMIDE 20 MG: 20 TABLET ORAL at 09:39

## 2024-09-16 RX ADMIN — EZETIMIBE 10 MG: 10 TABLET ORAL at 09:39

## 2024-09-16 RX ADMIN — LOSARTAN POTASSIUM 25 MG: 25 TABLET, FILM COATED ORAL at 09:39

## 2024-09-16 RX ADMIN — PANTOPRAZOLE SODIUM 40 MG: 40 TABLET, DELAYED RELEASE ORAL at 05:34

## 2024-09-16 NOTE — PLAN OF CARE
Problem: PAIN - ADULT  Goal: Verbalizes/displays adequate comfort level or baseline comfort level  Description: Interventions:  - Encourage patient to monitor pain and request assistance  - Assess pain using appropriate pain scale  - Administer analgesics based on type and severity of pain and evaluate response  - Implement non-pharmacological measures as appropriate and evaluate response  - Consider cultural and social influences on pain and pain management  - Notify physician/advanced practitioner if interventions unsuccessful or patient reports new pain  Outcome: Progressing     Problem: INFECTION - ADULT  Goal: Absence or prevention of progression during hospitalization  Description: INTERVENTIONS:  - Assess and monitor for signs and symptoms of infection  - Monitor lab/diagnostic results  - Monitor all insertion sites, i.e. indwelling lines, tubes, and drains  - Monitor endotracheal if appropriate and nasal secretions for changes in amount and color  - [place] appropriate cooling/warming therapies per order  - Administer medications as ordered  - Instruct and encourage patient and family to use good hand hygiene technique  - Identify and instruct in appropriate isolation precautions for identified infection/condition  Outcome: Progressing     Problem: SAFETY ADULT  Goal: Patient will remain free of falls  Description: INTERVENTIONS:  - Educate patient/family on patient safety including physical limitations  - Instruct patient to call for assistance with activity   - Consult OT/PT to assist with strengthening/mobility   - Keep Call bell within reach  - Keep bed low and locked with side rails adjusted as appropriate  - Keep care items and personal belongings within reach  - Initiate and maintain comfort rounds  - Make Fall Risk Sign visible to staff  - Offer Toileting every 2 Hours, in advance of need  - Initiate/Maintain bed/chair alarm  - Apply yellow socks and bracelet for high fall risk patients  -  Consider moving patient to room near nurses station  Outcome: Progressing  Goal: Maintain or return to baseline ADL function  Description: INTERVENTIONS:  -  Assess patient's ability to carry out ADLs; assess patient's baseline for ADL function and identify physical deficits which impact ability to perform ADLs (bathing, care of mouth/teeth, toileting, grooming, dressing, etc.)  - Assess/evaluate cause of self-care deficits   - Assess range of motion  - Assess patient's mobility; develop plan if impaired  - Assess patient's need for assistive devices and provide as appropriate  - Encourage maximum independence but intervene and supervise when necessary  - Involve family in performance of ADLs  - Assess for home care needs following discharge   - Consider OT consult to assist with ADL evaluation and planning for discharge  - Provide patient education as appropriate  Outcome: Progressing  Goal: Maintains/Returns to pre admission functional level  Description: INTERVENTIONS:  - Perform AM-PAC 6 Click Basic Mobility/ Daily Activity assessment daily.  - Set and communicate daily mobility goal to care team and patient/family/caregiver.   - Collaborate with rehabilitation services on mobility goals if consulted  - Ambulate patient 3 times a day  - Out of bed to chair 3 times a day   - Out of bed for toileting  - Record patient progress and toleration of activity level   Outcome: Progressing     Problem: DISCHARGE PLANNING  Goal: Discharge to home or other facility with appropriate resources  Description: INTERVENTIONS:  - Identify barriers to discharge w/patient and caregiver  - Arrange for needed discharge resources and transportation as appropriate  - Identify discharge learning needs (meds, wound care, etc.)  - Arrange for interpretive services to assist at discharge as needed  - Refer to Case Management Department for coordinating discharge planning if the patient needs post-hospital services based on  physician/advanced practitioner order or complex needs related to functional status, cognitive ability, or social support system  Outcome: Progressing

## 2024-09-16 NOTE — CASE MANAGEMENT
[facility] received request for authorization from Care Manager.  Authorization request submitted for: SNF  Fac[facility] Name:  [facility]  NPI:[id number]  Facility MD:  roslyn womack    NPI: [id number]  Authorization initiated by contacting insurance:  [employer]   Via: [employer]   Clinicals submitted via Portal attachment   Pending Reference #:[id number]      Care Manager notified: armen brown     Updates to authorization status will be noted in chart. Please reach out to CM for updates on any clinical information.

## 2024-09-16 NOTE — PROGRESS NOTES
Patient:    MRN:  [id number]    Aidin Request ID:  [id number]    Level of care reserved:  Skilled Nursing Facility    Partner Reserved:  [facility], [place] [address] [phone]    Clinical needs requested:    Geography searched:  20 miles around [id number]    Start of Service:    Request sent:  12:03pm EDT on 9/16/2024 by Negar Mayes    Partner reserved:  2:29pm EDT on 9/16/2024 by Berto Tello    Choice list shared:  2:25pm EDT on 9/16/2024 by Negar Mayes

## 2024-09-16 NOTE — CASE MANAGEMENT
Case Management Assessment & Discharge Planning Note    Patient name Demi Donnelly  Location /-01 MRN [id number]  : 1940 Date 2024       Current Admission Date: 2024  Current Admission Diagnosis:Ambulatory dysfunction   Patient Active Problem List    Diagnosis Date Noted Date Diagnosed    Traumatic rhabdomyolysis (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) 2024     Frequent falls 2024     Visual hallucinations 2023     Spinal stenosis of lumbar region with neurogenic claudication 2023     Lumbar spondylosis 2023     Skin ulcer of right great toe, limited to breakdown of skin ([facility]) 2023     Physical deconditioning 2022     Anemia 2022     Ground glass opacity present on imaging of lung 2021     Prediabetes 2021     Old myocardial infarction 2021     Fecal incontinence 2021     LUPE (acute kidney injury) ([facility]) 2020     Other specified inflammatory spondylopathies, lumbosacral region ([facility]) 2020     Obesity, morbid ([facility]) 2020     Constipation 2020     Dementia due to Parkinson's disease with behavioral disturbance ([facility]) 2020     Renal cyst 10/23/2020     CAD (coronary artery disease) 2019     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     Single kidney 2019     Vitamin D deficiency 2019     Anxiety 2019     Abnormal stress test 2019     Ambulatory dysfunction 2019     A-fib ([facility]) 2019     Stage 3b chronic kidney disease ([facility]) 2019     Junctional bradycardia 2019     BMI 36.0-36.9,adult 2019     Chronic left shoulder pain 10/09/2018     H/O: rheumatic fever 2018     Sciatica 2018     Peripheral neuropathy 2018     B12 deficiency 2018     Osteoporosis 2018     Closed head injury 2017     Lumbar radiculopathy 2017      Chronic diastolic CHF (congestive heart failure) ([facility]) 05/15/2017     Low serum vitamin B12 02/13/2017     Peripheral edema 02/13/2017     Biceps tendinitis of right shoulder 04/11/2016     Diastolic dysfunction 04/11/2016     Lateral epicondylitis of right elbow 04/11/2016     Essential hypertension 03/14/2016     Dyspnea on exertion 03/14/2016     GERD without esophagitis 03/14/2016     Osteoarthritis 09/17/2012     Peripheral venous insufficiency 09/17/2012     Prolapse of vaginal walls 12/30/2003       LOS (days): 2  Geometric Mean LOS (GMLOS) (days): 3.3  Days to GMLOS:1.3     OBJECTIVE:    Risk of Unplanned Readmission Score: 14.75         Current admission status: Inpatient  Referral Reason:  (dc planning)    Preferred Pharmacy:   [id number] - [place] - [address]  [address]  [place] [facility] [address]  Phone: [phone] Fax: [phone]    Primary Care Provider: Kenroy Jerome MD    Primary Insurance: [employer]  Secondary Insurance:     Baseline information was obtained from [place].  ALBERTO discussed the role of CM in helping the patient develop a discharge plan and assist the patient in carry out their plan.      ASSESSMENT:  Active Health Care Proxies    There are no active Health Care Proxies on file.       Advance Directives  Does patient have a Health Care POA?: No  Was patient offered paperwork?: No  Does patient currently have a Health Care decision maker?: Yes, please see Health Care Proxy section  Does patient have Advance Directives?: No  Primary Contact: BYRON HARRISON (Son)  [phone]         Readmission Root Cause  30 Day Readmission: No    Patient Information  Admitted from:: Home  Mental Status: Alert, Confused  During Assessment patient was accompanied by: Not accompanied during assessment  Assessment information provided by:: Son, Daughter  Primary Caregiver: Family  Support Systems: Daughter, Children, Other (Comment) (Has Home Health Aides 3  times a week for 2-3 hours, unsure of the name of the agency)  [facility]: [facility]  What [facility] do you live in?: [place]  Home entry access options. Select all that apply.: Elevator  Type of Current Residence: Apartment  Floor Level: 3  Upon entering residence, is there a bedroom on the main floor (no further steps)?: Yes  Upon entering residence, is there a bathroom on the main floor (no further steps)?: Yes  Living Arrangements: Lives Alone  Is patient a ?: No    Activities of Daily Living Prior to Admission  Functional Status: Assistance  Completes ADLs independently?: No  Level of ADL dependence: Assistance  Ambulates independently?: Yes  Does patient use assisted devices?: Yes  Assisted Devices (DME) used: Rollator  Does patient currently own DME?: Yes  What DME does the patient currently own?: Rollator, Walker  Does patient have a history of Outpatient Therapy (PT/OT)?: No  Does the patient have a history of Short-Term Rehab?: Yes ([facility])  Does patient have a history of HHC?: Yes ([facility])    Current Home Health Care  Type of Current Home Care Services: Me[employer] on Wheels (Home Aides,3 times a week)    Patient Information Continued  Income Source: Pension/[facility]  Does patient have prescription coverage?: Yes  Does patient receive dialysis treatments?: No  Does patient have a history of substance abuse?: No  Does patient have a history of Mental Health Diagnosis?: No         Means of Transportation  Means of Transport to Appts:: Family transport      Social Determinants of Health (SDOH)      Flowsheet Row Most Recent Value   Housing Stability    In the last 12 months, was there a time when you were not able to pay the mortgage or rent on time? N   In the past 12 months, how many times have you moved where you were living? 0   At any time in the past 12 months, were you homeless or living in a shelter (including now)? N   Transportation Needs    In the past 12 months, has  lack of transportation kept you from medical appointments or from getting medications? no   In the past 12 months, has lack of transportation kept you from meetings, work, or from getting things needed for daily living? No   Food Insecurity    Within the past 12 months, you worried that your food would run out before you got the money to buy more. Never true   Within the past 12 months, the food you bought just didn't last and you didn't have money to get more. Never true   Utilities    In the past 12 months has the electric, gas, oil, or water company threatened to shut off services in your home? No          Pt has 5 children, uses a Rolator to ambulate and lives alone.  Has home aides 3 times a week for 2-3 hours, unsure of the name of the agency.  A nurse comes in as well.  Has MOWs.  Son assist with meals and transport to MD appointments.    CM called daughter Kadi Davidson to clarify information and discussed STR.   DISCHARGE DETAILS:    Discharge planning discussed with:: patient and daughter  Freedom of Choice: Yes  Comments - Freedom of Choice: Discussed recommendation for STR-- top choice is [facility] as pt was there in arturo past, however agreeable for a blanket referral  CM contacted family/caregiver?: Yes  Were Treatment Team discharge recommendations reviewed with patient/caregiver?: Yes  Did patient/caregiver verbalize understanding of patient care needs?: Yes       Contacts  Patient Contacts: Kadi Davidson  Relationship to Patient:: Family  Contact Method: Phone  Phone Number: see face sheet  Reason/Outcome: Discharge Planning    Requested Home Health Care         Is the patient interested in HHC at discharge?: No    DME Referral Provided  Referral made for DME?: No    Other Referral/Resources/Interventions Provided:  Interventions: Short Term Rehab  Referral Comments: VALERIA armenta referral was made         Treatment Team Recommendation: Short Term Rehab  Discharge Destination Plan:: Short  Term Rehab

## 2024-09-16 NOTE — PLAN OF CARE
Problem: PHYSICAL THERAPY ADULT  Goal: Performs mobility at highest level of function for planned discharge setting.  See evaluation for individualized goals.  Description: Treatment/Interventions: ADL retraining, Functional transfer training, LE strengthening/ROM, Elevations, Therapeutic exercise, Endurance training, Patient/family training, Equipment eval/education, Bed mobility, Gait training, Compensatory technique education, Spoke to nursing, Spoke to case management, OT          See flowsheet documentation for full assessment, interventions and recommendations.  Note: Prognosis: Good  Problem List: Decreased strength, Decreased endurance, Impaired balance, Decreased mobility, Decreased cognition, Impaired judgement, Decreased safety awareness, Obesity, Decreased skin integrity, Pain  Assessment: Pt is a 84 y.o. female seen for PT evaluation s/p admission to [facility] on 9/14/2024 with Ambulatory dysfunction.  Order placed for PT services.  Upon evaluation: Pt is presenting with impaired functional mobility due to pain, decreased strength, decreased endurance, impaired balance, gait deviations, impaired cognition, decreased safety awareness, impaired judgment, fall risk, and impaired skin integrity requiring  moderate assistance for bed mobility, moderate assistance for transfers, and moderate assistance for ambulation with RW . Pt's clinical presentation is currently unpredictable given the functional mobility deficits above, especially weakness, decreased skin integrity, decreased endurance, impaired balance, gait deviations, pain, decreased activity tolerance, decreased functional mobility tolerance, decreased safety awareness, impaired judgement, and decreased cognition, coupled with fall risks as indicated by AM-PAC 6-Clicks: 12/24 as well as hx of falls, impaired balance, polypharmacy, impaired judgement, decreased safety awareness, decreased cognition, and obesity and combined with  medical complications of abnormal H&H, need for input for mobility technique/safety, and traumatic rhabdomyolysis, elevated troponin due to rhabdomyolysis .  Pt's PMHx and comorbidities that may affect physical performance and progress include: A fib, CHF, CKD, Dementia, HTN, Parkinson's Disease, obesity, and limited cognition, recent UTI, peripheral neuropathy, lumbar radiculopathy, h/o CHI, anxiety, OA, left nephrectomy, CAD, peripheral venous insufficiency, sciatica, osteoporosis. Personal factors affecting pt at time of IE include: inaccessible home environment, limited home support, inability to perform IADLs, inability to perform ADLs, inability to navigate level surfaces without external assistance, inability to ambulate household distances, limited insight into impairments, and recent fall(s)/fall history. Pt will benefit from continued skilled PT services to address deficits as defined above and to maximize level of functional mobility to facilitate return toward PLOF and improved QOL. From PT/mobility standpoint, recommendation at time of d/c would be Level II (Moderate Resource Intensity in order to reduce fall risk and maximize pt's functional independence and consistency with mobility. Recommend trial with walker next 1-2 sessions and ther ex next 1-2 sessions.  Barriers to Discharge: Inaccessible home environment, Decreased caregiver support  Barriers to Discharge Comments: lives alone, inc risk for falls, safety concerns with impaired cognition  Rehab Resource Intensity Level, PT: II (Moderate Resource Intensity)    See flowsheet documentation for full assessment.

## 2024-09-16 NOTE — PLAN OF CARE
Problem: OCCUPATIONAL THERAPY ADULT  Goal: Performs self-care activities at highest level of function for planned discharge setting.  See evaluation for individualized goals.  Description: Treatment Interventions: ADL retraining, Functional transfer training, UE strengthening/ROM, Endurance training, Cognitive reorientation, Patient/family training, Equipment evaluation/education, Fine motor coordination activities, Compensatory technique education, Continued evaluation, Energy conservation, Activityengagement          See flowsheet documentation for full assessment, interventions and recommendations.   Outcome: Progressing  Note: Limitation: Decreased ADL status, Decreased UE ROM, Decreased UE strength, Decreased Safe judgement during ADL, Decreased cognition, Decreased endurance, Decreased fine motor control, Decreased self-care trans, Decreased high-level ADLs  Prognosis: Fair  Assessment: Pt is a 84 y.o. female, admitted to [facility] 9/14/2024 d/t experiencing fall. Dx: Ambulatory dysfunction. Pt with PMHx impacting their performance during ADL tasks, including: dementia due to PD with behavioral disturbance, HTN, GERD without esophagitis, Afib, CKD 3b, recent UTI, CHF. Prior to admission to the hospital Pt was performing ADLs without physical assistance. IADLs with physical assistance. Functional transfers/ambulation without physical assistance. Cognitive status PTA was impaired. OT order placed to assess Pt's ADLs, cognitive status, and performance during functional tasks in order to maximize safety and independence while making most appropriate d/c recommendations. PT/OT co-evaluation completed at this time d/t significant mobility deficits and safety concerns. Pt's clinical presentation is currently unstable/unpredictable given new onset deficits that affect Pt's occupational performance and ability to safely return to PLOF including decrease activity tolerance, decrease standing balance, decrease performance  during ADL tasks, decrease cognition, decrease BUE ROM, decrease UB MS, increased pain, decrease generalized strength, decrease activity engagement, decrease performance during functional transfers, decrease FM control, decrease GM control, limited family support, high fall risk, limited insight to deficits, and decreased alertness combined with medical complications of hypotension, pain impacting overall mobility status, abnormal renal lab values, A-fib, abnormal H&H, abnormal CBC, incontinence, and need for input for mobility technique/safety. Personal factors affecting Pt at time of initial evaluation include: limited home support, advanced age, inability to perform IADLs, inability to perform ADLs, inability to navigate community distances, limited insight into impairments, decreased initiation and engagement, and recent fall(s)/fall history. Pt will benefit from continued skilled OT services to address deficits as defined above and to maximize level independence/participation during ADLs and functional tasks to facilitate return toward PLOF and improved quality of life. From an occupational therapy standpoint, Level II (Moderate Resource Intensity is recommended upon d/c.     Rehab Resource Intensity Level, OT: II (Moderate Resource Intensity)

## 2024-09-16 NOTE — PHYSICAL THERAPY NOTE
PHYSICAL THERAPY EVALUATION  NAME:  Demi Donnelly  DATE: 09/16/24    AGE:   84 y.o.  Mrn:   [id number]  ADMIT DX:  Lumbar degenerative disc disease [M51.36]  Strain of lumbar region, initial encounter [S39.012A]  Ambulatory dysfunction [R26.2]  Unspecified multiple injuries, initial encounter [T07.XXXA]    Past Medical History:   Diagnosis Date    Alzheimer's dementia ([facility])     Aphasia     CHF (congestive heart failure) ([facility])     Coronary artery disease     Headache, worsening 08/14/2017    Hypercholesteremia     Hypertension     IBS (irritable bowel syndrome)     Mid back pain 03/11/2020    Neck pain 04/03/2023    Other chest pain 08/01/2016    Renal disorder     Rheumatic fever     Skin lesion      Length Of Stay: 2  Performed at least 2 patient identifiers during session: Name and Birthday  PHYSICAL THERAPY EVALUATION :    09/16/24 0848   PT Last Visit   PT Visit Date 09/16/24   Note Type   Note type Evaluation   Pain Assessment   Pain Assessment Tool FLACC   Pain Score No Pain   Pain Location/Orientation Orientation: Bilateral;Location: Leg   Pain Rating: FLACC (Rest) - Face 0   Pain Rating: FLACC (Rest) - Legs 0   Pain Rating: FLACC (Rest) - Activity 0   Pain Rating: FLACC (Rest) - Cry 1   Pain Rating: FLACC (Rest) - Consolability 0   Score: FLACC (Rest) 1   Pain Rating: FLACC (Activity) - Face 1   Pain Rating: FLACC (Activity) - Legs 0   Pain Rating: FLACC (Activity) - Activity 0   Pain Rating: FLACC (Activity) - Cry 1   Pain Rating: FLACC (Activity) - Consolability 0   Score: FLACC (Activity) 2   Restrictions/Precautions   Other Precautions Cognitive;Chair Alarm;Bed Alarm;Fall Risk;Multiple lines   Home Living   Type of Home Apartment  (elevator)   Home Layout One level;Performs ADLs on one level;Able to live on main level with bedroom/bathroom   Bathroom Shower/Tub Tub/shower unit   Bathroom Toilet Raised   Bathroom Equipment Shower chair;Grab bars in shower   Home Equipment Cane;Walker  (rollator-uses  "rollator PTA)   Additional Comments Reports living in a high rise apartment with elevator to access and uses rollator for mobility.   Prior Function   Level of [place] Independent with ADLs;Independent with functional mobility   Lives With Alone   Receives Help From Personal care attendant  (3x/week, 3 hours on Monday, Wed 2 hours and Friday 3 hours. Pt has meals on wheels.)   IADLs Family/Friend/Other provides transportation;Family/Friend/Other provides meals;Family/Friend/Other provides medication management  (microwaves meals)   Falls in the last 6 months 1 to 4   Comments Reports being independent with mobility, ADLs and has assistance for IADLs. Reports days her aide is there she has assistance for ADLs.   General   Additional Pertinent History impaired skin integrity right great toe   Cognition   Orientation Level Oriented to person;Oriented to place  (partially to time and situation reporting September then November and 2025)   Following Commands Follows one step commands without difficulty   Comments offers confused conversation at times.cues fo rincreased [place] to task, keeping eyes open.   Subjective   Subjective \"I forgot to tell the aides I wasn't going to be home.\"   RLE Assessment   RLE Assessment WFL  (3+/5)   LLE Assessment   LLE Assessment WFL  (3-/5)   Coordination   Rapid Alternating Movements Intact   Bed Mobility   Rolling L 3  Moderate assistance   Additional items Assist x 1;Increased time required;Verbal cues;LE management  (trunk management)   Supine to Sit 3  Moderate assistance   Additional items Assist x 1;Increased time required;Verbal cues;LE management  (trunk management)   Additional Comments HOB elevated 34 degrees. use of bedrail prn.   Transfers   Sit to Stand 3  Moderate assistance   Additional items Assist x 1;Increased time required;Verbal cues   Stand to Sit 3  Moderate assistance   Additional items Assist x 1;Increased time required;Verbal cues   Stand pivot 3  " Moderate assistance   Additional items Assist x 1;Increased time required;Verbal cues   Additional Comments use of RW. verbal cues for hand placement for safety with RW. sit<>stand with modAx1 with inc posteiror lean upon standing. manual cues for ant wt shfiting. spt with RW with modAx1 with manual cues for wt shifting and RW management. verbal cues for turning completely prior ot sitting.   Ambulation/Elevation   Gait pattern Wide LAYA;Improper Weight shift;Short stride;Excessively slow;Step through pattern   Gait Assistance 3  Moderate assist   Additional items Assist x 1;Verbal cues   Assistive Device Rolling walker   Distance ambulated 12'x1 with RW with modAx1 with manual cues for wt shifting amd RW management and verbal cues for inc step length and foot clearance.   Balance   Static Sitting Fair +   Dynamic Sitting Fair   Static Standing Poor +   Dynamic Standing Poor   Ambulatory Poor   Activity Tolerance   Activity Tolerance Patient limited by fatigue;Patient limited by pain  (limited by impaired cognition)   Medical Staff Made Aware OTJamesie   Assessment   Prognosis Good   Problem List Decreased strength;Decreased endurance;Impaired balance;Decreased mobility;Decreased cognition;Impaired judgement;Decreased safety awareness;Obesity;Decreased skin integrity;Pain   Barriers to Discharge Inaccessible home environment;Decreased caregiver support   Barriers to Discharge Comments lives alone, inc risk for falls, safety concerns with impaired cognition   Goals   Patient Goals go home   STG Expiration Date 09/30/24   PT Treatment Day 1   Plan   Treatment/Interventions ADL retraining;Functional transfer training;LE strengthening/ROM;Elevations;Therapeutic exercise;Endurance training;Patient/family training;Equipment eval/education;Bed mobility;Gait training;Compensatory technique education;Spoke to nursing;Spoke to case management;OT   PT Frequency 3-5x/wk   Discharge Recommendation   Rehab Resource Intensity  Level, PT II (Moderate Resource Intensity)   Additional Comments should patient/family defer  post acute rehab, would require 24/7 caregiver assistance with all mobility.   AM-PAC Basic Mobility Inpatient   Turning in Flat Bed Without Bedrails 2   Lying on Back to Sitting on Edge of Flat Bed Without Bedrails 2   Moving Bed to Chair 2   Standing Up From Chair Using Arms 3   Walk in Room 2   Climb 3-5 Stairs With Railing 1   Basic Mobility Inpatient Raw Score 12   Basic Mobility Standardized Score 32.23   [facility] Highest Level Of Mobility   -[facility] Goal 4: Move to chair/commode   -[facility] Achieved 7: Walk 25 feet or more   Additional Treatment Session   Start Time 0918   End Time 0928   Treatment Assessment Pt tolerated session fairly. She was able to achieve standing with decreased assistance but continues to require increased assistance with spt and ambulation. She was able to ambulate increased distance. She is limited by decreased strength, balance, endurance and decreased safety. She will continue to benefit from PT services to maximize LOF.   Equipment Use use of RW. min verbal cues for hand placement for safety with RW. sit<>stand with miNAx1. spt with RW with mod to miNAx1 wiht manual cues for wt shifting and RW management and verbal cues for turning compeltely prior to sitting. ambulated 28'x1 with RW with min/modAx1 wiht manual cues for wt shifting and RW management and inc assistance with turning, verbal cues for inc step lenght and foot clearance.   End of Consult   Patient Position at End of Consult Bedside chair;Bed/Chair alarm activated;All needs within reach       Pt requires PT/OT co-eval due to medical complexity, safety concerns, fall risk, significant assistance with mobility and/or cognitive-behavioral impairments.    (Please find full objective findings from PT assessment regarding body systems outlined above).     Assessment: Pt is a 84 y.o. female seen for PT evaluation s/p admission to  [facility] on 9/14/2024 with Ambulatory dysfunction.  Order placed for PT services.  Upon evaluation: Pt is presenting with impaired functional mobility due to pain, decreased strength, decreased endurance, impaired balance, gait deviations, impaired cognition, decreased safety awareness, impaired judgment, fall risk, and impaired skin integrity requiring  moderate assistance for bed mobility, moderate assistance for transfers, and moderate assistance for ambulation with RW . Pt's clinical presentation is currently unpredictable given the functional mobility deficits above, especially weakness, decreased skin integrity, decreased endurance, impaired balance, gait deviations, pain, decreased activity tolerance, decreased functional mobility tolerance, decreased safety awareness, impaired judgement, and decreased cognition, coupled with fall risks as indicated by AM-PAC 6-Clicks: 12/24 as well as hx of falls, impaired balance, polypharmacy, impaired judgement, decreased safety awareness, decreased cognition, and obesity and combined with medical complications of abnormal H&H, need for input for mobility technique/safety, and traumatic rhabdomyolysis, elevated troponin due to rhabdomyolysis .  Pt's PMHx and comorbidities that may affect physical performance and progress include: A fib, CHF, CKD, Dementia, HTN, Parkinson's Disease, obesity, and limited cognition, recent UTI, peripheral neuropathy, lumbar radiculopathy, h/o CHI, anxiety, OA, left nephrectomy, CAD, peripheral venous insufficiency, sciatica, osteoporosis. Personal factors affecting pt at time of IE include: inaccessible home environment, limited home support, inability to perform IADLs, inability to perform ADLs, inability to navigate level surfaces without external assistance, inability to ambulate household distances, limited insight into impairments, and recent fall(s)/fall history. Pt will benefit from continued skilled PT services  to address deficits as defined above and to maximize level of functional mobility to facilitate return toward PLOF and improved QOL. From PT/mobility standpoint, recommendation at time of d/c would be Level II (Moderate Resource Intensity in order to reduce fall risk and maximize pt's functional independence and consistency with mobility. Recommend trial with walker next 1-2 sessions and ther ex next 1-2 sessions.       The patient's AM-PAC Basic Mobility Inpatient Short Form Raw Score is 12. A Raw score of less than or equal to 16 suggests the patient may benefit from discharge to post-acute rehabilitation services. Please also refer to the recommendation of the Physical Therapist for safe discharge planning.       Goals: Pt will: Perform bed mobility tasks with modified Independent to reposition in bed and prepare for transfers. Pt will perform transfers with modified Independent to decrease burden of care, decrease risk for falls, and improve activity tolerance and prepare for ambulation. Pt will ambulate with RW for >/= 75' with  supervision  to decrease burden of care, decrease risk for falls, improve activity tolerance, and improve gait quality and to access home environment. Pt will participate in objective balance assessment to determine baseline fall risk. Pt will participate in SSWS assessment to determine level of mobility. Pt will increase B LE strength >/= 1/2 MMT grade to facilitate functional mobility.      Reema Hernandez, PT,DPT

## 2024-09-16 NOTE — PROGRESS NOTES
Progress Note - Hospitalist   Name: Demi Donnelly 84 y.o. female I MRN: [id number]  Unit/Bed#: -01 I Date of Admission: 9/14/2024   Date of Service: 9/16/2024 I Hospital Day: 2    Assessment & Plan  Ambulatory dysfunction  Presented to ED with falls at home. Was found on the ground at home. Complaining of low back pain. Has history of dementia.   Currently lives alone - family went over and found patient on the floor today when she didn't answer her phone  No evidence of infection at this time  Trauma workup negative  CK mildly elevated at 387 - start on gentle ivf  Mild leukocytosis, likely reactive secondary to fall. Procal negative  UA negative  PT/OT evaluations: II (Moderate Resource Intensity)   Case management consult to assist with discharge disposition planning - discharge pending to rehab  Traumatic rhabdomyolysis (HCC)  POA, due to prolonged confinement on ground following fall, evidenced by elevated CK, treated with trending CK,  mL/hr.   Trend CK - downtrending. 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 Patient lives at home alone with frequent falls. On namenda outpatient, previously stated that she wanted to stop this medication with her PCP and appears to have been discontinued with PCP. Per son, mentation waxes and wanes very frequently.   Will hold on namenda and monitor mentation - verify meds with family.  No longer on this per daughter.   Frequent reorientation  Delirium precautions  Essential hypertension  History of htn, previously on lasix 20 mg bid, losartan 25 mg qd, norvasc 5 mg qd. Patient recently stopped norvasc 5 mg as she noticed that her BP was dropped at home because of this  BP elevated in the ED initially  Orthostatics ordered - will hold norvasc here, orthostatics negative. BP acceptable currently.   Avoid hypotension  GERD without esophagitis  Continue PPI  A-fib  (HCC)  History of a fib currently on eliquis 5 mg bid. Does not appear to be on any rate controlling medications.   Continue to monitor HR  Stage 3b chronic kidney disease (HCC)  Lab Results   Component Value Date    EGFR 56 09/16/2024    EGFR 53 09/15/2024    EGFR 42 09/14/2024    CREATININE 0.92 09/16/2024    CREATININE 0.97 09/15/2024    CREATININE 1.17 09/14/2024     History of ckd, baseline creatinine around 1.1, currently around baseline  Does have history of solitary kidney - pending outpatient nephrology follow up  Avoid nephrotoxic medications, nsaids, hypotension  Elevated troponin  Non-ischemic myocardial injury, POA, due to traumatic rhabdomyolysis, evidenced by elevated troponins 35->65->73, EKG: NSR with no ischemia, treated with trending troponins and EKG.   No current CP    VTE Pharmacologic Prophylaxis: VTE Score: 5 High Risk (Score >/= 5) - Pharmacological DVT Prophylaxis Ordered: apixaban (Eliquis). Sequential Compression Devices Ordered.    Mobility:   Basic Mobility Inpatient Raw Score: 17  JH-HLM Goal: 5: Stand one or more mins  JH-HLM Achieved: 7: Walk 25 feet or more  JH-HLM Goal achieved. Continue to encourage appropriate mobility.    Patient Centered Rounds: I performed bedside rounds with nursing staff today.   Discussions with Specialists or Other Care Team Provider: nursing, case management    Education and Discussions with Family / Patient: Updated  (daughter) via phone.    Current Length of Stay: 2 day(s)  Current Patient Status: Inpatient   Certification Statement: The patient will continue to require additional inpatient hospital stay due to rhabdomyolysis, pending discharge planning  Discharge Plan: Anticipate discharge in 24-48 hrs to rehab facility.    Code Status: Level 1 - Full Code    Subjective   Seen and examined today. Said that she is feeling better today. No nausea, vomiting, diarrhea, constipation, abdominal pain, CP, SOB, fever, chills. Said she had a  little back pain, but no pain on palpation. Pain relieved with tylenol.     Objective     Vitals:   Temp (24hrs), Av.2 °F (36.2 °C), Min:97 °F (36.1 °C), Max:97.5 °F (36.4 °C)    Temp:  [97 °F (36.1 °C)-97.5 °F (36.4 °C)] 97.2 °F (36.2 °C)  HR:  [55-74] 74  Resp:  [16-20] 18  BP: (115-160)/(50-89) 115/73  SpO2:  [94 %-97 %] 96 %  Body mass index is 32.79 kg/m².     Input and Output Summary (last 24 hours):     Intake/Output Summary (Last 24 hours) at 2024 1348  Last data filed at 2024 1233  Gross per 24 hour   Intake 1068.33 ml   Output 1600 ml   Net -531.67 ml       Physical Exam  Vitals reviewed.   Constitutional:       General: She is not in acute distress.     Appearance: She is obese. She is ill-appearing. She is not toxic-appearing.   HENT:      Head: Normocephalic and atraumatic.      Mouth/Throat:      Mouth: Mucous membranes are moist.   Cardiovascular:      Rate and Rhythm: Normal rate and regular rhythm.      Heart sounds: No murmur heard.  Pulmonary:      Effort: No respiratory distress.      Breath sounds: No stridor. No wheezing, rhonchi or rales.   Abdominal:      General: Bowel sounds are normal. There is no distension.      Palpations: Abdomen is soft. There is no mass.      Tenderness: There is no abdominal tenderness.   Musculoskeletal:      Right lower leg: No edema.      Left lower leg: No edema.   Skin:     General: Skin is warm and dry.   Neurological:      Mental Status: She is alert and oriented to person, place, and time.      Comments: Answering orientation questions appropriately. Patient knew location, year, president, situation. Was aware why she was brought into the hospital   Psychiatric:         Mood and Affect: Mood normal.         Behavior: Behavior normal.          Lines/Drains:  Lines/Drains/Airways       Active Status       None                            Lab Results: I have reviewed the following results: CBC/BMP:   .     24  0508   WBC 5.04   HGB 10.4*    HCT 33.7*      SODIUM 139   K 3.8      CO2 25   BUN 19   CREATININE 0.92   GLUC 102   MG 2.2    , LFTs:   .     09/16/24  0508   AST 26   ALT 11   ALB 3.3*   TBILI 0.47   ALKPHOS 58       Results from last 7 days   Lab Units 09/16/24  0508 09/15/24  0549 09/14/24  1202   WBC Thousand/uL 5.04   < > 10.46*   HEMOGLOBIN g/dL 10.4*   < > 11.6   HEMATOCRIT % 33.7*   < > 37.6   PLATELETS Thousands/uL 232   < > 268   SEGS PCT %  --   --  82*   LYMPHO PCT %  --   --  11*   MONO PCT %  --   --  7   EOS PCT %  --   --  0    < > = values in this interval not displayed.     Results from last 7 days   Lab Units 09/16/24  0508   SODIUM mmol/L 139   POTASSIUM mmol/L 3.8   CHLORIDE mmol/L 107   CO2 mmol/L 25   BUN mg/dL 19   CREATININE mg/dL 0.92   ANION GAP mmol/L 7   CALCIUM mg/dL 8.2*   ALBUMIN g/dL 3.3*   TOTAL BILIRUBIN mg/dL 0.47   ALK PHOS U/L 58   ALT U/L 11   AST U/L 26   GLUCOSE RANDOM mg/dL 102                 Results from last 7 days   Lab Units 09/14/24  1202   PROCALCITONIN ng/ml 0.08       Recent Cultures (last 7 days):         Imaging Review: No pertinent imaging studies reviewed.  Other Studies: No additional pertinent studies reviewed.    Last 24 Hours Medication List:     Current Facility-Administered Medications:     acetaminophen (TYLENOL) tablet 650 mg, Q6H PRN    apixaban (ELIQUIS) tablet 5 mg, BID    Cholecalciferol (VITAMIN D3) tablet 1,000 Units, Daily    ezetimibe (ZETIA) tablet 10 mg, Daily    furosemide (LASIX) tablet 20 mg, BID    losartan (COZAAR) tablet 25 mg, Daily    pantoprazole (PROTONIX) EC tablet 40 mg, Early Morning    senna (SENOKOT) tablet 8.6 mg, HS    Administrative Statements   Today, Patient Was Seen By: Annette Pham PA-C    **Please Note: This note may have been constructed using a voice recognition system.**

## 2024-09-16 NOTE — WOUND OSTOMY CARE
Consult Note - Wound   Demi Donnelly 84 y.o. 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 Wound care consulted for knee abrasion which has since resolved. And Right great toe chronic wound. Seen by Podiatry 8/8/24 at [facility] out patient [facility]. Has a surgical shoe for right foot advised to wear.       Assessment Findings:   Right great toe, medial aspect has dry intact callous. No open areas noted no drainage. Surrounding edema . No drainage increased warmth or malodor. Cleansed and open to air         Plan:   Cleanse right great toe with soap and water, may leave open to air      Wound 03/26/24 Toe D1, great Right (Active)   Wound Image   09/16/24 1052   Wound Description Intact;Dry;White 09/16/24 1052   Alyson-wound Assessment Edema;Erythema 09/16/24 1052   Wound Length (cm) 0.5 cm 09/16/24 1052   Wound Width (cm) 0.5 cm 09/16/24 1052   Wound Depth (cm) 0.1 cm 09/16/24 1052   Wound Surface Area (cm^2) 0.25 cm^2 09/16/24 1052   Wound Volume (cm^3) 0.025 cm^3 09/16/24 1052   Calculated Wound Volume (cm^3) 0.03 cm^3 09/16/24 1052   Change in Wound Size % -200 09/16/24 1052   Drainage Amount None 09/16/24 1052   Non-staged Wound Description Partial thickness 09/16/24 1052   Treatments Cleansed;Site care 09/16/24 1052   Dressing Open to air 09/16/24 1052   Dressing Changed Reinforced 09/16/24 0003   Patient Tolerance Tolerated well 09/16/24 1052   Dressing Status Clean;Dry;Intact 09/16/24 0003   Wound Care will sign off  Call or Secure Chat  with any questions      Samra BERRYN RN CWON

## 2024-09-16 NOTE — CASE MANAGEMENT
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[facility]) 2024     Frequent falls 2024     Visual hallucinations 2023     Spinal stenosis of lumbar region with neurogenic claudication 2023     Lumbar spondylosis 2023     Skin ulcer of right great toe, limited to breakdown of skin ([facility]) 2023     Physical deconditioning 2022     Anemia 2022     Ground glass opacity present on imaging of lung 2021     Prediabetes 2021     Old myocardial infarction 2021     Fecal incontinence 2021     LUPE (acute kidney injury) ([facility]) 2020     Other specified inflammatory spondylopathies, lumbosacral region ([facility]) 2020     Obesity, morbid ([facility]) 2020     Constipation 2020     Dementia due to Parkinson's disease with behavioral disturbance ([facility]) 2020     Renal cyst 10/23/2020     CAD (coronary artery disease) 2019     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     Single kidney 2019     Vitamin D deficiency 2019     Anxiety 2019     Abnormal stress test 2019     Ambulatory dysfunction 2019     A-fib ([facility]) 2019     Stage 3b chronic kidney disease ([facility]) 2019     Junctional bradycardia 2019     BMI 36.0-36.9,adult 2019     Chronic left shoulder pain 10/09/2018     H/O: rheumatic fever 2018     Sciatica 2018     Peripheral neuropathy 2018     B12 deficiency 2018     Osteoporosis 2018     Closed head injury 2017     Lumbar radiculopathy 2017     Chronic  diastolic CHF (congestive heart failure) ([facility]) 05/15/2017     Low serum vitamin B12 02/13/2017     Peripheral edema 02/13/2017     Biceps tendinitis of right shoulder 04/11/2016     Diastolic dysfunction 04/11/2016     Lateral epicondylitis of right elbow 04/11/2016     Essential hypertension 03/14/2016     Dyspnea on exertion 03/14/2016     GERD without esophagitis 03/14/2016     Osteoarthritis 09/17/2012     Peripheral venous insufficiency 09/17/2012     Prolapse of vaginal walls 12/30/2003       LOS (days): 2  Geometric Mean LOS (GMLOS) (days): 3.3  Days to GMLOS:1.2     OBJECTIVE:  Risk of Unplanned Readmission Score: 14.75         Current admission status: Inpatient   Preferred Pharmacy:   [id number] [place]  [address]  [address]  [place] [address]  Phone: [phone] Fax: [phone]    Primary Care Provider: Kenroy Jerome MD    Primary Insurance: [employer]  Secondary Insurance:     DISCHARGE DETAILS:        Notified Daughter, Armand, that patient did get her 1st choice, [facility].  Next step is authorization. Tasked CM support to obtained authorization.  Dc plan is [facility]                                                                             Accepting Facility Name, City & State : [facility] [place]  Receiving Facility/Agency Phone Number: [phone]  Facility/Agency Fax Number: Fax: [phone]

## 2024-09-16 NOTE — OCCUPATIONAL THERAPY NOTE
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)    Traumatic rhabdomyolysis (HCC)    Elevated troponin    Past Medical History  Past Medical History:   Diagnosis Date    Alzheimer's dementia (HCC)     Aphasia     CHF (congestive heart failure) ([facility])     Coronary artery disease     Headache, worsening 08/14/2017    Hypercholesteremia     Hypertension     IBS (irritable bowel syndrome)     Mid back pain 03/11/2020    Neck pain 04/03/2023    Other chest pain 08/01/2016    Renal disorder     Rheumatic fever     Skin lesion      Past Surgical History  Past Surgical History:   Procedure Laterality Date    ANKLE FRACTURE SURGERY      LAST ASSESSED [id number]    HYSTERECTOMY      KIDNEY SURGERY      NEPHRECTOMY      TONSILLECTOMY          09/16/24 0849   Note Type   Note type Evaluation   Pain Assessment   Pain Assessment Tool FLACC   Pain Location/Orientation Orientation: Bilateral;Location: Leg   Pain Rating: FLACC (Rest) - Face 0   Pain Rating: FLACC (Rest) - Legs 0   Pain Rating: FLACC (Rest) - Activity 0   Pain Rating: FLACC (Rest) - Cry 1   Pain Rating: FLACC (Rest) - Consolability 0   Score: FLACC (Rest) 1   Pain Rating: FLACC (Activity) - Face 1   Pain Rating: FLACC (Activity) - Legs 0   Pain Rating: FLACC (Activity) - Activity 0   Pain Rating: FLACC (Activity) - Cry 1   Pain Rating: FLACC (Activity) - Consolability 0   Score: FLACC (Activity) 2   Restrictions/Precautions   Other Precautions Cognitive;Chair Alarm;Bed Alarm;Fall Risk;Pain;Multiple lines   Home Living   Type of Home Apartment  (Elevator entry)   Home Layout One level;Performs ADLs on one level;Able to live on main level with bedroom/bathroom   Bathroom  "Shower/Tub Tub/shower unit   Bathroom Toilet Raised   Bathroom Equipment Shower chair;Grab bars in shower   Home Equipment Walker;Cane;Other (Comment)  (Rollator)   Additional Comments Pt reports living in a high rise apt with elevator entry and was using rollator for functional mobility PTA.   Prior Function   Level of [place] Independent with ADLs;Independent with functional mobility;Needs assistance with IADLS   Lives With Alone   Receives Help From Personal care attendant  (3x/week- Monday (3 hours), Wednesday (2 hours), Friday (3 hours))   IADLs Family/Friend/Other provides transportation;Family/Friend/Other provides meals;Family/Friend/Other provides medication management  (Pt has Meals on Wheels, is able to microwave the meals)   Falls in the last 6 months 1 to 4   Comments PTA, pt reports independence with ADLs and functional mobility, has assistance with IADLs. She reports when the aide is there they will assist her with ADLs.   Subjective   Subjective \"I'm 4'7'' too\"   ADL   UB Dressing Assistance 3  Moderate Assistance   UB Dressing Deficit Thread RUE;Thread LUE;Pull over head;Pull around back;Pull down in back   LB Dressing Assistance 1  Total Assistance   LB Dressing Deficit Don/doff R sock;Don/doff L sock   Additional Comments Pt requiring total assistance to doff/don B socks while seated EOB. Overall, anticipate for LB ADLs to require maximal/total assistance at this time. UB ADLs with at least moderate assistance at this time as she demonstrates <50% AROM at this time.   Bed Mobility   Rolling L 3  Moderate assistance   Additional items Assist x 1;Increased time required;Verbal cues;LE management;Other;Comment  (Trunk management)   Supine to Sit 3  Moderate assistance   Additional items Assist x 1;Increased time required;Verbal cues;LE management;Other;Comment;HOB elevated  (Trunk management)   Additional Comments Pt received supine in bed upon start of session. Pt supine > sit EOB with mod " assist x 1 for LE management and trunk management. Pt reporting her bed is adjustable at home.   Transfers   Sit to Stand 3  Moderate assistance   Additional items Assist x 1;Increased time required;Verbal cues   Stand to Sit 3  Moderate assistance   Additional items Assist x 1;Increased time required;Verbal cues   Stand pivot 3  Moderate assistance   Additional items Assist x 1;Increased time required;Verbal cues   Additional Comments All functional transfers with RW. VCs for safe transfer techniques and sequencing. Manual cues for safe RW management while turning.   Functional Mobility   Functional Mobility 3  Moderate assistance   Additional Comments Pt participated in short functional distance in room, walking to bathroom with mod assist x 1 and RW. Manual cues for RW management.   Balance   Static Sitting Fair +   Dynamic Sitting Fair   Static Standing Poor +   Dynamic Standing Poor +   Activity Tolerance   Activity Tolerance Patient limited by fatigue;Patient limited by pain  (Pt limited by cognitive status)   Medical Staff Made Aware PTReemaE Assessment   RUE Assessment X  (Pt demonstrating less than 50% AROM at this time. Able to passively range to WFL. Strength grossly 2+/5)   LUE Assessment   LUE Assessment X  (Pt demonstrating less than 50% AROM at this time. Able to passively range to WFL. Strength grossly 2+/5)   Hand Function   Gross Motor Coordination Impaired   Fine Motor Coordination Impaired   Sensation   Light Touch No apparent deficits   Vision-Basic Assessment   Current Vision Wears glasses only for reading   Cognition   Overall Cognitive Status Impaired   Arousal/Participation Responsive;Cooperative   Attention Attends with cues to redirect   Orientation Level Oriented to person;Oriented to place  (Partially to time with month, not year. Partially to situation)   Memory Decreased short term memory;Decreased recall of recent events   Following Commands Follows one step commands with  "increased time or repetition   Comments Cues for increased alertness/to open her eyes. Questionable lethargy vs lightheadedness/dizziness, pt reporting \"I always do that\" (referring to closing her eyes). Alzheimer's dementia and aphasia noted in chart.   Assessment   Limitation Decreased ADL status;Decreased UE ROM;Decreased UE strength;Decreased Safe judgement during ADL;Decreased cognition;Decreased endurance;Decreased fine motor control;Decreased self-care trans;Decreased high-level ADLs   Prognosis Fair   Assessment Pt is a 84 y.o. female, admitted [facility]14/[facility]24 d/t experiencing fall. Dx: Ambulatory dysfunction. Pt with PMHx impacting their performance during ADL tasks, including: dementia due to PD with behavioral disturbance, HTN, GERD without esophagitis, Afib, CKD 3b, recent UTI, CHF. Prior to admission to the hospital Pt was performing ADLs without physical assistance. IADLs with physical assistance. Functional transfers/ambulation without physical assistance. Cognitive status PTA was impaired. OT order placed to assess Pt's ADLs, cognitive status, and performance during functional tasks in order to maximize safety and independence while making most appropriate d/c recommendations. PT/OT co-evaluation completed at this time d/t significant mobility deficits and safety concerns. Pt's clinical presentation is currently unstable/unpredictable given new onset deficits that affect Pt's occupational performance and ability to safely return to PLOF including decrease activity tolerance, decrease standing balance, decrease performance during ADL tasks, decrease cognition, decrease BUE ROM, decrease UB MS, increased pain, decrease generalized strength, decrease activity engagement, decrease performance during functional transfers, decrease FM control, decrease GM control, limited family support, high fall risk, limited insight to deficits, and decreased alertness combined with medical complications of " hypotension, pain impacting overall mobility status, abnormal renal lab values, A-fib, abnormal H&H, abnormal CBC, incontinence, and need for input for mobility technique/safety. Personal factors affecting Pt at time of initial evaluation include: limited home support, advanced age, inability to perform IADLs, inability to perform ADLs, inability to navigate community distances, limited insight into impairments, decreased initiation and engagement, and recent fall(s)/fall history. Pt will benefit from continued skilled OT services to address deficits as defined above and to maximize level independence/participation during ADLs and functional tasks to facilitate return toward PLOF and improved quality of life. From an occupational therapy standpoint, Level II (Moderate Resource Intensity is recommended upon d/c.   Goals   Patient Goals to use the bathroom   Plan   Treatment Interventions ADL retraining;Functional transfer training;UE strengthening/ROM;Endurance training;Cognitive reorientation;Patient/family training;Equipment evaluation/education;Fine motor coordination activities;Compensatory technique education;Continued evaluation;Energy conservation;Activityengagement   Goal Expiration Date 09/30/24   OT Treatment Day 1   OT Frequency 3-5x/wk   Discharge Recommendation   Rehab Resource Intensity Level, OT II (Moderate Resource Intensity)   AM-PAC Daily Activity Inpatient   Lower Body Dressing 1   Bathing 2   Toileting 2   Upper Body Dressing 2   Grooming 2   Eating 3   Daily Activity Raw Score 12   Daily Activity Standardized Score (Calc for Raw Score >=11) 30.6   AM-PAC Applied Cognition Inpatient   Following a Speech/Presentation 1   Understanding Ordinary Conversation 3   Taking Medications 2   Remembering Where Things Are Placed or Put Away 2   Remembering List of 4-5 Errands 1   Taking Care of Complicated Tasks 1   Applied Cognition Raw Score 10   Applied Cognition Standardized Score 24.98   Additional  Treatment Session   Start Time 0914   End Time 0929   Treatment Assessment Pt tolerated treatment fairly. She is limited by fatigue and cognitive status. Pt will benefit from continued skilled OT services to maximize her safety and independence with ADLs and functional mobility. All functional transfers with RW. Pt participated in functional transfer to/from slightly raised toilet surface with minimal assistance. Pt urinated and had a bowel movement while seated on toilet. Total assistance for pericare, for clothing management down/up. Pt participated in short functional distance in room, varying between minimal and moderate assistance with manual cues for RW management throughout.   End of Consult   Patient Position at End of Consult Bedside chair;Bed/Chair alarm activated;All needs within reach      09/16/24 0903 09/16/24 0906 09/16/24 0910   Vitals   Pulse 66 70 72   Blood Pressure 135/50 128/59 119/66   MAP (mmHg) 78 82 84   Patient Position - Orthostatic VS Lying - Orthostatic VS Sitting - Orthostatic VS Standing - Orthostatic VS      09/16/24 0915   Vitals   Pulse 74   Blood Pressure 115/73   MAP (mmHg) 87   Patient Position - Orthostatic VS Sitting  (After ambulation)     The patient's raw score on the -PAC Daily Activity Inpatient Short Form is 12. A raw score of less than 19 suggests the patient may benefit from discharge to post-acute rehabilitation services. Please refer to the recommendation of the Occupational Therapist for safe discharge planning.    Pt goals to be met by 9/30/2024:    Pt will demonstrate ability to complete grooming/hygiene tasks @ supervision after set-up.  Pt will demonstrate ability to complete supine<>sit @ supervision in order to increase safety and independence during ADL tasks.  Pt will demonstrate ability to complete UB ADLs including washing/dressing @ supervision in order to increase performance and participation during meaningful tasks  Pt will demonstrate ability to  complete LB dressing @ minimal assistance in order to increase safety and independence during meaningful tasks.   Pt will demonstrate ability to robinson/doff socks/shoes while sitting EOB/chair @ minimal assistance in order to increase safety and independence during meaningful tasks.   Pt will demonstrate ability to complete toileting tasks including CM and pericare @ minimal assistance in order to increase safety and independence during meaningful tasks.  Pt will demonstrate ability to complete EOB, chair, toilet/commode transfers @ supervision in order to increase performance and participation during functional tasks.  Pt will demonstrate ability to stand for 5 minutes while maintaining F+ balance with use of rollator for UB support PRN.  Pt will demonstrate ability to tolerate 20 minute OT session with no vc'ing for deep breathing or use of energy conservation techniques in order to increase activity tolerance during functional tasks.   Pt will demonstrate Good carryover of use of energy conservation/compensatory strategies during ADLs and functional tasks in order to increase safety and reduce risk for falls.   Pt will demonstrate Good attention and participation in continued evaluation of functional ambulation house hold distances in order to assist with safe d/c planning.  Pt will attend to continued cognitive assessments 100% of the time in order to provide most appropriate d/c recommendations.   Pt will follow 100% simple 2-step commands and be A&O x3 consistently with environmental cues to increase participation in functional activities.   Pt will identify 3 areas of interest/hobbies and 1 intervention on how to incorporate into daily life in order to increase interaction with environment and peers as well as increase participation in meaningful tasks.   Pt will demonstrate 100% carryover of BUE HEP in order to increase BUE MS and increase performance during functional tasks upon d/c home.    Dominick Simpson,  MS, OTR/L

## 2024-09-16 NOTE — UTILIZATION REVIEW
"Initial Clinical Review    Admission: Date/Time/Statement:   Admission Orders (From admission, onward)       Ordered        09/14/24 1321  INPATIENT ADMISSION  Once                          Orders Placed This Encounter   Procedures    INPATIENT ADMISSION     Standing Status:   Standing     Number of Occurrences:   1     Order Specific Question:   Level of Care     Answer:   Med Surg [16]     Order Specific Question:   Estimated length of stay     Answer:   More than 2 Midnights     Order Specific Question:   Certification     Answer:   I certify that inpatient services are medically necessary for this patient for a duration of greater than two midnights. See H&P and MD Progress Notes for additional information about the patient's course of treatment.     ED Arrival Information       Expected   -    Arrival   9/14/2024 11:52    Acuity   Urgent              Means of arrival   Ambulance    Escorted by   [facility]    Service   Hospitalist    Admission type   Emergency              Arrival complaint   Fall             Chief Complaint   Patient presents with    Trauma     Pt has unwitnessed fall at home per family. + BT, - loc, - HS. Pt complaining fo back and R leg pain. Pt recently had UTI with burning and frequency        Initial Presentation: 84 y.o. female presents to ED via  EMS from home after a fall the day of admission.  Lives alone, fall unwitnessed. Found on floor  by family.  Poor historian but denies  LOC  or head strike. Complains of  right hip  and right lower back pain.  Had a recent  UTI and  completed course of  antibiotics.    When asking patient what happened today, she said \"my daughter in law fell, not me, I was waiting for my mammogram\".  PMH  is dementia, Parkinsons,   HTN, CKD  and CHF.   CK mildly  elevated at  387. U/A  negative.   Trauma work up negative.  Admit  IP with  Ambulatory dysfunction  and  plan is  PT/OT, monitor labs,  gentle  IVF,  frequent " reorientation, delirium precautions  and continue home meds.    Anticipated Length of Stay/Certification Statement: Patient will be admitted on an inpatient basis with an anticipated length of stay of greater than 2 midnights secondary to ambulatory dysfunction, pt/ot ledaal.       Date:   9/15   Day 2:   Needs  PT/OT.   Monitor labs.  No evidence of infection.  On gentle  IVF.  Pleasantly confused.   Continue current meds.     Date:   9/16  Day 3: Has surpassed a 2nd midnight with active treatments and services.  Answers  orientation  questions appropriately.  Knows location, year, president and situation.   Knows  why she is hospitalized.    Continue all current meds.  Needs  PT/OT.       ED Triage Vitals   Temperature Pulse Respirations Blood Pressure SpO2 Pain Score   09/14/24 1155 09/14/24 1155 09/14/24 1155 09/14/24 1155 09/14/24 1155 09/14/24 1158   97.5 °F (36.4 °C) 74 18 (!) 178/75 100 % 4     Weight (last 2 days)       Date/Time Weight    09/14/24 14:24:20 64 (141.09)            Vital Signs (last 3 days)       Date/Time Temp Pulse Resp BP MAP (mmHg) SpO2 O2 Device Patient Position - Orthostatic VS [place] Coma Scale Score Pain    09/16/24 0848 -- -- -- -- -- -- -- -- -- No Pain    09/16/24 07:09:03 97.2 °F (36.2 °C) 55 18 160/54 89 95 % -- -- -- --    09/16/24 0003 -- -- -- -- -- -- None (Room air) -- 14 3    09/15/24 23:22:08 97 °F (36.1 °C) 62 16 152/61 91 97 % None (Room air) Lying -- --    09/15/24 1716 -- -- -- -- -- -- -- -- -- 3    09/15/24 1409 97.5 °F (36.4 °C) -- 20 140/89 106 -- None (Room air) Lying -- --    09/15/24 13:31:34 97.5 °F (36.4 °C) 75 20 140/89 106 98 % -- -- -- --    09/15/24 0813 -- -- -- -- -- -- None (Room air) -- 14 No Pain    09/15/24 07:23:25 97.2 °F (36.2 °C) 69 18 162/66 98 96 % -- -- -- --    09/15/24 0546 -- -- -- -- -- -- -- -- -- 3    09/14/24 2307 -- -- -- -- -- -- -- -- -- 3    09/14/24 21:46:09 -- 69 -- 142/68 93 97 % -- -- -- --    09/14/24 20:47:11 97.9 °F (36.6 °C)  67 -- 117/53 74 96 % -- -- -- --    09/14/24 2015 -- -- -- -- -- -- None (Room air) -- 14 No Pain    09/14/24 16:30:13 -- 80 -- 122/73 89 97 % -- Standing - Orthostatic VS -- --    09/14/24 16:26:03 97.5 °F (36.4 °C) 70 -- 130/71 91 97 % -- Sitting - Orthostatic VS -- --    09/14/24 16:24:13 97.5 °F (36.4 °C) 64 18 136/54 81 96 % -- Lying - Orthostatic VS -- --    09/14/24 1552 -- -- -- -- -- -- -- -- -- 3    09/14/24 14:24:20 97.6 °F (36.4 °C) 74 18 156/72 100 96 % None (Room air) Lying 14 No Pain    09/14/24 1330 -- 66 18 157/68 98 98 % None (Room air) Lying -- --    09/14/24 1315 -- 66 17 188/68 -- 99 % None (Room air) Lying 14 4    09/14/24 1300 -- 79 24 180/73 105 99 % -- -- 15 --    09/14/24 1245 -- 73 22 164/67 97 99 % -- -- 15 --    09/14/24 1230 -- 69 18 169/72 104 99 % -- -- 15 --    09/14/24 1215 -- 72 18 139/61 -- 99 % None (Room air) -- 15 4    09/14/24 1200 -- 69 18 178/75 108 100 % None (Room air) -- 15 4    09/14/24 11:58:08 -- -- -- -- -- -- -- -- -- 4    09/14/24 11:55:33 97.5 °F (36.4 °C) 74 18 178/75 -- 100 % None (Room air) Lying 15 --              Pertinent Labs/Diagnostic Test Results:   Radiology:  XR hip/pelv 2-3 vws right if performed   ED Interpretation by Norm Fraga MD (09/14 1229)   No fracture or dislocation      Final Interpretation by Wilfrid Javed MD (09/16 7739)      No acute osseous abnormality.         Computerized Assisted Algorithm (CAA) may have been used to analyze all applicable images.            Workstation performed: [id number]         TRAUMA - CT chest abdomen pelvis wo contrast   Final Interpretation by Irma Roberts MD (09/14 7468)   No free fluid to suggest any significant visceral injury      Incidentally detected 5 mm left upper lobe lung nodule. Based on current Fleischner Society  follow-up CT at 12 months can be considered. If patient is at risk for primary metastatic disease short interval follow-up at 3 months suggested      Erosive changes  sacroiliac joint correlate with sacroiliitis      The study was marked in EPIC for significant notification.         Workstation performed: [id number]         CT recon only thoracolumbar (No Charge)   Final Interpretation by Irma Roberts MD (09/14 1253)      No acute compression collapse of the vertebra            Workstation performed: [id number]         TRAUMA - CT head wo contrast   Final Interpretation by Irma Roberts MD (09/14 1254)      No acute intracranial hemorrhage seen   No mass effect or midline shift seen                  Workstation performed: [id number]         TRAUMA - CT spine cervical wo contrast   Final Interpretation by Irma Roberts MD (09/14 1234)      No acute compression collapse of the vertebra.                  Workstation performed: [id number]           Cardiology:  ECG 12 lead   Final Result by Mu Mcbride MD (09/16 0757)   Sinus rhythm with 1st degree A-V block with Premature atrial complexes    with Aberrant conduction   Left posterior fascicular block   When compared with ECG of 16-JUN-2022 07:40,   Aberrant conduction is now Present   Left posterior fascicular block is now Present   T wave inversion now evident in Inferior leads      Confirmed by Mu Mcbride ([id number]) on 9/16/2024 7:57:05 AM            Results from last 7 days   Lab Units 09/16/24  0508 09/15/24  0549 09/14/24  1202   WBC Thousand/uL 5.04 5.98 10.46*   HEMOGLOBIN g/dL 10.4* 10.7* 11.6   HEMATOCRIT % 33.7* 34.6* 37.6   PLATELETS Thousands/uL 232 243 268   TOTAL NEUT ABS Thousands/µL  --   --  8.52*         Results from last 7 days   Lab Units 09/16/24  0508 09/15/24  0549 09/14/24  1202   SODIUM mmol/L 139 141 143   POTASSIUM mmol/L 3.8 4.0 4.3   CHLORIDE mmol/L 107 108 108   CO2 mmol/L 25 25 27   ANION GAP mmol/L 7 8 8   BUN mg/dL 19 26* 35*   CREATININE mg/dL 0.92 0.97 1.17   EGFR ml/min/1.73sq m 56 53 42   CALCIUM mg/dL 8.2* 8.7 9.4   MAGNESIUM mg/dL 2.2 2.1 2.3     Results from last 7 days   Lab Units  09/16/24  0508 09/15/24  0549 09/14/24  1202   AST U/L 26 26 19   ALT U/L 11 13 12   ALK PHOS U/L 58 65 70   TOTAL PROTEIN g/dL 5.4* 5.8* 6.5   ALBUMIN g/dL 3.3* 3.5 3.9   TOTAL BILIRUBIN mg/dL 0.47 0.56 0.51         Results from last 7 days   Lab Units 09/16/24  0508 09/15/24  0549 09/14/24  1202   GLUCOSE RANDOM mg/dL 102 94 101           Results from last 7 days   Lab Units 09/16/24  0508 09/15/24  0549 09/14/24  1202   CK TOTAL U/L 444* 538* 387*     Results from last 7 days   Lab Units 09/14/24  1619 09/14/24  1437 09/14/24  1202   HS TNI 0HR ng/L  --   --  35   HS TNI 2HR ng/L  --  65*  --    HSTNI D2 ng/L  --  30*  --    HS TNI 4HR ng/L 73*  --   --    HSTNI D4 ng/L 38*  --   --              Results from last 7 days   Lab Units 09/14/24  1202   TSH 3RD GENERATON uIU/mL 0.972     Results from last 7 days   Lab Units 09/14/24  1202   PROCALCITONIN ng/ml 0.08               Results from last 7 days   Lab Units 09/14/24  1304   CLARITY UA  Clear   COLOR UA  Yellow   SPEC GRAV UA  1.015   PH UA  6.0   GLUCOSE UA mg/dl Negative   KETONES UA mg/dl Negative   BLOOD UA  Trace-Intact*   PROTEIN UA mg/dl Trace*   NITRITE UA  Negative   BILIRUBIN UA  Negative   UROBILINOGEN UA E.U./dl 0.2   LEUKOCYTES UA  Negative   WBC UA /hpf None Seen   RBC UA /hpf 0-5   BACTERIA UA /hpf None Seen   EPITHELIAL CELLS WET PREP /hpf Occasional                   Present on Admission:   Essential hypertension   GERD without esophagitis   A-fib (HCC)   Ambulatory dysfunction   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 female  Admission Orders:  Scheduled Medications:  apixaban, 5 mg, Oral, BID  cholecalciferol, 1,000 Units, Oral,  Daily  ezetimibe, 10 mg, Oral, Daily  furosemide, 20 mg, Oral, BID  losartan, 25 mg, Oral, Daily  pantoprazole, 40 mg, Oral, Early Morning  senna, 8.6 mg, Oral, HS      Continuous IV Infusions:  multi-electrolyte, 100 mL/hr, Intravenous, Continuous      PRN Meds:  acetaminophen, 650 mg, Oral, Q6H PRN      Network Utilization Review Department  ATTENTION: Please call with any questions or concerns to [phone] and carefully listen to the prompts so that you are directed to the right person. All voicemails are confidential.   For Discharge needs, contact Care Management DC Support Team at [phone] opt. 2  Send all requests for admission clinical reviews, approved or denied determinations and any other requests to dedicated fax number below belonging to the [place] where the patient is receiving treatment. List of dedicated fax numbers for the Facilities:  FACILITY NAME UR FAX NUMBER   ADMISSION DENIALS (Administrative/Medical Necessity) [phone]   DISCHARGE SUPPORT TEAM (NETWORK) [phone]   PARENT CHILD HEALTH (Maternity/NICU/Pediatrics) [phone]   [facility] [phone]   [facility] [phone]   [facility] [phone]   [facility] [phone]   [facility] [phone]   [facility] [phone]   [facility] [phone]   [facility] [phone]   [facility] [phone]   [facility] [phone]   [facility] [phone]   [facility] [phone]

## 2024-09-16 NOTE — CASE MANAGEMENT
Attempted to submit on carelon for snf auth , needs tax id to go further awaiting response from facility . Attempted calling facility and no answer . Will pick back up when tax id is received . Cm notified

## 2024-09-17 VITALS
WEIGHT: 141.09 LBS | DIASTOLIC BLOOD PRESSURE: 49 MMHG | RESPIRATION RATE: 18 BRPM | BODY MASS INDEX: 32.65 KG/M2 | SYSTOLIC BLOOD PRESSURE: 129 MMHG | OXYGEN SATURATION: 98 % | HEIGHT: 55 IN | HEART RATE: 68 BPM | TEMPERATURE: 97 F

## 2024-09-17 LAB
ANION GAP SERPL CALCULATED.3IONS-SCNC: 6 MMOL/L (ref 4–13)
BUN SERPL-MCNC: 22 MG/DL (ref 5–25)
CALCIUM SERPL-MCNC: 8.6 MG/DL (ref 8.4–10.2)
CHLORIDE SERPL-SCNC: 106 MMOL/L (ref 96–108)
CK SERPL-CCNC: 220 U/L (ref 26–192)
CO2 SERPL-SCNC: 27 MMOL/L (ref 21–32)
CREAT SERPL-MCNC: 1.03 MG/DL (ref 0.6–1.3)
ERYTHROCYTE [DISTWIDTH] IN BLOOD BY AUTOMATED COUNT: 14.6 % (ref 11.6–15.1)
GFR SERPL CREATININE-BSD FRML MDRD: 49 ML/MIN/1.73SQ M
GLUCOSE SERPL-MCNC: 106 MG/DL (ref 65–140)
HCT VFR BLD AUTO: 36.2 % (ref 34.8–46.1)
HGB BLD-MCNC: 11.3 G/DL (ref 11.5–15.4)
MAGNESIUM SERPL-MCNC: 2 MG/DL (ref 1.9–2.7)
MCH RBC QN AUTO: 28.3 PG (ref 26.8–34.3)
MCHC RBC AUTO-ENTMCNC: 31.2 G/DL (ref 31.4–37.4)
MCV RBC AUTO: 91 FL (ref 82–98)
PLATELET # BLD AUTO: 242 THOUSANDS/UL (ref 149–390)
PMV BLD AUTO: 11.3 FL (ref 8.9–12.7)
POTASSIUM SERPL-SCNC: 4.1 MMOL/L (ref 3.5–5.3)
RBC # BLD AUTO: 3.99 MILLION/UL (ref 3.81–5.12)
SODIUM SERPL-SCNC: 139 MMOL/L (ref 135–147)
WBC # BLD AUTO: 5.4 THOUSAND/UL (ref 4.31–10.16)

## 2024-09-17 PROCEDURE — 83735 ASSAY OF MAGNESIUM: CPT

## 2024-09-17 PROCEDURE — 82550 ASSAY OF CK (CPK): CPT

## 2024-09-17 PROCEDURE — 85027 COMPLETE CBC AUTOMATED: CPT

## 2024-09-17 PROCEDURE — 99239 HOSP IP/OBS DSCHRG MGMT >30: CPT

## 2024-09-17 PROCEDURE — 80048 BASIC METABOLIC PNL TOTAL CA: CPT

## 2024-09-17 RX ADMIN — EZETIMIBE 10 MG: 10 TABLET ORAL at 09:08

## 2024-09-17 RX ADMIN — LOSARTAN POTASSIUM 25 MG: 25 TABLET, FILM COATED ORAL at 09:10

## 2024-09-17 RX ADMIN — ACETAMINOPHEN 325MG 650 MG: 325 TABLET ORAL at 09:12

## 2024-09-17 RX ADMIN — FUROSEMIDE 20 MG: 20 TABLET ORAL at 09:10

## 2024-09-17 RX ADMIN — PANTOPRAZOLE SODIUM 40 MG: 40 TABLET, DELAYED RELEASE ORAL at 05:09

## 2024-09-17 RX ADMIN — APIXABAN 5 MG: 5 TABLET, FILM COATED ORAL at 09:08

## 2024-09-17 RX ADMIN — Medication 1000 UNITS: at 09:08

## 2024-09-17 NOTE — ASSESSMENT & PLAN NOTE
Continue PPI  
History of a fib currently on eliquis 5 mg bid. Does not appear to be on any rate controlling medications.   Continue to monitor HR  
History of dementia. Patient lives at home alone with frequent falls. On namenda outpatient, previously stated that she wanted to stop this medication with her PCP and appears to have been discontinued with PCP. Per son, mentation waxes and wanes very frequently.   Will hold on namenda and monitor mentation - verify meds with family.  Frequent reorientation  Delirium precautions  
History of dementia. Patient lives at home alone with frequent falls. On namenda outpatient, previously stated that she wanted to stop this medication with her PCP and appears to have been discontinued with PCP. Per son, mentation waxes and wanes very frequently.   Will hold on namenda and monitor mentation - verify meds with family.  Frequent reorientation  Delirium precautions  
History of dementia. Patient lives at home alone with frequent falls. On namenda outpatient, previously stated that she wanted to stop this medication with her PCP and appears to have been discontinued with PCP. Per son, mentation waxes and wanes very frequently.   Will hold on namenda and monitor mentation - verify meds with family.  No longer on this per daughter.   Frequent reorientation  Delirium precautions  
History of dementia. Patient lives at home alone with frequent falls. On namenda outpatient, previously stated that she wanted to stop this medication with her PCP and appears to have been discontinued with PCP. Per son, mentation waxes and wanes very frequently.   Will hold on namenda and monitor mentation - verify meds with family.  No longer on this per daughter. Outpatient follow up with neurology - recommend patient and family follow up with neurology on discharge.   Frequent reorientation  Delirium precautions  
History of htn, previously on lasix 20 mg bid, losartan 25 mg qd, norvasc 5 mg qd. Patient recently stopped norvasc 5 mg as she noticed that her BP was dropped at home because of this  BP elevated in the ED initially  Orthostatics ordered - will hold norvasc here  Avoid hypotension  
History of htn, previously on lasix 20 mg bid, losartan 25 mg qd, norvasc 5 mg qd. Patient recently stopped norvasc 5 mg as she noticed that her BP was dropped at home because of this  BP elevated in the ED initially  Orthostatics ordered - will hold norvasc here, orthostatics negative. BP acceptable currently.   Avoid hypotension  
History of htn, previously on lasix 20 mg bid, losartan 25 mg qd, norvasc 5 mg qd. Patient recently stopped norvasc 5 mg as she noticed that her BP was dropped at home because of this  BP elevated in the ED initially  Orthostatics ordered - will hold norvasc here, orthostatics negative. BP acceptable currently.   Avoid hypotension  
History of htn, previously on lasix 20 mg bid, losartan 25 mg qd, norvasc 5 mg qd. Patient recently stopped norvasc 5 mg as she noticed that her BP was dropped at home because of this  BP elevated in the ED initially  Orthostatics ordered - will hold norvasc here, orthostatics negative. BP acceptable currently.   Continue to hold norvasc on discharge. BP acceptable.   Avoid hypotension  
Lab Results   Component Value Date    EGFR 42 09/14/2024    EGFR 37 09/02/2024    EGFR 49 09/19/2023    CREATININE 1.17 09/14/2024    CREATININE 1.29 09/02/2024    CREATININE 1.04 09/19/2023     History of ckd, baseline creatinine around 1.1, currently around baseline  Does have history of solitary kidney - pending outpatient nephrology follow up  Avoid nephrotoxic medications, nsaids, hypotension  
Lab Results   Component Value Date    EGFR 49 09/17/2024    EGFR 56 09/16/2024    EGFR 53 09/15/2024    CREATININE 1.03 09/17/2024    CREATININE 0.92 09/16/2024    CREATININE 0.97 09/15/2024     History of ckd, baseline creatinine around 1.1, currently around baseline  Does have history of solitary kidney - pending outpatient nephrology follow up  Avoid nephrotoxic medications, nsaids, hypotension  
Lab Results   Component Value Date    EGFR 53 09/15/2024    EGFR 42 09/14/2024    EGFR 37 09/02/2024    CREATININE 0.97 09/15/2024    CREATININE 1.17 09/14/2024    CREATININE 1.29 09/02/2024     History of ckd, baseline creatinine around 1.1, currently around baseline  Does have history of solitary kidney - pending outpatient nephrology follow up  Avoid nephrotoxic medications, nsaids, hypotension  
Lab Results   Component Value Date    EGFR 56 09/16/2024    EGFR 53 09/15/2024    EGFR 42 09/14/2024    CREATININE 0.92 09/16/2024    CREATININE 0.97 09/15/2024    CREATININE 1.17 09/14/2024     History of ckd, baseline creatinine around 1.1, currently around baseline  Does have history of solitary kidney - pending outpatient nephrology follow up  Avoid nephrotoxic medications, nsaids, hypotension  
Non-ischemic myocardial injury, POA, due to traumatic rhabdomyolysis, evidenced by elevated troponins 35->65->73, EKG: NSR with no ischemia, treated with trending troponins and EKG.   No current CP  
POA, due to prolonged confinement on ground following fall, evidenced by elevated CK, treated with trending CK,  mL/hr.   Trend CK  
POA, due to prolonged confinement on ground following fall, evidenced by elevated CK, treated with trending CK,  mL/hr.   Trend CK - downtrending. Will stop IVF rehydration and monitor  
POA, due to prolonged confinement on ground following fall, evidenced by elevated CK, treated with trending CK,  mL/hr.   Trend CK - downtrending. Will stop IVF rehydration and monitor  CK continues to downtrend  
Presented to ED with falls at home. Was found on the ground at home. Complaining of low back pain. Has history of dementia.   Currently lives alone - family went over and found patient on the floor today when she didn't answer her phone  No evidence of infection at this time  Trauma workup negative  CK mildly elevated at 387 - start on gentle ivf  Mild leukocytosis, likely reactive secondary to fall. Procal negative  UA negative  PT/OT evaluations: II (Moderate Resource Intensity)   Case management consult to assist with discharge disposition planning - discharge pending to rehab  
Presented to ED with falls at home. Was found on the ground at home. Complaining of low back pain. Has history of dementia.   Currently lives alone - family went over and found patient on the floor today when she didn't answer her phone  No evidence of infection at this time  Trauma workup negative  CK mildly elevated at 387 - start on gentle ivf  Mild leukocytosis, likely reactive secondary to fall. Procal negative  UA negative  PT/OT evaluations: II (Moderate Resource Intensity)   Case management consult to assist with discharge disposition planning - discharge to [facility].   
Presented to ED with falls at home. Was found on the ground at home. Complaining of low back pain. Has history of dementia.   Currently lives alone - family went over and found patient on the floor today when she didn't answer her phone  No evidence of infection at this time  Trauma workup negative  CK mildly elevated at 387 - start on gentle ivf  Mild leukocytosis, likely reactive secondary to fall. Procal ordered  UA negative  PT/OT evaluations appreciated   Case management consult to assist with discharge disposition planning  
Presented to ED with falls at home. Was found on the ground at home. Complaining of low back pain. Has history of dementia.   Currently lives alone - family went over and found patient on the floor today when she didn't answer her phone  No evidence of infection at this time  Trauma workup negative  CK mildly elevated at 387 - start on gentle ivf  Mild leukocytosis, likely reactive secondary to fall. Procal ordered  UA negative  PT/OT evaluations appreciated   Case management consult to assist with discharge disposition planning  
Detail Level: Generalized
Detail Level: Simple

## 2024-09-17 NOTE — NURSING NOTE
IV removed, no bleeding noted and catheter intact.  AVS faxed to [facility].  [facility] transport team arrived for patient.  Report called Serena SABA at facility, all questions answered.

## 2024-09-17 NOTE — CASE MANAGEMENT
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[facility]) 2024     Frequent falls 2024     Visual hallucinations 2023     Spinal stenosis of lumbar region with neurogenic claudication 2023     Lumbar spondylosis 2023     Skin ulcer of right great toe, limited to breakdown of skin ([facility]) 2023     Physical deconditioning 2022     Anemia 2022     Ground glass opacity present on imaging of lung 2021     Prediabetes 2021     Old myocardial infarction 2021     Fecal incontinence 2021     LUPE (acute kidney injury) ([facility]) 2020     Other specified inflammatory spondylopathies, lumbosacral region ([facility]) 2020     Obesity, morbid ([facility]) 2020     Constipation 2020     Dementia due to Parkinson's disease with behavioral disturbance ([facility]) 2020     Renal cyst 10/23/2020     CAD (coronary artery disease) 2019     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     Single kidney 2019     Vitamin D deficiency 2019     Anxiety 2019     Abnormal stress test 2019     Ambulatory dysfunction 2019     A-fib ([facility]) 2019     Stage 3b chronic kidney disease ([facility]) 2019     Junctional bradycardia 2019     BMI 36.0-36.9,adult 2019     Chronic left shoulder pain 10/09/2018     H/O: rheumatic fever 2018     Sciatica 2018     Peripheral neuropathy 2018     B12 deficiency 2018     Osteoporosis 2018     Closed head injury 2017     Lumbar radiculopathy 2017     Chronic  diastolic CHF (congestive heart failure) ([facility]) 05/15/2017     Low serum vitamin B12 02/13/2017     Peripheral edema 02/13/2017     Biceps tendinitis of right shoulder 04/11/2016     Diastolic dysfunction 04/11/2016     Lateral epicondylitis of right elbow 04/11/2016     Essential hypertension 03/14/2016     Dyspnea on exertion 03/14/2016     GERD without esophagitis 03/14/2016     Osteoarthritis 09/17/2012     Peripheral venous insufficiency 09/17/2012     Prolapse of vaginal walls 12/30/2003       LOS (days): 3  Geometric Mean LOS (GMLOS) (days): 3.3  Days to GMLOS:0.3     OBJECTIVE:  Risk of Unplanned Readmission Score: 12.83         Current admission status: Inpatient   Preferred Pharmacy:   [id number] [place] [facility] [address]  [address]  [place] [address]  Phone: [phone] Fax: [phone]    Primary Care Provider: Kenroy Jerome MD    Primary Insurance: [employer] REP  Secondary Insurance:     DISCHARGE DETAILS:    Pt is cleared for dc, auth was received, facility notified and they can accept today.           Contacts  Patient Contacts: Kadi Davidson  Relationship to Patient:: Family  Contact Method: Phone  Phone Number: see face sheet  Reason/Outcome: Discharge Planning (1. updated daughter, auth was obtained and pt is cleared for dc. 2. Discussed WC transport is an OOP expense, pt will be billed.)          Transport at Discharge : Wheelchair van   Discussed with daughter that transportation via WC is not covered by insurance and patient will be billed for this service. She verbalized understanding.         ETA of Transport (Date): 09/17/24   Roundtrip request is pending          IMM Given (Date):: 09/17/24  IMM Given to:: Family  Family notified:: Reviewed on the phone     CM spoke to daughter, reviewed DC IMM with patient and informed that patient can stay an additional 4 hours for reconsidering appealing the discharge as the medicare rights were review on the day  of discharge. Daughter verbalized understanding and feels pt is ready for dc and does not intend to stay 4 hours to reconsider.       Accepting Facility Name, City & State : [place]  Receiving Facility/Agency Phone Number: [phone]  Facility/Agency Fax Number: Fax: [phone]       Confirmed  with [facility] at 2:30, updated facility of transport time.

## 2024-09-17 NOTE — PLAN OF CARE
Problem: PAIN - ADULT  Goal: Verbalizes/displays adequate comfort level or baseline comfort level  Description: Interventions:  - Encourage patient to monitor pain and request assistance  - Assess pain using appropriate pain scale  - Administer analgesics based on type and severity of pain and evaluate response  - Implement non-pharmacological measures as appropriate and evaluate response  - Consider cultural and social influences on pain and pain management  - Notify physician/advanced practitioner if interventions unsuccessful or patient reports new pain  Outcome: Progressing     Problem: INFECTION - ADULT  Goal: Absence or prevention of progression during hospitalization  Description: INTERVENTIONS:  - Assess and monitor for signs and symptoms of infection  - Monitor lab/diagnostic results  - Monitor all insertion sites, i.e. indwelling lines, tubes, and drains  - Monitor endotracheal if appropriate and nasal secretions for changes in amount and color  - [place] appropriate cooling/warming therapies per order  - Administer medications as ordered  - Instruct and encourage patient and family to use good hand hygiene technique  - Identify and instruct in appropriate isolation precautions for identified infection/condition  Outcome: Progressing     Problem: SAFETY ADULT  Goal: Patient will remain free of falls  Description: INTERVENTIONS:  - Educate patient/family on patient safety including physical limitations  - Instruct patient to call for assistance with activity   - Consult OT/PT to assist with strengthening/mobility   - Keep Call bell within reach  - Keep bed low and locked with side rails adjusted as appropriate  - Keep care items and personal belongings within reach  - Initiate and maintain comfort rounds  - Make Fall Risk Sign visible to staff  - Offer Toileting every 2 Hours, in advance of need  - Initiate/Maintain bed/chair alarm  - Apply yellow socks and bracelet for high fall risk patients  -  Consider moving patient to room near nurses station  Outcome: Progressing  Goal: Maintain or return to baseline ADL function  Description: INTERVENTIONS:  -  Assess patient's ability to carry out ADLs; assess patient's baseline for ADL function and identify physical deficits which impact ability to perform ADLs (bathing, care of mouth/teeth, toileting, grooming, dressing, etc.)  - Assess/evaluate cause of self-care deficits   - Assess range of motion  - Assess patient's mobility; develop plan if impaired  - Assess patient's need for assistive devices and provide as appropriate  - Encourage maximum independence but intervene and supervise when necessary  - Involve family in performance of ADLs  - Assess for home care needs following discharge   - Consider OT consult to assist with ADL evaluation and planning for discharge  - Provide patient education as appropriate  Outcome: Progressing  Goal: Maintains/Returns to pre admission functional level  Description: INTERVENTIONS:  - Perform AM-PAC 6 Click Basic Mobility/ Daily Activity assessment daily.  - Set and communicate daily mobility goal to care team and patient/family/caregiver.   - Collaborate with rehabilitation services on mobility goals if consulted  - Ambulate patient 3 times a day  - Out of bed to chair 3 times a day   - Out of bed for toileting  - Record patient progress and toleration of activity level   Outcome: Progressing     Problem: DISCHARGE PLANNING  Goal: Discharge to home or other facility with appropriate resources  Description: INTERVENTIONS:  - Identify barriers to discharge w/patient and caregiver  - Arrange for needed discharge resources and transportation as appropriate  - Identify discharge learning needs (meds, wound care, etc.)  - Arrange for interpretive services to assist at discharge as needed  - Refer to Case Management Department for coordinating discharge planning if the patient needs post-hospital services based on  physician/advanced practitioner order or complex needs related to functional status, cognitive ability, or social support system  Outcome: Progressing     Problem: Knowledge Deficit  Goal: Patient/family/caregiver demonstrates understanding of disease process, treatment plan, medications, and discharge instructions  Description: Complete learning assessment and assess knowledge base.  Interventions:  - Provide teaching at level of understanding  - Provide teaching via preferred learning methods  Outcome: Progressing     Problem: MUSCULOSKELETAL - ADULT  Goal: Maintain or return mobility to safest level of function  Description: INTERVENTIONS:  - Assess patient's ability to carry out ADLs; assess patient's baseline for ADL function and identify physical deficits which impact ability to perform ADLs (bathing, care of mouth/teeth, toileting, grooming, dressing, etc.)  - Assess/evaluate cause of self-care deficits   - Assess range of motion  - Assess patient's mobility  - Assess patient's need for assistive devices and provide as appropriate  - Encourage maximum independe....nce but intervene and supervise when necessary  - Involve family in performance of ADLs  - Assess for home care needs following discharge   - Consider OT consult to assist with ADL evaluation and planning for discharge  - Provide patient education as appropriate  Outcome: Progressing

## 2024-09-17 NOTE — CASE MANAGEMENT
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 ([facility]) 2024     Frequent falls 2024     Visual hallucinations 2023     Spinal stenosis of lumbar region with neurogenic claudication 2023     Lumbar spondylosis 2023     Skin ulcer of right great toe, limited to breakdown of skin ([facility]) 2023     Physical deconditioning 2022     Anemia 2022     Ground glass opacity present on imaging of lung 2021     Prediabetes 2021     Old myocardial infarction 2021     Fecal incontinence 2021     LUPE (acute kidney injury) ([facility]) 2020     Other specified inflammatory spondylopathies, lumbosacral region ([facility]) 2020     Obesity, morbid ([facility]) 2020     Constipation 2020     Dementia due to Parkinson's disease with behavioral disturbance ([facility]) 2020     Renal cyst 10/23/2020     CAD (coronary artery disease) 2019     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     Single kidney 2019     Vitamin D deficiency 2019     Anxiety 2019     Abnormal stress test 2019     Ambulatory dysfunction 2019     A-fib ([facility]) 2019     Stage 3b chronic kidney disease ([facility]) 2019     Junctional bradycardia 2019     BMI 36.0-36.9,adult 2019     Chronic left shoulder pain 10/09/2018     H/O: rheumatic fever 2018     Sciatica 2018     Peripheral neuropathy 2018     B12 deficiency 2018     Osteoporosis 2018     Closed head injury 2017     Lumbar radiculopathy 2017     Chronic  diastolic CHF (congestive heart failure) ([facility]) 05/15/2017     Low serum vitamin B12 02/13/2017     Peripheral edema 02/13/2017     Biceps tendinitis of right shoulder 04/11/2016     Diastolic dysfunction 04/11/2016     Lateral epicondylitis of right elbow 04/11/2016     Essential hypertension 03/14/2016     Dyspnea on exertion 03/14/2016     GERD without esophagitis 03/14/2016     Osteoarthritis 09/17/2012     Peripheral venous insufficiency 09/17/2012     Prolapse of vaginal walls 12/30/2003       LOS (days): 3  Geometric Mean LOS (GMLOS) (days): 3.3  Days to GMLOS:0.3     OBJECTIVE:  Risk of Unplanned Readmission Score: 12.83         Current admission status: Inpatient   [facility]acy:   [id number] [place] [address] [place] [address]  Phone: [phone] Fax: [phone]    Primary Care Provider: Kenroy Jerome MD    Primary Insurance: [employer] RE[employer]  Secondary Insurance:     DISCHARGE DETAILS:                                                                                                               Facility Insurance Auth Number: [id number]

## 2024-09-17 NOTE — DISCHARGE SUMMARY
Discharge Summary - Hospitalist   Name: Demi Donnelly 84 y.o. female I MRN: [id number]  Unit/Bed#: -01 I Date of Admission: 9/14/2024   Date of Service: 9/17/2024 I Hospital Day: 3     Assessment & Plan  Ambulatory dysfunction  Presented to ED with falls at home. Was found on the ground at home. Complaining of low back pain. Has history of dementia.   Currently lives alone - family went over and found patient on the floor today when she didn't answer her phone  No evidence of infection at this time  Trauma workup negative  CK mildly elevated at 387 - start on gentle ivf  Mild leukocytosis, likely reactive secondary to fall. Procal negative  UA negative  PT/OT evaluations: II (Moderate Resource Intensity)   Case management consult to assist with discharge disposition planning - discharge to [facility].   Traumatic rhabdomyolysis (HCC)  POA, due to prolonged confinement on ground following fall, evidenced by elevated CK, treated with trending CK,  mL/hr.   Trend CK - downtrending. Will stop IVF rehydration and monitor  CK continues to downtrend  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)  History of dementia. Patient lives at home alone with frequent falls. On namenda outpatient, previously stated that she wanted to stop this medication with her PCP and appears to have been discontinued with PCP. Per son, mentation waxes and wanes very frequently.   Will hold on namenda and monitor mentation - verify meds with family.  No longer on this per daughter. Outpatient follow up with neurology - recommend patient and family follow up with neurology on discharge.   Frequent reorientation  Delirium precautions  Essential hypertension  History of htn, previously on lasix 20 mg bid, losartan 25 mg qd, norvasc 5 mg qd. Patient recently stopped norvasc 5 mg as she noticed that her BP was dropped at home because of this  BP elevated in the ED initially  Orthostatics ordered - will  hold norvasc here, orthostatics negative. BP acceptable currently.   Continue to hold norvasc on discharge. BP acceptable.   Avoid hypotension  GERD without esophagitis  Continue PPI  A-fib (HCC)  History of a fib currently on eliquis 5 mg bid. Does not appear to be on any rate controlling medications.   Continue to monitor HR  Stage 3b chronic kidney disease (HCC)  Lab Results   Component Value Date    EGFR 49 09/17/2024    EGFR 56 09/16/2024    EGFR 53 09/15/2024    CREATININE 1.03 09/17/2024    CREATININE 0.92 09/16/2024    CREATININE 0.97 09/15/2024     History of ckd, baseline creatinine around 1.1, currently around baseline  Does have history of solitary kidney - pending outpatient nephrology follow up  Avoid nephrotoxic medications, nsaids, hypotension  Elevated troponin  Non-ischemic myocardial injury, POA, due to traumatic rhabdomyolysis, evidenced by elevated troponins 35->65->73, EKG: NSR with no ischemia, treated with trending troponins and EKG.   No current CP     Medical Problems       Resolved Problems  Date Reviewed: 9/16/2024   None       Discharging Physician / Practitioner: Annette Pham PA-C  PCP: Kenroy Jerome MD  Admission Date:   Admission Orders (From admission, onward)       Ordered        09/14/24 1321  INPATIENT ADMISSION  Once                          Discharge Date: 09/17/24    Consultations During Hospital Stay:  Pt/ot    Procedures Performed:   none    Significant Findings / Test Results:   XR hip/pelv 2-3 vws right if performed   ED Interpretation by Norm Fraga MD (09/14 3516)   No fracture or dislocation      Final Result by Wilfrid Javed MD (09/16 2585)      No acute osseous abnormality.         Computerized Assisted Algorithm (CAA) may have been used to analyze all applicable images.            Workstation performed: [id number]         TRAUMA - CT chest abdomen pelvis wo contrast   Final Result by Irma Roberts MD (09/14 1175)   No free fluid to suggest any  significant visceral injury      Incidentally detected 5 mm left upper lobe lung nodule. Based on current Fleischner Society  follow-up CT at 12 months can be considered. If patient is at risk for primary metastatic disease short interval follow-up at 3 months suggested      Erosive changes sacroiliac joint correlate with sacroiliitis      The study was marked in EPIC for significant notification.         Workstation performed: [id number]         CT recon only thoracolumbar (No Charge)   Final Result by Irma Roberts MD (09/14 1253)      No acute compression collapse of the vertebra            Workstation performed: [id number]         TRAUMA - CT head wo contrast   Final Result by Irma Roberts MD (09/14 1254)      No acute intracranial hemorrhage seen   No mass effect or midline shift seen                  Workstation performed: [id number]         TRAUMA - CT spine cervical wo contrast   Final Result by Irma Roberts MD (09/14 1234)      No acute compression collapse of the vertebra.                  Workstation performed: [id number]           CK: 387 > 538 > 444 > 220  Troponin: 35 > 65 > 73  UA: negative leukocytes, negative nitrite, trace protein; microscopic: normal  Vitamin D normal  Vitamin b12: 2495  TSH normal  Procal normal    Incidental Findings:   See above     Test Results Pending at Discharge (will require follow up):   none     Outpatient Tests Requested:  Follow up with PCP in 1 week  Follow up with neurology outpatient  Repeat CBC and BMP in 1 week  CT chest in 3 months or 12 months based on if patient high risk for lung cancer. Outpatient PCP follow up to discuss further.     Complications:  none    Reason for Admission: ambulatory dysfunction, rhabdomyolysis    Hospital Course:   Demi Donnelly is a 84 y.o. female patient who originally presented to the hospital on 9/14/2024 due to fall that occurred at home. In the ED, found to have rhabdomyolysis and ambulatory dysfunction. Started on IVF  "rehydration. Infectious workup negative. CK trended down and IVF was discontinued. Orthostatic BP ordered and was negative. Held patient's norvasc as this was suspected to be causing orthostatic hypotension outpatient, recommend holding this on discharge as well. Namenda was recently discontinued outpatient, would recommend that patient follow up with neurology outpatient for further medication evaluation for her dementia. PT/OT evaluated patient and recommended II (Moderate Resource Intensity) for her. On discharge, patient going to [facility]. Repeat CBC and BMP in 1 week. Follow up with PCP in 1 week. Return for any new or worsening symptoms. Verbalized understanding and agrees to plans above.     Please see above list of diagnoses and related plan for additional information.     Condition at Discharge: fair    Discharge Day Visit / Exam:   Subjective:  Seen and examined today. Offering no current complaints. Said that she feels well. Said she was brought to the hospital because of a fall. No nausea, vomiting, diarrhea, constipation, abdominal pain, CP, SOB. She is looking forward to going to rehab and then hopefully home.   Vitals: Blood Pressure: (!) 129/49 (09/17/24 0909)  Pulse: 68 (09/17/24 0909)  Temperature: (!) 97 °F (36.1 °C) (09/17/24 0723)  Temp Source: Temporal (09/15/24 2322)  Respirations: 18 (09/17/24 0723)  Height: 4' 7\" (139.7 cm) (09/14/24 1424)  Weight - Scale: 64 kg (141 lb 1.5 oz) (09/14/24 1424)  SpO2: 98 % (09/17/24 0909)  Exam:   Physical Exam  Vitals reviewed.   Constitutional:       General: She is not in acute distress.     Appearance: She is not ill-appearing or toxic-appearing.      Comments: Pleasantly confused   HENT:      Head: Normocephalic and atraumatic.      Mouth/Throat:      Mouth: Mucous membranes are moist.   Cardiovascular:      Rate and Rhythm: Normal rate and regular rhythm.      Heart sounds: No murmur heard.  Pulmonary:      Effort: No respiratory distress.      " Breath sounds: No stridor. No wheezing, rhonchi or rales.   Abdominal:      General: Bowel sounds are normal. There is no distension.      Palpations: Abdomen is soft. There is no mass.      Tenderness: There is no abdominal tenderness.   Musculoskeletal:      Right lower leg: No edema.      Left lower leg: No edema.   Skin:     General: Skin is warm and dry.   Neurological:      Mental Status: She is alert and oriented to person, place, and time.      Comments: Answering all orientation questions appropriately currently. Oriented to self, location, year, president, situation.    Psychiatric:         Mood and Affect: Mood normal.         Behavior: Behavior normal.          Discussion with Family: Updated  (son) via phone. Attempted to update patient's daughter over the phone, LVM. CM was able to update patient's daughter.     Discharge instructions/Information to patient and family:   See after visit summary for information provided to patient and family.      Provisions for Follow-Up Care:  See after visit summary for information related to follow-up care and any pertinent home health orders.      Mobility at time of Discharge:   Basic Mobility Inpatient Raw Score: 18  JH-HLM Goal: 6: Walk 10 steps or more  JH-HLM Achieved: 6: Walk 10 steps or more  HLM Goal achieved. Continue to encourage appropriate mobility.     Disposition:   Other Skilled Nursing Facility at [facility]    Planned Readmission: no    Discharge Medications:  See after visit summary for reconciled discharge medications provided to patient and/or family.      Administrative Statements   Discharge Statement:  I have spent a total time of 43 minutes in caring for this patient on the day of the visit/encounter. >30 minutes of time was spent on: Diagnostic results, Patient and family education, Counseling / Coordination of care, Documenting in the medical record, and Reviewing / ordering tests, medicine, procedures  .    **Please  Note: This note may have been constructed using a voice recognition system**

## 2024-09-17 NOTE — DISCHARGE INSTR - AVS FIRST PAGE
Dear Demi Donnelly,     It was our pleasure to care for you here at [facility]. For follow up as well as any medication refills, we recommend that you follow up with your primary care physician. Here are the most important instructions/ recommendations at discharge:     Notable Medication Adjustments -   Continue taking medications as prescribed  Stop taking norvasc   Stop taking namenda as recommended by PCP  Testing Required after Discharge -   Repeat CBC and BMP in 1 week  Repeat CT chest in 3 months or in 12 months based on recommendations per PCP if patient is considered high risk with lung nodules noted  Important follow up information -   Follow up with PCP in 1 week  Follow up with neurology outpatient  Other Instructions -   Continue taking medications as prescribed. Continue with gradual position changes. Please monitor blood pressure.   Please review this entire after visit summary as additional general instructions including medication list, appointments, activity, diet, any pertinent wound care, and other additional recommendations from your care team that may be provided for you.      Sincerely,     Annette Pham PA-C    
No

## 2024-09-17 NOTE — CASE MANAGEMENT
[facility] has received APPROVED authorization.  Insurance:   anthem through [employer]    Authorization received for: SNF  Facility: [facility]   Authorization #:[id number]    Start of Care:9/17  Next Review Date:9/23  Continued Stay Care Coordinator: n/a  Submit next review to: margarita [phone]      Care Manager notified:armen brown     Please reach out to  for updates on any clinical information.

## 2024-09-17 NOTE — INCIDENTAL FINDINGS
The following findings require follow up:  Radiographic finding   Finding: Incidentally detected 5 mm left upper lobe lung nodule    Follow up required: follow-up CT at 12 months can be considered. If patient is at risk for primary metastatic disease short interval follow-up at 3 months suggested    Follow up should be done within 3 month(s)    Please notify the following clinician to assist with the follow up:   Dr. Kenroy Jerome MD

## 2024-09-18 NOTE — UTILIZATION REVIEW
NOTIFICATION OF ADMISSION DISCHARGE   This is a Notification of Discharge from [facility]. Please be advised that this patient has been discharge from our facility. Below you will find the admission and discharge date and time including the patient’s disposition.   UTILIZATION REVIEW CONTACT:  Radha Sharif  Utilization   Network Utilization Review Department  Phone: [phone] x carefully listen to the prompts. All voicemails are confidential.  Email: [email or web address]     ADMISSION INFORMATION  PRESENTATION DATE: 9/14/2024 11:52 AM  OBERVATION ADMISSION DATE: N/A  INPATIENT ADMISSION DATE: 9/14/24  1:21 PM   DISCHARGE DATE: 9/17/2024  3:01 PM   DISPOSITION:Non [facility] SNF/TCU/SNU    Network Utilization Review Department  ATTENTION: Please call with any questions or concerns to [phone] and carefully listen to the prompts so that you are directed to the right person. All voicemails are confidential.   For Discharge needs, contact Care Management DC Support Team at [phone] opt. 2  Send all requests for admission clinical reviews, approved or denied determinations and any other requests to dedicated fax number below belonging to the campus where the patient is receiving treatment. List of dedicated fax numbers for the Facilities:  FACILITY NAME UR FAX NUMBER   ADMISSION DENIALS (Administrative/Medical Necessity) [phone]   DISCHARGE SUPPORT TEAM ([facility]) [phone]   PARENT CHILD HEALTH (Maternity/NICU/Pediatrics) [phone]   [facility] [phone]   [facility] [phone]   [facility] [phone]   [facility] [phone]   [facility] [phone]   [facility] [phone]   [facility] [phone]   [facility]  [place] [phone]   [facility] [phone]   [facility] [phone]   [facility] [phone]   [facility] [phone]

## 2024-10-03 ENCOUNTER — TELEPHONE (OUTPATIENT)
Age: 84
End: 2024-10-03

## 2024-10-03 NOTE — TELEPHONE ENCOUNTER
Pamela with [facility] called about signing home health orders. She was made aware that Dr. Jerome is with [facility]. It looks like pt will be staying with our office so have her fax home health orders to our office. My call was disconnected

## 2024-10-03 NOTE — TELEPHONE ENCOUNTER
Pamela was calling back as the call got disconnected.  Confirmed [employer] phone number for her.  States she put a call in to [facility].    Unsure if pt will Follow Dr. Jerome - states pt is confused.

## 2024-10-07 ENCOUNTER — TELEPHONE (OUTPATIENT)
Age: 84
End: 2024-10-07

## 2024-10-07 NOTE — TELEPHONE ENCOUNTER
Requesting verbal orders to sign office on Home Care Evaluation.  Joanne informed we no longer due verbal orders. She myst fax to office Plan of Care Plan and Home service orders.  Office number given

## 2024-10-11 ENCOUNTER — HOSPITAL ENCOUNTER (OUTPATIENT)
Dept: MRI IMAGING | Facility: HOSPITAL | Age: 84
End: 2024-10-11
Attending: STUDENT IN AN ORGANIZED HEALTH CARE EDUCATION/TRAINING PROGRAM
Payer: COMMERCIAL

## 2024-10-11 DIAGNOSIS — M1A.9XX1 CHRONIC GOUT INVOLVING TOE OF RIGHT FOOT WITH TOPHUS, UNSPECIFIED CAUSE: ICD-10-CM

## 2024-10-11 DIAGNOSIS — G62.89 OTHER POLYNEUROPATHY: ICD-10-CM

## 2024-10-11 DIAGNOSIS — L97.512 NEUROPATHIC ULCER OF TOE OF RIGHT FOOT WITH FAT LAYER EXPOSED (HCC): ICD-10-CM

## 2024-10-11 PROCEDURE — 73718 MRI LOWER EXTREMITY W/O DYE: CPT

## 2024-11-01 ENCOUNTER — OFFICE VISIT (OUTPATIENT)
Dept: FAMILY MEDICINE CLINIC | Facility: CLINIC | Age: 84
End: 2024-11-01
Payer: COMMERCIAL

## 2024-11-01 VITALS
HEART RATE: 63 BPM | SYSTOLIC BLOOD PRESSURE: 118 MMHG | RESPIRATION RATE: 18 BRPM | BODY MASS INDEX: 32.08 KG/M2 | DIASTOLIC BLOOD PRESSURE: 62 MMHG | HEIGHT: 55 IN | WEIGHT: 138.6 LBS | OXYGEN SATURATION: 97 %

## 2024-11-01 DIAGNOSIS — I25.10 CORONARY ARTERY DISEASE INVOLVING NATIVE HEART, UNSPECIFIED VESSEL OR LESION TYPE, UNSPECIFIED WHETHER ANGINA PRESENT: ICD-10-CM

## 2024-11-01 DIAGNOSIS — L60.0 INGROWN NAIL: ICD-10-CM

## 2024-11-01 DIAGNOSIS — D52.0 DIETARY FOLATE DEFICIENCY ANEMIA: ICD-10-CM

## 2024-11-01 DIAGNOSIS — N18.32 STAGE 3B CHRONIC KIDNEY DISEASE (HCC): ICD-10-CM

## 2024-11-01 DIAGNOSIS — E56.9 VITAMIN DEFICIENCY: ICD-10-CM

## 2024-11-01 DIAGNOSIS — I50.32 CHRONIC DIASTOLIC CHF (CONGESTIVE HEART FAILURE) (HCC): ICD-10-CM

## 2024-11-01 DIAGNOSIS — F02.818 DEMENTIA DUE TO PARKINSON'S DISEASE WITH BEHAVIORAL DISTURBANCE (HCC): ICD-10-CM

## 2024-11-01 DIAGNOSIS — D69.6 THROMBOCYTOPENIA, UNSPECIFIED (HCC): ICD-10-CM

## 2024-11-01 DIAGNOSIS — D50.9 IRON DEFICIENCY ANEMIA, UNSPECIFIED IRON DEFICIENCY ANEMIA TYPE: ICD-10-CM

## 2024-11-01 DIAGNOSIS — I48.0 PAROXYSMAL ATRIAL FIBRILLATION (HCC): ICD-10-CM

## 2024-11-01 DIAGNOSIS — G20.A1 DEMENTIA DUE TO PARKINSON'S DISEASE WITH BEHAVIORAL DISTURBANCE (HCC): ICD-10-CM

## 2024-11-01 DIAGNOSIS — I10 ESSENTIAL HYPERTENSION: Primary | ICD-10-CM

## 2024-11-01 DIAGNOSIS — K21.9 GERD WITHOUT ESOPHAGITIS: ICD-10-CM

## 2024-11-01 DIAGNOSIS — E55.9 VITAMIN D DEFICIENCY: ICD-10-CM

## 2024-11-01 DIAGNOSIS — I13.0 HYPERTENSIVE HEART AND RENAL DISEASE WITH CONGESTIVE HEART FAILURE (HCC): ICD-10-CM

## 2024-11-01 PROCEDURE — 99214 OFFICE O/P EST MOD 30 MIN: CPT | Performed by: FAMILY MEDICINE

## 2024-11-01 NOTE — LETTER
November 1, 2024      To Whom It May Concern,      Demi Donnelly requires [facility] for transportation.  She has dementia and requires an aid to travel with her.  Please consider this accommodation for her.    Please contact our office with any questions or concerns.        Sincerely,          Isrrael Jerome MD

## 2024-11-01 NOTE — ASSESSMENT & PLAN NOTE
Wt Readings from Last 3 Encounters:   11/01/24 62.9 kg (138 lb 9.6 oz)   09/14/24 64 kg (141 lb 1.5 oz)   09/06/24 61.7 kg (136 lb)     Weight stable.  Continue current.

## 2024-11-01 NOTE — ASSESSMENT & PLAN NOTE
Wt Readings from Last 3 Encounters:   11/01/24 62.9 kg (138 lb 9.6 oz)   09/14/24 64 kg (141 lb 1.5 oz)   09/06/24 61.7 kg (136 lb)     Plan per nephrology.          Orders:    Lipid panel; Future

## 2024-11-01 NOTE — ASSESSMENT & PLAN NOTE
Lab Results   Component Value Date    EGFR 47 (L) 09/24/2024    EGFR 49 09/17/2024    EGFR 56 09/16/2024    CREATININE 1.1 (H) 09/24/2024    CREATININE 1.03 09/17/2024    CREATININE 0.92 09/16/2024   Stable. Continue current.

## 2024-11-01 NOTE — ASSESSMENT & PLAN NOTE
BP at goal.  Continue current.    Orders:    TSH, 3rd generation with Free T4 reflex; Future    Lipid panel; Future

## 2024-11-01 NOTE — ASSESSMENT & PLAN NOTE
Orders:    CBC and differential; Future    TIBC Panel (incl. Iron, TIBC, % Iron Saturation); Future

## 2024-11-01 NOTE — PROGRESS NOTES
Ambulatory Visit  Name: Demi Donnelly      : 1940      MRN: [id number]  Encounter Provider: Kenroy Jerome MD  Encounter Date: 2024   Encounter department: [facility]    Assessment & Plan  Essential hypertension  BP at goal.  Continue current.    Orders:    TSH, 3rd generation with Free T4 reflex; Future    Lipid panel; Future    Chronic diastolic CHF (congestive heart failure) (HCC)  Wt Readings from Last 3 Encounters:   24 62.9 kg (138 lb 9.6 oz)   24 64 kg (141 lb 1.5 oz)   24 61.7 kg (136 lb)     Weight stable.  Continue current.               Paroxysmal atrial fibrillation (HCC)  Rate control and anticoagualte.    Orders:    TSH, 3rd generation with Free T4 reflex; Future    Ingrown nail    Orders:    Ambulatory Referral to Podiatry; Future    Coronary artery disease involving native heart, unspecified vessel or lesion type, unspecified whether angina present  Currently asymptomatic.  Relatively normal stress test in 2023. Continue current.    Orders:    Lipid panel; Future    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 (HCC)  Wt Readings from Last 3 Encounters:   24 62.9 kg (138 lb 9.6 oz)   24 64 kg (141 lb 1.5 oz)   24 61.7 kg (136 lb)     Plan per nephrology.          Orders:    Lipid panel; Future    GERD without esophagitis  No alarm signs.  Continue current.         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 Continue current.         Stage 3b chronic kidney disease (HCC)  Lab Results   Component Value Date    EGFR 47 (L) 2024    EGFR 49 2024    EGFR 56 2024    CREATININE 1.1 (H) 2024    CREATININE 1.03 2024    CREATININE 0.92 2024   Stable. Continue current.         Vitamin deficiency    Orders:    Vitamin B12; Future    Folate; Future    Vitamin D 25 hydroxy; Future    Iron deficiency anemia, unspecified iron  "deficiency anemia type    Orders:    CBC and differential; Future    TIBC Panel (incl. Iron, TIBC, % Iron Saturation); Future    Thrombocytopenia, unspecified (HCC)    Orders:    Vitamin B12; Future    Dietary folate deficiency anemia    Orders:    Folate; Future    Vitamin D deficiency    Orders:    Vitamin D 25 hydroxy; Future       History of Present Illness     She has good pain relief (low back) with Tylenol.    Her BP is at goal on her current regimen.  She has no CP or SOB.    Her lipids are at goal on her current regimen. She has normal LFTs.    Her weight is stable and she has no HINES.          Review of Systems   All other systems reviewed and are negative.          Objective     /62 (BP Location: Left arm, Patient Position: Sitting, Cuff Size: Large)   Pulse 63   Resp 18   Ht 4' 7\" (1.397 m)   Wt 62.9 kg (138 lb 9.6 oz)   SpO2 97%   BMI 32.21 kg/m²     Physical Exam  Vitals and nursing note reviewed.   Constitutional:       Appearance: Normal appearance. She is obese.   Cardiovascular:      Rate and Rhythm: Normal rate and regular rhythm.      Pulses: Normal pulses.      Heart sounds: Normal heart sounds.   Pulmonary:      Effort: Pulmonary effort is normal.      Breath sounds: Normal breath sounds.   Abdominal:      General: Abdomen is flat. Bowel sounds are normal.      Palpations: Abdomen is soft.   Musculoskeletal:         General: Normal range of motion.      Cervical back: Normal range of motion and neck supple.   Skin:     General: Skin is dry.      Capillary Refill: Capillary refill takes less than 2 seconds.   Neurological:      General: No focal deficit present.      Mental Status: She is alert and oriented to person, place, and time. Mental status is at baseline.   Psychiatric:         Mood and Affect: Mood normal.         Behavior: Behavior normal.         Thought Content: Thought content normal.         Judgment: Judgment normal.         "

## 2024-11-07 ENCOUNTER — OFFICE VISIT (OUTPATIENT)
Dept: PODIATRY | Facility: CLINIC | Age: 84
End: 2024-11-07
Payer: COMMERCIAL

## 2024-11-07 VITALS
TEMPERATURE: 98.1 F | RESPIRATION RATE: 16 BRPM | HEIGHT: 55 IN | DIASTOLIC BLOOD PRESSURE: 60 MMHG | WEIGHT: 135 LBS | BODY MASS INDEX: 31.24 KG/M2 | SYSTOLIC BLOOD PRESSURE: 100 MMHG | OXYGEN SATURATION: 99 % | HEART RATE: 64 BPM

## 2024-11-07 DIAGNOSIS — I73.9 PERIPHERAL VASCULAR DISEASE, UNSPECIFIED (HCC): ICD-10-CM

## 2024-11-07 DIAGNOSIS — B35.1 ONYCHOMYCOSIS: ICD-10-CM

## 2024-11-07 DIAGNOSIS — L60.0 INGROWN NAIL: Primary | ICD-10-CM

## 2024-11-07 DIAGNOSIS — G62.9 PERIPHERAL POLYNEUROPATHY: ICD-10-CM

## 2024-11-07 DIAGNOSIS — L97.511 SKIN ULCER OF RIGHT GREAT TOE, LIMITED TO BREAKDOWN OF SKIN (HCC): ICD-10-CM

## 2024-11-07 PROCEDURE — 99214 OFFICE O/P EST MOD 30 MIN: CPT | Performed by: PODIATRIST

## 2024-11-07 NOTE — PROGRESS NOTES
Ambulatory Visit  Name: Demi Donnelly      : 1940      MRN: [id number]  Encounter Provider: Crystal Montoya DPM  Encounter Date: 2024   Encounter department: [facility] PODI[facility] [place]    Assessment & Plan  Ingrown nail    Orders:    Ambulatory Referral to Podiatry    Onychomycosis         Skin ulcer of right great toe, limited to breakdown of skin (HCC)    Orders:    Ambulatory Referral to Wound Care; Future    Peripheral polyneuropathy         Peripheral vascular disease, unspecified (HCC)              IMPRESSION:  Right medial great toe ingrown without acute signs of infection  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  MRI redemonstrated findings of polyarticular inflammatory or crystal arthropathy such as gout  LE ADS RLE IRIS 1.0.  PPG/PVR tracings are mildly dampened.  Biphasic waveforms at ankle.  Met pressures 87 mmHg, great toe pressures 41 mmHg within healing range  Leads LLE IRIS 1.03, PPG/PVR tracings are mildly dampened.  Biphasic waveforms at the ankle.  Met pressures 92 mmHg, GT pressure 90 mmHg, within healing range  Right great toe ulceration stable with fibrotic base.  There is no acute SOI at today's visit  The patient and her son were counseled to follow-up at the wound care center for continued management of right great toe  Patient instructed to wear stiff bottom shoes with wide toe box and mesh upper to prevent rubbing and irritation on both ingrown toenail and right great toe ulceration  Rx mupirocin  "ointment, apply ointment to left great toe medial nail border for 5 to 7 days.  If any questions or concerns arise contact the office for further evaluation management  Discussed establishing routine footcare as patient is unable to care for her own feet.  Referral to wound care for continued evaluation and management of right great toe ulceration  Follow-up 10 weeks for at risk foot evaluation    History of Present Illness     Demi Donnelly is a 84 y.o. female who presents for evaluation of left great toe ingrown toenail along the medial nail border.  Patient denies any redness or swelling in the area but she states the area is extremely painful and hypersensitive.  She is unable to trim her own toenails.  Patient also has ulceration on the right great toe which she states is being taken care of \"by the girls\" patient is a poor historian.  Son had difficulty providing additional information.  Patient denies nausea, vomiting, fever, chills, shortness of breath      Review of Systems   Constitutional:  Negative for chills and fever.   HENT:  Negative for ear pain and sore throat.    Eyes:  Negative for pain and visual disturbance.   Respiratory:  Negative for cough and shortness of breath.    Cardiovascular:  Negative for chest pain and palpitations.   Gastrointestinal:  Negative for abdominal pain and vomiting.   Genitourinary:  Negative for dysuria and hematuria.   Musculoskeletal:  Positive for arthralgias and gait problem. Negative for back pain.   Skin:  Negative for color change and rash.   Neurological:  Negative for seizures and syncope.   All other systems reviewed and are negative.          Objective     /60 (BP Location: Left arm, Patient Position: Sitting, Cuff Size: Large)   Pulse 64   Temp 98.1 °F (36.7 °C) (Tympanic)   Resp 16   Ht 4' 7\" (1.397 m)   Wt 61.2 kg (135 lb)   SpO2 99%   BMI 31.38 kg/m²     Physical Exam  Constitutional:       General: She is not in acute distress.     " Appearance: Normal appearance. She is not ill-appearing.   Cardiovascular:      Comments: Bilateral DP pulses are palpable 2/4. Bilateral PT pulses are nonpalpable or diminished 1/4. Pedal hair is absent. Legs to toes warm to cool. Varicosities with mild LE edema noted.  Pulmonary:      Effort: No respiratory distress.   Musculoskeletal:         General: No tenderness or deformity. Normal range of motion.   Skin:     Capillary Refill: Capillary refill takes less than 2 seconds.      Findings: Lesion (Right plantar-medial hallux IPJ, full thickness, no deep probe. No acute SOI. ) present. No erythema.      Comments: B/L LE skin is atrophic - thin, dry and shiny in appearance.   Toenails x10 are elongated, dystrophic, discolored. There is nail thickening and subungual debris noted to toenails to left hallux, R 2nd, B/L 5th toes.  Painful incurvated right great toenail without erythema or edema along medial nail border.  No drainage expressed  Neurological:      General: No focal deficit present.      Mental Status: She is alert and oriented to person, place, and time.      Comments: Gross sensation to feet intact. Patient endorses numbness, tingling and burning to B/L feet. + paresthesias.    Psychiatric:         Mood and Affect: Mood normal.         Behavior: Behavior normal.

## 2024-12-04 ENCOUNTER — TELEPHONE (OUTPATIENT)
Dept: FAMILY MEDICINE CLINIC | Facility: CLINIC | Age: 84
End: 2024-12-04

## 2024-12-04 NOTE — TELEPHONE ENCOUNTER
Hello I received a phone call from patient son lidia. He stated he left a message on Monday but didn't hear back. He states there was a physician certification form dropped off at the office at her most recent appt but it has not yet been completed. Can someone verify if this is completed? Thanks!

## 2024-12-06 ENCOUNTER — TELEPHONE (OUTPATIENT)
Dept: FAMILY MEDICINE CLINIC | Facility: CLINIC | Age: 84
End: 2024-12-06

## 2024-12-06 NOTE — TELEPHONE ENCOUNTER
Hi patient daughter called and will be picking up some chart notes I printed on Tuesday that she needs to provide to the nursing home she is at. Thanks!

## 2024-12-06 NOTE — TELEPHONE ENCOUNTER
Received call from patient's son, Byron. Patient inquiring about Physician Certification Form. Per staff, forms were faxed; forms scanned in media. Re-faxed forms to [phone]. Fax confirmation received.

## 2025-01-17 ENCOUNTER — TELEPHONE (OUTPATIENT)
Dept: FAMILY MEDICINE CLINIC | Facility: CLINIC | Age: 85
End: 2025-01-17

## 2025-01-17 NOTE — TELEPHONE ENCOUNTER
Patient called son called asking if medication can be ordered to help with patient sleep walking. He states it doesn't happen often, and she has around the clock care. He states she is responsive when she is sleep walking, but the reason he know she is sleeping walking is because she will call out for her  .

## 2025-01-20 ENCOUNTER — TELEPHONE (OUTPATIENT)
Age: 85
End: 2025-01-20

## 2025-01-20 NOTE — TELEPHONE ENCOUNTER
Rl Arce called.  Is kindly requesting a return call, would like to schedule TCM appointment with provider.    Discharge Date 1/21/2025    Yazmin's c/b# [phone]

## 2025-01-21 ENCOUNTER — TELEPHONE (OUTPATIENT)
Dept: FAMILY MEDICINE CLINIC | Facility: CLINIC | Age: 85
End: 2025-01-21

## 2025-01-21 NOTE — TELEPHONE ENCOUNTER
Yazmin from [facility] is follow up to get Demi scheduled for her appointment with Dr. Jerome. She is being discharged today, 1/21.    She is requesting a call back at: [phone]    Please advise, thank you.

## 2025-01-21 NOTE — TELEPHONE ENCOUNTER
Pts son calling regarding sleep medication.  Asking if something can be sent in for pt to help sleep.  ty

## 2025-01-21 NOTE — TELEPHONE ENCOUNTER
Byron son calling requesting an update on sleep aid requested for his mother a week ago. Stated his mother will be coming home this week and would like to have her medication prior.    Byron given [place] number to call them directly.  Byron stated they been having issues with the office with messages and calls not being returned.    Please review and advice as soon as possible.  Thank you

## 2025-01-22 ENCOUNTER — TELEPHONE (OUTPATIENT)
Age: 85
End: 2025-01-22

## 2025-01-22 DIAGNOSIS — F51.01 PRIMARY INSOMNIA: Primary | ICD-10-CM

## 2025-01-22 RX ORDER — MIRTAZAPINE 15 MG/1
15 TABLET, FILM COATED ORAL
Qty: 30 TABLET | Refills: 5 | Status: SHIPPED | OUTPATIENT
Start: 2025-01-22

## 2025-01-22 NOTE — TELEPHONE ENCOUNTER
Azeb, [facility] called with a message for the PCP.    Advised pt was discharged from [facility], is currently receiving at home health nursing services.    Pt is currently having an issue with her mobile#  [phone] (M)  If we need to reach the patient within the next few days,  we can call [employer] at  c/b# [phone]

## 2025-01-23 ENCOUNTER — TELEPHONE (OUTPATIENT)
Dept: FAMILY MEDICINE CLINIC | Facility: CLINIC | Age: 85
End: 2025-01-23

## 2025-01-23 NOTE — TELEPHONE ENCOUNTER
Left voicemail on pts son phone to follow up on medication request and confirm if pts son was aware.

## 2025-01-30 RX ORDER — APIXABAN 2.5 MG/1
TABLET, FILM COATED ORAL
COMMUNITY
Start: 2025-01-08

## 2025-01-30 RX ORDER — AMLODIPINE BESYLATE 5 MG/1
5 TABLET ORAL
COMMUNITY

## 2025-01-31 ENCOUNTER — TELEPHONE (OUTPATIENT)
Dept: FAMILY MEDICINE CLINIC | Facility: CLINIC | Age: 85
End: 2025-01-31

## 2025-01-31 ENCOUNTER — OFFICE VISIT (OUTPATIENT)
Dept: FAMILY MEDICINE CLINIC | Facility: CLINIC | Age: 85
End: 2025-01-31
Payer: COMMERCIAL

## 2025-01-31 VITALS
HEIGHT: 55 IN | OXYGEN SATURATION: 97 % | DIASTOLIC BLOOD PRESSURE: 70 MMHG | BODY MASS INDEX: 31.01 KG/M2 | HEART RATE: 65 BPM | RESPIRATION RATE: 18 BRPM | SYSTOLIC BLOOD PRESSURE: 100 MMHG | WEIGHT: 134 LBS

## 2025-01-31 DIAGNOSIS — E66.01 OBESITY, MORBID (HCC): ICD-10-CM

## 2025-01-31 DIAGNOSIS — F03.918 DEMENTIA WITH BEHAVIORAL DISTURBANCE (HCC): ICD-10-CM

## 2025-01-31 DIAGNOSIS — D50.9 IRON DEFICIENCY ANEMIA, UNSPECIFIED IRON DEFICIENCY ANEMIA TYPE: ICD-10-CM

## 2025-01-31 DIAGNOSIS — N18.32 STAGE 3B CHRONIC KIDNEY DISEASE (HCC): ICD-10-CM

## 2025-01-31 DIAGNOSIS — F02.80 ALZHEIMER'S DEMENTIA, UNSPECIFIED DEMENTIA SEVERITY, UNSPECIFIED TIMING OF DEMENTIA ONSET, UNSPECIFIED WHETHER BEHAVIORAL, PSYCHOTIC, OR MOOD DISTURBANCE OR ANXIETY (HCC): ICD-10-CM

## 2025-01-31 DIAGNOSIS — G20.A1 PARKINSON'S DISEASE, UNSPECIFIED WHETHER DYSKINESIA PRESENT, UNSPECIFIED WHETHER MANIFESTATIONS FLUCTUATE (HCC): ICD-10-CM

## 2025-01-31 DIAGNOSIS — I50.32 CHRONIC DIASTOLIC (CONGESTIVE) HEART FAILURE (HCC): ICD-10-CM

## 2025-01-31 DIAGNOSIS — D52.0 DIETARY FOLATE DEFICIENCY ANEMIA: ICD-10-CM

## 2025-01-31 DIAGNOSIS — F02.818 DEMENTIA DUE TO PARKINSON'S DISEASE WITH BEHAVIORAL DISTURBANCE (HCC): ICD-10-CM

## 2025-01-31 DIAGNOSIS — I21.4 NSTEMI (NON-ST ELEVATED MYOCARDIAL INFARCTION) (HCC): ICD-10-CM

## 2025-01-31 DIAGNOSIS — R21 RASH: ICD-10-CM

## 2025-01-31 DIAGNOSIS — N18.31 STAGE 3A CHRONIC KIDNEY DISEASE (HCC): ICD-10-CM

## 2025-01-31 DIAGNOSIS — I83.015: ICD-10-CM

## 2025-01-31 DIAGNOSIS — E53.8 B12 DEFICIENCY: ICD-10-CM

## 2025-01-31 DIAGNOSIS — I50.32 CHRONIC DIASTOLIC CHF (CONGESTIVE HEART FAILURE) (HCC): ICD-10-CM

## 2025-01-31 DIAGNOSIS — Z23 ENCOUNTER FOR IMMUNIZATION: ICD-10-CM

## 2025-01-31 DIAGNOSIS — K21.9 GERD WITHOUT ESOPHAGITIS: ICD-10-CM

## 2025-01-31 DIAGNOSIS — R05.1 ACUTE COUGH: ICD-10-CM

## 2025-01-31 DIAGNOSIS — G30.9 ALZHEIMER'S DEMENTIA, UNSPECIFIED DEMENTIA SEVERITY, UNSPECIFIED TIMING OF DEMENTIA ONSET, UNSPECIFIED WHETHER BEHAVIORAL, PSYCHOTIC, OR MOOD DISTURBANCE OR ANXIETY (HCC): ICD-10-CM

## 2025-01-31 DIAGNOSIS — I48.0 PAROXYSMAL ATRIAL FIBRILLATION (HCC): ICD-10-CM

## 2025-01-31 DIAGNOSIS — Z71.89 COMPLEX CARE COORDINATION: ICD-10-CM

## 2025-01-31 DIAGNOSIS — Z00.00 MEDICARE ANNUAL WELLNESS VISIT, SUBSEQUENT: ICD-10-CM

## 2025-01-31 DIAGNOSIS — R10.9 FLANK PAIN: ICD-10-CM

## 2025-01-31 DIAGNOSIS — L60.0 INGROWN NAIL: ICD-10-CM

## 2025-01-31 DIAGNOSIS — N30.00 ACUTE CYSTITIS WITHOUT HEMATURIA: ICD-10-CM

## 2025-01-31 DIAGNOSIS — D69.6 THROMBOCYTOPENIA, UNSPECIFIED (HCC): ICD-10-CM

## 2025-01-31 DIAGNOSIS — M79.672 PAIN IN BOTH FEET: ICD-10-CM

## 2025-01-31 DIAGNOSIS — F51.01 PRIMARY INSOMNIA: ICD-10-CM

## 2025-01-31 DIAGNOSIS — G20.A1 DEMENTIA DUE TO PARKINSON'S DISEASE WITH BEHAVIORAL DISTURBANCE (HCC): ICD-10-CM

## 2025-01-31 DIAGNOSIS — I25.10 CORONARY ARTERY DISEASE INVOLVING NATIVE HEART, UNSPECIFIED VESSEL OR LESION TYPE, UNSPECIFIED WHETHER ANGINA PRESENT: ICD-10-CM

## 2025-01-31 DIAGNOSIS — I10 ESSENTIAL HYPERTENSION: ICD-10-CM

## 2025-01-31 DIAGNOSIS — M79.671 PAIN IN BOTH FEET: ICD-10-CM

## 2025-01-31 DIAGNOSIS — I13.0 HYPERTENSIVE HEART AND RENAL DISEASE WITH CONGESTIVE HEART FAILURE (HCC): ICD-10-CM

## 2025-01-31 DIAGNOSIS — L89.302 PRESSURE INJURY OF BUTTOCK, STAGE 2, UNSPECIFIED LATERALITY (HCC): ICD-10-CM

## 2025-01-31 DIAGNOSIS — R26.2 AMBULATORY DYSFUNCTION: Primary | ICD-10-CM

## 2025-01-31 DIAGNOSIS — M46.87 OTHER SPECIFIED INFLAMMATORY SPONDYLOPATHIES, LUMBOSACRAL REGION (HCC): ICD-10-CM

## 2025-01-31 DIAGNOSIS — K59.00 CONSTIPATION, UNSPECIFIED CONSTIPATION TYPE: ICD-10-CM

## 2025-01-31 DIAGNOSIS — L97.511 SKIN ULCER OF RIGHT GREAT TOE, LIMITED TO BREAKDOWN OF SKIN (HCC): ICD-10-CM

## 2025-01-31 DIAGNOSIS — E55.9 VITAMIN D DEFICIENCY: ICD-10-CM

## 2025-01-31 DIAGNOSIS — N17.9 AKI (ACUTE KIDNEY INJURY) (HCC): ICD-10-CM

## 2025-01-31 DIAGNOSIS — M54.16 LUMBAR RADICULOPATHY: ICD-10-CM

## 2025-01-31 DIAGNOSIS — E56.9 VITAMIN DEFICIENCY: ICD-10-CM

## 2025-01-31 DIAGNOSIS — I48.11 LONGSTANDING PERSISTENT ATRIAL FIBRILLATION (HCC): ICD-10-CM

## 2025-01-31 PROCEDURE — 99214 OFFICE O/P EST MOD 30 MIN: CPT | Performed by: FAMILY MEDICINE

## 2025-01-31 RX ORDER — BENZONATATE 100 MG/1
100 CAPSULE ORAL 3 TIMES DAILY PRN
Qty: 20 CAPSULE | Refills: 0 | Status: SHIPPED | OUTPATIENT
Start: 2025-01-31

## 2025-01-31 RX ORDER — MIRTAZAPINE 15 MG/1
15 TABLET, FILM COATED ORAL
Qty: 30 TABLET | Refills: 5 | Status: SHIPPED | OUTPATIENT
Start: 2025-01-31

## 2025-01-31 RX ORDER — SULFAMETHOXAZOLE AND TRIMETHOPRIM 800; 160 MG/1; MG/1
1 TABLET ORAL EVERY 12 HOURS SCHEDULED
Qty: 14 TABLET | Refills: 0 | Status: SHIPPED | OUTPATIENT
Start: 2025-01-31 | End: 2025-02-07

## 2025-01-31 RX ORDER — TRIAMCINOLONE ACETONIDE 1 MG/G
CREAM TOPICAL 2 TIMES DAILY
Qty: 80 G | Refills: 0 | Status: SHIPPED | OUTPATIENT
Start: 2025-01-31

## 2025-01-31 NOTE — ASSESSMENT & PLAN NOTE
Wt Readings from Last 3 Encounters:   01/31/25 60.8 kg (134 lb)   11/07/24 61.2 kg (135 lb)   11/01/24 62.9 kg (138 lb 9.6 oz)

## 2025-01-31 NOTE — LETTER
January 31, 2025      To Whom It May Concern,    Demi Donnelly ([birth year]) requires 24-hour care.  She has a diagnosis of Parkinson's disease, congestive heart failure (CHF), and ambulatory dysfunction.    Please contact our office with any questions or concerns.      Sincerely,        Isrrael Jerome MD

## 2025-01-31 NOTE — ASSESSMENT & PLAN NOTE
Lab Results   Component Value Date    EGFR 46 (L) 12/04/2024    EGFR 52 (L) 11/30/2024    EGFR 65 11/29/2024    CREATININE 1.2 (H) 12/04/2024    CREATININE 1.05 11/30/2024    CREATININE 0.87 11/29/2024

## 2025-01-31 NOTE — PROGRESS NOTES
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 (CODE) (HCC)         Thrombocytopenia, unspecified (HCC)         Other specified inflammatory spondylopathies, lumbosacral region (HCC)         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 (HCC)  Wt Readings from Last 3 Encounters:   25 60.8 kg (134 lb)   24 61.2 kg (135 lb)   24 62.9 kg (138 lb 9.6 oz)                    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)         Longstanding persistent atrial fibrillation (HCC)         Obesity, morbid (HCC)         Stage 3b chronic kidney disease (HCC)  Lab Results   Component Value Date    EGFR 46 (L) 2024    EGFR 52 (L) 2024    EGFR 65 2024    CREATININE 1.2 (H) 2024    CREATININE 1.05 2024    CREATININE 0.87 2024            Primary insomnia    Orders:    mirtazapine (REMERON) 15 mg tablet; Take 1 tablet (15 mg total) by mouth daily at bedtime    Acute cystitis without hematuria    Orders:    Urinalysis with microscopic; Future    sulfamethoxazole-trimethoprim (BACTRIM DS) 800-160 mg per tablet; Take 1 tablet by mouth every 12 (twelve) hours for 7 days    Acute cough    Orders:    benzonatate (TESSALON PERLES) 100 mg capsule; Take 1 capsule (100 mg total) by mouth 3 (three) times a day as needed for cough    Essential hypertension         Chronic diastolic CHF (congestive heart failure) ([facility])  Wt Readings from Last 3 Encounters:   25 60.8 kg (134 lb)   24 61.2 kg (135 lb)   24 62.9 kg (138 lb 9.6 oz)                    Coronary artery disease  "involving native heart, unspecified vessel or lesion type, unspecified whether angina present         LUPE (acute kidney injury) (HCC)         Pain in both feet    Orders:    Ambulatory Referral to Podiatry; Future    Rash    Orders:    triamcinolone (KENALOG) 0.1 % cream; Apply topically 2 (two) times a day           History of Present Illness   She was recently admitted with UTI that led to encephalopathy.  She has co-morbid Parkinsons.  She has some mild confusion.      Her BP is at goal.    Her caregiver is concerned about a recurrent UTI.    She has Parkinson's and ambulatory dysfunction.  She would benefit from a hospital bed.  She ambulates with a walker.    Cough      Review of Systems   Respiratory:  Positive for cough.    All other systems reviewed and are negative.      Objective   /70 (BP Location: Left arm, Patient Position: Sitting, Cuff Size: Large)   Pulse 65   Resp 18   Ht 4' 7\" (1.397 m)   Wt 60.8 kg (134 lb)   SpO2 97%   BMI 31.14 kg/m²      Physical Exam  Vitals and nursing note reviewed.   Constitutional:       Appearance: Normal appearance. She is obese.   Cardiovascular:      Rate and Rhythm: Normal rate and regular rhythm.      Pulses: Normal pulses.      Heart sounds: Normal heart sounds.   Pulmonary:      Effort: Pulmonary effort is normal.      Breath sounds: Normal breath sounds.   Abdominal:      General: Abdomen is flat. Bowel sounds are normal.      Palpations: Abdomen is soft.   Musculoskeletal:         General: Normal range of motion.      Cervical back: Normal range of motion and neck supple.   Skin:     General: Skin is warm and dry.      Capillary Refill: Capillary refill takes less than 2 seconds.   Neurological:      General: No focal deficit present.      Mental Status: She is alert and oriented to person, place, and time. Mental status is at baseline.   Psychiatric:         Mood and Affect: Mood normal.         Behavior: Behavior normal.         Thought Content: " Thought content normal.         Judgment: Judgment normal.

## 2025-01-31 NOTE — LETTER
January 31, 2025    To Whom It May Concern,    Demi Donnelly ([birth year]) would from a walk-in shower.  She has ambulatory dysfunction and Parkinson's disease.    Please contact our office with any questions or concerns.      Sincerely,      Isrrael Jerome MD

## 2025-02-04 ENCOUNTER — TELEPHONE (OUTPATIENT)
Age: 85
End: 2025-02-04

## 2025-02-04 NOTE — TELEPHONE ENCOUNTER
Pt caregiver called in requesting a script for a power chair also she is requesting that the script for the hospital bed be faxed over to [phone] please include chart note and the medical card info for the hospital bed if there are any further questions you can contact cathy at [phone] pt care giver is requesting a phone call at [phone] Thank You

## 2025-02-05 ENCOUNTER — TELEPHONE (OUTPATIENT)
Dept: FAMILY MEDICINE CLINIC | Facility: CLINIC | Age: 85
End: 2025-02-05

## 2025-02-05 ENCOUNTER — TELEPHONE (OUTPATIENT)
Age: 85
End: 2025-02-05

## 2025-02-05 DIAGNOSIS — F51.01 PRIMARY INSOMNIA: Primary | ICD-10-CM

## 2025-02-05 RX ORDER — TRAZODONE HYDROCHLORIDE 50 MG/1
50 TABLET, FILM COATED ORAL
Qty: 30 TABLET | Refills: 5 | Status: SHIPPED | OUTPATIENT
Start: 2025-02-05

## 2025-02-05 NOTE — TELEPHONE ENCOUNTER
Order for hospital bed faxed to [phone] along with insurance information and office notes as requested.  Message sent to Dr Jerome also requesting order for power chair that was requested also/

## 2025-02-05 NOTE — TELEPHONE ENCOUNTER
Tamica patients care giver called in regards to patient having a heard time sleeping. Tamica stated that patient has been taking the Mirazapine but it has not been working for patient. Tamica would like a call back with providers advise.

## 2025-02-05 NOTE — TELEPHONE ENCOUNTER
Pts caregiver called, states pt is requesting a rx for a power chair.  Requested to be faxed when completed to [phone].  ty

## 2025-02-06 ENCOUNTER — TELEPHONE (OUTPATIENT)
Dept: FAMILY MEDICINE CLINIC | Facility: CLINIC | Age: 85
End: 2025-02-06

## 2025-02-06 ENCOUNTER — RESULTS FOLLOW-UP (OUTPATIENT)
Dept: FAMILY MEDICINE CLINIC | Facility: CLINIC | Age: 85
End: 2025-02-06

## 2025-02-06 NOTE — TELEPHONE ENCOUNTER
Heike from [facility] called stating hospital bed rx needs to have a dx of parkinson's and chronic pain listed instead of ambulatory dysfunction . Most recent office note also needs to state she needs reposition of the body that is not feasible from a regular bed to alleviate her chronic pain.    Can be refaxed to Jeff kendrick at [phone]    Thanks!

## 2025-02-07 ENCOUNTER — TELEPHONE (OUTPATIENT)
Age: 85
End: 2025-02-07

## 2025-02-07 NOTE — TELEPHONE ENCOUNTER
Caregiver calls to ask if the Trazadone can be cut in half? Patient is sleeping but remains drowsy during the day. The current dose is Trazodone 50 mg po daily at HS. CG is asking if they can cut in half and give 25 mg po at HS. Please call the caregiver Tamica @ [phone] or [phone].    CG also asking if the additional information regarding the hospital bed was sent to [facility]. Please read the notes dated 2/6/25. The [employer] needs diagnosis codes for Parkinson's and chronic pain. Office notes from 1/31/25 mention a Stage 2 pressure ulcer but do not mention the need for repositioning or that a hospital bed would assist the patient to do this. [facility] [phone]. Attn: Heike

## 2025-02-10 ENCOUNTER — TELEPHONE (OUTPATIENT)
Age: 85
End: 2025-02-10

## 2025-02-10 DIAGNOSIS — N30.00 ACUTE CYSTITIS WITHOUT HEMATURIA: Primary | ICD-10-CM

## 2025-02-10 DIAGNOSIS — R21 RASH: ICD-10-CM

## 2025-02-10 RX ORDER — KETOCONAZOLE 20 MG/G
CREAM TOPICAL DAILY
Qty: 60 G | Refills: 5 | Status: SHIPPED | OUTPATIENT
Start: 2025-02-10 | End: 2025-02-18

## 2025-02-10 RX ORDER — SULFAMETHOXAZOLE AND TRIMETHOPRIM 800; 160 MG/1; MG/1
1 TABLET ORAL EVERY 12 HOURS SCHEDULED
Qty: 6 TABLET | Refills: 0 | Status: SHIPPED | OUTPATIENT
Start: 2025-02-10 | End: 2025-02-13

## 2025-02-10 NOTE — TELEPHONE ENCOUNTER
[facility] call from patient's caregiver/To who said that they stopped giving trazadone to patient. Her last dose was on Saturday night. It was causing her to be very lethargic and hallucinate. She is still sleepy today from it but has not had any hallucinations since yesterday. Patient also having burning with urination. She finished the abx Friday for a UTI, To thinks maybe she needs another one. Also wants to know if a cream can be ordered for carlos-area and rectal area because of redness. Please f/u with caregiver To.

## 2025-02-11 DIAGNOSIS — R21 RASH: Primary | ICD-10-CM

## 2025-02-11 RX ORDER — CLOTRIMAZOLE 1 %
CREAM (GRAM) TOPICAL 2 TIMES DAILY
Qty: 28 G | Refills: 3 | Status: SHIPPED | OUTPATIENT
Start: 2025-02-11 | End: 2025-02-18

## 2025-02-12 PROBLEM — Z00.00 MEDICARE ANNUAL WELLNESS VISIT, SUBSEQUENT: Status: ACTIVE | Noted: 2025-02-12

## 2025-02-12 PROBLEM — I48.11 LONGSTANDING PERSISTENT ATRIAL FIBRILLATION (HCC): Status: ACTIVE | Noted: 2025-02-12

## 2025-02-12 PROBLEM — K59.00 CONSTIPATION, UNSPECIFIED CONSTIPATION TYPE: Status: ACTIVE | Noted: 2025-02-12

## 2025-02-12 PROBLEM — I21.4 NSTEMI (NON-ST ELEVATED MYOCARDIAL INFARCTION) (HCC): Status: ACTIVE | Noted: 2025-02-12

## 2025-02-12 PROBLEM — F51.01 PRIMARY INSOMNIA: Status: ACTIVE | Noted: 2025-02-12

## 2025-02-12 PROBLEM — F02.80 ALZHEIMER'S DEMENTIA, UNSPECIFIED DEMENTIA SEVERITY, UNSPECIFIED TIMING OF DEMENTIA ONSET, UNSPECIFIED WHETHER BEHAVIORAL, PSYCHOTIC, OR MOOD DISTURBANCE OR ANXIETY (HCC): Status: ACTIVE | Noted: 2025-02-12

## 2025-02-12 PROBLEM — M79.672 PAIN IN BOTH FEET: Status: ACTIVE | Noted: 2025-02-12

## 2025-02-12 PROBLEM — R05.1 ACUTE COUGH: Status: ACTIVE | Noted: 2025-02-12

## 2025-02-12 PROBLEM — R21 RASH: Status: ACTIVE | Noted: 2025-02-12

## 2025-02-12 PROBLEM — G30.9 ALZHEIMER'S DEMENTIA, UNSPECIFIED DEMENTIA SEVERITY, UNSPECIFIED TIMING OF DEMENTIA ONSET, UNSPECIFIED WHETHER BEHAVIORAL, PSYCHOTIC, OR MOOD DISTURBANCE OR ANXIETY (HCC): Status: ACTIVE | Noted: 2025-02-12

## 2025-02-12 PROBLEM — D52.0 DIETARY FOLATE DEFICIENCY ANEMIA: Status: ACTIVE | Noted: 2025-02-12

## 2025-02-12 PROBLEM — I50.32 CHRONIC DIASTOLIC (CONGESTIVE) HEART FAILURE (HCC): Status: ACTIVE | Noted: 2025-02-12

## 2025-02-12 PROBLEM — Z71.89 COMPLEX CARE COORDINATION: Status: ACTIVE | Noted: 2025-02-12

## 2025-02-12 PROBLEM — D50.9 IRON DEFICIENCY ANEMIA, UNSPECIFIED IRON DEFICIENCY ANEMIA TYPE: Status: ACTIVE | Noted: 2025-02-12

## 2025-02-12 PROBLEM — Z23 ENCOUNTER FOR IMMUNIZATION: Status: ACTIVE | Noted: 2025-02-12

## 2025-02-12 PROBLEM — I48.0 PAROXYSMAL ATRIAL FIBRILLATION (HCC): Status: ACTIVE | Noted: 2025-02-12

## 2025-02-12 PROBLEM — I25.10 CORONARY ARTERY DISEASE INVOLVING NATIVE HEART, UNSPECIFIED VESSEL OR LESION TYPE, UNSPECIFIED WHETHER ANGINA PRESENT: Status: ACTIVE | Noted: 2025-02-12

## 2025-02-12 PROBLEM — N30.00 ACUTE CYSTITIS WITHOUT HEMATURIA: Status: ACTIVE | Noted: 2025-02-12

## 2025-02-12 PROBLEM — M79.671 PAIN IN BOTH FEET: Status: ACTIVE | Noted: 2025-02-12

## 2025-02-12 PROBLEM — N18.31 STAGE 3A CHRONIC KIDNEY DISEASE (HCC): Status: ACTIVE | Noted: 2025-02-12

## 2025-02-12 PROBLEM — L60.0 INGROWN NAIL: Status: ACTIVE | Noted: 2025-02-12

## 2025-02-12 PROBLEM — G20.A1 PARKINSON'S DISEASE, UNSPECIFIED WHETHER DYSKINESIA PRESENT, UNSPECIFIED WHETHER MANIFESTATIONS FLUCTUATE (HCC): Status: ACTIVE | Noted: 2025-02-12

## 2025-02-12 PROBLEM — R10.9 FLANK PAIN: Status: ACTIVE | Noted: 2025-02-12

## 2025-02-12 PROBLEM — F03.918 DEMENTIA WITH BEHAVIORAL DISTURBANCE (HCC): Status: ACTIVE | Noted: 2025-02-12

## 2025-02-12 PROBLEM — E56.9 VITAMIN DEFICIENCY: Status: ACTIVE | Noted: 2025-02-12

## 2025-02-12 NOTE — ASSESSMENT & PLAN NOTE
Orders:    mirtazapine (REMERON) 15 mg tablet; Take 1 tablet (15 mg total) by mouth daily at bedtime

## 2025-02-12 NOTE — ASSESSMENT & PLAN NOTE
Orders:    Urinalysis with microscopic; Future    sulfamethoxazole-trimethoprim (BACTRIM DS) 800-160 mg per tablet; Take 1 tablet by mouth every 12 (twelve) hours for 7 days

## 2025-02-12 NOTE — ASSESSMENT & PLAN NOTE
Orders:    benzonatate (TESSALON PERLES) 100 mg capsule; Take 1 capsule (100 mg total) by mouth 3 (three) times a day as needed for cough

## 2025-02-17 ENCOUNTER — TELEPHONE (OUTPATIENT)
Age: 85
End: 2025-02-17

## 2025-02-17 ENCOUNTER — NURSE TRIAGE (OUTPATIENT)
Age: 85
End: 2025-02-17

## 2025-02-17 NOTE — TELEPHONE ENCOUNTER
Patient having manic episodes of confusion and insomnia. She is having hallucinations.    She is also having swelling with her left leg, calf, and foot. Ankle up to her shin and calf is red as well. Possible cellulitis.

## 2025-02-17 NOTE — TELEPHONE ENCOUNTER
"Received call transfer to triage symptoms. Patient's CG To calling to report that patient is having swelling and erythema to left leg.  States that she first noticed this yesterday.  Leg is swollen with pitting edema from foot to knee, redness from ankle to knee.  Denies fever.  Area is tender to touch.  CG has concerns for cellulitis.    CG also reports  that patient has had episodes of hallucinations and altered mental status lately.  Reports that this usually happens when her family is asking her for money.  CG has concerns about the stress that this is causing to patient. Tamica states that she is unable to bring patient into the office or to urgent care but would be able to do a virtual visit today.     Attempted to reach [facility] to have patient schedule an appointment.  Office unavailable at time of call.  Call with Tamica was then disconnected accidentally.  Called back to Tamica and she states that she spoke with another nurse at Dr Jerome's office and needs to speak with patient's daughter to decide what to do.   Please follow up with Tamica @ [phone]      Reason for Disposition   Thigh, calf, or ankle swelling in only one leg    Answer Assessment - Initial Assessment Questions  1. ONSET: \"When did the swelling start?\" (e.g., minutes, hours, days)      Caregiver just noticed yesterday  2. LOCATION: \"What part of the leg is swollen?\"  \"Are both legs swollen or just one leg?\"      Left leg foot to knee  3. SEVERITY: \"How bad is the swelling?\" (e.g., localized; mild, moderate, severe)      Moderate  4. REDNESS: \"Does the swelling look red or infected?\"      Yes red from ankle to shin  5. PAIN: \"Is the swelling painful to touch?\" If Yes, ask: \"How painful is it?\"   (Scale 1-10; mild, moderate or severe)      Tender to touch  6. FEVER: \"Do you have a fever?\" If Yes, ask: \"What is it, how was it measured, and when did it start?\"       Denies  7. CAUSE: \"What do you think is causing the leg " "swelling?\"      CG thinks it is cellulitis  8. MEDICAL HISTORY: \"Do you have a history of blood clots (e.g., DVT), cancer, heart failure, kidney disease, or liver failure?\"      CHF  9. RECURRENT SYMPTOM: \"Have you had leg swelling before?\" If Yes, ask: \"When was the last time?\" \"What happened that time?\"      Patient states that she has had swelling in the past with gout.  10. OTHER SYMPTOMS: \"Do you have any other symptoms?\" (e.g., chest pain, difficulty breathing)        CG reports hallucinations off and on recently  11. PREGNANCY: \"Is there any chance you are pregnant?\" \"When was your last menstrual period?\"        N/A    Protocols used: Leg Swelling and Edema-Adult-[place]    "

## 2025-02-18 ENCOUNTER — TELEMEDICINE (OUTPATIENT)
Dept: FAMILY MEDICINE CLINIC | Facility: CLINIC | Age: 85
End: 2025-02-18
Payer: COMMERCIAL

## 2025-02-18 DIAGNOSIS — L03.116 CELLULITIS OF LEFT LOWER EXTREMITY: ICD-10-CM

## 2025-02-18 DIAGNOSIS — R04.0 EPISTAXIS: ICD-10-CM

## 2025-02-18 DIAGNOSIS — Z87.440 HISTORY OF RECURRENT UTIS: ICD-10-CM

## 2025-02-18 DIAGNOSIS — R05.2 SUBACUTE COUGH: Primary | ICD-10-CM

## 2025-02-18 DIAGNOSIS — F51.01 PRIMARY INSOMNIA: ICD-10-CM

## 2025-02-18 DIAGNOSIS — F03.918 DEMENTIA WITH BEHAVIORAL DISTURBANCE (HCC): ICD-10-CM

## 2025-02-18 PROBLEM — Z90.5 H/O LEFT NEPHRECTOMY: Status: ACTIVE | Noted: 2018-07-12

## 2025-02-18 PROBLEM — R05.1 ACUTE COUGH: Status: RESOLVED | Noted: 2025-02-12 | Resolved: 2025-02-18

## 2025-02-18 PROBLEM — E66.812 CLASS 2 OBESITY IN ADULT: Status: ACTIVE | Noted: 2020-03-11

## 2025-02-18 PROBLEM — F03.90 DEMENTIA (HCC): Status: ACTIVE | Noted: 2022-06-20

## 2025-02-18 PROBLEM — I50.42 CHRONIC COMBINED SYSTOLIC AND DIASTOLIC CONGESTIVE HEART FAILURE (HCC): Status: ACTIVE | Noted: 2017-05-15

## 2025-02-18 PROBLEM — D63.8 ANEMIA OF CHRONIC DISEASE: Status: ACTIVE | Noted: 2022-03-08

## 2025-02-18 PROBLEM — K59.09 CHRONIC CONSTIPATION: Status: ACTIVE | Noted: 2020-12-01

## 2025-02-18 PROBLEM — F02.80 ALZHEIMER'S DISEASE (HCC): Status: ACTIVE | Noted: 2021-12-03

## 2025-02-18 PROBLEM — R79.89 ELEVATED TROPONIN: Status: RESOLVED | Noted: 2024-09-15 | Resolved: 2025-02-18

## 2025-02-18 PROBLEM — I48.0 PAROXYSMAL ATRIAL FIBRILLATION (HCC): Status: ACTIVE | Noted: 2019-07-23

## 2025-02-18 PROBLEM — R21 RASH: Status: RESOLVED | Noted: 2025-02-12 | Resolved: 2025-02-18

## 2025-02-18 PROBLEM — D75.839 THROMBOCYTOSIS: Status: ACTIVE | Noted: 2025-01-31

## 2025-02-18 PROBLEM — N30.00 ACUTE CYSTITIS WITHOUT HEMATURIA: Status: ACTIVE | Noted: 2018-07-12

## 2025-02-18 PROBLEM — E56.9 VITAMIN DEFICIENCY: Status: RESOLVED | Noted: 2025-02-12 | Resolved: 2025-02-18

## 2025-02-18 PROBLEM — N81.5 VAGINAL ENTEROCELE: Status: ACTIVE | Noted: 2021-11-18

## 2025-02-18 PROBLEM — R91.8 GROUND GLASS OPACITY PRESENT ON IMAGING OF LUNG: Status: RESOLVED | Noted: 2021-07-09 | Resolved: 2025-02-18

## 2025-02-18 PROBLEM — Z00.00 MEDICARE ANNUAL WELLNESS VISIT, SUBSEQUENT: Status: RESOLVED | Noted: 2025-02-12 | Resolved: 2025-02-18

## 2025-02-18 PROBLEM — J18.9 COMMUNITY ACQUIRED PNEUMONIA: Status: RESOLVED | Noted: 2021-11-12 | Resolved: 2025-02-18

## 2025-02-18 PROBLEM — G30.9 ALZHEIMER'S DISEASE (HCC): Status: ACTIVE | Noted: 2021-12-03

## 2025-02-18 PROBLEM — G20.A1 PARKINSON'S DISEASE (HCC): Status: ACTIVE | Noted: 2021-11-18

## 2025-02-18 PROBLEM — U07.1 COVID-19: Status: RESOLVED | Noted: 2021-11-20 | Resolved: 2025-02-18

## 2025-02-18 PROBLEM — N18.31 STAGE 3A CHRONIC KIDNEY DISEASE (HCC): Status: ACTIVE | Noted: 2019-07-23

## 2025-02-18 PROBLEM — E66.811 CLASS 1 OBESITY WITH BODY MASS INDEX (BMI) OF 31.0 TO 31.9 IN ADULT: Status: ACTIVE | Noted: 2020-03-11

## 2025-02-18 PROBLEM — Z71.89 COMPLEX CARE COORDINATION: Status: RESOLVED | Noted: 2025-02-12 | Resolved: 2025-02-18

## 2025-02-18 PROBLEM — Z23 ENCOUNTER FOR IMMUNIZATION: Status: RESOLVED | Noted: 2025-02-12 | Resolved: 2025-02-18

## 2025-02-18 PROCEDURE — 99213 OFFICE O/P EST LOW 20 MIN: CPT

## 2025-02-18 RX ORDER — GUAIFENESIN/DEXTROMETHORPHAN 100-10MG/5
5 SYRUP ORAL 3 TIMES DAILY PRN
Qty: 473 ML | Refills: 0 | Status: SHIPPED | OUTPATIENT
Start: 2025-02-18

## 2025-02-18 RX ORDER — CEPHALEXIN 500 MG/1
500 CAPSULE ORAL 3 TIMES DAILY
COMMUNITY
Start: 2025-02-17 | End: 2025-02-24

## 2025-02-18 RX ORDER — PHENAZOPYRIDINE HYDROCHLORIDE 100 MG/1
100 TABLET, FILM COATED ORAL 3 TIMES DAILY PRN
COMMUNITY
Start: 2025-02-17 | End: 2025-02-20

## 2025-02-18 NOTE — PROGRESS NOTES
"Name: Demi Donnelly      : 1940      MRN: [id number]  Encounter Provider: Matias Donovan DO  Encounter Date: 2025   [facility]  :  Assessment & Plan  Subacute cough  For the past 18 days; worsening w/o other systemic symptoms.  Ddx: GERD, pneumonia, CHF  Likely viral rhinopharyngitis vs bronchitis.  Trial of Robitussin  CXR if symptoms continue to worsen.  Return to office in 2 weeks for reevaluation.  Orders:    dextromethorphan-guaiFENesin (ROBITUSSIN DM)  mg/5 mL syrup; Take 5 mL by mouth 3 (three) times a day as needed for cough    XR chest pa and lateral; Future    Cellulitis of left lower extremity  LLE duplex US negative for DVT.  Continue Keflex 500 mg TID until 25 (1 wk total)         History of recurrent UTIs  Also history of vaginal enterocele & cystocele.  Ddx: dysuria d/t vaginal atrophy vs recurrent UTI d/t colonization 2/2 partial urinary retention.  Continue Keflex 500 mg TID  Pyridium for symptoms of dysuria  Return to office in 2 weeks.  Follow-up w/ GYN       Primary insomnia  Trazodone unacceptable for this patient d/t side effects.  Trial of Melatonin 1 mg/night       Dementia with behavioral disturbance (HCC)  Dementia likely not Parkinson's, though Alzheimer's is possible.  Discussed w/ caregiver that patient's confusion is likely multifactorial. UTI, cellulitis, respiratory infection, family stress, insomnia, polypharmacy, and dementia are possible sources.  Continue to provide care and orient.  Treat possible infection (see \"Cellulitis\" and \"recurrent UTI\").  Discuss methods to combat delirium on follow-up.         Epistaxis  On Eliquis for Hx of pAFib  Counseled on how to stop nosebleeds.  Advised topical Vasoline              History of Present Illness   HPI  Patient is an 84-y/o woman with Hx of HTN, HFpEF, Afib (on Eliquis), NSTEMI, CAD s/p PCI, venous insufficiency, dementia, CKD3a, leg edema, " constipation, and vaginal prolapse who presents via telemedicine with caregiver for follow-up on ED visit yesterday for agitation/confusion and left lower leg redness/swelling. Patient's caregiver was afraid that it was DVT or cellulitis. CBC showed no leukocytosis, and left leg venous duplex showed no DVT. Per caregiver, patient was found to have UTI on urinary studies, which are not available in our medical records. She was given Pyridium and a week of Keflex. Patient was scheduled to follow-up with GYN.   Today, caregiver is still concerned about patient's:   Leg swelling. She worries that it's cellulitis   Cough, which has been progressively worse since last month. Patient hasn't been febrile (unless temporal thermometer is used on chest), wheezy, or short of breath at rest.  Dysuria, which is intermittent  Insomnia. Trazodone had bad side effects.  Confusion. She fears that agitation is triggered by patient's son asking for money.  Nosebleeds: twice last week    Review of Systems  Right ankle pain  Dyspnea on exertion  Other RoS as per HPI    Physical exam was limited due to the visit being virtual. Patient was sleepy and sitting in chair but answering questions. Her left lower leg has pitting edema and is more erythematous than the right, which isn't as swollen as the left.    Administrative Statements   Encounter provider Matias Donovan, DO    The Patient is located at Home and in the following state in which I hold an active license [place].    The patient was identified by name and date of birth. Demi Donnelly was informed that this is a telemedicine visit and that the visit is being conducted through the Epic Embedded platform. She agrees to proceed..  My office door was closed. No one else was in the room.  She acknowledged consent and understanding of privacy and security of the video platform. The patient has agreed to participate and understands they can discontinue the visit at any time.    I have spent  a total time of 20 minutes in caring for this patient on the day of the visit/encounter including Diagnostic results, Risks and benefits of tx options, Instructions for management, Patient and family education, Impressions, Documenting in the medical record, Reviewing/placing orders in the medical record (including tests, medications, and/or procedures), Obtaining or reviewing history  , and Communicating with other healthcare professionals .

## 2025-02-19 NOTE — ASSESSMENT & PLAN NOTE
Also history of vaginal enterocele & cystocele.  Ddx: dysuria d/t vaginal atrophy vs recurrent UTI d/t colonization 2/2 partial urinary retention.  Continue Keflex 500 mg TID  Pyridium for symptoms of dysuria  Return to office in 2 weeks.  Follow-up w/ GYN

## 2025-02-19 NOTE — ASSESSMENT & PLAN NOTE
"Dementia likely not Parkinson's, though Alzheimer's is possible.  Discussed w/ caregiver that patient's confusion is likely multifactorial. UTI, cellulitis, respiratory infection, family stress, insomnia, polypharmacy, and dementia are possible sources.  Continue to provide care and orient.  Treat possible infection (see \"Cellulitis\" and \"recurrent UTI\").  Discuss methods to combat delirium on follow-up.         "

## 2025-02-27 ENCOUNTER — TELEPHONE (OUTPATIENT)
Dept: FAMILY MEDICINE CLINIC | Facility: CLINIC | Age: 85
End: 2025-02-27

## 2025-02-27 NOTE — TELEPHONE ENCOUNTER
Patient's daughter calling regarding medication refills. Reports patient was in nursing home and since being discharge she is taking over managing patients meds. When discharged from hospital, they only gave a small supply of meds which are now running out. Requesting refills of any of the meds patient is supposed to be continuing. Additionally, there are questions surrounding insurance on file and would like call back to review.

## 2025-02-28 DIAGNOSIS — I48.91 ATRIAL FIBRILLATION, UNSPECIFIED TYPE (HCC): ICD-10-CM

## 2025-02-28 DIAGNOSIS — M46.87 OTHER SPECIFIED INFLAMMATORY SPONDYLOPATHIES, LUMBOSACRAL REGION (HCC): ICD-10-CM

## 2025-02-28 DIAGNOSIS — I10 ESSENTIAL HYPERTENSION: ICD-10-CM

## 2025-02-28 DIAGNOSIS — I25.10 CORONARY ARTERY DISEASE INVOLVING NATIVE HEART, UNSPECIFIED VESSEL OR LESION TYPE, UNSPECIFIED WHETHER ANGINA PRESENT: ICD-10-CM

## 2025-02-28 DIAGNOSIS — G20.A1 PARKINSON'S DISEASE (HCC): ICD-10-CM

## 2025-02-28 DIAGNOSIS — E66.01 OBESITY, MORBID (HCC): ICD-10-CM

## 2025-02-28 DIAGNOSIS — E55.9 VITAMIN D DEFICIENCY: ICD-10-CM

## 2025-02-28 DIAGNOSIS — I50.32 CHRONIC DIASTOLIC CHF (CONGESTIVE HEART FAILURE) (HCC): ICD-10-CM

## 2025-02-28 DIAGNOSIS — N18.31 STAGE 3A CHRONIC KIDNEY DISEASE (HCC): ICD-10-CM

## 2025-02-28 DIAGNOSIS — Z90.5 SINGLE KIDNEY: Chronic | ICD-10-CM

## 2025-02-28 DIAGNOSIS — R00.1 JUNCTIONAL BRADYCARDIA: ICD-10-CM

## 2025-02-28 RX ORDER — ISOSORBIDE MONONITRATE 60 MG/1
60 TABLET, EXTENDED RELEASE ORAL DAILY
Qty: 90 TABLET | Refills: 3 | Status: SHIPPED | OUTPATIENT
Start: 2025-02-28 | End: 2025-03-04 | Stop reason: SDUPTHER

## 2025-02-28 RX ORDER — AMLODIPINE BESYLATE 5 MG/1
5 TABLET ORAL DAILY
Qty: 30 TABLET | Refills: 1 | Status: SHIPPED | OUTPATIENT
Start: 2025-02-28 | End: 2025-03-04 | Stop reason: DRUGHIGH

## 2025-02-28 RX ORDER — FUROSEMIDE 20 MG/1
20 TABLET ORAL 2 TIMES DAILY
Qty: 180 TABLET | Refills: 3 | Status: SHIPPED | OUTPATIENT
Start: 2025-02-28

## 2025-02-28 RX ORDER — APIXABAN 5 MG/1
5 TABLET, FILM COATED ORAL 2 TIMES DAILY
Qty: 180 TABLET | Refills: 3 | Status: SHIPPED | OUTPATIENT
Start: 2025-02-28

## 2025-02-28 RX ORDER — EZETIMIBE 10 MG/1
10 TABLET ORAL DAILY
Qty: 90 TABLET | Refills: 3 | Status: SHIPPED | OUTPATIENT
Start: 2025-02-28

## 2025-02-28 RX ORDER — LANSOPRAZOLE 30 MG/1
30 CAPSULE, DELAYED RELEASE ORAL DAILY
Qty: 90 CAPSULE | Refills: 3 | Status: SHIPPED | OUTPATIENT
Start: 2025-02-28

## 2025-02-28 NOTE — TELEPHONE ENCOUNTER
I called patient's caregiver, since she requested refills. Erythema of left lower leg has resolved, though it's still a little swollen (no right-sided edema). Discussed that Lasix only really only needs to be given if she has edema or shortness of breath.

## 2025-03-01 PROBLEM — I25.10 CAD S/P PERCUTANEOUS CORONARY ANGIOPLASTY: Status: ACTIVE | Noted: 2019-12-17

## 2025-03-01 PROBLEM — R94.39 ABNORMAL STRESS TEST: Status: RESOLVED | Noted: 2019-08-20 | Resolved: 2025-03-01

## 2025-03-01 PROBLEM — E53.8 B12 DEFICIENCY: Status: RESOLVED | Noted: 2018-02-13 | Resolved: 2025-03-01

## 2025-03-01 PROBLEM — M54.40 CHRONIC LOW BACK PAIN WITH SCIATICA: Status: ACTIVE | Noted: 2018-10-09

## 2025-03-01 PROBLEM — D52.0 DIETARY FOLATE DEFICIENCY ANEMIA: Status: RESOLVED | Noted: 2025-02-12 | Resolved: 2025-03-01

## 2025-03-01 PROBLEM — M51.16 HERNIATION OF LUMBAR INTERVERTEBRAL DISC WITH RADICULOPATHY: Status: ACTIVE | Noted: 2017-09-25

## 2025-03-01 PROBLEM — I25.10 CAD S/P PERCUTANEOUS CORONARY ANGIOPLASTY: Status: ACTIVE | Noted: 2021-06-01

## 2025-03-01 PROBLEM — Z98.61 CAD S/P PERCUTANEOUS CORONARY ANGIOPLASTY: Status: ACTIVE | Noted: 2021-06-01

## 2025-03-01 PROBLEM — Z86.79 H/O: RHEUMATIC FEVER: Status: RESOLVED | Noted: 2018-07-12 | Resolved: 2025-03-01

## 2025-03-01 PROBLEM — N17.9 ACUTE NONTRAUMATIC KIDNEY INJURY (HCC): Status: RESOLVED | Noted: 2020-12-01 | Resolved: 2025-03-01

## 2025-03-01 PROBLEM — N30.00 ACUTE CYSTITIS WITHOUT HEMATURIA: Status: RESOLVED | Noted: 2018-07-12 | Resolved: 2025-03-01

## 2025-03-01 PROBLEM — T79.6XXA TRAUMATIC RHABDOMYOLYSIS (HCC): Status: RESOLVED | Noted: 2024-09-15 | Resolved: 2025-03-01

## 2025-03-01 PROBLEM — Z98.61 CAD S/P PERCUTANEOUS CORONARY ANGIOPLASTY: Status: ACTIVE | Noted: 2019-12-17

## 2025-03-01 PROBLEM — Z87.440 HISTORY OF RECURRENT UTIS: Status: RESOLVED | Noted: 2025-02-18 | Resolved: 2025-03-01

## 2025-03-01 PROBLEM — E78.5 HYPERLIPIDEMIA, UNSPECIFIED: Status: ACTIVE | Noted: 2021-12-03

## 2025-03-01 PROBLEM — E53.8 LOW SERUM VITAMIN B12: Status: RESOLVED | Noted: 2017-02-13 | Resolved: 2025-03-01

## 2025-03-01 PROBLEM — I25.9 CHRONIC ISCHEMIC HEART DISEASE: Status: ACTIVE | Noted: 2021-06-17

## 2025-03-01 PROBLEM — D50.9 IRON DEFICIENCY ANEMIA, UNSPECIFIED IRON DEFICIENCY ANEMIA TYPE: Status: RESOLVED | Noted: 2025-02-12 | Resolved: 2025-03-01

## 2025-03-04 ENCOUNTER — OFFICE VISIT (OUTPATIENT)
Dept: FAMILY MEDICINE CLINIC | Facility: CLINIC | Age: 85
End: 2025-03-04
Payer: COMMERCIAL

## 2025-03-04 VITALS
SYSTOLIC BLOOD PRESSURE: 100 MMHG | HEART RATE: 73 BPM | BODY MASS INDEX: 31.01 KG/M2 | WEIGHT: 134 LBS | HEIGHT: 55 IN | DIASTOLIC BLOOD PRESSURE: 72 MMHG | OXYGEN SATURATION: 99 %

## 2025-03-04 DIAGNOSIS — N81.10 FEMALE CYSTOCELE: ICD-10-CM

## 2025-03-04 DIAGNOSIS — R60.0 PERIPHERAL EDEMA: Primary | ICD-10-CM

## 2025-03-04 DIAGNOSIS — I50.32 CHRONIC DIASTOLIC CHF (CONGESTIVE HEART FAILURE) (HCC): ICD-10-CM

## 2025-03-04 DIAGNOSIS — Z23 NEED FOR VACCINATION: ICD-10-CM

## 2025-03-04 DIAGNOSIS — I10 PRIMARY HYPERTENSION: ICD-10-CM

## 2025-03-04 DIAGNOSIS — K21.9 GASTROESOPHAGEAL REFLUX DISEASE WITHOUT ESOPHAGITIS: ICD-10-CM

## 2025-03-04 DIAGNOSIS — L03.116 CELLULITIS OF LEFT LOWER EXTREMITY: ICD-10-CM

## 2025-03-04 DIAGNOSIS — I48.91 ATRIAL FIBRILLATION, UNSPECIFIED TYPE (HCC): ICD-10-CM

## 2025-03-04 DIAGNOSIS — R26.2 DIFFICULTY IN WALKING, NOT ELSEWHERE CLASSIFIED: ICD-10-CM

## 2025-03-04 DIAGNOSIS — R29.6 FREQUENT FALLS: ICD-10-CM

## 2025-03-04 DIAGNOSIS — R53.81 PHYSICAL DECONDITIONING: ICD-10-CM

## 2025-03-04 PROBLEM — G20.A1 PARKINSON'S DISEASE (HCC): Status: RESOLVED | Noted: 2021-11-18 | Resolved: 2025-03-04

## 2025-03-04 PROBLEM — R10.9 FLANK PAIN: Status: RESOLVED | Noted: 2025-02-12 | Resolved: 2025-03-04

## 2025-03-04 PROBLEM — L97.511 SKIN ULCER OF RIGHT GREAT TOE, LIMITED TO BREAKDOWN OF SKIN (HCC): Status: RESOLVED | Noted: 2023-03-28 | Resolved: 2025-03-04

## 2025-03-04 PROBLEM — I48.11 LONGSTANDING PERSISTENT ATRIAL FIBRILLATION (HCC): Status: RESOLVED | Noted: 2025-02-12 | Resolved: 2025-03-04

## 2025-03-04 PROBLEM — R00.1 JUNCTIONAL BRADYCARDIA: Status: RESOLVED | Noted: 2019-07-17 | Resolved: 2025-03-04

## 2025-03-04 PROBLEM — M47.816 LUMBAR SPONDYLOSIS: Status: ACTIVE | Noted: 2020-12-01

## 2025-03-04 PROCEDURE — 99214 OFFICE O/P EST MOD 30 MIN: CPT

## 2025-03-04 RX ORDER — AMLODIPINE BESYLATE 2.5 MG/1
2.5 TABLET ORAL DAILY
Qty: 30 TABLET | Refills: 1 | Status: SHIPPED | OUTPATIENT
Start: 2025-03-04

## 2025-03-04 RX ORDER — ISOSORBIDE MONONITRATE 60 MG/1
60 TABLET, EXTENDED RELEASE ORAL DAILY
Qty: 90 TABLET | Refills: 3 | Status: SHIPPED | OUTPATIENT
Start: 2025-03-04

## 2025-03-04 RX ORDER — BLOOD PRESSURE TEST KIT
KIT MISCELLANEOUS EVERY OTHER DAY
Qty: 1 KIT | Refills: 0 | Status: SHIPPED | OUTPATIENT
Start: 2025-03-04

## 2025-03-04 NOTE — ASSESSMENT & PLAN NOTE
Wt Readings from Last 3 Encounters:   03/04/25 60.8 kg (134 lb)   01/31/25 60.8 kg (134 lb)   11/07/24 61.2 kg (135 lb)   Last reported weight 62.3 kg 2/17/25  Last echo (2/19/24): LVEF 60-65% w/ grade 1 diastolic dysfunction.  Symptoms seem well-controlled at this time.  Continue Lasix 20 mg twice daily as needed

## 2025-03-04 NOTE — PROGRESS NOTES
Name: Demi Donnelly      : 1940      MRN: [id number]  Encounter Provider: Matias Donovan DO  Encounter Date: 3/4/2025   [facility]  :  Assessment & Plan  Peripheral edema  Likely d/t dCHF and/or venous insufficiency.  Well-controlled currently.  Lasix PRN       Female cystocele  Urinary complaints resolved, except for concern of perineal lesion.  F/u with GYN on 3/12       Primary hypertension  A little too well-controlled.  Encouraged regular blood pressure monitoring at home with automatic blood pressure cuff; every other day.  Cutting Norvasc prescription in half.  Orders:    Blood Pressure KIT; Use every other day    amLODIPine (NORVASC) 2.5 mg tablet; Take 1 tablet (2.5 mg total) by mouth daily    Chronic diastolic CHF (congestive heart failure) (HCC)  Wt Readings from Last 3 Encounters:   25 60.8 kg (134 lb)   25 60.8 kg (134 lb)   24 61.2 kg (135 lb)   Last reported weight 62.3 kg 25  Last echo (24): LVEF 60-65% w/ grade 1 diastolic dysfunction.  Symptoms seem well-controlled at this time.  Continue Lasix 20 mg twice daily as needed       Atrial fibrillation, unspecified type (HCC)  Not currently in A-fib, rate controlled without medication.  Anticoagulated with Eliquis 5 mg. No current bleeding concerns. Though she probably could decrease her dose d/t her CKD3a, weight ~60kg, and age >80, a lower dose wouldn't necessarily decrease her bleeding risk per the BRISA trial (2020).       Need for vaccination    Orders:    Zoster Vac Recomb Adjuvanted 50 MCG/0.5ML SUSR; Inject 50 mcg into a muscle 1 (one) time for 1 dose    Difficulty in walking, not elsewhere classified    Orders:    Ambulatory Referral to Physical Therapy; Future    Frequent falls    Orders:    Ambulatory Referral to Physical Therapy; Future    Physical deconditioning    Orders:    Ambulatory Referral to Physical Therapy; Future    Gastroesophageal  "reflux disease without esophagitis  Symptoms well-controlled with lansoprazole and Tums              History of Present Illness   HPI  Patient presents for emergency room follow-up on 2/17/2025 patient went to [facility] emergency department for dysuria, urinary frequency, pelvic fullness, perineal wound, and left leg swelling and redness.  Labs were unremarkable aside from chronic anemia, and positive nitrates and leukocyte esterase on urinalysis, with several epithelial cells and some WBCs.  Lower extremity venous duplex showed no DVT in the left lower extremity.  CT showed new mild right hydronephroureter with no stone, moderate diverticulosis without diverticulitis, and findings suggestive of pelvic floor prolapse.  She was sent home with antibiotics and Pyridium for UTI.  Today, patient still complains of perineal wound, but left leg swelling/redness and urinary symptoms have resolved.  Spoke with the daughter, who requests letter for emotional support cat.  She also requests refill of Imdur.  We discussed regular weight checks due to her history of heart failure.  We also discussed initiating regular blood pressure monitoring due to her history of hypertension. Discussed risks of continuing on current antihypertensive dose, as patient has had low blood pressure for the past couple visits.  Patient reports good appetite, though she admits that she does not eat her veggies.  She reports good sleep.  Discussed shingles vaccine.  Patient will be following up with GYN in 8 days.  Review of Systems   Respiratory:          Mild intermittent shortness of breath.  Some coughing, mostly at night.   Gastrointestinal:  Negative for constipation and diarrhea.        Heartburn   Genitourinary:  Negative for difficulty urinating, dysuria, frequency and hematuria.        Perineal lesion   Psychiatric/Behavioral:  Negative for sleep disturbance.         Difficulty with memory       Objective   /72   Pulse 73   Ht 4' 7\" (1.397 " m)   Wt 60.8 kg (134 lb)   SpO2 99%   BMI 31.14 kg/m²      Physical Exam  Vitals reviewed.   Constitutional:       General: She is not in acute distress.     Appearance: Normal appearance. She is obese. She is not ill-appearing, toxic-appearing or diaphoretic.   Cardiovascular:      Rate and Rhythm: Normal rate and regular rhythm.      Heart sounds: Normal heart sounds.   Pulmonary:      Effort: Pulmonary effort is normal. No respiratory distress.      Breath sounds: Normal breath sounds. No wheezing, rhonchi or rales.   Abdominal:      General: There is no distension.      Palpations: Abdomen is soft.      Tenderness: There is no abdominal tenderness. There is no guarding.   Musculoskeletal:      Right lower le+ Pitting Edema present.      Left lower leg: No edema.   Neurological:      Mental Status: She is alert.         This note was created with voice recognition/dictation software.  Please pardon any typos or grammatical errors.

## 2025-03-04 NOTE — ASSESSMENT & PLAN NOTE
A little too well-controlled.  Encouraged regular blood pressure monitoring at home with automatic blood pressure cuff; every other day.  Cutting Norvasc prescription in half.  Orders:    Blood Pressure KIT; Use every other day    amLODIPine (NORVASC) 2.5 mg tablet; Take 1 tablet (2.5 mg total) by mouth daily

## 2025-03-04 NOTE — LETTER
To whom it may concern,      Please allow patient's cat to reside in [facility]'s [place], as it is essential for emotional support.    Respectfully,    Matias Donovan D.O.

## 2025-03-10 ENCOUNTER — OFFICE VISIT (OUTPATIENT)
Dept: PODIATRY | Facility: CLINIC | Age: 85
End: 2025-03-10
Payer: COMMERCIAL

## 2025-03-10 ENCOUNTER — TELEPHONE (OUTPATIENT)
Dept: FAMILY MEDICINE CLINIC | Facility: CLINIC | Age: 85
End: 2025-03-10

## 2025-03-10 VITALS — WEIGHT: 134 LBS | HEIGHT: 55 IN | BODY MASS INDEX: 31.01 KG/M2 | RESPIRATION RATE: 16 BRPM

## 2025-03-10 DIAGNOSIS — B35.1 ONYCHOMYCOSIS: Primary | ICD-10-CM

## 2025-03-10 DIAGNOSIS — I73.9 PERIPHERAL VASCULAR DISEASE (HCC): ICD-10-CM

## 2025-03-10 DIAGNOSIS — G62.9 PERIPHERAL POLYNEUROPATHY: ICD-10-CM

## 2025-03-10 PROCEDURE — RECHECK: Performed by: PODIATRIST

## 2025-03-10 PROCEDURE — 11721 DEBRIDE NAIL 6 OR MORE: CPT | Performed by: PODIATRIST

## 2025-03-10 NOTE — PROGRESS NOTES
Name: Demi Donnelly      : 1940      MRN: [id number]  Encounter Provider: Kenroy Castillo DPM  Encounter Date: 3/10/2025   Encounter department: [facility] [place]  :  Assessment & Plan  Onychomycosis       Debride mycotic nails and thin the nail plates x 6 with the use of a nail nipper manually and an electric Dremel bur was used to reduce the thickness of the nail beds and smoothed the distal aspect of the nails.   Peripheral polyneuropathy         Peripheral vascular disease (HCC)         Pt was instructed to use lotion once a day on both feet such as cerave, Cetaphil or similar thick style of lotion.   Discussed proper shoe gear, daily inspections of feet, and general foot health with patient. Patient has Q9  findings and is recommended for at risk foot care every 9-10 weeks.    Patients most recent complete clinical foot exam was on: 2024      Return in about 10 weeks (around 2025).   History of Present Illness   HPI  Demi Donnelly is a 84 y.o. female who presents with chief complaint of painful thick nails on both feet.  Her aide was present with her in the room today.  Patient presents for at-risk foot care.  Patient has no acute concerns today.  Patient has significant lower extremity risk due to neuropathy, parasthesia, edema, and trophic skin changes to the lower extremity. .  History obtained from: patient    Review of Systems  Medical History Reviewed by provider this encounter:     .  Current Outpatient Medications on File Prior to Visit   Medication Sig Dispense Refill    acetaminophen (TYLENOL) 650 mg CR tablet Take 1 tablet (650 mg total) by mouth every 8 (eight) hours as needed for mild pain or moderate pain 90 tablet 2    amLODIPine (NORVASC) 2.5 mg tablet Take 1 tablet (2.5 mg total) by mouth daily 30 tablet 1    Blood Pressure KIT Use every other day 1 kit 0    dextromethorphan-guaiFENesin (ROBITUSSIN DM)  mg/5 mL syrup Take 5 mL by mouth 3 (three)  "times a day as needed for cough 473 mL 0    Eliquis 5 MG Take 1 tablet (5 mg total) by mouth 2 (two) times a day 180 tablet 3    ezetimibe (ZETIA) 10 mg tablet Take 1 tablet (10 mg total) by mouth daily 90 tablet 3    furosemide (LASIX) 20 mg tablet Take 1 tablet (20 mg total) by mouth 2 (two) times a day 180 tablet 3    isosorbide mononitrate (IMDUR) 60 mg 24 hr tablet Take 1 tablet (60 mg total) by mouth daily 90 tablet 3    lansoprazole (PREVACID) 30 mg capsule Take 1 capsule (30 mg total) by mouth daily 90 capsule 3    cholecalciferol (VITAMIN D3) 1,000 units tablet take 1 tablet by mouth once daily (Patient not taking: Reported on 1/31/2025) 90 tablet 1    losartan (COZAAR) 25 mg tablet take 1 tablet by mouth once daily (Patient not taking: Reported on 1/31/2025) 90 tablet 1    senna (SENOKOT) 8.6 mg Take 1 tablet (8.6 mg total) by mouth daily at bedtime 30 tablet 2     Current Facility-Administered Medications on File Prior to Visit   Medication Dose Route Frequency Provider Last Rate Last Admin    cyanocobalamin injection 1,000 mcg  1,000 mcg Intramuscular Q30 Days Kenroy Jerome MD   1,000 mcg at 02/19/18 1008      Social History     Tobacco Use    Smoking status: Never     Passive exposure: Never    Smokeless tobacco: Never   Vaping Use    Vaping status: Never Used   Substance and Sexual Activity    Alcohol use: No    Drug use: No    Sexual activity: Not Currently        Objective   Resp 16   Ht 4' 7\" (1.397 m)   Wt 60.8 kg (134 lb)   BMI 31.14 kg/m²      Physical Exam  Vascular status is 0/4 DP PT negative digital hair, delayed capillary refill, normal distal cooling bilaterally.  There is edema present bilaterally.  Capillary refill is approximately 3 seconds and the edema is +2 pitting.    Derm nails are brittle elongated hypertrophic white-yellow discoloration with subungual debris x 6.  There is an increased thickness and the nails are approximately 1 to 2 mm.  There is loss of turgor noted " bilaterally and thinning of the skin also seen bilaterally.      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  Mild hammertoe deformities are present digits 2 3 and 5 bilaterally.  There is minimal dorsi and plantarflexion of both feet.    Neuro light touch is intact and equal bilaterally.    Administrative Statements   I have spent a total time of 15 minutes in caring for this patient on the day of the visit/encounter including Risks and benefits of tx options, Instructions for management, Patient and family education, Importance of tx compliance, Risk factor reductions, Counseling / Coordination of care, Documenting in the medical record, and Obtaining or reviewing history  .

## 2025-03-10 NOTE — TELEPHONE ENCOUNTER
Patient daughter called to have PT referral I printed faxed to [employer] [facility] at fax # [phone]  thanks!

## 2025-04-16 ENCOUNTER — TELEMEDICINE (OUTPATIENT)
Dept: FAMILY MEDICINE CLINIC | Facility: CLINIC | Age: 85
End: 2025-04-16
Payer: COMMERCIAL

## 2025-04-16 DIAGNOSIS — I10 PRIMARY HYPERTENSION: ICD-10-CM

## 2025-04-16 DIAGNOSIS — G44.52 NEW DAILY PERSISTENT HEADACHE: Primary | ICD-10-CM

## 2025-04-16 PROCEDURE — 99213 OFFICE O/P EST LOW 20 MIN: CPT

## 2025-04-16 RX ORDER — AMLODIPINE BESYLATE 2.5 MG/1
2.5 TABLET ORAL DAILY
Qty: 30 TABLET | Refills: 1 | Status: SHIPPED | OUTPATIENT
Start: 2025-04-16

## 2025-04-16 NOTE — PROGRESS NOTES
Name: Demi Donnelly      : 1940      MRN: [id number]  Encounter Provider: Darline Morillo MD  Encounter Date: 2025   Encounter department: [facility]  The patient was identified by name and date of birth. Patient was informed that this is a telemedicine visit and that the visit is being conducted virtually. She agrees to proceed. My office door was closed. The patient was notified there were no  individuals present in the room other than myself.  She acknowledged consent and understanding of privacy and security of the virtual office. The patient has agreed to participate and understands they can discontinue the visit at any time.     Patient is aware this is a billable service.     Assessment & Plan  New daily persistent headache    Orders:    CT chest wo contrast; Future    CBC and differential; Future    Sedimentation rate, automated; Future           History of Present Illness   HPI    An 84-year-old female with multiple comorbidities presents with her first episode of severe headache, located at the vertex and described as throbbing in nature. The headache began a few days ago and is associated with nausea, vomiting (2-3 episodes yesterday), new-onset diarrhea today, and a nosebleed. She also experienced dizziness upon awakening this morning. She denies any prior history of migraine or cluster headaches, and there is no history of sinus congestion, URI symptoms, or sick contacts. She also denies visual changes or trauma. Workup includes a CT head, ESR, and CBC to rule out serious intracranial or inflammatory pathology. Her caregiver will assist with follow-up and coordination of care.  Review of Systems   Constitutional:  Negative for fever.   HENT:  Negative for congestion, rhinorrhea and sinus pressure.    Eyes:  Negative for photophobia and visual disturbance.   Respiratory:  Negative for cough.    Gastrointestinal:  Positive for diarrhea, nausea and vomiting.    Neurological:  Positive for light-headedness and headaches. Negative for syncope.   Psychiatric/Behavioral:  Negative for agitation.        Virtual visit    Objective   There were no vitals taken for this visit.     Physical Exam

## 2025-04-28 ENCOUNTER — TELEPHONE (OUTPATIENT)
Dept: FAMILY MEDICINE CLINIC | Facility: CLINIC | Age: 85
End: 2025-04-28

## 2025-04-28 DIAGNOSIS — G44.89 OTHER HEADACHE SYNDROME: Primary | ICD-10-CM

## 2025-04-28 NOTE — TELEPHONE ENCOUNTER
[facility] scheduling called, requested confirmation on imaging that pt is trying to schedule.  Pt has a CT chest WO contrast but dx is flagged and asking if a CT of Head is warranted instead.  Please advise as pt is trying to schedule but clarification needed as well as a dx for CT of chest if that is what is to be ordered.  Thank you

## 2025-05-14 ENCOUNTER — TELEPHONE (OUTPATIENT)
Dept: FAMILY MEDICINE CLINIC | Facility: CLINIC | Age: 85
End: 2025-05-14

## 2025-05-14 NOTE — TELEPHONE ENCOUNTER
Received approval for CT HEAD auth # [id number] valid 5/14/25-6/11/25    ISAI Bennett at [facility] with approval info at [phone]

## 2025-05-19 ENCOUNTER — RESULTS FOLLOW-UP (OUTPATIENT)
Dept: PEDIATRICS CLINIC | Facility: CLINIC | Age: 85
End: 2025-05-19

## 2025-05-19 NOTE — RESULT ENCOUNTER NOTE
Attempted to reach her caregiver but no one replied and mail box is full. The CT scan results are normal.

## 2025-06-11 DIAGNOSIS — I10 PRIMARY HYPERTENSION: ICD-10-CM

## 2025-06-12 RX ORDER — AMLODIPINE BESYLATE 2.5 MG/1
2.5 TABLET ORAL DAILY
Qty: 30 TABLET | Refills: 3 | Status: SHIPPED | OUTPATIENT
Start: 2025-06-12

## 2025-07-01 DIAGNOSIS — I25.10 CORONARY ARTERY DISEASE INVOLVING NATIVE HEART, UNSPECIFIED VESSEL OR LESION TYPE, UNSPECIFIED WHETHER ANGINA PRESENT: ICD-10-CM

## 2025-07-01 RX ORDER — FUROSEMIDE 20 MG/1
20 TABLET ORAL 2 TIMES DAILY
Qty: 180 TABLET | Refills: 3 | Status: SHIPPED | OUTPATIENT
Start: 2025-07-01

## 2025-07-03 ENCOUNTER — PROCEDURE VISIT (OUTPATIENT)
Dept: PODIATRY | Facility: CLINIC | Age: 85
End: 2025-07-03
Payer: COMMERCIAL

## 2025-07-03 VITALS
BODY MASS INDEX: 31.98 KG/M2 | HEIGHT: 55 IN | OXYGEN SATURATION: 98 % | HEART RATE: 78 BPM | WEIGHT: 138.2 LBS | TEMPERATURE: 98.4 F

## 2025-07-03 DIAGNOSIS — G62.9 PERIPHERAL POLYNEUROPATHY: ICD-10-CM

## 2025-07-03 DIAGNOSIS — M79.674 PAIN IN TOES OF BOTH FEET: ICD-10-CM

## 2025-07-03 DIAGNOSIS — I73.9 PERIPHERAL VASCULAR DISEASE (HCC): ICD-10-CM

## 2025-07-03 DIAGNOSIS — M79.675 PAIN IN TOES OF BOTH FEET: ICD-10-CM

## 2025-07-03 DIAGNOSIS — B35.1 ONYCHOMYCOSIS: Primary | ICD-10-CM

## 2025-07-03 DIAGNOSIS — Z79.01 LONG TERM CURRENT USE OF ANTICOAGULANT: ICD-10-CM

## 2025-07-03 PROCEDURE — 11721 DEBRIDE NAIL 6 OR MORE: CPT | Performed by: PODIATRIST

## 2025-07-03 RX ORDER — CLOTRIMAZOLE AND BETAMETHASONE DIPROPIONATE 10; .64 MG/G; MG/G
CREAM TOPICAL
COMMUNITY
Start: 2025-04-04

## 2025-07-03 RX ORDER — ONDANSETRON 4 MG/1
4 TABLET, ORALLY DISINTEGRATING ORAL EVERY 8 HOURS PRN
COMMUNITY
Start: 2025-04-01

## 2025-07-03 NOTE — PROGRESS NOTES
"Name: Demi Donnelly      : 1940      MRN: [id number]  Encounter Provider: Kenroy Castillo DPM  Encounter Date: 7/3/2025   Encounter department: [facility] [place]  :  Assessment & Plan  Onychomycosis       Debride mycotic nails and thin the nail plates x 6 with the use of a nail nipper manually and an electric Dremel bur was used to reduce the thickness of the nail beds and smoothed the distal aspect of the nails.   Peripheral polyneuropathy         Peripheral vascular disease (HCC)         Pain in toes of both feet         Long term current use of anticoagulant         Reviewed PCPs notes from 3/4/2025.  Discussed proper shoe gear, daily inspections of feet, and general foot health with patient. Patient has Q9  findings and is recommended for at risk foot care every 9-10 weeks.    Patients most recent complete clinical foot exam was on: 3/10/2025    Return in about 10 weeks (around 2025).       History of Present Illness   HPI  Demi Donnelly is a 85 y.o. female who presents with chief complaint of painful thick nails on both feet.  No change in her medical history or medications since her last visit and she is in fair spirits.  Her aide is present in the room for today's visit.  She uses a rollator to get around.  Patient presents for at-risk foot care.  Patient has no acute concerns today.  Patient has significant lower extremity risk due to neuropathy, parasthesia, edema, and trophic skin changes to the lower extremity.   History obtained from: patient    Review of Systems  Medical History Reviewed by provider this encounter:     .  Medications Ordered Prior to Encounter[1]   Social History[2]     Objective   Pulse 78   Temp 98.4 °F (36.9 °C) (Temporal)   Ht 4' 7\" (1.397 m)   Wt 62.7 kg (138 lb 3.2 oz)   SpO2 98%   BMI 32.12 kg/m²      Physical Exam  Vascular status is 0/4 DP PT negative digital hair, delayed capillary refill, normal distal cooling bilaterally.  There is " edema present bilaterally.  Capillary refill is approximately 3 seconds and the edema is +1 to +2 pitting with the left extremity being worse than the right.     Derm nails are brittle elongated hypertrophic white-yellow discoloration with subungual debris x 6.  There is an increased thickness and the nails are approximately 1 to 2 mm.  The medial border of the left hallux nail is completely loosened and came off in 1 she.  No bleeding was noted and no breaks were seen.  There is loss of turgor noted bilaterally and thinning of the skin also seen bilaterally.       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  Mild hammertoe deformities are present digits 2 3 and 5 bilaterally.  There is minimal dorsi and plantarflexion of both feet.  There is lateral deviation noted to the digits of 2 through 5 on the left extremity.  There is also a hallux valgus deformity noted on the left extremity.         [1]   Current Outpatient Medications on File Prior to Visit   Medication Sig Dispense Refill    acetaminophen (TYLENOL) 650 mg CR tablet Take 1 tablet (650 mg total) by mouth every 8 (eight) hours as needed for mild pain or moderate pain 90 tablet 2    amLODIPine (NORVASC) 2.5 mg tablet Take 1 tablet (2.5 mg total) by mouth daily 30 tablet 3    Blood Pressure KIT Use every other day 1 kit 0    clotrimazole-betamethasone (LOTRISONE) 1-0.05 % cream apply to affected area twice a day until IMPROVED THEN TAPER OFF WHEN REDNESS FADES      Eliquis 5 MG Take 1 tablet (5 mg total) by mouth 2 (two) times a day 180 tablet 3    ezetimibe (ZETIA) 10 mg tablet Take 1 tablet (10 mg total) by mouth daily 90 tablet 3    furosemide (LASIX) 20 mg tablet Take 1 tablet (20 mg total) by mouth 2 (two) times a day 180 tablet 3    isosorbide mononitrate (IMDUR) 60 mg 24 hr tablet Take 1 tablet (60 mg total) by mouth daily 90 tablet 3     lansoprazole (PREVACID) 30 mg capsule Take 1 capsule (30 mg total) by mouth daily 90 capsule 3    ondansetron (ZOFRAN-ODT) 4 mg disintegrating tablet Take 4 mg by mouth every 8 (eight) hours as needed for nausea or vomiting      senna (SENOKOT) 8.6 mg Take 1 tablet (8.6 mg total) by mouth daily at bedtime 30 tablet 2    cholecalciferol (VITAMIN D3) 1,000 units tablet take 1 tablet by mouth once daily (Patient not taking: Reported on 1/31/2025) 90 tablet 1    dextromethorphan-guaiFENesin (ROBITUSSIN DM)  mg/5 mL syrup Take 5 mL by mouth 3 (three) times a day as needed for cough 473 mL 0    losartan (COZAAR) 25 mg tablet take 1 tablet by mouth once daily (Patient not taking: Reported on 1/31/2025) 90 tablet 1     Current Facility-Administered Medications on File Prior to Visit   Medication Dose Route Frequency Provider Last Rate Last Admin    cyanocobalamin injection 1,000 mcg  1,000 mcg Intramuscular Q30 Days Kenroy Jerome MD   1,000 mcg at 02/19/18 1008   [2]   Social History  Tobacco Use    Smoking status: Never     Passive exposure: Never    Smokeless tobacco: Never   Vaping Use    Vaping status: Never Used   Substance and Sexual Activity    Alcohol use: No    Drug use: No    Sexual activity: Not Currently

## 2025-08-19 DIAGNOSIS — I25.10 CORONARY ARTERY DISEASE INVOLVING NATIVE HEART, UNSPECIFIED VESSEL OR LESION TYPE, UNSPECIFIED WHETHER ANGINA PRESENT: ICD-10-CM

## 2025-08-19 DIAGNOSIS — Z90.5 SINGLE KIDNEY: Chronic | ICD-10-CM

## 2025-08-19 DIAGNOSIS — I10 ESSENTIAL HYPERTENSION: ICD-10-CM

## 2025-08-19 DIAGNOSIS — M46.87 OTHER SPECIFIED INFLAMMATORY SPONDYLOPATHIES, LUMBOSACRAL REGION (HCC): ICD-10-CM

## 2025-08-19 DIAGNOSIS — I48.91 ATRIAL FIBRILLATION, UNSPECIFIED TYPE (HCC): ICD-10-CM

## 2025-08-19 DIAGNOSIS — R00.1 JUNCTIONAL BRADYCARDIA: ICD-10-CM

## 2025-08-19 DIAGNOSIS — E66.01 OBESITY, MORBID (HCC): ICD-10-CM

## 2025-08-19 DIAGNOSIS — I50.32 CHRONIC DIASTOLIC CHF (CONGESTIVE HEART FAILURE) (HCC): ICD-10-CM

## 2025-08-19 DIAGNOSIS — E55.9 VITAMIN D DEFICIENCY: ICD-10-CM

## 2025-08-19 DIAGNOSIS — G20.A1 PARKINSON'S DISEASE (HCC): ICD-10-CM

## 2025-08-19 DIAGNOSIS — N18.31 STAGE 3A CHRONIC KIDNEY DISEASE (HCC): ICD-10-CM

## 2025-08-19 RX ORDER — APIXABAN 5 MG/1
5 TABLET, FILM COATED ORAL 2 TIMES DAILY
Qty: 180 TABLET | Refills: 3 | Status: SHIPPED | OUTPATIENT
Start: 2025-08-19

## 2025-08-19 RX ORDER — LANSOPRAZOLE 30 MG/1
30 CAPSULE, DELAYED RELEASE ORAL DAILY
Qty: 90 CAPSULE | Refills: 3 | Status: SHIPPED | OUTPATIENT
Start: 2025-08-19

## 2025-08-19 RX ORDER — ISOSORBIDE MONONITRATE 60 MG/1
60 TABLET, EXTENDED RELEASE ORAL DAILY
Qty: 90 TABLET | Refills: 3 | Status: SHIPPED | OUTPATIENT
Start: 2025-08-19